# Patient Record
Sex: MALE | Race: WHITE | URBAN - METROPOLITAN AREA
[De-identification: names, ages, dates, MRNs, and addresses within clinical notes are randomized per-mention and may not be internally consistent; named-entity substitution may affect disease eponyms.]

---

## 2023-01-27 ENCOUNTER — TELEPHONE (OUTPATIENT)
Dept: OTHER | Facility: OTHER | Age: 68
End: 2023-01-27

## 2023-01-28 ENCOUNTER — TELEPHONE (OUTPATIENT)
Dept: OTHER | Facility: OTHER | Age: 68
End: 2023-01-28

## 2023-01-28 NOTE — TELEPHONE ENCOUNTER
Jacinto Rosales called, requesting a call back from on call provider, to review new admission order   Provider paged via [facility]

## 2023-01-30 ENCOUNTER — NURSING HOME VISIT (OUTPATIENT)
Dept: GERIATRICS | Facility: OTHER | Age: 68
End: 2023-01-30

## 2023-01-30 DIAGNOSIS — R06.09 DYSPNEA ON EXERTION: Primary | ICD-10-CM

## 2023-01-30 DIAGNOSIS — E66.01 MORBID OBESITY (HCC): ICD-10-CM

## 2023-01-30 DIAGNOSIS — G47.33 OSA (OBSTRUCTIVE SLEEP APNEA): ICD-10-CM

## 2023-01-30 DIAGNOSIS — I50.43 ACUTE ON CHRONIC COMBINED SYSTOLIC AND DIASTOLIC CONGESTIVE HEART FAILURE (HCC): ICD-10-CM

## 2023-01-30 DIAGNOSIS — I10 PRIMARY HYPERTENSION: ICD-10-CM

## 2023-01-30 DIAGNOSIS — R26.2 AMBULATORY DYSFUNCTION: ICD-10-CM

## 2023-01-30 DIAGNOSIS — Z86.711 HISTORY OF PULMONARY EMBOLISM: ICD-10-CM

## 2023-01-30 PROBLEM — I82.409 DVT (DEEP VENOUS THROMBOSIS) (HCC): Status: ACTIVE | Noted: 2023-01-30

## 2023-01-30 PROBLEM — I50.9 CHF (CONGESTIVE HEART FAILURE) (HCC): Status: ACTIVE | Noted: 2023-01-30

## 2023-01-30 NOTE — PROGRESS NOTES
[address]  [address] [facility] 19 [facility], [address] [place] [address] 31  History and Physical    NAME: David Lam  AGE: 69 y o  SEX: male [id number]    DATE OF ENCOUNTER: 2/3/2023    Code status:  CPR    Assessment and Plan     1  Dyspnea on exertion  - non compliant with oxygen and CPAP use  - on chronic 4 lit oxygen via NC  - cont Mucinex 600 mg po bid  - cont duonebs q6     2  Ambulatory dysfunction  - uses walker at baseline  - mostly sedentary at home due to body habitus  - PT/OT ordered  - Fall precautions in place    3  Morbid obesity ([address] )  - due excessive calories and inactivity  - nutrition consult for balanced diet    4  Primary hypertension  - controlled    5  EVERETT (obstructive sleep apnea)  - non compliant with CPAP  - consult resp therapist    6  Acute on chronic combined systolic and diastolic congestive heart failure (HCC)  - improving  - cont fluid restriction 2000 ml  - cont daily weights  - cont Furosemide 40 mg po qd    7  History of pulmonary embolism  - cont coumadin at current dose  - monitor PT/INR      All medications and routine orders were reviewed and updated as needed  Plan discussed with: patient and staff    Chief Complaint     Seen for admission at [address] [place] [address]    History of Present Illness     Consuelo Esposito, a 68 y/o male with PMH of HTN, CHF, M  Obesity, h/o PE/DVT got admitted to the hospital with worsening SOB, due to acute on chronic CHF  Cardiology consulted, treated with IV lasix  Echo was done with no myocardial abnormalities and normal systolic function  Pulmonology consulted, recommended another trial of CPAP  He is on 4 lit of O2 via NC at baseline  He is not tolerant and refused BiPAP and/or CPAP, recommended out patient sleep study follow up  He has sacral and left posterior thigh deep tissue and pressure injury  PT evaluated and recommended physical therapy  He got discharged to [facility] post acute for subacute rehab    He was seen and examined at bedside, stable  He found sitting in bed propped up with pillows  He is c/o about bed, it is small in length  He has chronic back pain  He lives at home with his wife, uses walker, independent of ADLs  Staff have no concerns at this time  HISTORY:  Past Medical History:   Diagnosis Date   • CHF (congestive heart failure) ([facility])    • DVT (deep venous thrombosis) ([facility])    • HTN (hypertension)    • Morbid obesity ([facility])    • EVERETT (obstructive sleep apnea)    • Pulmonary embol[facility] )      History reviewed  No pertinent family history  Social History     Socioeconomic History   • Marital status: /Civil Union     Spouse name: None   • Number of children: None   • Years of education: None   • Highest education level: None   Occupational History   • None   Tobacco Use   • Smoking status: None   • Smokeless tobacco: None   Substance and Sexual Activity   • Alcohol use: None   • Drug use: None   • Sexual activity: None   Other Topics Concern   • None   Social History Narrative   • None     Social Determinants of Health     Financial Resource Strain: Not on file   Food Insecurity: Not on file   Transportation Needs: Not on file   Physical Activity: Not on file   Stress: Not on file   Social Connections: Not on file   Intimate Partner Violence: Not on file   Housing Stability: Not on file       Allergies: Allergies   Allergen Reactions   • Baclofen Other (See Comments)     Nausea and vomiting       Review of Systems     Review of Systems   Constitutional: Positive for activity change and fatigue  HENT: Negative for dental problem, hearing loss and trouble swallowing  Eyes: Negative  Respiratory: Negative  Cardiovascular: Negative  Gastrointestinal: Negative  Genitourinary: Negative  Musculoskeletal: Positive for arthralgias, back pain and gait problem  Neurological: Negative for weakness  Psychiatric/Behavioral: Negative      All other systems reviewed and are negative  As in HPI  Medications and orders     All medications reviewed and updated in Nursing Home EMR  Objective     Vitals: T: 97 3, P: 93, R: 18, BP: 146/64, 96% on 4 lit, Wt: 450 lbs    Physical Exam  Vitals and nursing note reviewed  Constitutional:       General: He is not in acute distress  Appearance: He is well-developed  He is obese  He is not diaphoretic  HENT:      Head: Normocephalic and atraumatic  Nose: Nose normal       Mouth/Throat:      Mouth: Mucous membranes are moist       Pharynx: Oropharynx is clear  No oropharyngeal exudate  Eyes:      General: No scleral icterus  Right eye: No discharge  Left eye: No discharge  Extraocular Movements: Extraocular movements intact  Conjunctiva/sclera: Conjunctivae normal       Comments: Keeps eyes closed mostly  Cardiovascular:      Rate and Rhythm: Normal rate and regular rhythm  Heart sounds: Normal heart sounds  No murmur heard  Pulmonary:      Effort: Pulmonary effort is normal  No respiratory distress  Breath sounds: Normal breath sounds  No wheezing  Chest:      Chest wall: No tenderness  Abdominal:      General: Bowel sounds are normal  There is no distension  Palpations: Abdomen is soft  Tenderness: There is no abdominal tenderness  There is no guarding  Musculoskeletal:         General: No tenderness or deformity  Right lower leg: No edema  Left lower leg: No edema  Comments: Impaired ROM due to body habitus  Skin:     General: Skin is warm and dry  Neurological:      Mental Status: He is alert and oriented to person, place, and time  Cranial Nerves: No cranial nerve deficit  Motor: No abnormal muscle tone  Coordination: Coordination normal    Psychiatric:         Mood and Affect: Mood normal          Behavior: Behavior normal          Pertinent Laboratory/Diagnostic Studies:    The following labs/studies were reviewed please see chart or hospital paperwork for details  Ref Range & Units 1/24/23 0252    Glucose 65 - 99 mg/dL 114 High     BUN 7 - 28 mg/dL 11    Creatinine 0 53 - 1 30 mg/dL 0 63    Sodium 135 - 145 mmol/L 137    Potassium 3 5 - 5 2 mmol/L 4 4    Chloride 100 - 109 mmol/L 89 Low     Carbon Dioxide 23 - 31 mmol/L >40 High     Calcium 8 5 - 10 1 mg/dL 9 1    Anion Gap 3 - 11 Unable to perform calculation      eGFRcr >59 103      Ref Range & Units 1/22/23 0439    Hemoglobin 12 5 - 17 0 g/dL 12 5    Hematocrit 37 0 - 48 0 % 38 9    WBC 4 0 - 10 5 thou/cmm 6 4    RBC 4 00 - 5 40 mill/cmm 3 77 Low     Platelet Count 090 - 350 thou/cmm 174    MPV 7 5 - 11 3 fL 7 6    MCV 80 - 100 fL 103 High     MCH 27 0 - 36 0 pg 33 1    MCHC 32 0 - 37 0 g/dL 32 1    RDW 12 0 - 16 0 % 12 9        - Counseling Documentation: patient was counseled regarding: risk factor reductions

## 2023-02-01 ENCOUNTER — NURSING HOME VISIT (OUTPATIENT)
Dept: GERIATRICS | Facility: OTHER | Age: 68
End: 2023-02-01

## 2023-02-01 VITALS
RESPIRATION RATE: 18 BRPM | WEIGHT: 315 LBS | HEART RATE: 77 BPM | OXYGEN SATURATION: 96 % | SYSTOLIC BLOOD PRESSURE: 144 MMHG | TEMPERATURE: 97.8 F | DIASTOLIC BLOOD PRESSURE: 74 MMHG

## 2023-02-01 DIAGNOSIS — B02.29 NEURALGIA, POST-HERPETIC: ICD-10-CM

## 2023-02-01 DIAGNOSIS — L89.95 PRESSURE INJURY, UNSTAGEABLE, WITH SUSPECTED DEEP TISSUE INJURY (HCC): ICD-10-CM

## 2023-02-01 DIAGNOSIS — R53.81 DEBILITY: ICD-10-CM

## 2023-02-01 DIAGNOSIS — J96.12 CHRONIC RESPIRATORY FAILURE WITH HYPOXIA AND HYPERCAPNIA (HCC): Primary | ICD-10-CM

## 2023-02-01 DIAGNOSIS — I50.812 CHRONIC RIGHT-SIDED CONGESTIVE HEART FAILURE (HCC): ICD-10-CM

## 2023-02-01 DIAGNOSIS — J96.11 CHRONIC RESPIRATORY FAILURE WITH HYPOXIA AND HYPERCAPNIA (HCC): Primary | ICD-10-CM

## 2023-02-01 DIAGNOSIS — F95.2 GILLES DE LA TOURETTE'S SYNDROME: ICD-10-CM

## 2023-02-01 DIAGNOSIS — Z86.711 HISTORY OF PULMONARY EMBOLISM: ICD-10-CM

## 2023-02-01 PROBLEM — J98.4 RESTRICTIVE LUNG DISEASE: Status: ACTIVE | Noted: 2020-04-13

## 2023-02-01 PROBLEM — I50.33 ACUTE ON CHRONIC DIASTOLIC (CONGESTIVE) HEART FAILURE (HCC): Status: ACTIVE | Noted: 2021-12-28

## 2023-02-01 PROBLEM — E66.2 OBESITY HYPOVENTILATION SYNDROME (HCC): Status: ACTIVE | Noted: 2018-02-05

## 2023-02-01 PROBLEM — Z79.899 MEDICAL MARIJUANA USE: Status: ACTIVE | Noted: 2018-09-21

## 2023-02-01 PROBLEM — I27.20 PULMONARY HYPERTENSION (HCC): Status: ACTIVE | Noted: 2018-01-15

## 2023-02-01 PROBLEM — M15.9 DJD (DEGENERATIVE JOINT DISEASE), MULTIPLE SITES: Status: ACTIVE | Noted: 2021-06-30

## 2023-02-01 PROBLEM — Z02.89 PAIN MEDICATION AGREEMENT SIGNED: Status: ACTIVE | Noted: 2017-09-06

## 2023-02-01 RX ORDER — TAMSULOSIN HYDROCHLORIDE 0.4 MG/1
0.4 CAPSULE ORAL
COMMUNITY

## 2023-02-01 RX ORDER — FUROSEMIDE 40 MG/1
40 TABLET ORAL DAILY
COMMUNITY
Start: 2023-01-28 | End: 2024-01-28

## 2023-02-01 RX ORDER — ACETAMINOPHEN 500 MG
1000 TABLET ORAL EVERY 6 HOURS PRN
COMMUNITY

## 2023-02-01 RX ORDER — WARFARIN SODIUM 6 MG/1
9 TABLET ORAL
COMMUNITY

## 2023-02-01 RX ORDER — FEXOFENADINE HCL AND PSEUDOEPHEDRINE HCI 180; 240 MG/1; MG/1
1 TABLET, EXTENDED RELEASE ORAL DAILY PRN
COMMUNITY
Start: 2023-01-27 | End: 2024-01-27

## 2023-02-01 RX ORDER — PREGABALIN 150 MG/1
150 CAPSULE ORAL 4 TIMES DAILY
COMMUNITY
Start: 2022-10-26

## 2023-02-01 RX ORDER — FINASTERIDE 5 MG/1
5 TABLET, FILM COATED ORAL DAILY
COMMUNITY

## 2023-02-01 RX ORDER — IPRATROPIUM BROMIDE AND ALBUTEROL SULFATE 2.5; .5 MG/3ML; MG/3ML
3 SOLUTION RESPIRATORY (INHALATION) 4 TIMES DAILY
COMMUNITY
Start: 2023-01-27 | End: 2024-01-27

## 2023-02-01 RX ORDER — METHOCARBAMOL 500 MG/1
500 TABLET, FILM COATED ORAL 4 TIMES DAILY
COMMUNITY

## 2023-02-01 RX ORDER — CYCLOBENZAPRINE HCL 10 MG
10 TABLET ORAL 3 TIMES DAILY PRN
COMMUNITY
Start: 2023-01-27 | End: 2023-02-01

## 2023-02-01 RX ORDER — RISPERIDONE 1 MG/1
1 TABLET ORAL 3 TIMES DAILY
COMMUNITY
Start: 2022-12-01

## 2023-02-01 NOTE — PROGRESS NOTES
[facility]  [address]  [phone]  FACILITY: [facility]  Code 31 (STR)      NAME: Elaina Stafford  AGE: 69 y o  SEX: male CODE STATUS: CPR    DATE OF ENCOUNTER: 2/1/2023    Assessment and Plan     1  Chronic respiratory failure with hypoxia and hypercapnia (HCC)  Assessment & Plan:  · Secondary to obstructive sleep apnea and Obesity hypoventilation syndrome  · Currently on his baseline home oxygen 4 L nasal cannula  · Patient was unable to tolerate CPAP/BiPAP  · Pulmonary and cardiology were following while inpatient, he was stable from a cardiology standpoint  · Pulmonary recommended CPAP or BiPAP and outpatient sleep study  · Patient is requesting to have a sleep study done while at [facility] will discuss with respiratory therapy   · Continue DuoNebs  · Continue furosemide                  2  Chronic right-sided congestive heart failure ([address] )  Assessment & Plan:  · Continue daily weights  · Continue Lasix 40 mg daily  · Check BMP to monitor renal function on Friday 2/3/23  · Patient was prescribed Flexeril on discharge however there is a high interaction with CHF patient's and possibility of cardiotoxicity  · Will discontinue Flexeril and start Robaxin      3  History of pulmonary embolism  Assessment & Plan:  · On Coumadin 9 mg daily, INR today 2 6, Repeat INR Friday with a CBC  · Patient states at home he takes 9mg Tue-Sun and 10 mg Monday  · Denies any s/s of bleeding       4  Neuralgia, post-herpetic  Assessment & Plan:  · Chronic pain x10 years  · Patient on Lyrica and Tylenol   · Pain medications prescribed by Dr Clif Peña per patient   · Was previously on  Robaxin but switched to [facility] ( significant contraindication with Flexaril and CHF with potential to cause Cardiovascular Toxicity)   · Will switch to Robaxin  · Per [facility] records: Avoid narcotics  He has narcotic seeking tendency              5  Debility  Assessment & Plan:  • Multifactorial in setting of morbid obesity, CHF/EVERETT  • Lives at home with wife who drives, he states he is mostly sedentary, was able to ambulate with RW short distances prior to recent hospital stay  • Continue PT/OT  • Fall Precautions  • Ensure adequate nutrition/hydration   • Monitor CBC/BMP   •  following for d/c planning         6  Fercho de la Tourette's syndrome  Assessment & Plan:  Continue Risperidone 1 mg TID       7  Pressure injury, unstageable, with suspected deep tissue injury [facility])  Assessment & Plan:  · Per review of [facility] records patient presented with a DTI to Sacrum and Left Posterior thigh from home  · Patient states at home he is mostly sedentary, no recent fall/injury noted   Plan:   · Recommend daily skin checks  · Turn and Reposition/Off load  · Keep skin clean and dry  · Elevate heels off bed with Prevelon boots or pillows  · Barrier cream to buttocks and heels          All medications and routine orders were reviewed and updated as needed  Chief Complaint     STR follow up visit    Past Medical and Surgica History      Past Medical History:   Diagnosis Date   • CHF (congestive heart failure) ([facility] )    • DVT (deep venous thrombosis) ([facility])    • HTN (hypertension)    • Morbid obesity (HCC)    • EVERETT (obstructive sleep apnea)    • Pulmonary embolism (HCC)      No past surgical history on file  Allergies   Allergen Reactions   • Baclofen Other (See Comments)     Nausea and vomiting          History of Present Illness     Celso Pily "Varun Jimenez" is a 61-year-[facility] hospitalization for CHF exacerbation  Past medical history of chronic respiratory failure on 4L baseline, diastolic CHF, DVT/PE on coumadin, HTN, morbid obesity, ambulatory dysfunction  Patient is seen today for routine short-term rehab follow-up visit  Patient initially presented to [facility] on 1/20/2023 with complaint of increasing VALERIO x 1 month   He was diagnosed with acute on chronic CHF  He noted a 10 lb weight gain  He was hypoxic 80% on his home 4L and put on 6 L by EMS  CXR revealed cardiomegaly with interstitial pulmonary edema and basilar effusion in the LLL with small effusion on the right  He was treated with 40 mg IV lasix  Cardiology was consulted, recommended obtaining an echocardiogram which revealed LVEF of 60 to 65%, wall motion could not be accurately assessed but appears grossly normal, indeterminate diastolic function due to very technically difficult study  Cardiology felt patient's dyspnea on exertion was not associated with cardiac abnormalities and recommended outpatient cardiology may need a right heart cath at some point, cautioned use of high-dose diuretics which will contribute to his hypokalemia and metabolic alkalosis  Pulmonology was consulted, he was recommended CPAP but was unable to tolerate  He was diagnosed with severe chronic hypercapnic and hypoxic respiratory failure due to EVERETT and OHS intolerant of CPAP as well as BiPAP  Per review of hospital records, May need outpatient PSG for titration but patient not interested because he cannot tolerate BiPAP or CPAP  Patient and wife were concerned about his distended abdomen, ultrasound of the abdomen negative for ascites  Fatty abdomen and obesity causing difficulty with breathing and dyspnea on exertion again noted he was unable to tolerate CPAP/BiPAP  He was noted to have a deep tissue pressure injury to sacrum and left posterior thigh present on admission to hospital   Patient was evaluated by PT/OT multiple times with slow improvement he was able to ambulate about 30 feet and he was recommended discharge to short-term rehab  Patient was seen and examined at bedside in stable condition  Patient states yesterday was a good day however today he is not feeling as well, his main complaint is fatigue  States he has continued shortness of breath    As well as his chronic back pain  Patient is agreeable to labs being drawn on Friday  Patient is requesting to see if he can have a sleep study done while at SNF due to issue with transportation once home  Patient denies any CP/palpitations  Denies any nausea vomiting or diarrhea  Denies any abdominal pain on exam   Denies any bowel or bladder issues  Staff have no concerns at this time  The patient's allergies, past medical, surgical, social and family history were reviewed and unchanged  Review of Systems     Review of Systems   Constitutional: Positive for fatigue  Negative for activity change, appetite change and fever  HENT: Negative for ear pain, rhinorrhea and trouble swallowing  Eyes: Negative for pain and visual disturbance  Respiratory: Positive for shortness of breath  Negative for cough and wheezing  Cardiovascular: Negative for chest pain and palpitations  Gastrointestinal: Negative for abdominal distention, abdominal pain, blood in stool, constipation, diarrhea, nausea and vomiting  Genitourinary: Negative for decreased urine volume, difficulty urinating, dysuria, frequency and hematuria  Musculoskeletal: Positive for gait problem  Negative for arthralgias and back pain  Skin: Positive for wound  Negative for color change and rash  Neurological: Positive for weakness  Negative for dizziness, syncope, light-headedness, numbness and headaches  Psychiatric/Behavioral: Negative for dysphoric mood and sleep disturbance  Objective     Vitals:   Vitals:    02/01/23 0837   BP: 144/74   Pulse: 77   Resp: 18   Temp: 97 8 °F (36 6 °C)   SpO2: 96%         Physical Exam  Vitals and nursing note reviewed  Constitutional:       General: He is not in acute distress  Appearance: Normal appearance  HENT:      Head: Normocephalic and atraumatic  Nose: No congestion or rhinorrhea        Mouth/Throat:      Mouth: Mucous membranes are moist    Eyes:      General: No scleral icterus  Extraocular Movements: Extraocular movements intact  Conjunctiva/sclera: Conjunctivae normal       Pupils: Pupils are equal, round, and reactive to light  Cardiovascular:      Rate and Rhythm: Normal rate and regular rhythm  Pulses: Normal pulses  Heart sounds: Normal heart sounds  No murmur heard  Pulmonary:      Effort: Pulmonary effort is normal       Breath sounds: Normal breath sounds  No wheezing, rhonchi or rales  Abdominal:      General: Bowel sounds are normal  There is no distension  Palpations: Abdomen is soft  Tenderness: There is no abdominal tenderness  Musculoskeletal:         General: No swelling or signs of injury  Lymphadenopathy:      Cervical: No cervical adenopathy  Skin:     General: Skin is warm and dry  Findings: No erythema  Neurological:      Mental Status: He is alert and oriented to person, place, and time  Sensory: No sensory deficit  Motor: No weakness  Gait: Gait normal    Psychiatric:         Mood and Affect: Mood normal          Behavior: Behavior normal          Pertinent Laboratory/Diagnostic Studies:   Reviewed in facility chart-stable      Current Medications   Medications reviewed and updated see facility [facility] for details        Current Outpatient Medications:   •  fexofenadine-pseudoephedrine (ALLEGRA-D 24) 180-240 MG per 24 hr tablet, Take 1 tablet by mouth daily as needed, Disp: , Rfl:   •  furosemide (LASIX) 40 mg tablet, Take 40 mg by mouth daily, Disp: , Rfl:   •  ipratropium-albuterol (DUO-NEB) 0 5-2 5 mg/3 mL nebulizer solution, Inhale 3 mL 4 (four) times a day, Disp: , Rfl:   •  methocarbamol (ROBAXIN) 500 mg tablet, Take 500 mg by mouth 4 (four) times a day, Disp: , Rfl:   •  miconazole (MICOTIN) 2 % powder, Apply 1 application topically 2 (two) times a day, Disp: , Rfl:   •  warfarin (COUMADIN) 6 mg tablet, Take 9 mg by mouth daily, Disp: , Rfl:   •  acetaminophen (TYLENOL) 500 mg tablet, Take 1,000 mg by mouth every 6 (six) hours as needed, Disp: , Rfl:   •  finasteride (PROSCAR) 5 mg tablet, Take 5 mg by mouth daily, Disp: , Rfl:   •  pregabalin (LYRICA) 150 mg capsule, Take 150 mg by mouth 4 (four) times a day, Disp: , Rfl:   •  risperiDONE (RisperDAL) 1 mg tablet, Take 1 mg by mouth 3 (three) times a day, Disp: , Rfl:   •  tamsulosin (FLOMAX) 0 4 mg, Take 0 4 mg by mouth daily at bedtime, Disp: , Rfl:      Plan discussed with Dr Promise Meyers noted agreement with assessment and plan  Please note:  Voice-recognition software may have been used in the preparation of this document  Occasional wrong word or "sound-alike" substitutions may have occurred due to the inherent limitations of voice recognition software  Interpretation should be guided by context           MO Givens  2/1/2023  8:30 AM

## 2023-02-02 NOTE — ASSESSMENT & PLAN NOTE
· Secondary to obstructive sleep apnea and Obesity hypoventilation syndrome  · Currently on his baseline home oxygen 4 L nasal cannula  · Patient was unable to tolerate CPAP/BiPAP  · Pulmonary and cardiology were following while inpatient, he was stable from a cardiology standpoint  · Pulmonary recommended CPAP or BiPAP and outpatient sleep study  · Patient is requesting to have a sleep study done while at SNF will discuss with respiratory therapy   · Continue DuoNebs  · Continue furosemide

## 2023-02-02 NOTE — ASSESSMENT & PLAN NOTE
· Continue daily weights  · Continue Lasix 40 mg daily  · Check BMP to monitor renal function on Friday 2/3/23  · Patient was prescribed Flexeril on discharge however there is a high interaction with CHF patient's and possibility of cardiotoxicity  · Will discontinue Flexeril and start Robaxin

## 2023-02-02 NOTE — ASSESSMENT & PLAN NOTE
· On Coumadin 9 mg daily, INR today 2 6, Repeat INR Friday with a CBC  · Patient states at home he takes 9mg Tue-Sun and 10 mg Monday  · Denies any s/s of bleeding

## 2023-02-02 NOTE — ASSESSMENT & PLAN NOTE
· Per review of [facility] records patient presented with a DTI to Sacrum and Left Posterior thigh from home  · Patient states at home he is mostly sedentary, no recent fall/injury noted   Plan:   · Recommend daily skin checks  · Turn and Reposition/Off load  · Keep skin clean and dry  · Elevate heels off bed with Prevelon boots or pillows  · Barrier cream to buttocks and heels

## 2023-02-02 NOTE — ASSESSMENT & PLAN NOTE
· Chronic pain x10 years  · Patient on Lyrica and Tylenol   · Pain medications prescribed by Dr Larose Bosworth per patient   · Was previously on  Robaxin but switched to [facility] ( significant contraindication with Flexaril and CHF with potential to cause Cardiovascular Toxicity)   · Will switch to Robaxin  · Per [facility] records: Avoid narcotics  He has narcotic seeking tendency

## 2023-02-03 ENCOUNTER — NURSING HOME VISIT (OUTPATIENT)
Dept: GERIATRICS | Facility: OTHER | Age: 68
End: 2023-02-03

## 2023-02-03 VITALS
SYSTOLIC BLOOD PRESSURE: 144 MMHG | WEIGHT: 315 LBS | HEART RATE: 89 BPM | OXYGEN SATURATION: 97 % | DIASTOLIC BLOOD PRESSURE: 83 MMHG | RESPIRATION RATE: 18 BRPM | TEMPERATURE: 97.8 F

## 2023-02-03 DIAGNOSIS — B02.29 NEURALGIA, POST-HERPETIC: ICD-10-CM

## 2023-02-03 DIAGNOSIS — R53.81 DEBILITY: ICD-10-CM

## 2023-02-03 DIAGNOSIS — J96.11 CHRONIC RESPIRATORY FAILURE WITH HYPOXIA AND HYPERCAPNIA (HCC): Primary | ICD-10-CM

## 2023-02-03 DIAGNOSIS — F95.2 GILLES DE LA TOURETTE'S SYNDROME: ICD-10-CM

## 2023-02-03 DIAGNOSIS — L89.95 PRESSURE INJURY, UNSTAGEABLE, WITH SUSPECTED DEEP TISSUE INJURY (HCC): ICD-10-CM

## 2023-02-03 DIAGNOSIS — J96.12 CHRONIC RESPIRATORY FAILURE WITH HYPOXIA AND HYPERCAPNIA (HCC): Primary | ICD-10-CM

## 2023-02-03 DIAGNOSIS — I50.812 CHRONIC RIGHT-SIDED CONGESTIVE HEART FAILURE (HCC): ICD-10-CM

## 2023-02-03 DIAGNOSIS — I27.20 PULMONARY HYPERTENSION (HCC): ICD-10-CM

## 2023-02-03 NOTE — ASSESSMENT & PLAN NOTE
· Continue daily weights  · Continue Lasix 40 mg daily  · BUN 10 creatinine 0 44  (2/3/23)  · Patient was prescribed Flexeril on discharge however there is a high interaction with CHF patient's and possibility of cardiotoxicity  · Proving Robaxin

## 2023-02-03 NOTE — PROGRESS NOTES
[facility]  [address]  [phone]  FACILITY: [facility]  Code 31 (STR)      NAME: Rafita Bazzi  AGE: 69 y o  SEX: male CODE STATUS: CPR    DATE OF ENCOUNTER: 2/3/2023    Assessment and Plan     1  Chronic respiratory failure with hypoxia and hypercapnia (HCC)  Assessment & Plan:  · Secondary to obstructive sleep apnea and Obesity hypoventilation syndrome  · Currently on his baseline home oxygen 4 L nasal cannula  · Patient was unable to tolerate CPAP/BiPAP  · Pulmonary and cardiology were following while inpatient, he was stable from a cardiology standpoint    · On exam today he is falling asleep during exam, he is having some confusion per staff, thinks he is in the hospital, he has muscle twitching  · His CO2 on labs today is >40   · Spoke with RT- Start Bipap 12/6 with 3L NC now and then Q HS and PRN while napping   · Continue DuoNebs  · Continue furosemide  · Continue Mucinex                 2  Chronic right-sided congestive heart failure ([facility] [address] )  Assessment & Plan:  · Continue daily weights  · Continue Lasix 40 mg daily  · BUN 10 creatinine 0 44  (2/3/23)  · Patient was prescribed Flexeril on discharge however there is a high interaction with CHF patient's and possibility of cardiotoxicity  · Proving Robaxin      3  Pulmonary hypertension ([facility] [address] )  Assessment & Plan:  Ordered Bipap 12/6 at lower setting to see if patient can tolerate   Continue lasix         4  Fercho de la Tourette's syndrome  Assessment & Plan:  Continue Risperidone 1 mg TID       5   Pressure injury, unstageable, with suspected deep tissue injury [facility])  Assessment & Plan:  · Per review of [facility] records patient presented with a DTI to Sacrum and Left Posterior thigh from home  · Patient states at home he is mostly sedentary, no recent fall/injury noted     Staff state they checked this today- the area is blanchable which would indicate not actually DTI    Plan:   · Recommend daily skin checks  · Turn and Reposition/Off load  · Keep skin clean and dry  · Elevate heels off bed with Prevelon boots or pillows  · Barrier cream to buttocks and heels       6  Debility  Assessment & Plan:  • Multifactorial in setting of morbid obesity, CHF/EVERETT  • Lives at home with wife who drives, he states he is mostly sedentary, was able to ambulate with RW short distances prior to recent hospital stay  • Continue PT/OT  • Fall Precautions  • Ensure adequate nutrition/hydration   • Monitor CBC/BMP   •  following for d/c planning         7  Neuralgia, post-herpetic  Assessment & Plan:  · Chronic pain x10 years  · Patient on Lyrica and Tylenol   · Pain medications prescribed by Dr Lashae Madrid per patient   · Was previously on  Robaxin but switched to [facility] ( significant contraindication with Flexaril and CHF with potential to cause Cardiovascular Toxicity)   · Will switch to Robaxin  · Per [facility] records: Avoid narcotics  He has narcotic seeking tendency  All medications and routine orders were reviewed and updated as needed  Chief Complaint     STR follow up visit    Past Medical and Surgica History      Past Medical History:   Diagnosis Date   • CHF (congestive heart failure) ([facility] [address] )    • DVT (deep venous thrombosis) (HCC)    • HTN (hypertension)    • Morbid obesity (HCC)    • EVERETT (obstructive sleep apnea)    • Pulmonary embolism (HCC)      No past surgical history on file  Allergies   Allergen Reactions   • Baclofen Other (See Comments)     Nausea and vomiting          History of Present Illness     Kilo Mo" is a 20-year-old male [facility] following hospitalization for CHF exacerbation  Past medical history of chronic respiratory failure on 4L baseline, diastolic CHF, DVT/PE on coumadin, HTN, morbid obesity, ambulatory dysfunction  Patient is seen today for routine short-term rehab follow-up visit      Patient initially presented to [facility] on 1/20/2023 with complaint of increasing VALERIO x 1 month  He was diagnosed with acute on chronic CHF  He noted a 10 lb weight gain  He was hypoxic 80% on his home 4L and put on 6 L by EMS  CXR revealed cardiomegaly with interstitial pulmonary edema and basilar effusion in the LLL with small effusion on the right  He was treated with 40 mg IV lasix  Cardiology was consulted, recommended obtaining an echocardiogram which revealed LVEF of 60 to 65%, wall motion could not be accurately assessed but appears grossly normal, indeterminate diastolic function due to very technically difficult study  Cardiology felt patient's dyspnea on exertion was not associated with cardiac abnormalities and recommended outpatient cardiology may need a right heart cath at some point, cautioned use of high-dose diuretics which will contribute to his hypokalemia and metabolic alkalosis  Pulmonology was consulted, he was recommended CPAP but was unable to tolerate  He was diagnosed with severe chronic hypercapnic and hypoxic respiratory failure due to EVERETT and OHS intolerant of CPAP as well as BiPAP  Per review of hospital records, May need outpatient PSG for titration but patient not interested because he cannot tolerate BiPAP or CPAP  Patient and wife were concerned about his distended abdomen, ultrasound of the abdomen negative for ascites  Fatty abdomen and obesity causing difficulty with breathing and dyspnea on exertion again noted he was unable to tolerate CPAP/BiPAP  He was noted to have a deep tissue pressure injury to sacrum and left posterior thigh present on admission to hospital   Patient was evaluated by PT/OT multiple times with slow improvement he was able to ambulate about 30 feet and he was recommended discharge to short-term rehab  Patient was seen and examined at bedside   He is confused today, thinks he is in the hospital, he is asking for more pain medication, discussed we will schedule Tylenol RTC, he was agreeable to this  He is falling alsleep during my interview  Concerning for CO2 retention, I spoke with patient , agreeable to trial BIpap again  Spoke with Resp  Therapy recommended settings of 12/6  Staff noted some increased confusion  Patient states he was out of bed today for about 1 hours working with PT  The patient's allergies, past medical, surgical, social and family history were reviewed and unchanged  Review of Systems     Review of Systems   Constitutional: Positive for fatigue  Negative for activity change, appetite change and fever  HENT: Negative for ear pain, rhinorrhea and trouble swallowing  Eyes: Negative for pain and visual disturbance  Respiratory: Positive for shortness of breath  Negative for cough and wheezing  Cardiovascular: Negative for chest pain and palpitations  Gastrointestinal: Negative for abdominal distention, abdominal pain, blood in stool, constipation, diarrhea, nausea and vomiting  Genitourinary: Negative for decreased urine volume, difficulty urinating, dysuria, frequency and hematuria  Musculoskeletal: Positive for gait problem  Negative for arthralgias and back pain  Skin: Positive for wound  Negative for color change and rash  Neurological: Positive for weakness  Negative for dizziness, syncope, light-headedness, numbness and headaches  Psychiatric/Behavioral: Negative for dysphoric mood and sleep disturbance  Objective     Vitals:   Vitals:    02/03/23 0951   BP: 144/83   Pulse: 89   Resp: 18   Temp: 97 8 °F (36 6 °C)   SpO2: 97%         Physical Exam  Vitals and nursing note reviewed  Constitutional:       General: He is not in acute distress  Appearance: Normal appearance  HENT:      Head: Normocephalic and atraumatic  Nose: No congestion or rhinorrhea  Mouth/Throat:      Mouth: Mucous membranes are moist    Eyes:      General: No scleral icterus  Extraocular Movements: Extraocular movements intact  Conjunctiva/sclera: Conjunctivae normal       Pupils: Pupils are equal, round, and reactive to light  Cardiovascular:      Rate and Rhythm: Normal rate and regular rhythm  Pulses: Normal pulses  Heart sounds: Normal heart sounds  No murmur heard  Pulmonary:      Effort: Pulmonary effort is normal       Breath sounds: Normal breath sounds  No wheezing, rhonchi or rales  Comments: Diminished, very poor effort   Abdominal:      General: Bowel sounds are normal  There is no distension  Palpations: Abdomen is soft  Tenderness: There is no abdominal tenderness  Musculoskeletal:         General: No swelling or signs of injury  Right lower leg: No edema  Left lower leg: No edema  Lymphadenopathy:      Cervical: No cervical adenopathy  Skin:     General: Skin is warm and dry  Findings: No erythema  Neurological:      Mental Status: He is alert  He is disoriented  Sensory: No sensory deficit  Motor: Weakness present  Gait: Gait abnormal    Psychiatric:         Mood and Affect: Mood normal          Behavior: Behavior normal          Pertinent Laboratory/Diagnostic Studies:   Reviewed in facility chart-stable      Current Medications   Medications reviewed and updated see facility [facility] for details        Current Outpatient Medications:   •  acetaminophen (TYLENOL) 500 mg tablet, Take 1,000 mg by mouth every 6 (six) hours as needed, Disp: , Rfl:   •  fexofenadine-pseudoephedrine (ALLEGRA-D 24) 180-240 MG per 24 hr tablet, Take 1 tablet by mouth daily as needed, Disp: , Rfl:   •  finasteride (PROSCAR) 5 mg tablet, Take 5 mg by mouth daily, Disp: , Rfl:   •  furosemide (LASIX) 40 mg tablet, Take 40 mg by mouth daily, Disp: , Rfl:   •  ipratropium-albuterol (DUO-NEB) 0 5-2 5 mg/3 mL nebulizer solution, Inhale 3 mL 4 (four) times a day, Disp: , Rfl:   •  methocarbamol (ROBAXIN) 500 mg tablet, Take 500 mg by mouth 4 (four) times a day, Disp: , Rfl:   •  miconazole (MICOTIN) 2 % powder, Apply 1 application topically 2 (two) times a day, Disp: , Rfl:   •  pregabalin (LYRICA) 150 mg capsule, Take 150 mg by mouth 4 (four) times a day, Disp: , Rfl:   •  risperiDONE (RisperDAL) 1 mg tablet, Take 1 mg by mouth 3 (three) times a day, Disp: , Rfl:   •  tamsulosin (FLOMAX) 0 4 mg, Take 0 4 mg by mouth daily at bedtime, Disp: , Rfl:   •  warfarin (COUMADIN) 6 mg tablet, Take 9 mg by mouth daily, Disp: , Rfl:      Plan discussed with Dr Alexi Moreno noted agreement with assessment and plan  Please note:  Voice-recognition software may have been used in the preparation of this document  Occasional wrong word or "sound-alike" substitutions may have occurred due to the inherent limitations of voice recognition software  Interpretation should be guided by MO Lamar  2/3/2023  9:52 AM

## 2023-02-03 NOTE — ASSESSMENT & PLAN NOTE
· Secondary to obstructive sleep apnea and Obesity hypoventilation syndrome  · Currently on his baseline home oxygen 4 L nasal cannula  · Patient was unable to tolerate CPAP/BiPAP  · Pulmonary and cardiology were following while inpatient, he was stable from a cardiology standpoint    · On exam today he is falling asleep during exam, he is having some confusion per staff, thinks he is in the hospital, he has muscle twitching  · His CO2 on labs today is >40   · Spoke with RT- Start Bipap 12/6 with 3L NC now and then Q HS and PRN while napping   · Continue DuoNebs  · Continue furosemide  · Continue Mucinex

## 2023-02-03 NOTE — ASSESSMENT & PLAN NOTE
· Chronic pain x10 years  · Patient on Lyrica and Tylenol   · Pain medications prescribed by Dr Isamar Ferrer per patient   · Was previously on  Robaxin but switched to [facility] ( significant contraindication with Flexaril and CHF with potential to cause Cardiovascular Toxicity)   · Will switch to Robaxin  · Per [facility] records: Avoid narcotics  He has narcotic seeking tendency

## 2023-02-03 NOTE — ASSESSMENT & PLAN NOTE
· Per review of [facility] records patient presented with a DTI to Sacrum and Left Posterior thigh from home  · Patient states at home he is mostly sedentary, no recent fall/injury noted     Staff state they checked this today- the area is blanchable which would indicate not actually DTI    Plan:   · Recommend daily skin checks  · Turn and Reposition/Off load  · Keep skin clean and dry  · Elevate heels off bed with Prevelon boots or pillows  · Barrier cream to buttocks and heels

## 2023-02-03 NOTE — ASSESSMENT & PLAN NOTE
• Multifactorial in setting of morbid obesity, CHF/EVERETT  • Lives at home with wife who drives, he states he is mostly sedentary, was able to ambulate with RW short distances prior to recent hospital stay  • Continue PT/OT  • Fall Precautions  • Ensure adequate nutrition/hydration   • Monitor CBC/BMP   •  following for d/c planning

## 2023-02-04 ENCOUNTER — TELEPHONE (OUTPATIENT)
Dept: OTHER | Facility: OTHER | Age: 68
End: 2023-02-04

## 2023-03-06 ENCOUNTER — TELEPHONE (OUTPATIENT)
Dept: OTHER | Facility: OTHER | Age: 68
End: 2023-03-06

## 2023-03-06 DIAGNOSIS — B02.29 NEURALGIA, POST-HERPETIC: Primary | ICD-10-CM

## 2023-03-06 RX ORDER — PREGABALIN 150 MG/1
150 CAPSULE ORAL 4 TIMES DAILY
Qty: 4 CAPSULE | Refills: 0 | Status: SHIPPED | OUTPATIENT
Start: 2023-03-06

## 2023-03-06 NOTE — TELEPHONE ENCOUNTER
Sonia Goodson from [facility] called to review new admission orders with on call  On call notified via TC

## 2023-03-07 ENCOUNTER — NURSING HOME VISIT (OUTPATIENT)
Dept: FAMILY MEDICINE CLINIC | Facility: HOSPITAL | Age: 68
End: 2023-03-07

## 2023-03-07 DIAGNOSIS — J96.12 CHRONIC RESPIRATORY FAILURE WITH HYPOXIA AND HYPERCAPNIA (HCC): Primary | ICD-10-CM

## 2023-03-07 DIAGNOSIS — E66.01 MORBID OBESITY (HCC): ICD-10-CM

## 2023-03-07 DIAGNOSIS — I50.33 ACUTE ON CHRONIC DIASTOLIC (CONGESTIVE) HEART FAILURE (HCC): ICD-10-CM

## 2023-03-07 DIAGNOSIS — Z74.09 IMPAIRED FUNCTIONAL MOBILITY, BALANCE, GAIT, AND ENDURANCE: ICD-10-CM

## 2023-03-07 DIAGNOSIS — G47.33 SEVERE OBSTRUCTIVE SLEEP APNEA: ICD-10-CM

## 2023-03-07 DIAGNOSIS — J96.11 CHRONIC RESPIRATORY FAILURE WITH HYPOXIA AND HYPERCAPNIA (HCC): Primary | ICD-10-CM

## 2023-03-07 DIAGNOSIS — F95.2 GILLES DE LA TOURETTE'S SYNDROME: ICD-10-CM

## 2023-03-07 PROBLEM — N40.0 BPH (BENIGN PROSTATIC HYPERPLASIA): Status: ACTIVE | Noted: 2023-02-08

## 2023-03-07 PROBLEM — Z79.01 CHRONIC ANTICOAGULATION: Status: ACTIVE | Noted: 2023-02-04

## 2023-03-07 PROBLEM — R26.2 AMBULATORY DYSFUNCTION: Status: ACTIVE | Noted: 2021-03-02

## 2023-03-07 PROBLEM — R53.1 GENERALIZED WEAKNESS: Status: ACTIVE | Noted: 2023-03-07

## 2023-03-08 ENCOUNTER — TELEPHONE (OUTPATIENT)
Dept: OTHER | Facility: OTHER | Age: 68
End: 2023-03-08

## 2023-03-08 NOTE — PROGRESS NOTES
[facility]  [address]  [phone]  [facility]  Code 31 (STR)        NAME: Isidro Rice  AGE: 69 y o  SEX: male CODE STATUS: CPR    DATE OF ENCOUNTER: 3/9/2023    Assessment and Plan     1  Acute on chronic right-sided congestive heart failure [facility])  Assessment & Plan:  Wt Readings from Last 3 Encounters:   03/09/23 (!) 203 kg (447 lb 3 2 oz)   02/03/23 (!) 205 kg (452 lb)   02/01/23 (!) 205 kg (451 lb 12 8 oz)   · patient admitted to hospital for SOB  · CXR showed congestion vs pneumonia  · Patient was treated with Lasix  · Continue Lasix 40 mg daily  · Continue daily weights  · Monitor for s/s of fluid overload          2  Ambulatory dysfunction  Assessment & Plan:  Multifactorial in the setting of obesity, multiple chronic conditions  Continue PT/OT  Fall Precautions  Ensure adequate nutrition/hydration   Monitor CBC/BMP    following for d/c planning         3  Deep vein thrombosis (DVT) of proximal lower extremity, unspecified chronicity, unspecified laterality (HCC)  Assessment & Plan:  · DVT history   · INR 3/7/23 2 5  · No s/sof active bleeding reported from staff  · Continue coumadin 4 mg daily      4  Severe obstructive sleep apnea  Assessment & Plan:  · Chronic O2 4L  · Advised to continue CPAP      5  Cerumen debris on tympanic membrane of both ears  Assessment & Plan:  · Patient c/o decreased hearing  · B/l cerumen noted   · Will order debrox 5 gtt b/l ears x 4 days  · Reassess monday         All medications and routine orders were reviewed and updated as needed  Chief Complaint     STR follow up visit    Patient's care was coordinated with nursing facility staff  Recent vitals, labs, and updated medications were review on [facility] system in facility    Past Medical and Surgical History      Past Medical History:   Diagnosis Date   • CHF (congestive heart failure) ([facility] [address] )    • DVT (deep venous thrombosis) (HCC)    • HTN (hypertension)    • Morbid obesity (HCC)    • EVERETT (obstructive sleep apnea)    • Pulmonary embolism ([address] )      History reviewed  No pertinent surgical history  Allergies   Allergen Reactions   • Baclofen Other (See Comments)     Nausea and vomiting          History of Present Illness     HPI Rozella Lundborg is a 69year old male, she/he is a LTC resident/STR patient of [facility] since 3/6/23  Past Medical Hx including but not limited to EVERETT, CHF, HTN, restrictive lung disease, DVT, BPH, PE, Tourette's  He was seen in collaboration with nursing for medical mgmt and STR follow up  Hospital course  Patient was admitted to the hospital 2/4/2023 - 3/6/2023 for shortness of breath  Patient was treated for acute on chronic respiratory failure with hypoxia and hypercapnia  CXR showed congestion vs pneumonia  No fever or leukocytosis suggesting infection  PCO2 111, improved with CPAP  Patient was given PO lasix, and was transitioned to O2 4L NC  Patient was recommended for rehab and discharged to [facility]  Rehab Course  Oziel Mtz was seen and examined at bedside today  Patient is a reliable historian and is AAOx3  On exam patient is resting in bed and does not appear to be in any distress  He offers multiple complaints about the facility  He states his ears feels blocked, on inspection he is noted to have large amount of cerumen in b/l ears, right side impacted  Will order debrox 5 gtts to b/l ears x 4 days and reevaluate at that time  He denies CP/SOB/N/V/D  Denies lightheadedness, dizziness, headaches, vision changes  Patient states they are eating well and staying hydrated  Per review of SNF records, Patient is eating 3 meals per day, consuming  %  Last documented BM 3/9/23  No concerns from nursing at this time  The patient's allergies, past medical, surgical, social and family history were reviewed and unchanged      Review of Systems     Review of Systems   Constitutional: Positive for activity change  Negative for appetite change, chills and fever  HENT: Positive for hearing loss  Eyes: Negative  Respiratory: Positive for shortness of breath  Negative for cough and wheezing  Chronic O2 4 L   Cardiovascular: Positive for leg swelling  Negative for chest pain and palpitations  Gastrointestinal: Negative for abdominal pain, constipation, diarrhea, nausea and vomiting  Patient reports having a formed BM with each meal   Endocrine: Negative  Genitourinary: Negative for difficulty urinating  Musculoskeletal: Positive for gait problem  Skin: Negative  Allergic/Immunologic: Negative  Neurological: Positive for weakness  Negative for dizziness, light-headedness and headaches  Hematological: Negative  Psychiatric/Behavioral: Negative for sleep disturbance  Objective     Vitals:   Vitals:    03/09/23 1330   BP: 137/69   Pulse: 89   Resp: 18   Temp: 97 9 °F (36 6 °C)   SpO2: 96%         Physical Exam  Vitals and nursing note reviewed  Constitutional:       General: He is not in acute distress  Appearance: He is obese  He is not ill-appearing  HENT:      Head: Normocephalic and atraumatic  Right Ear: There is impacted cerumen  Nose: No congestion  Mouth/Throat:      Mouth: Mucous membranes are moist    Eyes:      Conjunctiva/sclera: Conjunctivae normal    Cardiovascular:      Rate and Rhythm: Normal rate and regular rhythm  Heart sounds: Normal heart sounds  Pulmonary:      Effort: Pulmonary effort is normal  No respiratory distress  Breath sounds: No wheezing, rhonchi or rales  Abdominal:      General: Bowel sounds are normal  There is no distension  Palpations: Abdomen is soft  Tenderness: There is no abdominal tenderness  Musculoskeletal:      Right lower leg: Edema present  Left lower leg: Edema present  Skin:     General: Skin is warm     Neurological:      Mental Status: He is alert and oriented to person, place, and time  Motor: Weakness present  Gait: Gait abnormal    Psychiatric:         Mood and Affect: Mood normal          Behavior: Behavior normal          Pertinent Laboratory/Diagnostic Studies:   Reviewed in facility chart-stable      Current Medications   Medications reviewed and updated see facility [facility] for details  Current Outpatient Medications:   •  acetaminophen (TYLENOL) 500 mg tablet, Take 1,000 mg by mouth every 6 (six) hours as needed, Disp: , Rfl:   •  b complex vitamins capsule, Take 1 capsule by mouth daily, Disp: , Rfl:   •  Cholecalciferol 125 MCG (5000 UT) capsule, Take 5,000 Units by mouth daily, Disp: , Rfl:   •  fexofenadine (ALLEGRA) 180 MG tablet, Take 180 mg by mouth daily, Disp: , Rfl:   •  finasteride (PROSCAR) 5 mg tablet, Take 5 mg by mouth daily, Disp: , Rfl:   •  furosemide (LASIX) 40 mg tablet, Take 40 mg by mouth daily, Disp: , Rfl:   •  ipratropium-albuterol (DUO-NEB) 0 5-2 5 mg/3 mL nebulizer solution, Inhale 3 mL 4 (four) times a day, Disp: , Rfl:   •  methocarbamol (ROBAXIN) 500 mg tablet, Take 500 mg by mouth 4 (four) times a day, Disp: , Rfl:   •  pregabalin (LYRICA) 150 mg capsule, Take 1 capsule (150 mg total) by mouth 4 (four) times a day, Disp: 4 capsule, Rfl: 0  •  risperiDONE (RisperDAL) 1 mg tablet, Take 1 mg by mouth 3 (three) times a day, Disp: , Rfl:   •  tamsulosin (FLOMAX) 0 4 mg, Take 0 4 mg by mouth daily at bedtime, Disp: , Rfl:   •  traMADol (ULTRAM) 50 mg tablet, Take 50 mg by mouth every 6 (six) hours as needed, Disp: , Rfl:   •  warfarin (COUMADIN) 6 mg tablet, Take 9 mg by mouth daily, Disp: , Rfl:      Please note:  Voice-recognition software may have been used in the preparation of this document  Occasional wrong word or "sound-alike" substitutions may have occurred due to the inherent limitations of voice recognition software  Interpretation should be guided by context           [facility] MO Huber  3/9/2023 2:03 PM

## 2023-03-08 NOTE — TELEPHONE ENCOUNTER
[phone] or [phone]/LOGAN or Pancho from [employer] [facility]/Confirmation needed to hold med order  Sarah Sevilla paged via TC    Please allow the on-call provider 20-30 mins to respond  If you have not heard from them within that time, please feel free to call back

## 2023-03-08 NOTE — TELEPHONE ENCOUNTER
Pancho from [address] called back since the on call provider has not called  A tiger connect message was sent to the on call provider again regarding medication order

## 2023-03-09 ENCOUNTER — NURSING HOME VISIT (OUTPATIENT)
Dept: GERIATRICS | Facility: OTHER | Age: 68
End: 2023-03-09

## 2023-03-09 VITALS
TEMPERATURE: 97.9 F | RESPIRATION RATE: 18 BRPM | WEIGHT: 315 LBS | HEART RATE: 89 BPM | OXYGEN SATURATION: 96 % | DIASTOLIC BLOOD PRESSURE: 69 MMHG | SYSTOLIC BLOOD PRESSURE: 137 MMHG

## 2023-03-09 DIAGNOSIS — I82.4Y9 DEEP VEIN THROMBOSIS (DVT) OF PROXIMAL LOWER EXTREMITY, UNSPECIFIED CHRONICITY, UNSPECIFIED LATERALITY (HCC): ICD-10-CM

## 2023-03-09 DIAGNOSIS — G47.33 SEVERE OBSTRUCTIVE SLEEP APNEA: ICD-10-CM

## 2023-03-09 DIAGNOSIS — H61.23 CERUMEN DEBRIS ON TYMPANIC MEMBRANE OF BOTH EARS: ICD-10-CM

## 2023-03-09 DIAGNOSIS — I50.813 ACUTE ON CHRONIC RIGHT-SIDED CONGESTIVE HEART FAILURE (HCC): Primary | ICD-10-CM

## 2023-03-09 DIAGNOSIS — R26.2 AMBULATORY DYSFUNCTION: ICD-10-CM

## 2023-03-09 NOTE — ASSESSMENT & PLAN NOTE
· DVT history   · INR 3/7/23 2 5  · No s/sof active bleeding reported from staff  · Continue coumadin 4 mg daily

## 2023-03-09 NOTE — ASSESSMENT & PLAN NOTE
· Patient c/o decreased hearing  · B/l cerumen noted   · Will order debrox 5 gtt b/l ears x 4 days  · Reassess monday

## 2023-03-09 NOTE — ASSESSMENT & PLAN NOTE
Wt Readings from Last 3 Encounters:   03/09/23 (!) 203 kg (447 lb 3 2 oz)   02/03/23 (!) 205 kg (452 lb)   02/01/23 (!) 205 kg (451 lb 12 8 oz)   · patient admitted to hospital for SOB  · CXR showed congestion vs pneumonia  · Patient was treated with Lasix  · Continue Lasix 40 mg daily  · Continue daily weights  · Monitor for s/s of fluid overload

## 2023-03-09 NOTE — ASSESSMENT & PLAN NOTE
Multifactorial in the setting of obesity, multiple chronic conditions  Continue PT/OT  Fall Precautions  Ensure adequate nutrition/hydration   Monitor CBC/BMP    following for d/c planning

## 2023-03-10 NOTE — PROGRESS NOTES
[address]en [address]  [address] Post Acute  History and Physical     NAME: Celso Nascimento  AGE: 69 y o  SEX: male [phone]     DATE OF ENCOUNTER: 3/8/2023     Code status:  CPR     Assessment and Plan      1  Acute on chronic diastolic (congestive) heart failure (HCC)  At this time is acute chronic heart failure has improved  He is relatively euvolemic  He will continue on diuresis  Continue to monitor  His last 2D echo showing 60% ejection fraction with right ventricular dilatation      2  BMI 50 0-59 9, adult ([address] )  Has been stable  Nutrition consult      3  Benign prostatic hyperplasia, unspecified whether lower urinary tract symptoms present  Stable  Continues on Flomax      4  Acute on chronic right-sided congestive heart failure (HCC)        5  Chronic anticoagulation  Patient maintained on Coumadin  Monitor INR's  Maintain INR of 2-3  Not interested in any mobile anticoagulations      INr on hospital   6  Chronic pain disorder  At this point in time his pain is stable  He is on high doses of Lyrica as well as Robaxin  He continues on Tylenol round-the-clock  He does not want to be on any Lidoderm patches  Or any patches for that matter  We will discontinue this  He has been on multiple interventions through pain management which he reports have not been helpful  Continue to monitor his pain      7  Chronic respiratory failure with hypoxia and hypercapnia (HCC)  At this point he is agreeable to BiPAP  He will continue to use this  Respiratory therapy will follow as well      8  Osteoarthritis of multiple joints, unspecified osteoarthritis type  As above continue pain management      9  Dyspnea on exertion  Multifactorial to include congestive heart failure (currently euvolemic), history of restrictive lung disease, morbid obesity, deconditioning      10   Fercho de la Tourette's syndrome  Reports he has been doing well with Risperdal and will continue the same      11  History of pulmonary embolism  Maintained on Coumadin      12  Morbid obesity [facility])  Nutrition consultation continue to monitor      13  Neuralgia, post-herpetic  In addition to chronic pain due to lumbar disc disease and osteoarthritis of other joints he has a history of postherpetic neuralgia  As above he is on Lyrica but reports Lidoderm patches are ineffective      14  Obesity hypoventilation syndrome ([facility] [address] )  He does see sleep medicine as an outpatient  Normally sees them regularly  As above will be on BiPAP      15  Primary hypertension  Stable  Other than diuretics he has not needed any other blood pressure medications  Slightly elevated blood pressure today  Continue to monitor      16  Pulmonary hypertension (HCC)  Stable  Overall he is euvolemic at this point      17  Restrictive lung disease  Follows up with pulmonary medicine as an outpatient  On ipratropium-albuterol 4 times a day      18  Severe obstructive sleep apnea  Currently on BiPAP      19  Generalized weakness        20  Ambulatory dysfunction        21  Impaired functional mobility, balance, gait, and endurance  Patient will undergo a comprehensive rehabilitation program   This will include physical therapy as well as Occupational Therapy  At home he is mostly ambulating with a walker  He is goal is to go home with a walker  At the time of his discharge he was only able to stand for about 2 seconds with 1 assist               All medications and routine orders were reviewed and updated as needed      Plan discussed with: Patient and staff     Chief Complaint      Seen for admission at [address] [place] [address]  History of Present Illness      Patient is a 22-year-old male seen for an admission at [facility]    Patient with known severe obstructive sleep apnea, obesity hypoventilation syndrome, congestive heart failure, lymphedema, benign prostatic hyperplasia, history of pulmonary embolism, lymphedema, chronic hypoxia      Patient was at [facility] care about 4 weeks ago but was sent to the hospital with subsequent admission due to increasing shortness of breath  Patient was at admitted into the hospital at [facility] due to shortness of breath found to be due to congestive heart failure  He has improved from that  He maintains on Lasix 40 mg daily  Severe shortness of breath contributed by severe obstructive sleep apnea  He was nonadherent to the use of CPAP  Consultation with pulmonology/sleep medicine was done in the hospital   They had recommended CPAP or possibly BiPAP if needed  He is currently on BiPAP  Set at 14/6 with 40% oxygen using a full mask      His other chronic medical conditions had remained stable      He was sent back to [facility] postacute care for acute rehabilitation      Today he reports his breathing has improved although still short of breath at times  His main complaint today is having a lot of pressure on his sacral area due to the type of bed that he has  He is requesting a new bed      Patient does have a history of chronic pain  Also with a history of opioid abuse  He is currently on Lyrica, Tylenol, Robaxin  He then uses tramadol up to 6 times a day  He was discharged on the lidocaine Derm patch as well as INC hot patch  He would like to get rid of these as they have been ineffective  He has had visits with pain management for which steroid injections and radial nerve ablations have not been helpful  He has been on other medications  This would include duloxetine  That was ineffective    He does not want to use any other sort of opioids due to the history of opiate abuse            HISTORY:  Medical History        Past Medical History:   Diagnosis Date   • CHF (congestive heart failure) ([facility])     • DVT (deep venous thrombosis) ([facility])     • HTN (hypertension)     • Morbid obesity ([facility] [address] )     • EVERETT (obstructive sleep apnea)     • Pulmonary embolism ([facility] [address] )           Family History   History reviewed  No pertinent family history  Social History   Social History            Socioeconomic History   • Marital status: /Civil Union       Spouse name: None   • Number of children: None   • Years of education: None   • Highest education level: None   Occupational History   • None   Tobacco Use   • Smoking status: Never   • Smokeless tobacco: Never   Substance and Sexual Activity   • Alcohol use: Not Currently   • Drug use: Not Currently       Types: Prescription   • Sexual activity: None   Other Topics Concern   • None   Social History Narrative   • None      Social Determinants of Health      Financial Resource Strain: Not on file   Food Insecurity: Not on file   Transportation Needs: Not on file   Physical Activity: Not on file   Stress: Not on file   Social Connections: Not on file   Intimate Partner Violence: Not on file   Housing Stability: Not on file            Allergies: Allergies   Allergen Reactions   • Baclofen Other (See Comments)       Nausea and vomiting         Review of Systems      Review of Systems   Constitutional: Negative  Negative for activity change, appetite change, chills and diaphoresis  HENT: Negative for congestion and dental problem  Respiratory: Negative  Negative for apnea, chest tightness, shortness of breath and wheezing  Cardiovascular: Negative  Negative for chest pain, palpitations and leg swelling  Gastrointestinal: Negative  Negative for abdominal distention, abdominal pain, constipation, diarrhea and nausea  Genitourinary: Negative  Negative for difficulty urinating, dysuria and frequency          Medications and orders      All medications reviewed and updated in Nursing Home EMR         Objective      Vitals: Temperature is at 97 8 °F, heart rate of 94, respiratory rate of 18, blood pressure 147/77     Physical Exam  Vitals reviewed     Constitutional:       Appearance: Normal appearance  He is obese  He is not ill-appearing or diaphoretic  HENT:      Head: Normocephalic  Nose: Nose normal       Mouth/Throat:      Mouth: Mucous membranes are moist    Eyes:      Extraocular Movements: Extraocular movements intact  Pupils: Pupils are equal, round, and reactive to light  Cardiovascular:      Rate and Rhythm: Normal rate and regular rhythm  Heart sounds: Normal heart sounds  Pulmonary:      Effort: Pulmonary effort is normal    Abdominal:      General: Bowel sounds are normal       Palpations: Abdomen is soft  Musculoskeletal:      Right lower leg: Edema present  Left lower leg: Edema present  Skin:     General: Skin is warm  Capillary Refill: Capillary refill takes less than 2 seconds  Neurological:      Mental Status: He is alert and oriented to person, place, and time  Mental status is at baseline  Psychiatric:         Mood and Affect: Mood normal          Behavior: Behavior normal             Pertinent Laboratory/Diagnostic Studies: The following labs/studies were reviewed please see chart or hospital paperwork for details  Ref Range & Units3/6/23 0305 PT12 0 - 14 6 sec35 9 High  PT INR3 6   Contains abnormal data BASIC METABOLIC PANEL  Order: [id number]    Ref Range & Units 3/5/23 0442   Glucose 65 - 99 mg/dL 102 High     BUN 7 - 28 mg/dL 9    Creatinine 0 53 - 1 30 mg/dL 0 48 Low     Sodium 135 - 145 mmol/L 139    Potassium 3 5 - 5 2 mmol/L 3 8    Chloride 100 - 109 mmol/L 90 Low     Carbon Dioxide 21 - 31 mmol/L >45 High     Calcium 8 5 - 10 1 mg/dL 9 3    Anion Gap 3 - 11 Unable to perform calculation  eGFRcr >59 112    eGFRcr Comment   Interpretive information: calculated GFR       INR: 3/7/2023: 2 5           - As above his main concern today is the quality of his current bed  This was discussed with nursing staff    Attempts to change the bed to include having an air mattress will be done                   Electronically signed by Ariadna Clemons MD at 3/8/2023  3:18 PM

## 2023-03-13 ENCOUNTER — NURSING HOME VISIT (OUTPATIENT)
Dept: GERIATRICS | Facility: OTHER | Age: 68
End: 2023-03-13

## 2023-03-13 VITALS
RESPIRATION RATE: 18 BRPM | DIASTOLIC BLOOD PRESSURE: 69 MMHG | TEMPERATURE: 97.9 F | WEIGHT: 315 LBS | SYSTOLIC BLOOD PRESSURE: 137 MMHG | OXYGEN SATURATION: 96 % | HEART RATE: 89 BPM

## 2023-03-13 DIAGNOSIS — J96.11 CHRONIC RESPIRATORY FAILURE WITH HYPOXIA AND HYPERCAPNIA (HCC): Primary | ICD-10-CM

## 2023-03-13 DIAGNOSIS — J96.12 CHRONIC RESPIRATORY FAILURE WITH HYPOXIA AND HYPERCAPNIA (HCC): Primary | ICD-10-CM

## 2023-03-13 DIAGNOSIS — B02.29 NEURALGIA, POST-HERPETIC: ICD-10-CM

## 2023-03-13 DIAGNOSIS — I50.813 ACUTE ON CHRONIC RIGHT-SIDED CONGESTIVE HEART FAILURE (HCC): ICD-10-CM

## 2023-03-13 DIAGNOSIS — R26.2 AMBULATORY DYSFUNCTION: ICD-10-CM

## 2023-03-13 DIAGNOSIS — F95.2 GILLES DE LA TOURETTE'S SYNDROME: ICD-10-CM

## 2023-03-13 NOTE — PROGRESS NOTES
[facility]  [address]  [phone]  FACILITY: [place] [facility]  Code 31 (STR)      NAME: Cony Vasques  AGE: 69 y o  SEX: male CODE STATUS: CPR    DATE OF ENCOUNTER: 3/13/2023    Assessment and Plan     1  Chronic respiratory failure with hypoxia and hypercapnia (HCC)  Assessment & Plan:  · Secondary to obstructive sleep apnea and Obesity hypoventilation syndrome  · Currently on his baseline home oxygen 4 L nasal cannula  · Patient was unable to tolerate BiPAP  · Continue home O2  · He is tolerating CPAP  · Resp Therapy following at rehab  · Denies daytime sleepiness  · Monitor resp status   · Continue DuoNebs, Continue Lasix                 2  Acute on chronic right-sided congestive heart failure (HCC)  Assessment & Plan:    Chronic b/l lower leg edema  Lungs clear on exam   Continue Lasix   Daily weights  Cardiac diet           3  Fercho de la Tourette's syndrome  Assessment & Plan:  Stable  Continue Risperidone 1 mg TID      4  Neuralgia, post-herpetic  Assessment & Plan:  · Chronic pain x10 years  · Patient on Lyrica and Tylenol, Tramadol, Robaxin   · Pain medications prescribed by Dr Hosea Anguiano per patient as outpatient             5  Ambulatory dysfunction  Assessment & Plan:  • Multifactorial  • Continue PT/OT  • Fall Precautions  • Ensure adequate nutrition/hydration   • Monitor CBC/BMP   •  following for d/c planning            All medications and routine orders were reviewed and updated as needed  Chief Complaint     STR follow up visit    Past Medical and Surgica History      Past Medical History:   Diagnosis Date   • CHF (congestive heart failure) ([facility] [address] )    • DVT (deep venous thrombosis) ([facility])    • HTN (hypertension)    • Morbid obesity (HCC)    • EVERETT (obstructive sleep apnea)    • Pulmonary embolism (HCC)      No past surgical history on file    Allergies   Allergen Reactions   • Baclofen Other (See Comments)     Nausea and vomiting History of Present Illness     Charles Guerrero is a 61-year-old male admitted to [facility] for short-term rehab following hospitalization for acute on chronic respiratory failure with hypoxia and hypercapnia  Patient has a past medical history of morbid obesity, obstructive sleep apnea, CHF, HTN, restrictive lung disease, PE/DVT on warfarin, BPH, Tourette's, chronic pain and ambulatory dysfunction      Patient was seen for routine short-term rehab follow-up visit today      Patient initially presented to the hospital 2/4/2023 with shortness of breath  Patient was diagnosed with acute on chronic hypoxic and hypercapnic respiratory failure  Patient is noted to be unable to tolerate BiPAP  Chest x-ray revealed congestion versus pneumonia  There was no fever or leukocytosis to suggest infectious process  His PCO2 was 111, patient was trialed on CPAP with improvement in his CO2 levels  Patient was also provided p o  Lasix and he later was able to tolerate his home dose of 4 L of oxygen  Patient was discharged to short-term rehab      Patient was seen and examined at bedside in stable condition  Patient is able to provide a reliable history he is alert and oriented x3  Patient is noted does not appear in any distress  Patient states he is hopeful he will get stronger with the goal to go home at the end of therapy  Patient states he is tolerating his CPAP  Patient states he has chronic pain but currently controlled on current regimen  Patient denies any bowel or bladder issues  Patient states he has a good appetite  Patient denies any cardiopulmonary symptoms on exam states his breathing is improved from hospital stay  Patient states he is chronic bilateral lower extremity edema which is unchanged from previous  Patient denies any dizziness or syncope  No concerns from staff at this time          The patient's allergies, past medical, surgical, social and family history were reviewed and unchanged  Review of Systems     Review of Systems   Constitutional: Negative for activity change, appetite change, fatigue and fever  HENT: Negative for ear pain, rhinorrhea and trouble swallowing  Eyes: Negative for pain and visual disturbance  Respiratory: Negative for cough, shortness of breath and wheezing  Cardiovascular: Negative for chest pain and palpitations  Gastrointestinal: Negative for abdominal distention, abdominal pain, blood in stool, constipation, diarrhea, nausea and vomiting  Genitourinary: Negative for decreased urine volume, difficulty urinating, dysuria, frequency and hematuria  Musculoskeletal: Positive for back pain and gait problem  Negative for arthralgias  Skin: Negative for color change and rash  Neurological: Positive for weakness  Negative for dizziness, syncope, light-headedness, numbness and headaches  Psychiatric/Behavioral: Negative for dysphoric mood and sleep disturbance  Objective     Vitals:   Vitals:    03/13/23 1013   BP: 137/69   Pulse: 89   Resp: 18   Temp: 97 9 °F (36 6 °C)   SpO2: 96%         Physical Exam  Vitals and nursing note reviewed  Constitutional:       General: He is not in acute distress  Appearance: Normal appearance  HENT:      Head: Normocephalic and atraumatic  Nose: No congestion or rhinorrhea  Mouth/Throat:      Mouth: Mucous membranes are moist    Eyes:      General: No scleral icterus  Extraocular Movements: Extraocular movements intact  Conjunctiva/sclera: Conjunctivae normal       Pupils: Pupils are equal, round, and reactive to light  Cardiovascular:      Rate and Rhythm: Normal rate and regular rhythm  Pulses: Normal pulses  Heart sounds: Normal heart sounds  No murmur heard  Pulmonary:      Effort: Pulmonary effort is normal       Breath sounds: Normal breath sounds  No wheezing, rhonchi or rales  Abdominal:      General: Bowel sounds are normal  There is no distension  Palpations: Abdomen is soft  Tenderness: There is no abdominal tenderness  Musculoskeletal:         General: No swelling or signs of injury  Lymphadenopathy:      Cervical: No cervical adenopathy  Skin:     General: Skin is warm and dry  Findings: No erythema  Neurological:      Mental Status: He is alert and oriented to person, place, and time  Sensory: No sensory deficit  Motor: Weakness present  Gait: Gait abnormal    Psychiatric:         Mood and Affect: Mood normal          Behavior: Behavior normal          Pertinent Laboratory/Diagnostic Studies:   Reviewed in facility chart-stable      Current Medications   Medications reviewed and updated see facility [facility] for details        Current Outpatient Medications:   •  acetaminophen (TYLENOL) 500 mg tablet, Take 1,000 mg by mouth every 6 (six) hours as needed, Disp: , Rfl:   •  b complex vitamins capsule, Take 1 capsule by mouth daily, Disp: , Rfl:   •  Cholecalciferol 125 MCG (5000 UT) capsule, Take 5,000 Units by mouth daily, Disp: , Rfl:   •  fexofenadine (ALLEGRA) 180 MG tablet, Take 180 mg by mouth daily, Disp: , Rfl:   •  finasteride (PROSCAR) 5 mg tablet, Take 5 mg by mouth daily, Disp: , Rfl:   •  furosemide (LASIX) 40 mg tablet, Take 40 mg by mouth daily, Disp: , Rfl:   •  ipratropium-albuterol (DUO-NEB) 0 5-2 5 mg/3 mL nebulizer solution, Inhale 3 mL 4 (four) times a day, Disp: , Rfl:   •  methocarbamol (ROBAXIN) 500 mg tablet, Take 500 mg by mouth 4 (four) times a day, Disp: , Rfl:   •  pregabalin (LYRICA) 150 mg capsule, Take 1 capsule (150 mg total) by mouth 4 (four) times a day, Disp: 4 capsule, Rfl: 0  •  risperiDONE (RisperDAL) 1 mg tablet, Take 1 mg by mouth 3 (three) times a day, Disp: , Rfl:   •  tamsulosin (FLOMAX) 0 4 mg, Take 0 4 mg by mouth daily at bedtime, Disp: , Rfl:   •  traMADol (ULTRAM) 50 mg tablet, Take 50 mg by mouth every 6 (six) hours as needed, Disp: , Rfl:   •  warfarin (COUMADIN) 6 mg tablet, Take 9 mg by mouth daily, Disp: , Rfl:        Please note:  Voice-recognition software may have been used in the preparation of this document  Occasional wrong word or "sound-alike" substitutions may have occurred due to the inherent limitations of voice recognition software  Interpretation should be guided by context           MO Muller  3/13/2023  10:14 AM

## 2023-03-16 ENCOUNTER — NURSING HOME VISIT (OUTPATIENT)
Dept: GERIATRICS | Facility: OTHER | Age: 68
End: 2023-03-16

## 2023-03-16 VITALS
DIASTOLIC BLOOD PRESSURE: 69 MMHG | SYSTOLIC BLOOD PRESSURE: 137 MMHG | WEIGHT: 315 LBS | HEART RATE: 89 BPM | RESPIRATION RATE: 18 BRPM | TEMPERATURE: 97.9 F | OXYGEN SATURATION: 96 %

## 2023-03-16 DIAGNOSIS — R53.1 GENERALIZED WEAKNESS: ICD-10-CM

## 2023-03-16 DIAGNOSIS — Z86.711 HISTORY OF PULMONARY EMBOLISM: ICD-10-CM

## 2023-03-16 DIAGNOSIS — H61.23 CERUMEN DEBRIS ON TYMPANIC MEMBRANE OF BOTH EARS: ICD-10-CM

## 2023-03-16 DIAGNOSIS — I50.813 ACUTE ON CHRONIC RIGHT-SIDED CONGESTIVE HEART FAILURE (HCC): Primary | ICD-10-CM

## 2023-03-16 DIAGNOSIS — G47.33 SEVERE OBSTRUCTIVE SLEEP APNEA: ICD-10-CM

## 2023-03-16 RX ORDER — WARFARIN SODIUM 3 MG/1
3 TABLET ORAL
COMMUNITY

## 2023-03-16 NOTE — ASSESSMENT & PLAN NOTE
Wt Readings from Last 3 Encounters:   03/16/23 (!) 200 kg (442 lb)   03/13/23 (!) 203 kg (447 lb 3 2 oz)   03/09/23 (!) 203 kg (447 lb 3 2 oz)     Chronic b/l lower leg edema  Lungs clear on exam   Continue Lasix   Daily weights  Cardiac diet

## 2023-03-16 NOTE — ASSESSMENT & PLAN NOTE
Treated with debrox  Able to clean/remove a fair amount of debris from b/l ears today  Still with some cerumen on TM- patient requesting debrox x 4 more days

## 2023-03-16 NOTE — ASSESSMENT & PLAN NOTE
Has been on Coumadin 9mg Tue-Sun and 10 mg on Mondays at home  Last INR was 1 5 mg on 8 mg of Coumadin on 3/13/23  Increase Coumadin to 9 mg tonight  Next INR Monday 3/20/23

## 2023-03-16 NOTE — ASSESSMENT & PLAN NOTE
• Multifactorial  • Continue PT/OT  • Fall Precautions  • Ensure adequate nutrition/hydration   • Monitor CBC/BMP   •  following for d/c planning

## 2023-03-16 NOTE — PROGRESS NOTES
[facility]  [address]  [phone]  FACILITY: [facility]  Code 31 (STR)        NAME: Angel Cortes  AGE: 69 y o  SEX: male CODE STATUS: CPR    DATE OF ENCOUNTER: 3/16/2023    Assessment and Plan     1  Acute on chronic right-sided congestive heart failure [facility])  Assessment & Plan:  Wt Readings from Last 3 Encounters:   03/16/23 (!) 200 kg (442 lb)   03/13/23 (!) 203 kg (447 lb 3 2 oz)   03/09/23 (!) 203 kg (447 lb 3 2 oz)     Chronic b/l lower leg edema  Lungs clear on exam   Continue Lasix   Daily weights  Cardiac diet           2  Severe obstructive sleep apnea  Assessment & Plan: On chronic O2 4L   Could not tolerate BiPap  Has been compliant with CPAP at rehab  No c/o daytime sleepiness- doing well with PT- oob in chair - does need Yuridia lift         3  History of pulmonary embolism  Assessment & Plan:  Has been on Coumadin 9mg Tue-Sun and 10 mg on Mondays at home  Last INR was 1 5 mg on 8 mg of Coumadin on 3/13/23  Increase Coumadin to 9 mg tonight  Next INR Monday 3/20/23      4  Generalized weakness  Assessment & Plan:  • Multifactorial  • Continue PT/OT  • Fall Precautions  • Ensure adequate nutrition/hydration   • Monitor CBC/BMP   •  following for d/c planning         5  Cerumen debris on tympanic membrane of both ears  Assessment & Plan:  Treated with debrox  Able to clean/remove a fair amount of debris from b/l ears today  Still with some cerumen on TM- patient requesting debrox x 4 more days     Orders:  -     Ear cerumen removal       All medications and routine orders were reviewed and updated as needed      Chief Complaint     STR follow up visit    Past Medical and Surgica History      Past Medical History:   Diagnosis Date   • CHF (congestive heart failure) ([facility] [address] )    • DVT (deep venous thrombosis) ([facility])    • HTN (hypertension)    • Morbid obesity (HCC)    • EVERETT (obstructive sleep apnea)    • Pulmonary embolism (HCC)      No past surgical history on file  Allergies   Allergen Reactions   • Baclofen Other (See Comments)     Nausea and vomiting          History of Present Illness     Olegario Bey is a 43-year-old male admitted to [facility] for short-term rehab following hospitalization for acute on chronic respiratory failure with hypoxia and hypercapnia  Patient has a past medical history of morbid obesity, obstructive sleep apnea, CHF, HTN, restrictive lung disease, PE/DVT on warfarin, BPH, Tourette's, chronic pain and ambulatory dysfunction  Patient was seen for routine short-term rehab follow-up visit today  Patient initially presented to the hospital 2/4/2023 with shortness of breath  Patient was diagnosed with acute on chronic hypoxic and hypercapnic respiratory failure  Patient is noted to be unable to tolerate BiPAP  Chest x-ray revealed congestion versus pneumonia  There was no fever or leukocytosis to suggest infectious process  His PCO2 was 111, patient was trialed on CPAP with improvement in his CO2 levels  Patient was also provided p o  Lasix and he later was able to tolerate his home dose of 4 L of oxygen  Patient was discharged to short-term rehab  Patient was seen and examined at bedside in stable condition  Patient is able to provide a reliable history he is alert and oriented x3  Patient is out of bed sitting in chair  He does require Yuridia lift to get out of bed  Patient states he feels he is getting stronger has been able to do exercises with PT and feels his legs are getting stronger  Patient states his goal is to go home  Patient had complained of blocked ears last visit to previous practitioner and is requesting earwax removal on exam today  Patient denies any cardiopulmonary symptoms states his breathing is improved denies any shortness of breath or leg edema  Patient states he is tolerating his diet denies any nausea vomiting or diarrhea  Patient denies any dizziness or syncope  Patient denies any bowel or bladder issues  No concerns from staff at this time  The patient's allergies, past medical, surgical, social and family history were reviewed and unchanged  Review of Systems     Review of Systems   Constitutional: Negative for activity change, appetite change, fatigue and fever  HENT: Negative for ear pain, rhinorrhea and trouble swallowing  Eyes: Negative for pain and visual disturbance  Respiratory: Negative for cough, shortness of breath and wheezing  Cardiovascular: Negative for chest pain and palpitations  Gastrointestinal: Negative for abdominal distention, abdominal pain, blood in stool, constipation, diarrhea, nausea and vomiting  Genitourinary: Negative for decreased urine volume, difficulty urinating, dysuria, frequency and hematuria  Musculoskeletal: Positive for arthralgias, gait problem and myalgias  Negative for back pain  Skin: Negative for color change and rash  Neurological: Positive for weakness  Negative for dizziness, syncope, light-headedness, numbness and headaches  Psychiatric/Behavioral: Negative for dysphoric mood and sleep disturbance  Objective     Vitals:   Vitals:    03/16/23 1331   BP: 137/69   Pulse: 89   Resp: 18   Temp: 97 9 °F (36 6 °C)   SpO2: 96%           Physical Exam  Vitals and nursing note reviewed  Constitutional:       General: He is not in acute distress  Appearance: Normal appearance  He is obese  HENT:      Head: Normocephalic and atraumatic  Right Ear: Ear canal and external ear normal  There is impacted cerumen  Tympanic membrane is not injected, scarred or erythematous  Left Ear: Ear canal and external ear normal  There is impacted cerumen  Tympanic membrane is not injected, scarred or erythematous  Nose: No congestion or rhinorrhea  Mouth/Throat:      Mouth: Mucous membranes are moist    Eyes:      General: No scleral icterus       Extraocular Movements: Extraocular movements intact  Conjunctiva/sclera: Conjunctivae normal       Pupils: Pupils are equal, round, and reactive to light  Cardiovascular:      Rate and Rhythm: Normal rate and regular rhythm  Pulses: Normal pulses  Heart sounds: Normal heart sounds  No murmur heard  Pulmonary:      Effort: Pulmonary effort is normal       Breath sounds: Normal breath sounds  No wheezing, rhonchi or rales  Abdominal:      General: Bowel sounds are normal  There is no distension  Palpations: Abdomen is soft  Tenderness: There is no abdominal tenderness  Musculoskeletal:         General: No swelling or signs of injury  Right lower leg: Edema present  Left lower leg: Edema present  Lymphadenopathy:      Cervical: No cervical adenopathy  Skin:     General: Skin is warm and dry  Findings: No erythema  Neurological:      Mental Status: He is alert and oriented to person, place, and time  Sensory: No sensory deficit  Motor: Weakness present  Gait: Gait normal    Psychiatric:         Mood and Affect: Mood normal          Behavior: Behavior normal        Ear cerumen removal    Date/Time: 3/16/2023 1:00 PM  Performed by: MO Odell  Authorized by: MO Odell   Universal Protocol:  Consent: Verbal consent obtained  Risks and benefits: risks, benefits and alternatives were discussed  Consent given by: patient  Patient understanding: patient states understanding of the procedure being performed  Patient identity confirmed: verbally with patient      Patient location:  Bedside  Procedure details:     Local anesthetic:  None    Location:  L ear and R ear    Procedure type: curette      Approach:  Natural orifice  Post-procedure details:     Complication:  None    Hearing quality:  Improved    Patient tolerance of procedure:   Tolerated well, no immediate complications  Comments:      Could not fully remove all cerumen- will repeat debrox gtts        Pertinent Laboratory/Diagnostic Studies:   Reviewed in facility chart-stable      Current Medications   Medications reviewed and updated see facility [facility] for details  Current Outpatient Medications:   •  warfarin (COUMADIN) 3 mg tablet, Take 3 tablets by mouth daily 9 mg daily, Disp: , Rfl:   •  acetaminophen (TYLENOL) 500 mg tablet, Take 1,000 mg by mouth every 6 (six) hours as needed, Disp: , Rfl:   •  b complex vitamins capsule, Take 1 capsule by mouth daily, Disp: , Rfl:   •  Cholecalciferol 125 MCG (5000 UT) capsule, Take 5,000 Units by mouth daily, Disp: , Rfl:   •  fexofenadine (ALLEGRA) 180 MG tablet, Take 180 mg by mouth daily, Disp: , Rfl:   •  finasteride (PROSCAR) 5 mg tablet, Take 5 mg by mouth daily, Disp: , Rfl:   •  furosemide (LASIX) 40 mg tablet, Take 40 mg by mouth daily, Disp: , Rfl:   •  ipratropium-albuterol (DUO-NEB) 0 5-2 5 mg/3 mL nebulizer solution, Inhale 3 mL 4 (four) times a day, Disp: , Rfl:   •  methocarbamol (ROBAXIN) 500 mg tablet, Take 500 mg by mouth 4 (four) times a day, Disp: , Rfl:   •  pregabalin (LYRICA) 150 mg capsule, Take 1 capsule (150 mg total) by mouth 4 (four) times a day, Disp: 4 capsule, Rfl: 0  •  risperiDONE (RisperDAL) 1 mg tablet, Take 1 mg by mouth 3 (three) times a day, Disp: , Rfl:   •  tamsulosin (FLOMAX) 0 4 mg, Take 0 4 mg by mouth daily at bedtime, Disp: , Rfl:      Plan discussed with Dr Leroy Abts  Dr Leroy Abts noted agreement with assessment and plan  Please note:  Voice-recognition software may have been used in the preparation of this document  Occasional wrong word or "sound-alike" substitutions may have occurred due to the inherent limitations of voice recognition software  Interpretation should be guided by context           MO Perez  3/16/2023  1:32 PM

## 2023-03-16 NOTE — ASSESSMENT & PLAN NOTE
On chronic O2 4L   Could not tolerate BiPap  Has been compliant with CPAP at rehab  No c/o daytime sleepiness- doing well with PT- oob in chair - does need Yuridia lift

## 2023-03-21 NOTE — ASSESSMENT & PLAN NOTE
· Secondary to obstructive sleep apnea and Obesity hypoventilation syndrome  · Currently on his baseline home oxygen 4 L nasal cannula  · Patient was unable to tolerate BiPAP  · Continue home O2  · He is tolerating CPAP  · Resp Therapy following at rehab  · Denies daytime sleepiness  · Monitor resp status   · Continue DuoNebs, Continue Lasix

## 2023-03-21 NOTE — ASSESSMENT & PLAN NOTE
· Chronic pain x10 years  · Patient on Lyrica and Tylenol, Tramadol, Robaxin   · Pain medications prescribed by Dr Queenie Amador per patient as outpatient

## 2023-03-22 ENCOUNTER — NURSING HOME VISIT (OUTPATIENT)
Dept: GERIATRICS | Facility: OTHER | Age: 68
End: 2023-03-22

## 2023-03-22 VITALS
WEIGHT: 315 LBS | TEMPERATURE: 97.9 F | SYSTOLIC BLOOD PRESSURE: 137 MMHG | DIASTOLIC BLOOD PRESSURE: 69 MMHG | OXYGEN SATURATION: 92 % | HEART RATE: 89 BPM | RESPIRATION RATE: 18 BRPM

## 2023-03-22 DIAGNOSIS — R53.1 GENERALIZED WEAKNESS: ICD-10-CM

## 2023-03-22 DIAGNOSIS — F95.2 GILLES DE LA TOURETTE'S SYNDROME: ICD-10-CM

## 2023-03-22 DIAGNOSIS — I50.813 ACUTE ON CHRONIC RIGHT-SIDED CONGESTIVE HEART FAILURE (HCC): ICD-10-CM

## 2023-03-22 DIAGNOSIS — Z86.711 HISTORY OF PULMONARY EMBOLISM: ICD-10-CM

## 2023-03-22 DIAGNOSIS — G89.4 CHRONIC PAIN DISORDER: ICD-10-CM

## 2023-03-22 DIAGNOSIS — J96.11 CHRONIC RESPIRATORY FAILURE WITH HYPOXIA AND HYPERCAPNIA (HCC): Primary | ICD-10-CM

## 2023-03-22 DIAGNOSIS — J96.12 CHRONIC RESPIRATORY FAILURE WITH HYPOXIA AND HYPERCAPNIA (HCC): Primary | ICD-10-CM

## 2023-03-22 NOTE — ASSESSMENT & PLAN NOTE
· Chronic pain x10 years noted with post-herpatic neuralgia- painful to touch to left side/left back   · Patient on Lyrica and Tylenol, Tramadol, Robaxin   · Pain medications prescribed by Dr Nitza Washington per patient as outpatient

## 2023-03-22 NOTE — ASSESSMENT & PLAN NOTE
Wt Readings from Last 3 Encounters:   03/22/23 (!) 198 kg (435 lb 12 8 oz)   03/16/23 (!) 200 kg (442 lb)   03/13/23 (!) 203 kg (447 lb 3 2 oz)     Weights stable  Continue Lasix   Daily weights/cardiac diet

## 2023-03-22 NOTE — ASSESSMENT & PLAN NOTE
· Has been on Coumadin 9mg Tue-Sun and 10 mg on Mondays at home  · Last INR was 1 9 mg on 9 mg of Coumadin   · We started his home dose coumdain of 10 mg Monday and 9 mg all other days on 3/20  · Repeat INR Fri 3/24

## 2023-03-22 NOTE — PROGRESS NOTES
[facility]  [address]  [phone]  FACILITY: [facility]  Code 31 (STR)        NAME: Rasta Barrios  AGE: 69 y o  SEX: male CODE STATUS: CPR    DATE OF ENCOUNTER: 3/22/2023    Assessment and Plan     1  Chronic respiratory failure with hypoxia and hypercapnia (HCC)  Assessment & Plan:  · Secondary to obstructive sleep apnea and Obesity hypoventilation syndrome  · Currently on his baseline home oxygen 4 L nasal cannula  · Patient was unable to tolerate BiPAP  · Continue home O2  · He is tolerating CPAP  · Resp Therapy following at rehab  · Denies daytime sleepiness  · Monitor resp status   · Continue DuoNebs PRN, Continue Lasix                 2  History of pulmonary embolism  Assessment & Plan:  · Has been on Coumadin 9mg Tue-Sun and 10 mg on Mondays at home  · Last INR was 1 9 mg on 9 mg of Coumadin   · We started his home dose coumdain of 10 mg Monday and 9 mg all other days on 3/20  · Repeat INR Fri 3/24        3  Generalized weakness  Assessment & Plan:  • Multifactorial  • Continue PT/OT  • Fall Precautions  • Ensure adequate nutrition/hydration   • Monitor CBC/BMP   •  following for d/c planning         4  Chronic pain disorder  Assessment & Plan:  · Chronic pain x10 years noted with post-herpatic neuralgia- painful to touch to left side/left back   · Patient on Lyrica and Tylenol, Tramadol, Robaxin   · Pain medications prescribed by Dr Kinjal Arias per patient as outpatient             5  Fercho de la Tourette's syndrome  Assessment & Plan:  Stable  Continue Risperidone 1 mg TID      6   Acute on chronic right-sided congestive heart failure [facility])  Assessment & Plan:  Wt Readings from Last 3 Encounters:   03/22/23 (!) 198 kg (435 lb 12 8 oz)   03/16/23 (!) 200 kg (442 lb)   03/13/23 (!) 203 kg (447 lb 3 2 oz)     Weights stable  Continue Lasix   Daily weights/cardiac diet                All medications and routine orders were reviewed and updated as needed  Chief Complaint     STR follow up visit    Past Medical and Surgica History      Past Medical History:   Diagnosis Date   • CHF (congestive heart failure) ([facility] [address] )    • DVT (deep venous thrombosis) ([facility])    • HTN (hypertension)    • Morbid obesity (HCC)    • EVERETT (obstructive sleep apnea)    • [facility]      History reviewed  No pertinent surgical history  Allergies   Allergen Reactions   • Baclofen Other (See Comments)     Nausea and vomiting          History of Present Illness     Fadumo Presley is a 68-year-old male admitted to [facility] for short-term rehab following hospitalization for acute on chronic respiratory failure with hypoxia and hypercapnia  Patient has a past medical history of morbid obesity, obstructive sleep apnea, CHF, HTN, restrictive lung disease, PE/DVT on warfarin, BPH, Tourette's, chronic pain and ambulatory dysfunction  Patient was seen for routine short-term rehab follow-up visit today  Patient initially presented to the hospital 2/4/2023 with shortness of breath  Patient was diagnosed with acute on chronic hypoxic and hypercapnic respiratory failure  Patient is noted to be unable to tolerate BiPAP  Chest x-ray revealed congestion versus pneumonia  There was no fever or leukocytosis to suggest infectious process  His PCO2 was 111, patient was trialed on CPAP with improvement in his CO2 levels  Patient was also provided p o  Lasix and he later was able to tolerate his home dose of 4 L of oxygen  Patient was discharged to short-term rehab  Patient was seen and examined at bedside in stable condition  Patient is able to provide a reliable history, he is alert and oriented x3  Patient is requesting to change DuoNebs to PRN, he states he is feeling better, he has been compliant with cpap  He staets he took 5 steps with PT, feels he is getting stronger  He does still require Yuridia lift to get out of bed  Patient states his goal is to go home  Patient denies any cardiopulmonary symptoms states his breathing is improved denies any shortness of breath or leg edema  Patient states he is tolerating his diet denies any nausea vomiting or diarrhea  Patient denies any dizziness or syncope  Patient denies any bowel or bladder issues  He still c/o his chronic back pain/neuropathy  No concerns from staff at this time  The patient's allergies, past medical, surgical, social and family history were reviewed and unchanged  Review of Systems     Review of Systems   Constitutional: Negative for activity change, appetite change, fatigue and fever  HENT: Negative for ear pain, rhinorrhea and trouble swallowing  Eyes: Negative for pain and visual disturbance  Respiratory: Negative for cough, shortness of breath and wheezing  Cardiovascular: Negative for chest pain and palpitations  Gastrointestinal: Negative for abdominal distention, abdominal pain, blood in stool, constipation, diarrhea, nausea and vomiting  Genitourinary: Negative for decreased urine volume, difficulty urinating, dysuria, frequency and hematuria  Musculoskeletal: Positive for gait problem  Negative for arthralgias, back pain and myalgias  Skin: Negative for color change and rash  Neurological: Positive for weakness and numbness (neuropathy)  Negative for dizziness, syncope, light-headedness and headaches  Psychiatric/Behavioral: Negative for dysphoric mood and sleep disturbance  Objective     Vitals:   Vitals:    03/22/23 1340   BP: 137/69   Pulse: 89   Resp: 18   Temp: 97 9 °F (36 6 °C)   SpO2: 92%           Physical Exam  Vitals and nursing note reviewed  Constitutional:       General: He is not in acute distress  Appearance: Normal appearance  He is obese  HENT:      Head: Normocephalic and atraumatic  Right Ear: Tympanic membrane is not injected, scarred or erythematous        Left Ear: Tympanic membrane is not injected, scarred or erythematous  Nose: No congestion or rhinorrhea  Mouth/Throat:      Mouth: Mucous membranes are moist    Eyes:      General: No scleral icterus  Extraocular Movements: Extraocular movements intact  Conjunctiva/sclera: Conjunctivae normal       Pupils: Pupils are equal, round, and reactive to light  Cardiovascular:      Rate and Rhythm: Normal rate and regular rhythm  Pulses: Normal pulses  Heart sounds: Normal heart sounds  No murmur heard  Pulmonary:      Effort: Pulmonary effort is normal       Breath sounds: Normal breath sounds  No wheezing, rhonchi or rales  Abdominal:      General: Bowel sounds are normal  There is no distension  Palpations: Abdomen is soft  Tenderness: There is no abdominal tenderness  Musculoskeletal:         General: No swelling or signs of injury  Right lower leg: Edema present  Left lower leg: Edema present  Lymphadenopathy:      Cervical: No cervical adenopathy  Skin:     General: Skin is warm and dry  Findings: No erythema  Neurological:      Mental Status: He is alert and oriented to person, place, and time  Sensory: No sensory deficit  Motor: Weakness present  Gait: Gait normal    Psychiatric:         Mood and Affect: Mood normal          Behavior: Behavior normal            Pertinent Laboratory/Diagnostic Studies:   Reviewed in facility chart-stable      Current Medications   Medications reviewed and updated see facility [facility] for details        Current Outpatient Medications:   •  acetaminophen (TYLENOL) 500 mg tablet, Take 1,000 mg by mouth every 6 (six) hours as needed, Disp: , Rfl:   •  b complex vitamins capsule, Take 1 capsule by mouth daily, Disp: , Rfl:   •  Cholecalciferol 125 MCG (5000 UT) capsule, Take 5,000 Units by mouth daily, Disp: , Rfl:   •  fexofenadine (ALLEGRA) 180 MG tablet, Take 180 mg by mouth daily, Disp: , Rfl:   •  finasteride (PROSCAR) 5 mg tablet, Take 5 mg by mouth daily, Disp: , Rfl:   •  furosemide (LASIX) 40 mg tablet, Take 40 mg by mouth daily, Disp: , Rfl:   •  ipratropium-albuterol (DUO-NEB) 0 5-2 5 mg/3 mL nebulizer solution, Inhale 3 mL 4 (four) times a day, Disp: , Rfl:   •  methocarbamol (ROBAXIN) 500 mg tablet, Take 500 mg by mouth 4 (four) times a day, Disp: , Rfl:   •  pregabalin (LYRICA) 150 mg capsule, Take 1 capsule (150 mg total) by mouth 4 (four) times a day, Disp: 4 capsule, Rfl: 0  •  risperiDONE (RisperDAL) 1 mg tablet, Take 1 mg by mouth 3 (three) times a day, Disp: , Rfl:   •  tamsulosin (FLOMAX) 0 4 mg, Take 0 4 mg by mouth daily at bedtime, Disp: , Rfl:   •  warfarin (COUMADIN) 3 mg tablet, Take 3 tablets by mouth daily 9 mg daily, Disp: , Rfl:        Please note:  Voice-recognition software may have been used in the preparation of this document  Occasional wrong word or "sound-alike" substitutions may have occurred due to the inherent limitations of voice recognition software  Interpretation should be guided by context           MO Salmon  3/22/2023  1:41 PM

## 2023-03-22 NOTE — ASSESSMENT & PLAN NOTE
· Secondary to obstructive sleep apnea and Obesity hypoventilation syndrome  · Currently on his baseline home oxygen 4 L nasal cannula  · Patient was unable to tolerate BiPAP  · Continue home O2  · He is tolerating CPAP  · Resp Therapy following at rehab  · Denies daytime sleepiness  · Monitor resp status   · Continue DuoNebs PRN, Continue Lasix

## 2023-03-24 ENCOUNTER — NURSING HOME VISIT (OUTPATIENT)
Dept: GERIATRICS | Facility: OTHER | Age: 68
End: 2023-03-24

## 2023-03-24 VITALS
OXYGEN SATURATION: 94 % | HEART RATE: 89 BPM | TEMPERATURE: 97.9 F | DIASTOLIC BLOOD PRESSURE: 69 MMHG | SYSTOLIC BLOOD PRESSURE: 137 MMHG | RESPIRATION RATE: 18 BRPM | WEIGHT: 315 LBS

## 2023-03-24 DIAGNOSIS — R26.2 AMBULATORY DYSFUNCTION: ICD-10-CM

## 2023-03-24 DIAGNOSIS — Z86.711 HISTORY OF PULMONARY EMBOLISM: ICD-10-CM

## 2023-03-24 DIAGNOSIS — I50.813 ACUTE ON CHRONIC RIGHT-SIDED CONGESTIVE HEART FAILURE (HCC): Primary | ICD-10-CM

## 2023-03-24 DIAGNOSIS — G47.33 SEVERE OBSTRUCTIVE SLEEP APNEA: ICD-10-CM

## 2023-03-24 RX ORDER — TRAMADOL HYDROCHLORIDE 50 MG/1
50 TABLET ORAL EVERY 6 HOURS PRN
Status: ON HOLD | COMMUNITY

## 2023-03-24 NOTE — ASSESSMENT & PLAN NOTE
· History of DVT, PE  · Home dose of coumadin 10 mg Mondays, 9 mg tuesdays-sundays  · INR continues to be subtherapeutic  · 3/24/23 INR 1 8  · Will start coumadin 10 mg sat/sun and 9 mg monday-Friday  · Recheck INR 3/31/23

## 2023-03-24 NOTE — ASSESSMENT & PLAN NOTE
Wt Readings from Last 3 Encounters:   03/24/23 (!) 198 kg (435 lb 12 8 oz)   03/22/23 (!) 198 kg (435 lb 12 8 oz)   03/16/23 (!) 200 kg (442 lb)   · Recent hospitalization for shortness of breath  · CXR showed congestion vs pneumonia  · Patient was treated with Lasix  · Weights have been stable  · Continue Lasix 40 mg daily  · Continue daily weights  · Monitor for s/s of fluid overload

## 2023-03-24 NOTE — ASSESSMENT & PLAN NOTE
Multifactorial in the setting of obesity, multiple chronic conditions  Continue PT/OT  Fall/ safety Precautions  Ensure adequate nutrition/hydration   Monitor CBC/BMP    following for d/c planning

## 2023-03-24 NOTE — PROGRESS NOTES
[facility]  [address]  [phone]  [place] [facility]  Code 31 (STR)        NAME: Jude Marshall  AGE: 69 y o  SEX: male CODE STATUS: CPR    DATE OF ENCOUNTER: 3/24/2023    Assessment and Plan     1  Acute on chronic right-sided congestive heart failure [facility])  Assessment & Plan:  Wt Readings from Last 3 Encounters:   03/24/23 (!) 198 kg (435 lb 12 8 oz)   03/22/23 (!) 198 kg (435 lb 12 8 oz)   03/16/23 (!) 200 kg (442 lb)   · Recent hospitalization for shortness of breath  · CXR showed congestion vs pneumonia  · Patient was treated with Lasix  · Weights have been stable  · Continue Lasix 40 mg daily  · Continue daily weights  · Monitor for s/s of fluid overload          2  Ambulatory dysfunction  Assessment & Plan:  Multifactorial in the setting of obesity, multiple chronic conditions  Continue PT/OT  Fall/ safety Precautions  Ensure adequate nutrition/hydration   Monitor CBC/BMP    following for d/c planning         3  History of pulmonary embolism  Assessment & Plan:  · History of DVT, PE  · Home dose of coumadin 10 mg Mondays, 9 mg tuesdays-sundays  · INR continues to be subtherapeutic  · 3/24/23 INR 1 8  · Will start coumadin 10 mg sat/sun and 9 mg monday-Friday  · Recheck INR 3/31/23      4  Severe obstructive sleep apnea  Assessment & Plan:  · Chronic O2 4L  · Advised to continue CPAP         All medications and routine orders were reviewed and updated as needed  Chief Complaint     STR follow up visit    Patient's care was coordinated with nursing facility staff  Recent vitals, labs, and updated medications were review on [facility] system in facility    Past Medical and Surgical History      Past Medical History:   Diagnosis Date   • CHF (congestive heart failure) ([facility] [address] )    • DVT (deep venous thrombosis) (HCC)    • HTN (hypertension)    • Morbid obesity (HCC)    • EVERETT (obstructive sleep apnea)    • Pulmonary embolism ([facility] [address] )      History reviewed  No pertinent surgical history  Allergies   Allergen Reactions   • Baclofen Other (See Comments)     Nausea and vomiting          History of Present Illness     HPI  Kemar Rodrigues is a 69year old male, she/he is a LTC resident/STR patient of [facility] since 3/6/23  Past Medical Hx including but not limited to EVERETT, CHF, HTN, restrictive lung disease, DVT, BPH, PE, Tourette's  He was seen in collaboration with nursing for medical mgmt and STR follow up  Hospital course  Patient was admitted to the hospital 2/4/2023 - 3/6/2023 for shortness of breath  Patient was treated for acute on chronic respiratory failure with hypoxia and hypercapnia  CXR showed congestion vs pneumonia  No fever or leukocytosis suggesting infection  PCO2 111, improved with CPAP  Patient was given PO lasix, and was transitioned to O2 4L NC  Patient was recommended for rehab and discharged to [facility]  Rehab Course  Zeina Laura was seen and examined at bedside today  Patient is a reliable historian and is AAOx3  On exam patient is sitting in his chair at the side of the bed and does not appear to be in any distress  He continues with 4L 02 NC, LSC, 94%, he has been compliant with his CPAP  Chronic B/L LE edema noted, continue lasix 40 mg daily  Patient states he has taken a few steps with therapy and is hoping to get andrew and go home soon  He denies CP/SOB/N/V/D  Denies lightheadedness, dizziness, headaches, vision changes  Patient states they are eating well and staying hydrated  Per review of SNF records, Patient is eating 3 meals per day, consuming  %  Last documented BM 3/23/23  No concerns from nursing at this time  The patient's allergies, past medical, surgical, social and family history were reviewed and unchanged  Review of Systems     Review of Systems   Constitutional: Positive for activity change  Negative for appetite change, chills and fever  HENT: Negative    Negative for congestion  Eyes: Negative  Respiratory: Negative for cough, shortness of breath and wheezing  Chronic O2 4 L   Cardiovascular: Positive for leg swelling  Negative for chest pain and palpitations  Gastrointestinal: Negative for abdominal pain, constipation, diarrhea, nausea and vomiting  Endocrine: Negative  Genitourinary: Negative for difficulty urinating and dysuria  Musculoskeletal: Positive for gait problem  Skin: Negative  Allergic/Immunologic: Negative  Neurological: Positive for weakness  Negative for dizziness, light-headedness and headaches  Hematological: Negative  Psychiatric/Behavioral: Negative for sleep disturbance  Objective     Vitals:   Vitals:    03/24/23 1345   BP: 137/69   Pulse: 89   Resp: 18   Temp: 97 9 °F (36 6 °C)   SpO2: 94%         Physical Exam  Vitals and nursing note reviewed  Constitutional:       General: He is not in acute distress  Appearance: He is obese  He is not ill-appearing  HENT:      Head: Normocephalic and atraumatic  Nose: No congestion  Mouth/Throat:      Mouth: Mucous membranes are moist    Eyes:      Conjunctiva/sclera: Conjunctivae normal    Cardiovascular:      Rate and Rhythm: Normal rate and regular rhythm  Heart sounds: Normal heart sounds  Pulmonary:      Effort: Pulmonary effort is normal  No respiratory distress  Breath sounds: No wheezing, rhonchi or rales  Abdominal:      General: Bowel sounds are normal  There is no distension  Palpations: Abdomen is soft  Tenderness: There is no abdominal tenderness  Musculoskeletal:      Right lower leg: Edema present  Left lower leg: Edema present  Skin:     General: Skin is warm  Neurological:      Mental Status: He is alert and oriented to person, place, and time  Motor: Weakness present        Gait: Gait abnormal    Psychiatric:         Mood and Affect: Mood normal          Behavior: Behavior normal          Pertinent Laboratory/Diagnostic Studies:   Reviewed in facility chart-stable      Current Medications   Medications reviewed and updated see facility [facility] for details  Current Outpatient Medications:   •  traMADol (ULTRAM) 50 mg tablet, Take 50 mg by mouth every 6 (six) hours as needed, Disp: , Rfl:   •  acetaminophen (TYLENOL) 500 mg tablet, Take 1,000 mg by mouth every 6 (six) hours as needed, Disp: , Rfl:   •  b complex vitamins capsule, Take 1 capsule by mouth daily, Disp: , Rfl:   •  Cholecalciferol 125 MCG (5000 UT) capsule, Take 5,000 Units by mouth daily, Disp: , Rfl:   •  fexofenadine (ALLEGRA) 180 MG tablet, Take 180 mg by mouth daily as needed, Disp: , Rfl:   •  furosemide (LASIX) 40 mg tablet, Take 40 mg by mouth daily, Disp: , Rfl:   •  ipratropium-albuterol (DUO-NEB) 0 5-2 5 mg/3 mL nebulizer solution, Inhale 3 mL 4 (four) times a day, Disp: , Rfl:   •  methocarbamol (ROBAXIN) 500 mg tablet, Take 750 mg by mouth every 6 (six) hours as needed, Disp: , Rfl:   •  pregabalin (LYRICA) 150 mg capsule, Take 1 capsule (150 mg total) by mouth 4 (four) times a day, Disp: 4 capsule, Rfl: 0  •  risperiDONE (RisperDAL) 1 mg tablet, Take 1 mg by mouth 3 (three) times a day, Disp: , Rfl:   •  warfarin (COUMADIN) 3 mg tablet, Take 3 tablets by mouth daily 9 mg m-f 10 mg sat/sun , Disp: , Rfl:      Please note:  Voice-recognition software may have been used in the preparation of this document  Occasional wrong word or "sound-alike" substitutions may have occurred due to the inherent limitations of voice recognition software  Interpretation should be guided by context           MO Franco  3/24/2023  3:33 PM

## 2023-03-25 ENCOUNTER — TELEPHONE (OUTPATIENT)
Dept: OTHER | Facility: OTHER | Age: 68
End: 2023-03-25

## 2023-03-28 ENCOUNTER — NURSING HOME VISIT (OUTPATIENT)
Dept: GERIATRICS | Facility: OTHER | Age: 68
End: 2023-03-28

## 2023-03-28 ENCOUNTER — TELEPHONE (OUTPATIENT)
Dept: OTHER | Facility: OTHER | Age: 68
End: 2023-03-28

## 2023-03-28 ENCOUNTER — HOSPITAL ENCOUNTER (INPATIENT)
Facility: HOSPITAL | Age: 68
LOS: 6 days | Discharge: NURSING, REHAB OR LONG-TERM CARE | End: 2023-04-04
Attending: EMERGENCY MEDICINE | Admitting: INTERNAL MEDICINE

## 2023-03-28 ENCOUNTER — APPOINTMENT (EMERGENCY)
Dept: CT IMAGING | Facility: HOSPITAL | Age: 68
End: 2023-03-28

## 2023-03-28 VITALS
HEART RATE: 89 BPM | DIASTOLIC BLOOD PRESSURE: 69 MMHG | TEMPERATURE: 98.4 F | OXYGEN SATURATION: 93 % | WEIGHT: 315 LBS | SYSTOLIC BLOOD PRESSURE: 137 MMHG | RESPIRATION RATE: 18 BRPM

## 2023-03-28 DIAGNOSIS — R06.89 HYPERCAPNIA: ICD-10-CM

## 2023-03-28 DIAGNOSIS — M15.9 DJD (DEGENERATIVE JOINT DISEASE), MULTIPLE SITES: ICD-10-CM

## 2023-03-28 DIAGNOSIS — U07.1 COVID-19: ICD-10-CM

## 2023-03-28 DIAGNOSIS — I82.4Y9 DEEP VEIN THROMBOSIS (DVT) OF PROXIMAL LOWER EXTREMITY, UNSPECIFIED CHRONICITY, UNSPECIFIED LATERALITY (HCC): ICD-10-CM

## 2023-03-28 DIAGNOSIS — B02.29 NEURALGIA, POST-HERPETIC: ICD-10-CM

## 2023-03-28 DIAGNOSIS — I10 PRIMARY HYPERTENSION: ICD-10-CM

## 2023-03-28 DIAGNOSIS — Z86.711 HISTORY OF PULMONARY EMBOLISM: ICD-10-CM

## 2023-03-28 DIAGNOSIS — I50.813 ACUTE ON CHRONIC RIGHT-SIDED CONGESTIVE HEART FAILURE (HCC): Primary | ICD-10-CM

## 2023-03-28 DIAGNOSIS — G89.4 CHRONIC PAIN DISORDER: ICD-10-CM

## 2023-03-28 DIAGNOSIS — J96.20 ACUTE ON CHRONIC RESPIRATORY FAILURE (HCC): Primary | ICD-10-CM

## 2023-03-28 DIAGNOSIS — G47.33 SEVERE OBSTRUCTIVE SLEEP APNEA: ICD-10-CM

## 2023-03-28 DIAGNOSIS — R26.2 AMBULATORY DYSFUNCTION: ICD-10-CM

## 2023-03-28 DIAGNOSIS — R09.02 HYPOXIA: ICD-10-CM

## 2023-03-28 LAB
ALBUMIN SERPL BCP-MCNC: 3.2 G/DL (ref 3.5–5)
ALP SERPL-CCNC: 81 U/L (ref 34–104)
ALT SERPL W P-5'-P-CCNC: 40 U/L (ref 7–52)
ANION GAP SERPL CALCULATED.3IONS-SCNC: 3 MMOL/L (ref 4–13)
AST SERPL W P-5'-P-CCNC: 37 U/L (ref 13–39)
BASOPHILS # BLD AUTO: 0.01 THOUSANDS/ÂΜL (ref 0–0.1)
BASOPHILS NFR BLD AUTO: 0 % (ref 0–1)
BILIRUB SERPL-MCNC: 0.32 MG/DL (ref 0.2–1)
BILIRUB UR QL STRIP: NEGATIVE
BUN SERPL-MCNC: 9 MG/DL (ref 5–25)
CALCIUM ALBUM COR SERPL-MCNC: 9.7 MG/DL (ref 8.3–10.1)
CALCIUM SERPL-MCNC: 9.1 MG/DL (ref 8.4–10.2)
CHLORIDE SERPL-SCNC: 91 MMOL/L (ref 96–108)
CLARITY UR: CLEAR
CO2 SERPL-SCNC: 45 MMOL/L (ref 21–32)
COLOR UR: YELLOW
CREAT SERPL-MCNC: 0.5 MG/DL (ref 0.6–1.3)
EOSINOPHIL # BLD AUTO: 0.14 THOUSAND/ÂΜL (ref 0–0.61)
EOSINOPHIL NFR BLD AUTO: 3 % (ref 0–6)
ERYTHROCYTE [DISTWIDTH] IN BLOOD BY AUTOMATED COUNT: 12.5 % (ref 11.6–15.1)
GFR SERPL CREATININE-BSD FRML MDRD: 110 ML/MIN/1.73SQ M
GLUCOSE SERPL-MCNC: 113 MG/DL (ref 65–140)
GLUCOSE UR STRIP-MCNC: NEGATIVE MG/DL
HCT VFR BLD AUTO: 41.1 % (ref 36.5–49.3)
HGB BLD-MCNC: 12.4 G/DL (ref 12–17)
HGB UR QL STRIP.AUTO: NEGATIVE
IMM GRANULOCYTES # BLD AUTO: 0.02 THOUSAND/UL (ref 0–0.2)
IMM GRANULOCYTES NFR BLD AUTO: 0 % (ref 0–2)
KETONES UR STRIP-MCNC: NEGATIVE MG/DL
LACTATE SERPL-SCNC: 0.7 MMOL/L (ref 0.5–2)
LEUKOCYTE ESTERASE UR QL STRIP: NEGATIVE
LYMPHOCYTES # BLD AUTO: 0.85 THOUSANDS/ÂΜL (ref 0.6–4.47)
LYMPHOCYTES NFR BLD AUTO: 15 % (ref 14–44)
MAGNESIUM SERPL-MCNC: 2.1 MG/DL (ref 1.9–2.7)
MCH RBC QN AUTO: 32.7 PG (ref 26.8–34.3)
MCHC RBC AUTO-ENTMCNC: 30.2 G/DL (ref 31.4–37.4)
MCV RBC AUTO: 108 FL (ref 82–98)
MONOCYTES # BLD AUTO: 0.6 THOUSAND/ÂΜL (ref 0.17–1.22)
MONOCYTES NFR BLD AUTO: 11 % (ref 4–12)
NEUTROPHILS # BLD AUTO: 3.94 THOUSANDS/ÂΜL (ref 1.85–7.62)
NEUTS SEG NFR BLD AUTO: 71 % (ref 43–75)
NITRITE UR QL STRIP: NEGATIVE
NRBC BLD AUTO-RTO: 0 /100 WBCS
PH UR STRIP.AUTO: 6.5 [PH]
PHOSPHATE SERPL-MCNC: 3.4 MG/DL (ref 2.3–4.1)
PLATELET # BLD AUTO: 182 THOUSANDS/UL (ref 149–390)
PMV BLD AUTO: 9.2 FL (ref 8.9–12.7)
POTASSIUM SERPL-SCNC: 4 MMOL/L (ref 3.5–5.3)
PROT SERPL-MCNC: 6.6 G/DL (ref 6.4–8.4)
PROT UR STRIP-MCNC: NEGATIVE MG/DL
RBC # BLD AUTO: 3.79 MILLION/UL (ref 3.88–5.62)
SODIUM SERPL-SCNC: 139 MMOL/L (ref 135–147)
SP GR UR STRIP.AUTO: 1.01 (ref 1–1.03)
UROBILINOGEN UR STRIP-ACNC: 2 MG/DL
WBC # BLD AUTO: 5.56 THOUSAND/UL (ref 4.31–10.16)

## 2023-03-28 RX ORDER — GUAIFENESIN 600 MG/1
600 TABLET, EXTENDED RELEASE ORAL ONCE
Status: COMPLETED | OUTPATIENT
Start: 2023-03-28 | End: 2023-03-28

## 2023-03-28 RX ORDER — SODIUM CHLORIDE FOR INHALATION 0.9 %
3 VIAL, NEBULIZER (ML) INHALATION ONCE
Status: COMPLETED | OUTPATIENT
Start: 2023-03-28 | End: 2023-03-28

## 2023-03-28 RX ORDER — METHYLPREDNISOLONE SODIUM SUCCINATE 125 MG/2ML
80 INJECTION, POWDER, LYOPHILIZED, FOR SOLUTION INTRAMUSCULAR; INTRAVENOUS ONCE
Status: COMPLETED | OUTPATIENT
Start: 2023-03-28 | End: 2023-03-28

## 2023-03-28 RX ADMIN — ISODIUM CHLORIDE 3 ML: 0.03 SOLUTION RESPIRATORY (INHALATION) at 23:34

## 2023-03-28 RX ADMIN — ALBUTEROL SULFATE 10 MG: 2.5 SOLUTION RESPIRATORY (INHALATION) at 23:33

## 2023-03-28 RX ADMIN — METHYLPREDNISOLONE SODIUM SUCCINATE 80 MG: 125 INJECTION, POWDER, FOR SOLUTION INTRAMUSCULAR; INTRAVENOUS at 23:43

## 2023-03-28 RX ADMIN — IPRATROPIUM BROMIDE 1 MG: 0.5 SOLUTION RESPIRATORY (INHALATION) at 23:34

## 2023-03-28 RX ADMIN — GUAIFENESIN 600 MG: 600 TABLET ORAL at 23:32

## 2023-03-28 NOTE — ASSESSMENT & PLAN NOTE
Multifactoral  Maintain fall precautions  Continue PT/OT  Ensure adequate nutrition and hydration   for dispo planning  Patient lives at home w/ wife

## 2023-03-28 NOTE — ASSESSMENT & PLAN NOTE
BP has been controlled w/o any use of specific BP meds  Continue furosemide 40 mg PO daily  Monitor VS  Avoid hypotension

## 2023-03-28 NOTE — ASSESSMENT & PLAN NOTE
Wt Readings from Last 3 Encounters:   03/28/23 (!) 197 kg (434 lb 6 4 oz)   03/24/23 (!) 198 kg (435 lb 12 8 oz)   03/22/23 (!) 198 kg (435 lb 12 8 oz)   Recent hospital admission for SOB  CXR revealed congestion vs pna  Treated w/ lasix  Continue lasix 40 mg PO daily  Monitor weights and volume status

## 2023-03-28 NOTE — PROGRESS NOTES
[facility]  [address]  [phone]  FACILITY: [facility]  Code 31 (STR)        NAME: Jarett Maher  AGE: 69 y o  SEX: male CODE STATUS: CPR    DATE OF ENCOUNTER: 3/28/2023    Assessment and Plan     1  Acute on chronic right-sided congestive heart failure [facility])  Assessment & Plan:  Wt Readings from Last 3 Encounters:   03/28/23 (!) 197 kg (434 lb 6 4 oz)   03/24/23 (!) 198 kg (435 lb 12 8 oz)   03/22/23 (!) 198 kg (435 lb 12 8 oz)   Recent hospital admission for SOB  CXR revealed congestion vs pna  Treated w/ lasix  Continue lasix 40 mg PO daily  Monitor weights and volume status              2  Severe obstructive sleep apnea  Assessment & Plan: On 4L chronically  Wears CPAP at bedtime  Encourage compliance      3  Deep vein thrombosis (DVT) of proximal lower extremity, unspecified chronicity, unspecified laterality (HCC)  Assessment & Plan:  Stable  Continue coumadin 9 mg PO daily M-F  F/u w/ repeat PT/INR      4  Primary hypertension  Assessment & Plan:  BP has been controlled w/o any use of specific BP meds  Continue furosemide 40 mg PO daily  Monitor VS  Avoid hypotension      5  History of pulmonary embolism  Assessment & Plan:  Stable  Continue coumadin 9 mg PO daily M-F  F/u w/ repeat PT/INR      6  Chronic pain disorder  Assessment & Plan:  Patient w/ 10 year chronic pain hx w/ post herpatic neuralgia  Continue current regimen w/ tylenol 1000 mg PO q  6 PRN, tramadol 50 mg PO q  6 PRN, robaxin 750 mg PO q  6 PRN, voltaren gel q  Shift, lyrica 150 mg PO qid  Has pain agreement w/ Dr Vonita Leventhal as an outpatient      7  Ambulatory dysfunction  Assessment & Plan:  Multifactoral  Maintain fall precautions  Continue PT/OT  Ensure adequate nutrition and hydration   for dispo planning  Patient lives at home w/ wife         All medications and routine orders were reviewed and updated as needed      Chief Complaint     STR follow up visit    Past Medical and Surgica History      Past Medical History:   Diagnosis Date   • CHF (congestive heart failure) ([facility] [address] )    • DVT (deep venous thrombosis) ([facility])    • HTN (hypertension)    • Morbid obesity (HCC)    • EVERETT (obstructive sleep apnea)    • Pulmonary embolism (HCC)      No past surgical history on file  Allergies   Allergen Reactions   • Baclofen Other (See Comments)     Nausea and vomiting          History of Present Illness     Luis F Johns is a 69year old male admitted to [facility] following a hospitalization for shortness of breath  He has a PMH including but not limited to EVERETT, CHF, HTN, DVT, BPH, PE, Tourette's, and ambulatory dysfunction  The patient initially presented to the ED from home for SOB  He was treated for acute on chronic respiratory failure w/ hypoxia and hypercapnia  CXR revealed congestion vs pna  Patient did not show any fever or leukocytosis suggesting infection  PCO2 was 11 but improved w/ CPAP  He was given PO lasix and remained at his baseline O2 requirements of 4L NC  He was evaluated by PT and was recommended for STR  The patient is seen today for a routine STR follow up visit  The patient was seen and examined at bedside in stable condition  He is alert and oriented x 3  He is seen resting in bed not in any acute distress  He has no complaints on today's exam other than some mucus production which has been chronic  He takes mucinex bid  Overall, he denies CP/SOB/N/V/D  Denies lightheadedness, dizziness, headaches, vision changes  Patient states they are eating well and staying hydrated  Denies any bowel or bladder issues  No concerns from nursing staff  He says he is working well w/ therapy and has been compliant w/ his CPAP  He hopes to get stronger so he can return home  The patient's allergies, past medical, surgical, social and family history were reviewed and unchanged      Review of Systems     Review of Systems   Constitutional: Negative  Negative for appetite change, chills, fatigue and fever  HENT: Positive for congestion  Negative for facial swelling, rhinorrhea, sneezing and sore throat  Eyes: Negative  Negative for redness, itching and visual disturbance  Respiratory: Negative  Negative for cough, chest tightness, shortness of breath and wheezing  Cardiovascular: Positive for leg swelling  Negative for chest pain and palpitations  Gastrointestinal: Negative for abdominal distention, abdominal pain, constipation, nausea and vomiting  Genitourinary: Negative  Negative for difficulty urinating, flank pain, frequency and hematuria  Musculoskeletal: Positive for gait problem  Negative for arthralgias, back pain, myalgias and neck stiffness  Skin: Negative for pallor, rash and wound  Neurological: Positive for weakness  Negative for dizziness, light-headedness, numbness and headaches  Psychiatric/Behavioral: Negative for agitation, confusion and sleep disturbance  The patient is not nervous/anxious  Objective     Vitals:   Vitals:    03/28/23 1418   BP: 137/69   Pulse: 89   Resp: 18   Temp: 98 4 °F (36 9 °C)   SpO2: 93%         Physical Exam  Vitals reviewed  Constitutional:       General: He is not in acute distress  Appearance: Normal appearance  He is not ill-appearing, toxic-appearing or diaphoretic  HENT:      Head: Normocephalic and atraumatic  Right Ear: External ear normal       Left Ear: External ear normal       Nose: Congestion present  No rhinorrhea  Mouth/Throat:      Mouth: Mucous membranes are moist       Pharynx: Oropharynx is clear  No oropharyngeal exudate or posterior oropharyngeal erythema  Eyes:      General:         Right eye: No discharge  Left eye: No discharge  Extraocular Movements: Extraocular movements intact  Conjunctiva/sclera: Conjunctivae normal       Pupils: Pupils are equal, round, and reactive to light     Cardiovascular:      Rate and Rhythm: Normal rate and regular rhythm  Pulses: Normal pulses  Heart sounds: Normal heart sounds  No murmur heard  No friction rub  No gallop  Pulmonary:      Effort: Pulmonary effort is normal  No respiratory distress  Breath sounds: Normal breath sounds  No wheezing  Chest:      Chest wall: No tenderness  Abdominal:      General: Abdomen is flat  Bowel sounds are normal  There is no distension  Palpations: Abdomen is soft  There is no mass  Tenderness: There is no abdominal tenderness  There is no guarding  Musculoskeletal:         General: No swelling or tenderness  Normal range of motion  Cervical back: Normal range of motion and neck supple  No rigidity or tenderness  Right lower leg: Edema present  Left lower leg: Edema present  Skin:     General: Skin is warm and dry  Coloration: Skin is not jaundiced  Findings: No bruising, erythema or rash  Neurological:      General: No focal deficit present  Mental Status: He is alert and oriented to person, place, and time  Mental status is at baseline  Sensory: No sensory deficit  Motor: Weakness present  Coordination: Coordination normal       Gait: Gait abnormal    Psychiatric:         Mood and Affect: Mood normal          Behavior: Behavior normal          Thought Content: Thought content normal          Judgment: Judgment normal          Pertinent Laboratory/Diagnostic Studies:   Reviewed in facility chart-stable    HGB 12 0  WBC 7 3  PLTS 238    BUN 10  CREAT 0 38    K 4 0    Current Medications   Medications reviewed and updated see facility [facility] for details        Current Outpatient Medications:   •  acetaminophen (TYLENOL) 500 mg tablet, Take 1,000 mg by mouth every 6 (six) hours as needed, Disp: , Rfl:   •  b complex vitamins capsule, Take 1 capsule by mouth daily, Disp: , Rfl:   •  Cholecalciferol 125 MCG (5000 UT) capsule, Take 5,000 Units by mouth daily, Disp: , Rfl:   • " fexofenadine (ALLEGRA) 180 MG tablet, Take 180 mg by mouth daily as needed, Disp: , Rfl:   •  furosemide (LASIX) 40 mg tablet, Take 40 mg by mouth daily, Disp: , Rfl:   •  ipratropium-albuterol (DUO-NEB) 0 5-2 5 mg/3 mL nebulizer solution, Inhale 3 mL 4 (four) times a day, Disp: , Rfl:   •  methocarbamol (ROBAXIN) 500 mg tablet, Take 750 mg by mouth every 6 (six) hours as needed, Disp: , Rfl:   •  pregabalin (LYRICA) 150 mg capsule, Take 1 capsule (150 mg total) by mouth 4 (four) times a day, Disp: 4 capsule, Rfl: 0  •  risperiDONE (RisperDAL) 1 mg tablet, Take 1 mg by mouth 3 (three) times a day, Disp: , Rfl:   •  traMADol (ULTRAM) 50 mg tablet, Take 50 mg by mouth every 6 (six) hours as needed, Disp: , Rfl:   •  warfarin (COUMADIN) 3 mg tablet, Take 3 tablets by mouth daily 9 mg m-f 10 mg sat/sun , Disp: , Rfl:      Plan discussed with Dr Carmelina Miguel noted agreement with assessment and plan  Please note:  Voice-recognition software may have been used in the preparation of this document  Occasional wrong word or \"sound-alike\" substitutions may have occurred due to the inherent limitations of voice recognition software  Interpretation should be guided by context           MO Martinez  3/28/2023  2:04 PM    "

## 2023-03-28 NOTE — LETTER
Thank you for allowing us to participate in the care of your patient, Sharmin Ware, who was hospitalized from [unfilled] through 4/4/2023 with the admitting diagnosis of ***   ***      If you have any additional questions or would like to discuss further, please feel free to contact me      Kelly Alvarenga MD  [facility] Internal Medicine, Hospitalist  [phone]

## 2023-03-28 NOTE — ASSESSMENT & PLAN NOTE
Patient w/ 10 year chronic pain hx w/ post herpatic neuralgia  Continue current regimen w/ tylenol 1000 mg PO q  6 PRN, tramadol 50 mg PO q  6 PRN, robaxin 750 mg PO q  6 PRN, voltaren gel q   Shift, lyrica 150 mg PO qid  Has pain agreement w/ Dr Lindsey Kidney as an outpatient

## 2023-03-28 NOTE — TELEPHONE ENCOUNTER
[facility] call to report that the patient has a chronic cough and wants new medication         Paged the on call provider [facility]

## 2023-03-29 ENCOUNTER — APPOINTMENT (EMERGENCY)
Dept: CT IMAGING | Facility: HOSPITAL | Age: 68
End: 2023-03-29

## 2023-03-29 PROBLEM — J96.21 ACUTE ON CHRONIC RESPIRATORY FAILURE WITH HYPOXIA AND HYPERCAPNIA (HCC): Status: ACTIVE | Noted: 2020-04-13

## 2023-03-29 PROBLEM — U07.1 COVID-19: Status: ACTIVE | Noted: 2023-03-29

## 2023-03-29 PROBLEM — J96.22 ACUTE ON CHRONIC RESPIRATORY FAILURE WITH HYPOXIA AND HYPERCAPNIA (HCC): Status: ACTIVE | Noted: 2020-04-13

## 2023-03-29 PROBLEM — R65.10 SIRS (SYSTEMIC INFLAMMATORY RESPONSE SYNDROME) (HCC): Status: ACTIVE | Noted: 2023-03-29

## 2023-03-29 LAB
ABO GROUP BLD: NORMAL
ANION GAP SERPL CALCULATED.3IONS-SCNC: 3 MMOL/L (ref 4–13)
APTT PPP: 200 SECONDS (ref 23–37)
APTT PPP: 45 SECONDS (ref 23–37)
BASE EX.OXY STD BLDV CALC-SCNC: 85.3 % (ref 60–80)
BASE EX.OXY STD BLDV CALC-SCNC: 89.5 % (ref 60–80)
BASE EX.OXY STD BLDV CALC-SCNC: 94.8 % (ref 60–80)
BASE EXCESS BLDV CALC-SCNC: 16.1 MMOL/L
BASE EXCESS BLDV CALC-SCNC: 16.6 MMOL/L
BASE EXCESS BLDV CALC-SCNC: 18.6 MMOL/L
BASOPHILS # BLD MANUAL: 0 THOUSAND/UL (ref 0–0.1)
BASOPHILS NFR MAR MANUAL: 0 % (ref 0–1)
BNP SERPL-MCNC: 20 PG/ML (ref 0–100)
BUN SERPL-MCNC: 9 MG/DL (ref 5–25)
CA-I BLD-SCNC: 1.09 MMOL/L (ref 1.12–1.32)
CALCIUM SERPL-MCNC: 8.8 MG/DL (ref 8.4–10.2)
CHLORIDE SERPL-SCNC: 91 MMOL/L (ref 96–108)
CK SERPL-CCNC: 20 U/L (ref 39–308)
CO2 SERPL-SCNC: 44 MMOL/L (ref 21–32)
CREAT SERPL-MCNC: 0.44 MG/DL (ref 0.6–1.3)
CRP SERPL QL: 24.2 MG/L
D DIMER PPP FEU-MCNC: 0.44 UG/ML FEU
EOSINOPHIL # BLD MANUAL: 0 THOUSAND/UL (ref 0–0.4)
EOSINOPHIL NFR BLD MANUAL: 0 % (ref 0–6)
ERYTHROCYTE [DISTWIDTH] IN BLOOD BY AUTOMATED COUNT: 12.6 % (ref 11.6–15.1)
FLUAV RNA RESP QL NAA+PROBE: NEGATIVE
FLUBV RNA RESP QL NAA+PROBE: NEGATIVE
GFR SERPL CREATININE-BSD FRML MDRD: 116 ML/MIN/1.73SQ M
GLUCOSE SERPL-MCNC: 147 MG/DL (ref 65–140)
HBV CORE AB SER QL: NORMAL
HBV CORE IGM SER QL: NORMAL
HBV SURFACE AG SER QL: NORMAL
HCO3 BLDV-SCNC: 46.4 MMOL/L (ref 24–30)
HCO3 BLDV-SCNC: 47.5 MMOL/L (ref 24–30)
HCO3 BLDV-SCNC: 50.3 MMOL/L (ref 24–30)
HCT VFR BLD AUTO: 42 % (ref 36.5–49.3)
HCV AB SER QL: NORMAL
HGB BLD-MCNC: 12.6 G/DL (ref 12–17)
HIV 1+2 AB+HIV1 P24 AG SERPL QL IA: NORMAL
HIV1 P24 AG SER QL: NORMAL
INR PPP: 2.4 (ref 0.84–1.19)
L PNEUMO1 AG UR QL IA.RAPID: NEGATIVE
LYMPHOCYTES # BLD AUTO: 0.13 THOUSAND/UL (ref 0.6–4.47)
LYMPHOCYTES # BLD AUTO: 2 % (ref 14–44)
MAGNESIUM SERPL-MCNC: 2 MG/DL (ref 1.9–2.7)
MCH RBC QN AUTO: 32.4 PG (ref 26.8–34.3)
MCHC RBC AUTO-ENTMCNC: 30 G/DL (ref 31.4–37.4)
MCV RBC AUTO: 108 FL (ref 82–98)
MONOCYTES # BLD AUTO: 0.2 THOUSAND/UL (ref 0–1.22)
MONOCYTES NFR BLD: 3 % (ref 4–12)
NEUTROPHILS # BLD MANUAL: 6.12 THOUSAND/UL (ref 1.85–7.62)
NEUTS BAND NFR BLD MANUAL: 4 % (ref 0–8)
NEUTS SEG NFR BLD AUTO: 90 % (ref 43–75)
O2 CT BLDV-SCNC: 17.1 ML/DL
O2 CT BLDV-SCNC: 17.2 ML/DL
O2 CT BLDV-SCNC: 18.9 ML/DL
PCO2 BLDV: 102.2 MM HG (ref 42–50)
PCO2 BLDV: 86.5 MM HG (ref 42–50)
PCO2 BLDV: 92.4 MM HG (ref 42–50)
PH BLDV: 7.31 [PH] (ref 7.3–7.4)
PH BLDV: 7.33 [PH] (ref 7.3–7.4)
PH BLDV: 7.35 [PH] (ref 7.3–7.4)
PHOSPHATE SERPL-MCNC: 3.2 MG/DL (ref 2.3–4.1)
PLATELET # BLD AUTO: 194 THOUSANDS/UL (ref 149–390)
PLATELET BLD QL SMEAR: ADEQUATE
PMV BLD AUTO: 9.4 FL (ref 8.9–12.7)
PO2 BLDV: 54.3 MM HG (ref 35–45)
PO2 BLDV: 64 MM HG (ref 35–45)
PO2 BLDV: 86.5 MM HG (ref 35–45)
POTASSIUM SERPL-SCNC: 4.2 MMOL/L (ref 3.5–5.3)
PROCALCITONIN SERPL-MCNC: 0.12 NG/ML
PROCALCITONIN SERPL-MCNC: 0.13 NG/ML
PROTHROMBIN TIME: 26.4 SECONDS (ref 11.6–14.5)
RBC # BLD AUTO: 3.89 MILLION/UL (ref 3.88–5.62)
RBC MORPH BLD: NORMAL
RH BLD: POSITIVE
RSV RNA RESP QL NAA+PROBE: NEGATIVE
S PNEUM AG UR QL: NEGATIVE
SARS-COV-2 RNA RESP QL NAA+PROBE: POSITIVE
SODIUM SERPL-SCNC: 138 MMOL/L (ref 135–147)
VARIANT LYMPHS # BLD AUTO: 1 %
WBC # BLD AUTO: 6.51 THOUSAND/UL (ref 4.31–10.16)

## 2023-03-29 PROCEDURE — XW033E5 INTRODUCTION OF REMDESIVIR ANTI-INFECTIVE INTO PERIPHERAL VEIN, PERCUTANEOUS APPROACH, NEW TECHNOLOGY GROUP 5: ICD-10-PCS | Performed by: INTERNAL MEDICINE

## 2023-03-29 PROCEDURE — 5A09557 ASSISTANCE WITH RESPIRATORY VENTILATION, GREATER THAN 96 CONSECUTIVE HOURS, CONTINUOUS POSITIVE AIRWAY PRESSURE: ICD-10-PCS | Performed by: INTERNAL MEDICINE

## 2023-03-29 RX ORDER — FUROSEMIDE 40 MG/1
40 TABLET ORAL DAILY
Status: DISCONTINUED | OUTPATIENT
Start: 2023-03-29 | End: 2023-03-29

## 2023-03-29 RX ORDER — HEPARIN SODIUM 1000 [USP'U]/ML
5000 INJECTION, SOLUTION INTRAVENOUS; SUBCUTANEOUS EVERY 6 HOURS PRN
Status: DISCONTINUED | OUTPATIENT
Start: 2023-03-29 | End: 2023-03-29

## 2023-03-29 RX ORDER — FUROSEMIDE 40 MG/1
40 TABLET ORAL DAILY
Status: DISCONTINUED | OUTPATIENT
Start: 2023-03-30 | End: 2023-04-04 | Stop reason: HOSPADM

## 2023-03-29 RX ORDER — GUAIFENESIN 600 MG/1
600 TABLET, EXTENDED RELEASE ORAL EVERY 12 HOURS SCHEDULED
Status: DISCONTINUED | OUTPATIENT
Start: 2023-03-29 | End: 2023-03-31

## 2023-03-29 RX ORDER — PREGABALIN 75 MG/1
150 CAPSULE ORAL 3 TIMES DAILY
Status: DISCONTINUED | OUTPATIENT
Start: 2023-03-29 | End: 2023-03-29

## 2023-03-29 RX ORDER — HEPARIN SODIUM 1000 [USP'U]/ML
10000 INJECTION, SOLUTION INTRAVENOUS; SUBCUTANEOUS EVERY 6 HOURS PRN
Status: DISCONTINUED | OUTPATIENT
Start: 2023-03-29 | End: 2023-03-29

## 2023-03-29 RX ORDER — HEPARIN SODIUM 1000 [USP'U]/ML
10000 INJECTION, SOLUTION INTRAVENOUS; SUBCUTANEOUS ONCE
Status: COMPLETED | OUTPATIENT
Start: 2023-03-29 | End: 2023-03-29

## 2023-03-29 RX ORDER — HEPARIN SODIUM 10000 [USP'U]/100ML
3-30 INJECTION, SOLUTION INTRAVENOUS
Status: DISCONTINUED | OUTPATIENT
Start: 2023-03-29 | End: 2023-03-29

## 2023-03-29 RX ORDER — CHLORHEXIDINE GLUCONATE 0.12 MG/ML
15 RINSE ORAL EVERY 12 HOURS SCHEDULED
Status: DISCONTINUED | OUTPATIENT
Start: 2023-03-29 | End: 2023-04-04 | Stop reason: HOSPADM

## 2023-03-29 RX ORDER — DOXYCYCLINE HYCLATE 100 MG/1
100 CAPSULE ORAL EVERY 12 HOURS
Status: DISCONTINUED | OUTPATIENT
Start: 2023-03-29 | End: 2023-03-31

## 2023-03-29 RX ORDER — FUROSEMIDE 10 MG/ML
20 INJECTION INTRAMUSCULAR; INTRAVENOUS ONCE
Status: COMPLETED | OUTPATIENT
Start: 2023-03-29 | End: 2023-03-29

## 2023-03-29 RX ORDER — PREGABALIN 75 MG/1
150 CAPSULE ORAL 3 TIMES DAILY
Status: DISCONTINUED | OUTPATIENT
Start: 2023-03-29 | End: 2023-04-04 | Stop reason: HOSPADM

## 2023-03-29 RX ADMIN — REMDESIVIR 200 MG: 100 INJECTION, POWDER, LYOPHILIZED, FOR SOLUTION INTRAVENOUS at 07:03

## 2023-03-29 RX ADMIN — GUAIFENESIN 600 MG: 600 TABLET ORAL at 09:55

## 2023-03-29 RX ADMIN — HEPARIN SODIUM 18 UNITS/KG/HR: 10000 INJECTION, SOLUTION INTRAVENOUS at 05:32

## 2023-03-29 RX ADMIN — DEXAMETHASONE SODIUM PHOSPHATE 19.7 MG: 10 INJECTION INTRAMUSCULAR; INTRAVENOUS at 06:20

## 2023-03-29 RX ADMIN — DEXAMETHASONE SODIUM PHOSPHATE 19.7 MG: 10 INJECTION INTRAMUSCULAR; INTRAVENOUS at 21:53

## 2023-03-29 RX ADMIN — DOXYCYCLINE 100 MG: 100 CAPSULE ORAL at 19:11

## 2023-03-29 RX ADMIN — PREGABALIN 150 MG: 75 CAPSULE ORAL at 09:55

## 2023-03-29 RX ADMIN — DOXYCYCLINE 100 MG: 100 CAPSULE ORAL at 04:47

## 2023-03-29 RX ADMIN — VANCOMYCIN HYDROCHLORIDE 2000 MG: 10 INJECTION, POWDER, LYOPHILIZED, FOR SOLUTION INTRAVENOUS at 02:54

## 2023-03-29 RX ADMIN — FUROSEMIDE 20 MG: 10 INJECTION, SOLUTION INTRAMUSCULAR; INTRAVENOUS at 09:55

## 2023-03-29 RX ADMIN — SODIUM CHLORIDE 250 ML: 0.9 INJECTION, SOLUTION INTRAVENOUS at 00:00

## 2023-03-29 RX ADMIN — CHLORHEXIDINE GLUCONATE 0.12% ORAL RINSE 15 ML: 1.2 LIQUID ORAL at 09:56

## 2023-03-29 RX ADMIN — WARFARIN SODIUM 9 MG: 6 TABLET ORAL at 19:12

## 2023-03-29 RX ADMIN — CEFEPIME 2000 MG: 2 INJECTION, POWDER, FOR SOLUTION INTRAVENOUS at 01:49

## 2023-03-29 RX ADMIN — CEFTRIAXONE SODIUM 1000 MG: 10 INJECTION, POWDER, FOR SOLUTION INTRAVENOUS at 11:58

## 2023-03-29 RX ADMIN — HEPARIN SODIUM 10000 UNITS: 1000 INJECTION INTRAVENOUS; SUBCUTANEOUS at 04:46

## 2023-03-29 RX ADMIN — PREGABALIN 150 MG: 75 CAPSULE ORAL at 19:16

## 2023-03-29 NOTE — ASSESSMENT & PLAN NOTE
Wt Readings from Last 3 Encounters:   03/29/23 (!) 197 kg (434 lb)   03/28/23 (!) 197 kg (434 lb 6 4 oz)   03/24/23 (!) 198 kg (435 lb 12 8 oz)     · Hx diastolic HF  · Most recent echo (2/5/23) - LVEF 60%, indeterminate diastolic function  RV mildly dilated    · Home regimen: Lasix 40mg  · Baseline weight 435 lbs  · Current weight 434 lbs  · Not in acute exacerbation  · Continue home Lasix  · Monitor I&O  · Daily weight

## 2023-03-29 NOTE — ASSESSMENT & PLAN NOTE
Wt Readings from Last 3 Encounters:   03/28/23 (!) 197 kg (434 lb 6 4 oz)   03/24/23 (!) 198 kg (435 lb 12 8 oz)   03/22/23 (!) 198 kg (435 lb 12 8 oz)     • Appears compensated at this time  • Last echocardiogram 2/2023:   o EF 60%, unable to determine diastolic function  o Mildly dilated aortic root  o Trivial pericardial effusion  • Monitor intake and output, daily weights  • Low sodium diet and fluid restriction  • Continue home medications  o Lasix 40 mg PO qd  · S/p fluid bolus in ED  Hold further  Patient is euvolemic

## 2023-03-29 NOTE — CASE MANAGEMENT
Case Management Assessment & Discharge Planning Note    Patient name Jose Elias Trimble  Location S /S -90 MRN [id number]  : 1955 Date 3/29/2023       Current Admission Date: 3/28/2023  Current Admission Diagnosis:Acute on chronic respiratory failure with hypoxia and hypercapnia [facility])   Patient Active Problem List    Diagnosis Date Noted   • COVID-19 2023   • SIRS (systemic inflammatory response syndrome) ([facility] [address] ) 2023   • Cerumen debris on tympanic membrane of both ears 2023   • Generalized weakness 2023   • Impaired functional mobility, balance, gait, and endurance 2023   • BPH (benign prostatic hyperplasia) 2023   • Chronic anticoagulation 2023   • Pressure injury, unstageable, with suspected deep tissue injury ([address] ) 2023   • Dyspnea on exertion 2023   • Morbid obesity ([address] ) 2023   • Primary hypertension 2023   • History of pulmonary embolism 2023   • CHF (congestive heart failure) ([address] ) 2023   • DVT (deep venous thrombosis) ([address] ) 2023   • Acute on chronic diastolic (congestive) heart failure ([address] ) 2021   • DJD (degenerative joint disease), multiple sites 2021   • Ambulatory dysfunction 2021   • Chronic right-sided congestive heart failure ([address] ) 2021   • BMI 50 0-59 9, adult ([address] ) 2020   • Debility 2020   • Acute on chronic respiratory failure with hypoxia and hypercapnia ([address] ) 2020   • Restrictive lung disease 2020   • Medical marijuana use 2018   • Severe obstructive sleep apnea 2018   • Obesity hypoventilation syndrome ([address] ) 2018   • Pulmonary hypertension ([facility] [address] ) 01/15/2018   • Pain medication agreement signed 2017   • Neuralgia, post-herpetic 2017   • Sciatica 2012   • Fercho de la Tourette's syndrome 2012   • Chronic pain disorder 2012      LOS (days): 0  Geometric Mean LOS (GMLOS) (days): 5 20  Days to GMLOS:4 7 OBJECTIVE:    Risk of Unplanned Readmission Score: 14 21     Current admission status: Inpatient    Preferred Pharmacy:   [address], [place] [address]  [address] [place] [address]  Phone: [phone] Fax: [phone]    Primary Care Provider: Janice Earl MD    Primary Insurance: [facility] REP  Secondary Insurance:     ASSESSMENT:  [address] Proxies    There are no active Health Care Proxies on file  Patient Information  Admitted from[de-identified] Facility ([facility])  Mental Status: Other (Comment)  During Assessment patient was accompanied by: Not accompanied during assessment  Assessment information provided by[de-identified] Spouse Son Burch)    DISCHARGE DETAILS:    Discharge planning discussed with[de-identified] pt wifeBoby November of Choice: Yes  Comments - Freedom of Choice: [place] STR    Contacts  Patient Contacts: Shilpa  Relationship to Patient[de-identified] Family  Contact Method: Phone  Reason/Outcome: Continuity of Care, Emergency Contact, Discharge Planning, Referral    Other Referral/Resources/Interventions Provided:  Interventions: Short Term Rehab  Referral Comments: Chart reviewed  Pt admitted from [address] [place] [address]  CM spoke with pt wife, Vida Chandra  Vida Chandra reports that pt has been at rehab at [address] [place] [address] for the past 2-3 weeks  Vida Chandra reports the goal is rehab to home  Vida Chandra reports she would like to consider other facilties for the pt  Preference would be [place] as it is closer to her home  Vida Chandra understands pt weight may cause a barrier to another facility  She is understanding of this  She is ok with [facility] being on the referral along with other facilities  Shilpa aware that [facility] re[facility] liaison may reach out to her given issues within the facility  Vida Chandra ok with this  Vida Chandar reports pt does wear a CPAP and 4L O2 during the day   CM reviewed referrals being sent via [employer] and that CM would f/u with options  CM spoke with Yazan Roach at [address] [facility] to obtain pt level of functioning at [facility]  As per Yazan Roach pt was initially a nanette lift, but now a 2 person assist to edge of bed and to transfer to [facility]  Pt can take about 1-2 steps with the walker and two person assist  Pt is an assist of 1 for bed mobility  CM did speak with [facility] Lia[facility]  Updated her on above information and that pt wife, Blanca Bianchi, would be interested in [address] [place] [address],[address]  They will f/u with admission and update CM      Treatment Team Recommendation: Short Term Rehab  Discharge Destination Plan[de-identified] Short Term Rehab

## 2023-03-29 NOTE — ASSESSMENT & PLAN NOTE
· Hx PH, most recent echo PAP unable to be calculated  · Home regimen: Lasix 40mg daily  · Appears Euvolemic on exam  · At base weight 434 lbs  · Continue Lasix 40mg daily  · Monitor I&O  · Daily weight

## 2023-03-29 NOTE — ED PROVIDER NOTES
History  Chief Complaint   Patient presents with   • Cough     Patient arrived via EMS from [facility] post acute skilled nursing for eval of productive cough  Patient has Hx: respiratory failure  EMS states patient was given several neb treatments with no relief, here for eval  Wears 4L 02 NC at the facility  Alert and oriented  Patient is a 68-year-old male with a history of CHF, DVT, hypertension, and pulmonary embolism on Coumadin milligrams daily, with nonischemic past surgical history that presents emerged department with shortness of breath, hypoxia, and intermittent hacking productive cough with yellow sputum for 1 week  Patient only lives in a residential seniors facility and stated that he had coughing symptoms beginning last week  Patient was recently admitted to the hospital on 2/4/2023 at [facility] by orthopedics  Patient denies palliative factors or provocative factors of exertion  Patient denies noneffective treatment  Patient denies fevers, chills,, vomiting, diarrhea, constipation, or symptoms  Patient has recent fall recent trauma  Patient firms possible sick contacts  Patient denies recent travel  Patient denies recent antibiotic use  Patient denies chest pain and abdominal pain  History provided by:  Patient   used: No    Cough  Associated symptoms: shortness of breath    Associated symptoms: no chest pain, no chills, no fever, no headaches, no rash, no rhinorrhea and no sore throat        Prior to Admission Medications   Prescriptions Last Dose Informant Patient Reported? Taking?    Cholecalciferol 125 MCG (5000 UT) capsule Unknown  Yes No   Sig: Take 5,000 Units by mouth daily   acetaminophen (TYLENOL) 500 mg tablet Unknown  Yes No   Sig: Take 1,000 mg by mouth every 6 (six) hours as needed   b complex vitamins capsule Unknown  Yes No   Sig: Take 1 capsule by mouth daily   fexofenadine (ALLEGRA) 180 MG tablet Unknown  Yes No   Sig: Take 180 mg by mouth daily as needed   furosemide (LASIX) 40 mg tablet Unknown  Yes No   Sig: Take 40 mg by mouth daily   ipratropium-albuterol (DUO-NEB) 0 5-2 5 mg/3 mL nebulizer solution Unknown  Yes No   Sig: Inhale 3 mL 4 (four) times a day   methocarbamol (ROBAXIN) 500 mg tablet Unknown  Yes No   Sig: Take 750 mg by mouth every 6 (six) hours as needed   pregabalin (LYRICA) 150 mg capsule Unknown  No No   Sig: Take 1 capsule (150 mg total) by mouth 4 (four) times a day   risperiDONE (RisperDAL) 1 mg tablet Unknown  Yes No   Sig: Take 1 mg by mouth 3 (three) times a day   traMADol (ULTRAM) 50 mg tablet Unknown  Yes No   Sig: Take 50 mg by mouth every 6 (six) hours as needed   warfarin (COUMADIN) 3 mg tablet Unknown  Yes No   Sig: Take 3 tablets by mouth daily 9 mg m-f 10 mg sat/sun       Facility-Administered Medications: None       Past Medical History:   Diagnosis Date   • CHF (congestive heart failure) ([facility])    • DVT (deep venous thrombosis) ([facility])    • HTN (hypertension)    • Morbid obesity ([facility])    • EVERETT (obstructive sleep apnea)    • Pulmonary embolism ([facility] [address] )        History reviewed  No pertinent surgical history  History reviewed  No pertinent family history  I have reviewed and agree with the history as documented  E-Cigarette/Vaping   • E-Cigarette Use Never User      E-Cigarette/Vaping Substances     Social History     Tobacco Use   • Smoking status: Never   • Smokeless tobacco: Never   Vaping Use   • Vaping Use: Never used   Substance Use Topics   • Alcohol use: Not Currently   • Drug use: Not Currently     Types: Prescription       Review of Systems   Constitutional: Negative for activity change, appetite change, chills and fever  HENT: Negative for congestion, postnasal drip, rhinorrhea, sinus pressure, sinus pain, sore throat and tinnitus  Eyes: Negative for photophobia and visual disturbance  Respiratory: Positive for cough and shortness of breath  Negative for chest tightness      Cardiovascular: Negative for chest pain and palpitations  Gastrointestinal: Negative for abdominal pain, constipation, diarrhea, nausea and vomiting  Genitourinary: Negative for difficulty urinating, dysuria, flank pain, frequency and urgency  Musculoskeletal: Negative for back pain, gait problem, neck pain and neck stiffness  Skin: Negative for pallor and rash  Allergic/Immunologic: Negative for environmental allergies and food allergies  Neurological: Negative for dizziness, weakness, numbness and headaches  Psychiatric/Behavioral: Negative for confusion  All other systems reviewed and are negative  Physical Exam  Physical Exam  Vitals and nursing note reviewed  Constitutional:       General: He is awake  Appearance: Normal appearance  He is well-developed  He is not ill-appearing, toxic-appearing or diaphoretic  Comments: Pulse (!) 114   Temp 98 7 °F (37 1 °C) (Oral)   Resp 20   SpO2 92%      HENT:      Head: Normocephalic and atraumatic  Right Ear: Hearing and external ear normal  No decreased hearing noted  No drainage, swelling or tenderness  No mastoid tenderness  Left Ear: Hearing and external ear normal  No decreased hearing noted  No drainage, swelling or tenderness  No mastoid tenderness  Nose: Nose normal       Mouth/Throat:      Lips: Pink  Mouth: Mucous membranes are moist       Pharynx: Oropharynx is clear  Uvula midline  Eyes:      General: Lids are normal  Vision grossly intact  Right eye: No discharge  Left eye: No discharge  Extraocular Movements: Extraocular movements intact  Conjunctiva/sclera: Conjunctivae normal       Pupils: Pupils are equal, round, and reactive to light  Neck:      Vascular: No JVD  Trachea: Trachea and phonation normal  No tracheal tenderness or tracheal deviation  Cardiovascular:      Rate and Rhythm: Normal rate and regular rhythm  Pulses: Normal pulses             Radial pulses are 2+ on the right side and 2+ on the left side  Posterior tibial pulses are 2+ on the right side and 2+ on the left side  Heart sounds: Normal heart sounds  Pulmonary:      Effort: Pulmonary effort is normal       Breath sounds: No stridor  Examination of the right-lower field reveals rhonchi  Examination of the left-lower field reveals rhonchi  Rhonchi present  No decreased breath sounds, wheezing or rales  Chest:      Chest wall: No tenderness  Abdominal:      General: Abdomen is flat  Bowel sounds are normal  There is no distension  Palpations: Abdomen is soft  Abdomen is not rigid  Tenderness: There is no abdominal tenderness  There is no guarding or rebound  Musculoskeletal:         General: Normal range of motion  Cervical back: Full passive range of motion without pain, normal range of motion and neck supple  No rigidity  No spinous process tenderness or muscular tenderness  Normal range of motion  Lymphadenopathy:      Head:      Right side of head: No submental, submandibular, tonsillar, preauricular, posterior auricular or occipital adenopathy  Left side of head: No submental, submandibular, tonsillar, preauricular, posterior auricular or occipital adenopathy  Cervical: No cervical adenopathy  Right cervical: No superficial, deep or posterior cervical adenopathy  Left cervical: No superficial, deep or posterior cervical adenopathy  Skin:     General: Skin is warm  Capillary Refill: Capillary refill takes less than 2 seconds  Neurological:      General: No focal deficit present  Mental Status: He is alert and oriented to person, place, and time  GCS: GCS eye subscore is 4  GCS verbal subscore is 5  GCS motor subscore is 6  Sensory: No sensory deficit  Deep Tendon Reflexes: Reflexes are normal and symmetric  Reflex Scores:       Patellar reflexes are 2+ on the right side and 2+ on the left side    Psychiatric:         Mood and Affect: Mood normal          Speech: Speech normal          Behavior: Behavior normal  Behavior is cooperative  Thought Content:  Thought content normal          Judgment: Judgment normal          Vital Signs  ED Triage Vitals   Temperature Pulse Respirations Blood Pressure SpO2   03/28/23 2212 03/28/23 2212 03/28/23 2212 03/29/23 0047 03/28/23 2212   98 7 °F (37 1 °C) (!) 114 20 163/72 92 %      Temp Source Heart Rate Source Patient Position - Orthostatic VS BP Location FiO2 (%)   03/28/23 2212 03/28/23 2212 03/28/23 2212 03/28/23 2212 --   Oral Monitor Sitting Right arm       Pain Score       03/28/23 2212       No Pain           Vitals:    03/29/23 0330 03/29/23 0428 03/29/23 0431 03/29/23 0700   BP: 133/63   146/70   Pulse: (!) 107 (!) 106  87   Patient Position - Orthostatic VS: Sitting Lying Lying Lying         Visual Acuity  Visual Acuity    Flowsheet Row Most Recent Value   L Pupil Size (mm) 2   R Pupil Size (mm) 2   L Pupil Shape Round   R Pupil Shape Round          ED Medications  Medications   chlorhexidine (PERIDEX) 0 12 % oral rinse 15 mL (has no administration in time range)   remdesivir (Veklury) 200 mg in sodium chloride 0 9 % 290 mL IVPB (200 mg Intravenous New Bag 3/29/23 0703)     Followed by   remdesivir Yelitza Nipper) 100 mg in sodium chloride 0 9 % 270 mL IVPB (has no administration in time range)   doxycycline hyclate (VIBRAMYCIN) capsule 100 mg (100 mg Oral Given 3/29/23 0447)   dexamethasone (DECADRON) 19 7 mg in sodium chloride 0 9 % 50 mL IVPB (19 7 mg Intravenous New Bag 3/29/23 0620)   heparin (porcine) 25,000 units in 0 45% NaCl 250 mL infusion (premix) (18 Units/kg/hr × 125 kg (Order-Specific) Intravenous New Bag 3/29/23 0532)   heparin (porcine) injection 10,000 Units (has no administration in time range)   heparin (porcine) injection 5,000 Units (has no administration in time range)   furosemide (LASIX) tablet 40 mg (has no administration in time range)   albuterol inhalation solution 10 mg (10 mg Nebulization Given 3/28/23 2333)     And   ipratropium (ATROVENT) 0 02 % inhalation solution 1 mg (1 mg Nebulization Given 3/28/23 2334)     And   sodium chloride 0 9 % inhalation solution 3 mL (3 mL Nebulization Given 3/28/23 2334)   methylPREDNISolone sodium succinate (Solu-MEDROL) injection 80 mg (80 mg Intravenous Given 3/28/23 2343)   guaiFENesin (MUCINEX) 12 hr tablet 600 mg (600 mg Oral Given 3/28/23 2332)   sodium chloride 0 9 % bolus 250 mL (0 mL Intravenous Stopped 3/29/23 0149)   cefepime (MAXIPIME) 2 g/50 mL dextrose IVPB (0 mg Intravenous Stopped 3/29/23 0230)   vancomycin (VANCOCIN) 2,000 mg in sodium chloride 0 9 % 500 mL IVPB (2,000 mg Intravenous New Bag 3/29/23 0254)   heparin (porcine) injection 10,000 Units (10,000 Units Intravenous Given 3/29/23 0446)       Diagnostic Studies  Results Reviewed     Procedure Component Value Units Date/Time    CBC and differential [[id number]]  (Abnormal) Collected: 03/29/23 0451    Lab Status: Final result Specimen: Blood from Arm, Right Updated: 03/29/23 0736     WBC 6 51 Thousand/uL      RBC 3 89 Million/uL      Hemoglobin 12 6 g/dL      Hematocrit 42 0 %       fL      MCH 32 4 pg      MCHC 30 0 g/dL      RDW 12 6 %      MPV 9 4 fL      Platelets 026 Thousands/uL     Narrative: This is an appended report  These results have been appended to a previously verified report  Rapid HIV 1/2 AB-AG combo for 15years old and above [[id number]]  (Normal) Collected: 03/29/23 0451    Lab Status: Final result Specimen: Blood from Arm, Right Updated: 03/29/23 0620     Rapid HIV 1 AND 2 Non-Reactive     HIV-1 P24 Ag Screen Non-Reactive    Narrative:      Negative for HIV-1 p24 Antigen  Negative for HIV-1 and/or HIV-2 Antibody      Procalcitonin [[id number]]  (Normal) Collected: 03/29/23 0451    Lab Status: Final result Specimen: Blood from Arm, Right Updated: 03/29/23 0617     Procalcitonin 0 12 ng/ml     Basic metabolic panel [[id number]] (Abnormal) Collected: 03/29/23 0451    Lab Status: Final result Specimen: Blood from Arm, Right Updated: 03/29/23 0558     Sodium 138 mmol/L      Potassium 4 2 mmol/L      Chloride 91 mmol/L      CO2 44 mmol/L      ANION GAP 3 mmol/L      BUN 9 mg/dL      Creatinine 0 44 mg/dL      Glucose 147 mg/dL      Calcium 8 8 mg/dL      eGFR 116 ml/min/1 73sq m     Narrative:      [place] guidelines for Chronic Kidney Disease (CKD):   •  Stage 1 with normal or high GFR (GFR > 90 mL/min/1 73 square meters)  •  Stage 2 Mild CKD (GFR = 60-89 mL/min/1 73 square meters)  •  Stage 3A Moderate CKD (GFR = 45-59 mL/min/1 73 square meters)  •  Stage 3B Moderate CKD (GFR = 30-44 mL/min/1 73 square meters)  •  Stage 4 Severe CKD (GFR = 15-29 mL/min/1 73 square meters)  •  Stage 5 End Stage CKD (GFR <15 mL/min/1 73 square meters)  Note: GFR calculation is accurate only with a steady state creatinine    Magnesium [[id number]]  (Normal) Collected: 03/29/23 0451    Lab Status: Final result Specimen: Blood from Arm, Right Updated: 03/29/23 0558     Magnesium 2 0 mg/dL     Phosphorus [[id number]]  (Normal) Collected: 03/29/23 0451    Lab Status: Final result Specimen: Blood from Arm, Right Updated: 03/29/23 0558     Phosphorus 3 2 mg/dL     Calcium, ionized [[id number]]  (Abnormal) Collected: 03/29/23 0451    Lab Status: Final result Specimen: Blood from Arm, Right Updated: 03/29/23 0526     Calcium, Ionized 1 09 mmol/L     MRSA culture [[id number]] Collected: 03/29/23 0451    Lab Status: In process Specimen: Nares from Nose Updated: 03/29/23 0518    Chronic Hepatitis Panel [[id number]] Collected: 03/29/23 0451    Lab Status:  In process Specimen: Blood from Arm, Right Updated: 03/29/23 0516    C-reactive protein [[id number]]  (Abnormal) Collected: 03/29/23 0000    Lab Status: Final result Specimen: Blood from Arm, Right Updated: 03/29/23 0419     CRP 24 2 mg/L     CK [[id number]]  (Abnormal) Collected: 03/29/23 0000    Lab Status: Final result Specimen: Blood from Arm, Right Updated: 03/29/23 0419     Total CK 20 U/L     D-dimer, quantitative [[id number]]  (Normal) Collected: 03/28/23 2251    Lab Status: Final result Specimen: Blood from Arm, Right Updated: 03/29/23 0412     D-Dimer, Quant 0 44 ug/ml FEU     Narrative: In the evaluation for possible pulmonary embolism, in the appropriate (Well's Score of 4 or less) patient, the age adjusted d-dimer cutoff for this patient can be calculated as:    Age x 0 01 (in ug/mL) for Age-adjusted D-dimer exclusion threshold for a patient over 50 years  Blood gas, venous [[id number]]  (Abnormal) Collected: 03/29/23 0256    Lab Status: Final result Specimen: Blood from Arm, Right Updated: 03/29/23 0302     pH, Carlos 7 329     pCO2, Carlos 92 4 mm Hg      pO2, Carlos 54 3 mm Hg      HCO3, Carlos 47 5 mmol/L      Base Excess, Carlos 16 6 mmol/L      O2 Content, Carlos 17 1 ml/dL      O2 HGB, VENOUS 85 3 %     FLU/RSV/COVID - if FLU/RSV clinically relevant [[id number]]  (Abnormal) Collected: 03/29/23 0000    Lab Status: Final result Specimen: Nares from Nose Updated: 03/29/23 0110     SARS-CoV-2 Positive     INFLUENZA A PCR Negative     INFLUENZA B PCR Negative     RSV PCR Negative    Narrative:      FOR PEDIATRIC PATIENTS - copy/paste COVID Guidelines URL to browser: [email or web address]    SARS-CoV-2 assay is a Nucleic Acid Amplification assay intended for the  qualitative detection of nucleic acid from SARS-CoV-2 in nasopharyngeal  swabs  Results are for the presumptive identification of SARS-CoV-2 RNA  Positive results are indicative of infection with SARS-CoV-2, the virus  causing COVID-19, but do not rule out bacterial infection or co-infection  with other viruses  Laboratories within the [place] and its  territories are required to report all positive results to the appropriate  public health authorities   Negative results do not preclude SARS-CoV-2  infection and should not be used as the sole basis for treatment or other  patient management decisions  Negative results must be combined with  clinical observations, patient history, and epidemiological information  This test has not been FDA cleared or approved  This test has been authorized by FDA under an Emergency Use Authorization  (EUA)  This test is only authorized for the duration of time the  declaration that circumstances exist justifying the authorization of the  emergency use of an in vitro diagnostic tests for detection of SARS-CoV-2  virus and/or diagnosis of COVID-19 infection under section 564(b)(1) of  the Act, 21 U  S C  450MXW-4(Y)(1), unless the authorization is terminated  or revoked sooner  The test has been validated but independent review by FDA  and CLIA is pending  Test performed using [employer] GeneXpert: This RT-PCR assay targets N2,  a region unique to SARS-CoV-2  A conserved region in the E-gene was chosen  for pan-Sarbecovirus detection which includes SARS-CoV-2  According to CMS-2020-01-R, this platform meets the definition of high-throughput technology  Procalcitonin [[id number]]  (Normal) Collected: 03/29/23 0000    Lab Status: Final result Specimen: Blood from Arm, Right Updated: 03/29/23 0050     Procalcitonin 0 13 ng/ml     B-Type Natriuretic Peptide(BNP) [[id number]]  (Normal) Collected: 03/29/23 0006    Lab Status: Final result Specimen: Blood from Arm, Right Updated: 03/29/23 0046     BNP 20 pg/mL     Blood gas, venous [[id number]]  (Abnormal) Collected: 03/29/23 0000    Lab Status: Final result Specimen: Blood from Arm, Right Updated: 03/29/23 0019     pH, Carlos 7 310     pCO2, Carlos 102 2 mm Hg      pO2, Carlos 64 0 mm Hg      HCO3, Carlos 50 3 mmol/L      Base Excess, Carlos 18 6 mmol/L      O2 Content, Carlos 17 2 ml/dL      O2 HGB, VENOUS 89 5 %     Blood culture #1 [[id number]] Collected: 03/29/23 0000    Lab Status:  In process Specimen: Blood from Arm, Left Updated: 03/29/23 0014    Protime-INR [[id number]]  (Abnormal) Collected: 03/28/23 2251    Lab Status: Final result Specimen: Blood from Arm, Right Updated: 03/29/23 0000     Protime 26 4 seconds      INR 2 40    APTT [[id number]]  (Abnormal) Collected: 03/28/23 2251    Lab Status: Final result Specimen: Blood from Arm, Right Updated: 03/29/23 0000     PTT 45 seconds     Lactic acid [[id number]]  (Normal) Collected: 03/28/23 2251    Lab Status: Final result Specimen: Blood from Arm, Right Updated: 03/28/23 2343     LACTIC ACID 0 7 mmol/L     Narrative:      Result may be elevated if tourniquet was used during collection      Comprehensive metabolic panel [[id number]]  (Abnormal) Collected: 03/28/23 2251    Lab Status: Final result Specimen: Blood from Arm, Right Updated: 03/28/23 2342     Sodium 139 mmol/L      Potassium 4 0 mmol/L      Chloride 91 mmol/L      CO2 45 mmol/L      ANION GAP 3 mmol/L      BUN 9 mg/dL      Creatinine 0 50 mg/dL      Glucose 113 mg/dL      Calcium 9 1 mg/dL      Corrected Calcium 9 7 mg/dL      AST 37 U/L      ALT 40 U/L      Alkaline Phosphatase 81 U/L      Total Protein 6 6 g/dL      Albumin 3 2 g/dL      Total Bilirubin 0 32 mg/dL      eGFR 110 ml/min/1 73sq m     Narrative:      [place] guidelines for Chronic Kidney Disease (CKD):   •  Stage 1 with normal or high GFR (GFR > 90 mL/min/1 73 square meters)  •  Stage 2 Mild CKD (GFR = 60-89 mL/min/1 73 square meters)  •  Stage 3A Moderate CKD (GFR = 45-59 mL/min/1 73 square meters)  •  Stage 3B Moderate CKD (GFR = 30-44 mL/min/1 73 square meters)  •  Stage 4 Severe CKD (GFR = 15-29 mL/min/1 73 square meters)  •  Stage 5 End Stage CKD (GFR <15 mL/min/1 73 square meters)  Note: GFR calculation is accurate only with a steady state creatinine    Phosphorus [[id number]]  (Normal) Collected: 03/28/23 2251    Lab Status: Final result Specimen: Blood from Arm, Right Updated: 03/28/23 2342     Phosphorus 3 4 mg/dL     Magnesium [[id number]]  (Normal) Collected: 03/28/23 2251    Lab Status: Final result Specimen: Blood from Arm, Right Updated: 03/28/23 2342     Magnesium 2 1 mg/dL     UA w Reflex to Microscopic w Reflex to Culture [[id number]]  (Abnormal) Collected: 03/28/23 2258    Lab Status: Final result Specimen: Urine, Clean Catch Updated: 03/28/23 2318     Color, UA Yellow     Clarity, UA Clear     Specific Gravity, UA 1 012     pH, UA 6 5     Leukocytes, UA Negative     Nitrite, UA Negative     Protein, UA Negative mg/dl      Glucose, UA Negative mg/dl      Ketones, UA Negative mg/dl      Urobilinogen, UA 2 0 mg/dl      Bilirubin, UA Negative     Occult Blood, UA Negative    CBC and differential [[id number]]  (Abnormal) Collected: 03/28/23 2251    Lab Status: Final result Specimen: Blood from Arm, Right Updated: 03/28/23 2317     WBC 5 56 Thousand/uL      RBC 3 79 Million/uL      Hemoglobin 12 4 g/dL      Hematocrit 41 1 %       fL      MCH 32 7 pg      MCHC 30 2 g/dL      RDW 12 5 %      MPV 9 2 fL      Platelets 143 Thousands/uL      nRBC 0 /100 WBCs      Neutrophils Relative 71 %      Immat GRANS % 0 %      Lymphocytes Relative 15 %      Monocytes Relative 11 %      Eosinophils Relative 3 %      Basophils Relative 0 %      Neutrophils Absolute 3 94 Thousands/µL      Immature Grans Absolute 0 02 Thousand/uL      Lymphocytes Absolute 0 85 Thousands/µL      Monocytes Absolute 0 60 Thousand/µL      Eosinophils Absolute 0 14 Thousand/µL      Basophils Absolute 0 01 Thousands/µL     Blood culture #2 [[id number]] Collected: 03/28/23 2254    Lab Status: In process Specimen: Blood from Arm, Right Updated: 03/28/23 2307                 CT chest wo contrast   ED Interpretation by Aleyda Valiente PA-C (03/29 0153)   Teodora Felton MD  [phone] 3/29/2023     Narrative & Impression  CT CHEST WITHOUT IV CONTRAST     INDICATION:   hypercapnia; acute on chronic respiratory failure   Patient has confirmed COVID-19      COMPARISON:  CT of abdomen pelvis on March 28, 2023      TECHNIQUE: CT examination of the chest was performed without intravenous contrast  Multiplanar 2D reformatted images were created from the source data      Radiation dose length product (DLP) for this visit:  0764 mGy-cm   This examination, like all CT scans performed in the [facility], was performed utilizing techniques to minimize radiation dose exposure, including the use of iterative   reconstruction and automated exposure control       FINDINGS:     LUNGS:  Mild bilateral dependent atelectasis  Otherwise no focal consolidation  The central airways are patent      PLEURA:  Unremarkable      HEART/GREAT VESSELS: Heart is unremarkable for patient's age  No thoracic aortic aneurysm      MEDIASTINUM AND EMILEE:     Unremarkable      CHEST WALL AND LOWER NECK:  Unremarkable      VISUALIZED STRUCTURES IN THE UPPER ABDOMEN:  Unremarkable      OSSEOUS STRUCTURES:  Spinal degenerative changes are noted  No acute fracture or destructive osseous lesion      IMPRESSION:     Mild bilateral dependent atelectasis      Workstation performed: [id number]           Final Result by Bere Bassett MD (03/29 0143)      Mild bilateral dependent atelectasis  Workstation performed: [id number]         CT abdomen pelvis wo contrast   Final Result by Bere Bassett MD (03/29 0045)      Sludge and/or stones within the gallbladder  No pericholecystic inflammatory change              Workstation performed: [id number]                    Procedures  Procedures         ED Course  ED Course as of 03/29/23 0748   Wed Mar 29, 2023   0030 pCO2, Carlos(!): 102 2                                             Medical Decision Making  Patient is a 49-year-old male with a history of CHF, DVT, hypertension, and pulmonary embolism on Coumadin milligrams daily, with nonischemic past surgical history that presents emerged department with shortness of breath, hypoxia, and intermittent hacking productive cough with yellow sputum for 1 week  Patient hemodynamically stable; initially hypoxic in the 80s with nasal cannulated oxygen 4 L with SPO2 saturation between 90% to 91%  PCO2-102  2-BiPAP with improvement of venous blood gas pCO2 92 4; normal pH with elevated bicarb- compensated; acute on chronic process  COVID-positive  Chest CT without contrast-mild bilateral dependent atelectasis  Cefepime and vancomycin started secondary to patient hospitalization previous; February 2023 in [facility]  Solu-Medrol 80 mg and heart nebulizer delivered in the ED  Discussed patient case with critical care RADHA and both agreed to place patient on inpatient status under the care of Dr Young Neil, critical care attending  Patient with verbal understandable clinical laboratory and imaging findings, admission instructions and verbalized agreement with patient current treatment plan  Amount and/or Complexity of Data Reviewed  Labs: ordered  Decision-making details documented in ED Course  Radiology: ordered  Decision-making details documented in ED Course  ECG/medicine tests:  Decision-making details documented in ED Course  Risk  Prescription drug management  Decision regarding hospitalization            Disposition  Final diagnoses:   Acute on chronic respiratory failure ([address] )   Hypoxia   Hypercapnia     Time reflects when diagnosis was documented in both MDM as applicable and the Disposition within this note     Time User Action Codes Description Comment    3/29/2023 12:11 AM Fannie Chung Add [J96 20] Acute on chronic respiratory failure ([address] )     3/29/2023 12:12 AM Fannie Chung Add [R09 02] Hypoxia     3/29/2023 12:29 AM Fannie Chung Add [R06 89] Hypercapnia       ED Disposition     ED Disposition   Admit    Condition   Stable    Date/Time   Wed Mar 29, 2023  3:18 AM    Comment   Case was discussed with [facility] and the patient's admission status was agreed to be Admission Status: inpatient status to the service of Dr Leisa Morse, Critical Care   Follow-up Information    None         Current Discharge Medication List      CONTINUE these medications which have NOT CHANGED    Details   acetaminophen (TYLENOL) 500 mg tablet Take 1,000 mg by mouth every 6 (six) hours as needed      b complex vitamins capsule Take 1 capsule by mouth daily      Cholecalciferol 125 MCG (5000 UT) capsule Take 5,000 Units by mouth daily      fexofenadine (ALLEGRA) 180 MG tablet Take 180 mg by mouth daily as needed      furosemide (LASIX) 40 mg tablet Take 40 mg by mouth daily      ipratropium-albuterol (DUO-NEB) 0 5-2 5 mg/3 mL nebulizer solution Inhale 3 mL 4 (four) times a day      methocarbamol (ROBAXIN) 500 mg tablet Take 750 mg by mouth every 6 (six) hours as needed      pregabalin (LYRICA) 150 mg capsule Take 1 capsule (150 mg total) by mouth 4 (four) times a day  Qty: 4 capsule, Refills: 0    Associated Diagnoses: Neuralgia, post-herpetic      risperiDONE (RisperDAL) 1 mg tablet Take 1 mg by mouth 3 (three) times a day      traMADol (ULTRAM) 50 mg tablet Take 50 mg by mouth every 6 (six) hours as needed      warfarin (COUMADIN) 3 mg tablet Take 3 tablets by mouth daily 9 mg m-f 10 mg sat/sun              No discharge procedures on file      PDMP Review       Value Time User    PDMP Reviewed  Yes 3/6/2023  7:29 PM Trey Cunningham DO          ED Provider  Electronically Signed by           Pamela Henson PA-C  03/29/23 5746

## 2023-03-29 NOTE — ASSESSMENT & PLAN NOTE
· Presents from [facility] for cough, shortness of breath, reported hypoxia  · COVID positive   · Is at baseline oxygen requirements: 4 liters NC, CPAP HS  · High risk for worsening of symptoms  Start remdesivir x 3 days  · Antibiotics: cefepime, vancomycin in ED  Hold further  Negative procalcitonin x1    Repeat procalcitonin in AM

## 2023-03-29 NOTE — ASSESSMENT & PLAN NOTE
· Hx PE noted on CT PE study 1/12/2018 - Pulmonary embolism is demonstrated as discussed above involving segmental and subsegmental branches of the left and right main pulmonaries    · Home regimen: Warfarin 9mg daily (mon-Fri) and 10mg (Sat-Sun)  · Hold Warfarin for now  · PT/INR - 26 4/2 40  · Start Heparin gtt for DVT/PE  · Monitor respiratory status

## 2023-03-29 NOTE — H&P
[facility]  H&P  Name: Ny Dealcruz  MRN: [id number]  Unit/Bed#: ICU 12 I Date of Admission: 3/28/2023   Date of Service: 3/29/2023 I Hospital Day: 0      Assessment/Plan   * Acute on chronic respiratory failure with hypoxia and hypercapnia (HCC)  Assessment & Plan  · Pt presented to ED with C/o productive cough x 1 week, shortness of breath, no improvement with nebs  He also noted that his SpO2 was low in the 80's at rehab  · Pt wears CPAP qhS doesn't know settings  Per medical record he is non-compliant with CPA as outpatient  · Likely d/t COVID, OHS and CPAP non-compliance  · Initial /102/64/50  · RVP - positive for COVID, see plan above  · Pt placed on BiPAP in ED 15/5, titrate FiO2 to maintain SpO2 > 88%  · Baseline Co2 likely near 90's  · CT chest - Mild bilateral dependent atelectasis  · Strep P and Legionella - pending    Plan  · Continue BiPAP overnight, wean to NC 4L in AM as tolerated  · Plan for CPAP qHS  · Continue COVID pathway, see plan below  · Follow up Strep P and Legionella  · Respiratory protocol  · IS while awake  · Avoid any sedating medication if possible, hold home Tramadol, Robaxin, Seroquel    COVID-19  Assessment & Plan  · Presented with cough and SOB x 1 week, from SNF  · RVP - positive for COVID  · Labs:  · Procalcitonin - 0 13  · CRP - 24 2  · D-dimer - 0 44  · BNP - 20  · Continue COVID pathway:  · Decadron 0 1mg/kg BID, Day 1/10  · Remdesivir Day   · Doxycycline Day #1  · Continue respiratory support with BiPAP  · Check Procal in AM and trend CRP    CHF (congestive heart failure) (HCC)  Assessment & Plan  Wt Readings from Last 3 Encounters:   23 (!) 197 kg (434 lb)   23 (!) 197 kg (434 lb 6 4 oz)   23 (!) 198 kg (435 lb 12 8 oz)     · Hx diastolic HF  · Most recent echo (23) - LVEF 60%, indeterminate diastolic function  RV mildly dilated    · Home regimen: Lasix 40mg  · Baseline weight 435 lbs  · Current weight 434 lbs  · Not in acute exacerbation  · Continue home Lasix  · Monitor I&O  · Daily weight    Morbid obesity (HCC)  Assessment & Plan  · BMI 55 72  · Nutritional consult when appropriate    SIRS (systemic inflammatory response syndrome) ([facility])  Assessment & Plan  · Present on admission: Tachycardia, Tachypnea, + COVID  · No suspected source of infection  · CT chest - Mild bilateral dependent atelectasis  · Received Cefepime and Vancomycin in ED  · WBC - 5  · Procal - 0 13  · Lactic - 0 7  · BCx x 2 - pending  · UA - negative  · Obtain sputum culture if able  · Continue Doxycycline per COVID pathway, hold additional ABX  · Follow up BCx  · Monitor/trend WBC and fevers    Pulmonary hypertension (HCC)  Assessment & Plan  · Hx PH, most recent echo PAP unable to be calculated  · Home regimen: Lasix 40mg daily  · Appears Euvolemic on exam  · At base weight 434 lbs  · Continue Lasix 40mg daily  · Monitor I&O  · Daily weight    Chronic pain disorder  Assessment & Plan  · Chronic pain from post herpetic neuralgia and chronic back pain  · Home regimen: Tramadol, Robaxin, Lyrica  · Hold home tramadol and Robaxin  · Continue home Lyrica  · Tylenol 650mg q6h PRN  · Will add lidocaine patch if pt c/o back pain in attempts to avoid opioids and muscle relaxer's for now    History of pulmonary embolism  Assessment & Plan  · Hx PE noted on CT PE study 1/12/2018 - Pulmonary embolism is demonstrated as discussed above involving segmental and subsegmental branches of the left and right main pulmonaries  · Home regimen: Warfarin 9mg daily (mon-Fri) and 10mg (Sat-Sun)  · Hold Warfarin for now  · PT/INR - 26 4/2 40  · Start Heparin gtt for DVT/PE  · Monitor respiratory status    Severe obstructive sleep apnea  Assessment & Plan  · Suppose to use CPAP at home, medical record notes history of non-compliance, pt currently endorses compliance    · Secondary to super morbid obesity (BMI 55) and OHS  · Currently on BiPAP, see plan above  · When weaned off, plan for CPA qHS    Primary hypertension  Assessment & Plan  · Home regimen: Lasix 40mg daily  · Hold home lasix for now, re-evaluate in AM    History of Present Illness     HPI: Carmina Weaver is a 69 y o  with a PMHx: CHF, DVT and PE on warfarin, HTN, OHS on chronic O2 4L NC, chronic back pain and pulm hypertension  He had a recent admission to [facility] to the ICU on 23 to 3/6/23 for acute/chronic hypoxic/hypercapnic respiratory failure secondary to acute CHF  During this admission he was on BiPAP briefly for hypercarbia and encephalopathy, diuresed well with lasix and improved  At the time of discharge he was too deconditioned and was discharged to a SNF  He presented to the ED with productive cough x 1 week, progressive shortness of breath no improvement with nebs  He was also noted to be hypoxic in the 80's at  On arrival to the ED he was noted to short fo breath and hypoxic in the 80's on baseline 4L NC  He was placed on BiPAP for work of breathing  CT chest - Mild bilateral dependent atelectasis, CT ABD/pelvis - Sludge and/or stones within the gallbladder  No pericholecystic inflammatory changes  RVP - positive for COVID  /102/64/50, other labs Procal 0 13, CRP 24 2, D-dimer 0 44, INR 2 40  In the ED he received albuterol/atrovent and 3% saline, solumedrol, cefepime and vancomycin  On exam pt is sleeping with BiPAP in place, VS stable  Wakes easily to verbal stimuli, oriented to person/place/date/event however during exam he would fall asleep during conversation  He endorses a intermittent productive cough  He denies any pain, shortness of breath, increased WOB, N/V, fever/chills  History obtained from chart review and the patient  Review of Systems   Constitutional: Positive for activity change  Negative for fatigue and fever  HENT: Negative  Eyes: Negative  Respiratory: Positive for cough and shortness of breath  Negative for chest tightness and wheezing      Cardiovascular: Negative  Negative for chest pain and palpitations  Gastrointestinal: Negative  Negative for constipation, diarrhea, nausea and vomiting  Endocrine: Negative  Genitourinary: Negative  Musculoskeletal: Positive for back pain  Skin: Negative  Allergic/Immunologic: Negative  Neurological: Negative  Negative for dizziness, syncope, weakness, light-headedness and headaches  Hematological: Negative  Psychiatric/Behavioral: Negative  Historical Information   Past Medical History:  No date: CHF (congestive heart failure) ([facility])  No date: DVT (deep venous thrombosis) ([facility])  No date: HTN (hypertension)  No date: Morbid obesity ([facility] )  No date: EVERETT (obstructive sleep apnea)  No date: Pulmonary embolism ([facility]) No past surgical history on file  Current Outpatient Medications   Medication Instructions   • acetaminophen (TYLENOL) 1,000 mg, Oral, Every 6 hours PRN   • b complex vitamins capsule 1 capsule, Oral, Daily   • Cholecalciferol 5,000 Units, Oral, Daily   • fexofenadine (ALLEGRA) 180 mg, Oral, Daily PRN   • furosemide (LASIX) 40 mg, Oral, Daily   • ipratropium-albuterol (DUO-NEB) 0 5-2 5 mg/3 mL nebulizer solution 3 mL, Inhalation, 4 times daily   • methocarbamol (ROBAXIN) 750 mg, Oral, Every 6 hours PRN   • pregabalin (LYRICA) 150 mg, Oral, 4 times daily   • risperiDONE (RISPERDAL) 1 mg, Oral, 3 times daily   • traMADol (ULTRAM) 50 mg, Oral, Every 6 hours PRN   • warfarin (COUMADIN) 3 mg tablet 3 tablets, Oral, Daily (warfarin), 9 mg m-f10 mg sat/sun     Allergies   Allergen Reactions   • Baclofen Other (See Comments)     Nausea and vomiting      Social History     Tobacco Use   • Smoking status: Never   • Smokeless tobacco: Never   Vaping Use   • Vaping Use: Never used   Substance Use Topics   • Alcohol use: Not Currently   • Drug use: Not Currently     Types: Prescription    History reviewed  No pertinent family history         Objective                            Vitals I/O      Most Recent Min/Max in 24hrs   Temp 98 6 °F (37 °C) Temp  Min: 98 4 °F (36 9 °C)  Max: 99 °F (37 2 °C)   Pulse 87 Pulse  Min: 87  Max: 114   Resp 19 Resp  Min: 16  Max: 20   /70 BP  Min: 133/63  Max: 163/72   O2 Sat 95 % SpO2  Min: 92 %  Max: 96 %      Intake/Output Summary (Last 24 hours) at 3/29/2023 0748  Last data filed at 3/29/2023 3622  Gross per 24 hour   Intake 300 ml   Output 300 ml   Net 0 ml         Diet NPO     Invasive Monitoring Physical exam   None Physical Exam  Vitals and nursing note reviewed  Constitutional:       General: He is not in acute distress  Appearance: Normal appearance  He is morbidly obese  He is ill-appearing  HENT:      Head: Normocephalic and atraumatic  Mouth/Throat:      Mouth: Mucous membranes are moist       Pharynx: Oropharynx is clear  Eyes:      Pupils: Pupils are equal, round, and reactive to light  Cardiovascular:      Rate and Rhythm: Regular rhythm  Tachycardia present  Pulses: Normal pulses  Radial pulses are 2+ on the right side and 2+ on the left side  Pulmonary:      Effort: Pulmonary effort is normal       Breath sounds: Decreased air movement present  Decreased breath sounds present  Comments: Scattered rhonchi on occasion  Abdominal:      General: There is no distension  Palpations: Abdomen is soft  Tenderness: There is no abdominal tenderness  There is no guarding  Musculoskeletal:      Cervical back: Full passive range of motion without pain  Right lower leg: Edema present  Left lower leg: Edema present  Skin:     General: Skin is warm and dry  Capillary Refill: Capillary refill takes less than 2 seconds  Neurological:      Mental Status: He is lethargic  GCS: GCS eye subscore is 3  GCS verbal subscore is 5  GCS motor subscore is 6  Motor: Motor function is intact  Psychiatric:         Mood and Affect: Mood and affect normal          Behavior: Behavior is cooperative  Thought Content: Thought content normal          Cognition and Memory: Cognition normal           Diagnostic Studies      EKG: ST  Imaging: I have personally reviewed pertinent reports  and I have personally reviewed pertinent films in PACS   CT ABD/pelvis - Sludge and/or stones within the gallbladder  No pericholecystic inflammatory change  CT chest - Mild bilateral dependent atelectasis        Medications:  Scheduled PRN   chlorhexidine, 15 mL, Q12H GRIFFIN  dexamethasone, 0 1 mg/kg, Q12H  doxycycline hyclate, 100 mg, Q12H  furosemide, 40 mg, Daily  [START ON 3/30/2023] remdesivir, 100 mg, Q24H      heparin (porcine), 10,000 Units, Q6H PRN  heparin (porcine), 5,000 Units, Q6H PRN       Continuous    heparin (porcine), 3-30 Units/kg/hr (Order-Specific), Last Rate: 18 Units/kg/hr (03/29/23 0532)         Labs:    CBC    Recent Labs     03/28/23 2251 03/29/23  0451   WBC 5 56 6 51   HGB 12 4 12 6   HCT 41 1 42 0    194   BANDSPCT  --  4     BMP    Recent Labs     03/28/23 2251 03/29/23  0451   SODIUM 139 138   K 4 0 4 2   CL 91* 91*   CO2 45* 44*   AGAP 3* 3*   BUN 9 9   CREATININE 0 50* 0 44*   CALCIUM 9 1 8 8       Coags    Recent Labs     03/28/23 2251   INR 2 40*   PTT 45*        Additional Electrolytes  Recent Labs     03/28/23 2251 03/29/23  0451   MG 2 1 2 0   PHOS 3 4 3 2   CAIONIZED  --  1 09*          Blood Gas    No recent results  Recent Labs     03/29/23  0256   PHVEN 7 329   [id number] 92 4*   PO2VEN 54 3*   [id number] 47 5*   BEVEN 16 6    LFTs  Recent Labs     03/28/23  2251   ALT 40   AST 37   ALKPHOS 81   ALB 3 2*   TBILI 0 32       Infectious  Recent Labs     03/29/23  0000 03/29/23  0451   PROCALCITONI 0 13 0 12     Glucose  Recent Labs     03/28/23 2251 03/29/23  0451   GLUC 113 147*        Critical Care Time Delivered: Upon my evaluation, this patient had a high probability of imminent or life-threatening deterioration due to acute on chronic hypoxic/hypercapnic respiratory failure, which required my direct attention, intervention, and personal management  I have personally provided 60 minutes of critical care time, exclusive of procedures, teaching, family meetings, and any prior time recorded by providers other than myself     Anticipated Length of Stay is > 2 midnights  MO Xavier

## 2023-03-29 NOTE — ASSESSMENT & PLAN NOTE
· Presented with cough and SOB x 1 week, from SNF  · RVP - positive for COVID  · Labs:  · Procalcitonin - 0 13  · CRP - 24 2  · D-dimer - 0 44  · BNP - 20  · Continue COVID pathway:  · Decadron 0 1mg/kg BID, Day 1/10  · Remdesivir Day 1/5  · Doxycycline Day #1  · Continue respiratory support with BiPAP  · Check Procal in AM and trend CRP

## 2023-03-29 NOTE — ASSESSMENT & PLAN NOTE
· Chronic pain from post herpetic neuralgia and chronic back pain  · Home regimen: Tramadol, Robaxin, Lyrica  · Hold home tramadol and Robaxin  · Continue home Lyrica  · Tylenol 650mg q6h PRN  · Will add lidocaine patch if pt c/o back pain in attempts to avoid opioids and muscle relaxer's for now

## 2023-03-29 NOTE — ASSESSMENT & PLAN NOTE
· Present on admission: Tachycardia, Tachypnea, + COVID  · No suspected source of infection  · CT chest - Mild bilateral dependent atelectasis  · Received Cefepime and Vancomycin in ED  · WBC - 5  · Procal - 0 13  · Lactic - 0 7  · BCx x 2 - pending  · UA - negative  · Obtain sputum culture if able  · Continue Doxycycline per COVID pathway, hold additional ABX  · Follow up BCx  · Monitor/trend WBC and fevers

## 2023-03-29 NOTE — ASSESSMENT & PLAN NOTE
· ABG reviewed and stable compared to prior  Patient is compensated with bicarb of 102  Patient was briefly put on bipap, despite compensation     · Continue with home CPAP HS and 4 liters NC during the day  · Monitor mental status  · Hold additional doses of solumedrol

## 2023-03-29 NOTE — PLAN OF CARE
Problem: MOBILITY - ADULT  Goal: Maintain or return to baseline ADL function  Description: INTERVENTIONS:  -  Assess patient's ability to carry out ADLs; assess patient's baseline for ADL function and identify physical deficits which impact ability to perform ADLs (bathing, care of mouth/teeth, toileting, grooming, dressing, etc )  - Assess/evaluate cause of self-care deficits   - Assess range of motion  - Assess patient's mobility; develop plan if impaired  - Assess patient's need for assistive devices and provide as appropriate  - Encourage maximum independence but intervene and supervise when necessary  - Involve family in performance of ADLs  - Assess for home care needs following discharge   - Consider OT consult to assist with ADL evaluation and planning for discharge  - Provide patient education as appropriate  Outcome: Progressing  Goal: Maintains/Returns to pre admission functional level  Description: INTERVENTIONS:  - Perform BMAT or MOVE assessment daily    - Set and communicate daily mobility goal to care team and patient/family/caregiver  - Collaborate with rehabilitation services on mobility goals if consulted  - Out of bed for toileting  - Record patient progress and toleration of activity level   Outcome: Progressing     Problem: Nutrition/Hydration-ADULT  Goal: Nutrient/Hydration intake appropriate for improving, restoring or maintaining nutritional needs  Description: Monitor and assess patient's nutrition/hydration status for malnutrition  Collaborate with interdisciplinary team and initiate plan and interventions as ordered  Monitor patient's weight and dietary intake as ordered or per policy  Utilize nutrition screening tool and intervene as necessary  Determine patient's food preferences and provide high-protein, high-caloric foods as appropriate       INTERVENTIONS:  - Monitor oral intake, urinary output, labs, and treatment plans  - Assess nutrition and hydration status and recommend course of action  - Evaluate amount of meals eaten  - Assist patient with eating if necessary   - Allow adequate time for meals  - Recommend/ encourage appropriate diets, oral nutritional supplements, and vitamin/mineral supplements  - Order, calculate, and assess calorie counts as needed  - Recommend, monitor, and adjust tube feedings and TPN/PPN based on assessed needs  - Assess need for intravenous fluids  - Provide specific nutrition/hydration education as appropriate  - Include patient/family/caregiver in decisions related to nutrition  Outcome: Progressing     Problem: Prexisting or High Potential for Compromised Skin Integrity  Goal: Skin integrity is maintained or improved  Description: INTERVENTIONS:  - Identify patients at risk for skin breakdown  - Assess and monitor skin integrity  - Assess and monitor nutrition and hydration status  - Monitor labs   - Assess for incontinence   - Turn and reposition patient  - Assist with mobility/ambulation  - Relieve pressure over bony prominences  - Avoid friction and shearing  - Provide appropriate hygiene as needed including keeping skin clean and dry  - Evaluate need for skin moisturizer/barrier cream  - Collaborate with interdisciplinary team   - Patient/family teaching  - Consider wound care consult   Outcome: Progressing

## 2023-03-29 NOTE — ASSESSMENT & PLAN NOTE
· Suppose to use CPAP at home, medical record notes history of non-compliance, pt currently endorses compliance    · Secondary to super morbid obesity (BMI 55) and OHS  · Currently on BiPAP, see plan above  · When weaned off, plan for CPA qHS

## 2023-03-29 NOTE — ASSESSMENT & PLAN NOTE
· Pt presented to ED with C/o productive cough x 1 week, shortness of breath, no improvement with nebs  He also noted that his SpO2 was low in the 80's at rehab  · Pt wears CPAP qhS doesn't know settings   Per medical record he is non-compliant with CPA as outpatient  · Likely d/t COVID, OHS and CPAP non-compliance  · Initial /102/64/50  · RVP - positive for COVID, see plan above  · Pt placed on BiPAP in ED 15/5, titrate FiO2 to maintain SpO2 > 88%  · Baseline Co2 likely near 90's  · CT chest - Mild bilateral dependent atelectasis  · Strep P and Legionella - pending    Plan  · Continue BiPAP overnight, wean to NC 4L in AM as tolerated  · Plan for CPAP qHS  · Continue COVID pathway, see plan below  · Follow up Strep P and Legionella  · Respiratory protocol  · IS while awake  · Avoid any sedating medication if possible, hold home Tramadol, Robaxin, Seroquel

## 2023-03-30 ENCOUNTER — APPOINTMENT (INPATIENT)
Dept: NON INVASIVE DIAGNOSTICS | Facility: HOSPITAL | Age: 68
End: 2023-03-30

## 2023-03-30 LAB
ALBUMIN SERPL BCP-MCNC: 3.1 G/DL (ref 3.5–5)
ALP SERPL-CCNC: 75 U/L (ref 34–104)
ALT SERPL W P-5'-P-CCNC: 79 U/L (ref 7–52)
AMMONIA PLAS-SCNC: 32 UMOL/L (ref 18–72)
ANION GAP SERPL CALCULATED.3IONS-SCNC: 3 MMOL/L (ref 4–13)
APTT PPP: 42 SECONDS (ref 23–37)
ARTERIAL PATENCY WRIST A: YES
AST SERPL W P-5'-P-CCNC: 56 U/L (ref 13–39)
BASE EXCESS BLDA CALC-SCNC: 18.3 MMOL/L
BILIRUB DIRECT SERPL-MCNC: 0.09 MG/DL (ref 0–0.2)
BILIRUB SERPL-MCNC: 0.26 MG/DL (ref 0.2–1)
BUN SERPL-MCNC: 19 MG/DL (ref 5–25)
CA-I BLD-SCNC: 1.11 MMOL/L (ref 1.12–1.32)
CALCIUM SERPL-MCNC: 9.2 MG/DL (ref 8.4–10.2)
CHLORIDE SERPL-SCNC: 92 MMOL/L (ref 96–108)
CO2 SERPL-SCNC: 45 MMOL/L (ref 21–32)
CREAT SERPL-MCNC: 0.58 MG/DL (ref 0.6–1.3)
CRP SERPL QL: 13.2 MG/L
ERYTHROCYTE [DISTWIDTH] IN BLOOD BY AUTOMATED COUNT: 12.2 % (ref 11.6–15.1)
GFR SERPL CREATININE-BSD FRML MDRD: 104 ML/MIN/1.73SQ M
GLUCOSE SERPL-MCNC: 138 MG/DL (ref 65–140)
GLUCOSE SERPL-MCNC: 169 MG/DL (ref 65–140)
HCO3 BLDA-SCNC: 47.3 MMOL/L (ref 22–28)
HCT VFR BLD AUTO: 41.2 % (ref 36.5–49.3)
HGB BLD-MCNC: 12.9 G/DL (ref 12–17)
INR PPP: 3.87 (ref 0.84–1.19)
MAGNESIUM SERPL-MCNC: 2 MG/DL (ref 1.9–2.7)
MCH RBC QN AUTO: 33.1 PG (ref 26.8–34.3)
MCHC RBC AUTO-ENTMCNC: 31.3 G/DL (ref 31.4–37.4)
MCV RBC AUTO: 106 FL (ref 82–98)
MRSA NOSE QL CULT: NORMAL
NASAL CANNULA: 8
O2 CT BLDA-SCNC: 17.7 ML/DL (ref 16–23)
OXYHGB MFR BLDA: 92 % (ref 94–97)
PCO2 BLDA: 77 MM HG (ref 36–44)
PH BLDA: 7.41 [PH] (ref 7.35–7.45)
PHOSPHATE SERPL-MCNC: 2.3 MG/DL (ref 2.3–4.1)
PLATELET # BLD AUTO: 229 THOUSANDS/UL (ref 149–390)
PMV BLD AUTO: 9.4 FL (ref 8.9–12.7)
PO2 BLDA: 66.2 MM HG (ref 75–129)
POTASSIUM SERPL-SCNC: 3.7 MMOL/L (ref 3.5–5.3)
PROCALCITONIN SERPL-MCNC: 0.12 NG/ML
PROT SERPL-MCNC: 6.3 G/DL (ref 6.4–8.4)
PROTHROMBIN TIME: 38.3 SECONDS (ref 11.6–14.5)
RBC # BLD AUTO: 3.9 MILLION/UL (ref 3.88–5.62)
SODIUM SERPL-SCNC: 140 MMOL/L (ref 135–147)
SPECIMEN SOURCE: ABNORMAL
WBC # BLD AUTO: 5.28 THOUSAND/UL (ref 4.31–10.16)

## 2023-03-30 RX ORDER — METHOCARBAMOL 500 MG/1
500 TABLET, FILM COATED ORAL EVERY 6 HOURS PRN
Status: DISCONTINUED | OUTPATIENT
Start: 2023-03-30 | End: 2023-03-31

## 2023-03-30 RX ORDER — ACETAMINOPHEN 325 MG/1
975 TABLET ORAL EVERY 8 HOURS
Status: DISCONTINUED | OUTPATIENT
Start: 2023-03-30 | End: 2023-04-04 | Stop reason: HOSPADM

## 2023-03-30 RX ORDER — IPRATROPIUM BROMIDE AND ALBUTEROL SULFATE 2.5; .5 MG/3ML; MG/3ML
3 SOLUTION RESPIRATORY (INHALATION) 4 TIMES DAILY PRN
Status: DISCONTINUED | OUTPATIENT
Start: 2023-03-30 | End: 2023-04-03

## 2023-03-30 RX ORDER — KETOROLAC TROMETHAMINE 30 MG/ML
15 INJECTION, SOLUTION INTRAMUSCULAR; INTRAVENOUS EVERY 6 HOURS PRN
Status: COMPLETED | OUTPATIENT
Start: 2023-03-30 | End: 2023-03-30

## 2023-03-30 RX ORDER — ACETAMINOPHEN 325 MG/1
650 TABLET ORAL EVERY 6 HOURS PRN
Status: DISCONTINUED | OUTPATIENT
Start: 2023-03-30 | End: 2023-03-30

## 2023-03-30 RX ORDER — LABETALOL HYDROCHLORIDE 5 MG/ML
10 INJECTION, SOLUTION INTRAVENOUS EVERY 4 HOURS PRN
Status: DISCONTINUED | OUTPATIENT
Start: 2023-03-30 | End: 2023-04-04 | Stop reason: HOSPADM

## 2023-03-30 RX ADMIN — DEXAMETHASONE SODIUM PHOSPHATE 19.7 MG: 10 INJECTION INTRAMUSCULAR; INTRAVENOUS at 06:32

## 2023-03-30 RX ADMIN — DOXYCYCLINE 100 MG: 100 CAPSULE ORAL at 21:23

## 2023-03-30 RX ADMIN — GUAIFENESIN 600 MG: 600 TABLET ORAL at 10:10

## 2023-03-30 RX ADMIN — DEXAMETHASONE SODIUM PHOSPHATE 19.7 MG: 10 INJECTION INTRAMUSCULAR; INTRAVENOUS at 17:37

## 2023-03-30 RX ADMIN — WARFARIN SODIUM 9 MG: 6 TABLET ORAL at 17:37

## 2023-03-30 RX ADMIN — KETOROLAC TROMETHAMINE 15 MG: 30 INJECTION, SOLUTION INTRAMUSCULAR; INTRAVENOUS at 05:38

## 2023-03-30 RX ADMIN — KETOROLAC TROMETHAMINE 15 MG: 30 INJECTION, SOLUTION INTRAMUSCULAR; INTRAVENOUS at 17:37

## 2023-03-30 RX ADMIN — CEFTRIAXONE SODIUM 1000 MG: 10 INJECTION, POWDER, FOR SOLUTION INTRAVENOUS at 10:10

## 2023-03-30 RX ADMIN — REMDESIVIR 100 MG: 100 INJECTION, POWDER, LYOPHILIZED, FOR SOLUTION INTRAVENOUS at 05:40

## 2023-03-30 RX ADMIN — ACETAMINOPHEN 975 MG: 325 TABLET, FILM COATED ORAL at 21:23

## 2023-03-30 RX ADMIN — METHOCARBAMOL 500 MG: 500 TABLET ORAL at 05:39

## 2023-03-30 RX ADMIN — PREGABALIN 150 MG: 75 CAPSULE ORAL at 14:54

## 2023-03-30 RX ADMIN — DOXYCYCLINE 100 MG: 100 CAPSULE ORAL at 14:57

## 2023-03-30 RX ADMIN — IPRATROPIUM BROMIDE AND ALBUTEROL SULFATE 3 ML: .5; 3 SOLUTION RESPIRATORY (INHALATION) at 14:15

## 2023-03-30 RX ADMIN — GUAIFENESIN 600 MG: 600 TABLET ORAL at 21:23

## 2023-03-30 RX ADMIN — DOXYCYCLINE 100 MG: 100 CAPSULE ORAL at 05:40

## 2023-03-30 RX ADMIN — PREGABALIN 150 MG: 75 CAPSULE ORAL at 05:40

## 2023-03-30 RX ADMIN — PREGABALIN 150 MG: 75 CAPSULE ORAL at 21:24

## 2023-03-30 RX ADMIN — FUROSEMIDE 40 MG: 40 TABLET ORAL at 10:11

## 2023-03-30 RX ADMIN — METHOCARBAMOL 500 MG: 500 TABLET ORAL at 14:57

## 2023-03-30 RX ADMIN — ACETAMINOPHEN 975 MG: 325 TABLET, FILM COATED ORAL at 14:53

## 2023-03-30 RX ADMIN — ACETAMINOPHEN 650 MG: 325 TABLET ORAL at 05:40

## 2023-03-30 NOTE — PLAN OF CARE
Problem: MOBILITY - ADULT  Goal: Maintain or return to baseline ADL function  Description: INTERVENTIONS:  -  Assess patient's ability to carry out ADLs; assess patient's baseline for ADL function and identify physical deficits which impact ability to perform ADLs (bathing, care of mouth/teeth, toileting, grooming, dressing, etc )  - Assess/evaluate cause of self-care deficits   - Assess range of motion  - Assess patient's mobility; develop plan if impaired  - Assess patient's need for assistive devices and provide as appropriate  - Encourage maximum independence but intervene and supervise when necessary  - Involve family in performance of ADLs  - Assess for home care needs following discharge   - Consider OT consult to assist with ADL evaluation and planning for discharge  - Provide patient education as appropriate  Outcome: Progressing  Goal: Maintains/Returns to pre admission functional level  Description: INTERVENTIONS:  - Perform BMAT or MOVE assessment daily    - Set and communicate daily mobility goal to care team and patient/family/caregiver  - Collaborate with rehabilitation services on mobility goals if consulted  - Perform Range of Motion 2 times a day  - Reposition patient every 2 hours  - Dangle patient 2 times a day  - Stand patient 2 times a day  - Ambulate patient 3 times a day  - Out of bed to chair 3 times a day   - Out of bed for meals 3 times a day  - Out of bed for toileting  - Record patient progress and toleration of activity level   Outcome: Progressing     Problem: Nutrition/Hydration-ADULT  Goal: Nutrient/Hydration intake appropriate for improving, restoring or maintaining nutritional needs  Description: Monitor and assess patient's nutrition/hydration status for malnutrition  Collaborate with interdisciplinary team and initiate plan and interventions as ordered  Monitor patient's weight and dietary intake as ordered or per policy   Utilize nutrition screening tool and intervene as necessary  Determine patient's food preferences and provide high-protein, high-caloric foods as appropriate       INTERVENTIONS:  - Monitor oral intake, urinary output, labs, and treatment plans  - Assess nutrition and hydration status and recommend course of action  - Evaluate amount of meals eaten  - Assist patient with eating if necessary   - Allow adequate time for meals  - Recommend/ encourage appropriate diets, oral nutritional supplements, and vitamin/mineral supplements  - Order, calculate, and assess calorie counts as needed  - Recommend, monitor, and adjust tube feedings and TPN/PPN based on assessed needs  - Assess need for intravenous fluids  - Provide specific nutrition/hydration education as appropriate  - Include patient/family/caregiver in decisions related to nutrition  Outcome: Progressing     Problem: Prexisting or High Potential for Compromised Skin Integrity  Goal: Skin integrity is maintained or improved  Description: INTERVENTIONS:  - Identify patients at risk for skin breakdown  - Assess and monitor skin integrity  - Assess and monitor nutrition and hydration status  - Monitor labs   - Assess for incontinence   - Turn and reposition patient  - Assist with mobility/ambulation  - Relieve pressure over bony prominences  - Avoid friction and shearing  - Provide appropriate hygiene as needed including keeping skin clean and dry  - Evaluate need for skin moisturizer/barrier cream  - Collaborate with interdisciplinary team   - Patient/family teaching  - Consider wound care consult   Outcome: Progressing

## 2023-03-30 NOTE — PROGRESS NOTES
[facility]  Progress Note  Name: Kervin Membreno  MRN: [id number]  Unit/Bed#: S -01 I Date of Admission: 3/28/2023   Date of Service: 3/30/2023 I Hospital Day: 1    Assessment/Plan   * Acute on chronic respiratory failure with hypoxia and hypercapnia (HCC)  Assessment & Plan  Pt presented to ED with C/o productive cough x 1 week, shortness of breath, no improvement with nebs  He also noted that his SpO2 was low in the 80's at rehab  Pt wears CPAP qhS doesn't know settings  Per medical record he is non-compliant with CPA as outpatient  Initial /102/64/50  Pt placed on BiPAP in ED 15 inspiratory / 5 expiratory pressure   Respiratory virus panel COVID + 3/29/2023  Legionella and Strep urine Ag negative   CT chest - mild dependent atelectasis     Acute on chronic respiratory failure likely 2/2 COVID with OHS and CPAP noncompliance contributing to chronic respiratory failure with hypoxia and hypercapnia       Patient was initially admitted to critical care, then transferred to [facility] level of care after he was no longer requiring BiPAP continuously       Plan  • Continue BiPAP qHS, titrate FiO2 to maintain SpO2 > 88%  • Continue COVID pathway, see plan below  • Respiratory protocol  • Incentive spirometry   • Avoid any sedating medication if possible, hold home Tramadol, Robaxin, Seroquel    SIRS (systemic inflammatory response syndrome) (HCC)  Assessment & Plan  On admission, patient had tachycardia and tachypnea = SIRS +   CT chest showed mild bilateral dependent atelectasis   S/p Cefepime and Vancomycin in ED   UA negative, Procal negative, Lactic negative    Likely 2/2 COVID with no other known potential source of infection     Plan:   - Blood cultures pending  - Sputum culture pending  - Repeat CBC to follow WBC   - Monitor for other potential sources of infection, especially skin   - Continue COVID treatment with doxycycline, remdesivir, decadron COVID-19  Assessment & Plan  Patient presented from SNF with cough and SOB x 1 week   COVID + on viral panel in ED   Procal negative  BNP negative   CRP and D-Dimer elevated       Plan:   - Continue COVID pathway:   ? Decadron 0 1mg/kg BID, Day 1/10  ? Remdesivir Day 1/5  ? Doxycycline Day #1  - Continue respiratory support with BiPAP  - Recheck Procal and trend CRP  - Consider CT PE scan if concerns for PE     Pulmonary hypertension ([facility] [address] )  Assessment & Plan  Patient with history of pulmonary hypertension  On most recent echo, PAP unable to be calculated   PTA: lasix 40mg daily     Patient appears euvolemic on exam without any peripheral edema  No large pleural effusions seen on imaging  No JVD  Plan:   - Continue PTA lasix 40mg daily  - Daily weights   - Is/Os     Chronic pain disorder  Assessment & Plan  Patient with chronic pain from post herpetic neuralgia and chronic back pain  PTA: Tramadol, Robaxin, Lyrica     Plan:  - Hold home tramadol and robaxin with concerns of AMS/lethargy   - Continue PTA lyrica   - Tylenol 650mg q6h PRN   - Lidocaine patch vs voltaren gel for back pain exacerbations     CHF (congestive heart failure) ([facility])  Assessment & Plan  Wt Readings from Last 3 Encounters:   03/29/23 (!) 197 kg (434 lb)   03/28/23 (!) 197 kg (434 lb 6 4 oz)   03/24/23 (!) 198 kg (435 lb 12 8 oz)     Hx diastolic HF  Most recent echo (2/5/23) - LVEF 60%, indeterminate diastolic function  RV mildly dilated  PTA Lasix 40mg  No evidence of acute exacerbation     Plan:   - Continue home lasix  - Monitor Is/Os   - Daily weights    History of pulmonary embolism  Assessment & Plan  Hx PE noted on CT PE study 1/12/2018 - Pulmonary embolism is demonstrated as discussed above involving segmental and subsegmental branches of the left and right main pulmonaries    PTA: Warfarin 9mg daily (mon-Fri) and 10mg (Sat-Sun)  PT/INR 26 4/2 40    Plan:   - Resume home warfarin   - Monitor respiratory status     Severe obstructive sleep apnea  Assessment & Plan  Patient has history of EVERETT and is prescribed CPAP for home use  Patient endorses compliance but medical record notes hx of non-compliance  EVERETT likely 2/2 BMI 55 and OHS    Plan:   - BiPAP for increased respiratory support until stable   - CPAP qHS once improved     Primary hypertension  Assessment & Plan  Blood Pressure: 165/74    Blood pressures have been elevated since arrival    Patient on lasix 40mg daily PTA  Plan:   - Consider amlodipine 5mg daily for blood pressure control if patient remains hypertensive   - Resume home medication regimen     Morbid obesity ([address] )  Assessment & Plan    Lab Results   Component Value Date    HGBA1C 5 8 (H) 04/02/2021     BMI 55 72    Plan:   - Hgb A1c to assess for DM                VTE Pharmacologic Prophylaxis: VTE Score: 6 High Risk (Score >/= 5) - Pharmacological DVT Prophylaxis Ordered: warfarin (Coumadin)  Sequential Compression Devices Ordered  Patient Centered Rounds: I performed bedside rounds with nursing staff today  Discussions with Specialists or Other Care Team Provider: Attending Physician, Case Management, PT/OT, RT, Critical Care     Education and Discussions with Family / Patient: Will call and update family later today  Current Length of Stay: 1 day(s)  Current Patient Status: Inpatient   Discharge Plan: Anticipate discharge in >72 hrs to rehab facility  Code Status: Level 1 - Full Code    Subjective:   Patient seen and examined at bedside this morning  Patient was very lethargic and difficult to arouse when I saw him  Patient was on BiPAP at time of exam on 50% FiO2  Overnight, night team reported patient had severe back pain overnight  Patient was given a dose of robaxin for pain  Nursing reports patient is very difficult to obtain vascular access for labs  Yesterday, patient was admitted under critical care for increased work of breathing requiring BiPAP   Patient was transferred to med surg after deemed medically stable on id flow nasal cannula  Objective:     Vitals:   Temp (24hrs), Av 4 °F (36 9 °C), Min:98 1 °F (36 7 °C), Max:98 7 °F (37 1 °C)    Temp:  [98 1 °F (36 7 °C)-98 7 °F (37 1 °C)] 98 7 °F (37 1 °C)  HR:  [74-94] 86  Resp:  [20] 20  BP: (135-183)/() 165/74  SpO2:  [93 %-96 %] 94 %  Body mass index is 56 27 kg/m²  Input and Output Summary (last 24 hours): Intake/Output Summary (Last 24 hours) at 3/30/2023 1346  Last data filed at 3/29/2023 1450  Gross per 24 hour   Intake 50 ml   Output 400 ml   Net -350 ml       Physical Exam:   Physical Exam  Vitals and nursing note reviewed  Constitutional:       General: He is sleeping  He is not in acute distress  Appearance: He is obese  He is not diaphoretic  Interventions: Face mask in place  HENT:      Nose: Nose normal  No congestion or rhinorrhea  Mouth/Throat:      Mouth: Mucous membranes are moist    Cardiovascular:      Rate and Rhythm: Normal rate and regular rhythm  Pulmonary:      Breath sounds: Decreased air movement present  Decreased breath sounds present  No wheezing, rhonchi or rales  Abdominal:      General: Abdomen is protuberant  Palpations: Abdomen is soft  Hernia: A hernia is present  Hernia is present in the umbilical area  Musculoskeletal:         General: No swelling, tenderness, deformity or signs of injury  Right lower leg: No edema  Left lower leg: No edema  Skin:     General: Skin is warm and dry  Capillary Refill: Capillary refill takes less than 2 seconds  Coloration: Skin is not jaundiced or pale  Findings: No bruising, erythema, lesion or rash  Neurological:      Mental Status: He is lethargic            Additional Data:     Labs:  Results from last 7 days   Lab Units 23  0451 23  2251   WBC Thousand/uL 6 51 5 56   HEMOGLOBIN g/dL 12 6 12 4   HEMATOCRIT % 42 0 41 1   PLATELETS Thousands/uL 194 182   BANDS PCT % 4 --    NEUTROS PCT %  --  71   LYMPHS PCT %  --  15   LYMPHO PCT % 2*  --    MONOS PCT %  --  11   MONO PCT % 3*  --    EOS PCT % 0 3     Results from last 7 days   Lab Units 03/29/23  0451 03/28/23  2251   SODIUM mmol/L 138 139   POTASSIUM mmol/L 4 2 4 0   CHLORIDE mmol/L 91* 91*   CO2 mmol/L 44* 45*   BUN mg/dL 9 9   CREATININE mg/dL 0 44* 0 50*   ANION GAP mmol/L 3* 3*   CALCIUM mg/dL 8 8 9 1   ALBUMIN g/dL  --  3 2*   TOTAL BILIRUBIN mg/dL  --  0 32   ALK PHOS U/L  --  81   ALT U/L  --  40   AST U/L  --  37   GLUCOSE RANDOM mg/dL 147* 113     Results from last 7 days   Lab Units 03/28/23  2251   INR  2 40*     Results from last 7 days   Lab Units 03/30/23  1008   POC GLUCOSE mg/dl 138         Results from last 7 days   Lab Units 03/29/23  0451 03/29/23  0000 03/28/23  2251   LACTIC ACID mmol/L  --   --  0 7   PROCALCITONIN ng/ml 0 12 0 13  --        Lines/Drains:  Invasive Devices     Peripheral Intravenous Line  Duration           Long-Dwell Peripheral IV (Midline) 49/04/62 Right Basilic 1 day    Peripheral IV 03/28/23 Proximal;Right;Ventral (anterior) Forearm 1 day                      Imaging: Reviewed radiology reports from this admission including: chest CT scan and abdominal/pelvic CT   CT chest wo contrast   ED Interpretation by Chato Vuong PA-C (03/29 0153)   Ray Benito MD  [phone] 3/29/2023     Narrative & Impression  CT CHEST WITHOUT IV CONTRAST     INDICATION:   hypercapnia; acute on chronic respiratory failure  Patient has confirmed COVID-19      COMPARISON:  CT of abdomen pelvis on March 28, 2023      TECHNIQUE: CT examination of the chest was performed without intravenous contrast  Multiplanar 2D reformatted images were created from the source data      Radiation dose length product (DLP) for this visit:  4148 mGy-cm     This examination, like all CT scans performed in the [facility], was performed utilizing techniques to minimize radiation dose exposure, including the use of iterative   reconstruction and automated exposure control       FINDINGS:     LUNGS:  Mild bilateral dependent atelectasis  Otherwise no focal consolidation  The central airways are patent      PLEURA:  Unremarkable      HEART/GREAT VESSELS: Heart is unremarkable for patient's age  No thoracic aortic aneurysm      MEDIASTINUM AND EMILEE:     Unremarkable      CHEST WALL AND LOWER NECK:  Unremarkable      VISUALIZED STRUCTURES IN THE UPPER ABDOMEN:  Unremarkable      OSSEOUS STRUCTURES:  Spinal degenerative changes are noted  No acute fracture or destructive osseous lesion      IMPRESSION:     Mild bilateral dependent atelectasis      Workstation performed: [id number]           Final Result by Teodora Felton MD (03/29 0143)      Mild bilateral dependent atelectasis  Workstation performed: [id number]         CT abdomen pelvis wo contrast   Final Result by Teodora Felton MD (03/29 0045)      Sludge and/or stones within the gallbladder  No pericholecystic inflammatory change  Workstation performed: [id number]             Recent Cultures (last 7 days):   Results from last 7 days   Lab Units 03/29/23  1515 03/29/23  0859 03/29/23  0000 03/28/23  2254   BLOOD CULTURE   --   --  No Growth at 24 hrs  No Growth at 24 hrs     SPUTUM CULTURE  Culture too young- will reincubate  --   --   --    GRAM STAIN RESULT  1+ Epithelial cells per low power field*  1+ Polys*  3+ Gram positive cocci in clusters*  2+ Gram positive rods*  1+ Gram negative rods*  --   --   --    LEGIONELLA URINARY ANTIGEN   --  Negative  --   --        Last 24 Hours Medication List:   Current Facility-Administered Medications   Medication Dose Route Frequency Provider Last Rate   • acetaminophen  650 mg Oral Q6H PRN Roxann Gilmore MD     • cefTRIAXone  1,000 mg Intravenous Q24H Omerdequan Pierre 1,000 mg (03/30/23 1010)   • chlorhexidine  15 mL Mouth/Throat Q12H [address] Omer G Ignacio     • dexamethasone  0 1 mg/kg Intravenous Q12H Omer SOFIE Ignacio 19 7 mg (03/30/23 0947)   • doxycycline hyclate  100 mg Oral Q12H Omer G Ignacio     • furosemide  40 mg Oral Daily [facility]     • guaiFENesin  600 mg Oral Q12H [facility]     • ipratropium-albuterol  3 mL Nebulization 4x Daily PRN Peyton Lewis MD     • ketorolac  15 mg Intravenous Q6H PRN Phillip Peguero MD     • methocarbamol  500 mg Oral Q6H PRN Phillip Peguero MD     • pregabalin  150 mg Oral TID [facility]     • remdesivir  100 mg Intravenous Q24H Richmond Dash     • warfarin  9 mg Oral Daily (warfarin) Peyton Lewis MD          Today, Patient Was Seen By: Peyton Lewis MD    **Please Note: This note may have been constructed using a voice recognition system  **

## 2023-03-30 NOTE — PHYSICAL THERAPY NOTE
Physical Therapy Cancellation Note     03/30/23 2511   Note Type   Note type Cancelled Session   Cancel Reasons Medical status   Additional Comments referral received for PT eval and tx  attempted to see pt for eval  Jovana WILLETTG reports pt is responding poorly to stim and is not appropriate for eval  will follow and initiate PT as appropriate       Joy Galloway, PT

## 2023-03-30 NOTE — OCCUPATIONAL THERAPY NOTE
Occupational Therapy Cancellation     03/30/23 1010   Note Type   Note type Cancelled Session   Cancel Reasons Medical status   Additional Comments OT consult received  Chart reviewed and this writer spoke with PT whom reports RN deferred this AM 2' pt not medically appropriate at this time  Will f/u as schedule permits for OT evaluation       Paulino Sheehan OT

## 2023-03-30 NOTE — ASSESSMENT & PLAN NOTE
Patient presented from SNF with cough and SOB x 1 week   COVID + on viral panel in ED   Procal negative  BNP negative   CRP and D-Dimer elevated       Plan:   - Continue COVID pathway:   ? Decadron 0 1mg/kg BID, Day 1/10  ? Remdesivir Day 1/5  ?  Doxycycline Day #1  - Continue respiratory support with BiPAP  - Recheck Procal and trend CRP  - Consider CT PE scan if concerns for PE

## 2023-03-30 NOTE — ASSESSMENT & PLAN NOTE
Lab Results   Component Value Date    HGBA1C 5 8 (H) 04/02/2021     BMI 55 72    Plan:   - Hgb A1c to assess for DM

## 2023-03-30 NOTE — UTILIZATION REVIEW
Initial Clinical Review    Admission: Date/Time/Statement:   Admission Orders (From admission, onward)     Ordered        03/29/23 0317  INPATIENT ADMISSION  Once                      Orders Placed This Encounter   Procedures   • INPATIENT ADMISSION     Standing Status:   Standing     Number of Occurrences:   1     Order Specific Question:   Level of Care     Answer:   Critical Care [15]     Order Specific Question:   Estimated length of stay     Answer:   More than 2 Midnights     Order Specific Question:   Certification     Answer:   I certify that inpatient services are medically necessary for this patient for a duration of greater than two midnights  See H&P and MD Progress Notes for additional information about the patient's course of treatment  ED Arrival Information     Expected   -    Arrival   3/28/2023 22:04    Acuity   Urgent            Means of arrival   Ambulance    Escorted by   [facility]    Admission type   Emergency            Arrival complaint   -           Chief Complaint   Patient presents with   • Cough     Patient arrived via EMS from [facility] post acute skilled nursing for eval of productive cough  Patient has Hx: respiratory failure  EMS states patient was given several neb treatments with no relief, here for eval  Wears 4L 02 NC at the facility  Alert and oriented  Initial Presentation: 69 y o  male with a PMHx: CHF, DVT and PE on warfarin, HTN, OHS on chronic O2 4L NC, chronic back pain and pulm hypertension  He had a recent admission to [facility] to the ICU on 2/4/23 to 3/6/23 for acute/chronic hypoxic/hypercapnic respiratory failure secondary to acute CHF  During this admission he was on BiPAP briefly for hypercarbia and encephalopathy, diuresed well with lasix and improved  At the time of discharge he was too deconditioned and was discharged to a SNF    He presented to the ED with productive cough x 1 week, progressive shortness of breath no improvement with nebs  He was also noted to be hypoxic in the 80's  On arrival to the ED he was noted to short fo breath and hypoxic in the 80's on baseline 4L NC  He was placed on BiPAP for work of breathing  Positive for Covid  Plan: Inpatient admission for evaluation and treatment of acute on chronic resp failure with hypoxia and hypercapnia, Covid 19, CHF, SIRS, pulmonary HTN, chronic pain disorder, hx of PE, HTN: continue Bipap and wean as tolerated, follow strep pneumoniae and legionella, Covid pathway, IV decadron, IV Remdesivir and doxycycline, trend CRP, procal in am, continue lasix, follow blood cultures, obtain sputum culture, continue home Lyrica, hold warfarin and start heparin drip  Date: 3/30   Day 2:     Internal medicine: Continue BiPAP qHS, titrate FiO2 to maintain SpO2 > 88%  Continue Covid pathway  IV Decadron, IV Remdesivir, doxycycline, blood and sputum cultures pending, follow WBC, continue home lasix, resume warfarin, consider amlodipine if BP control if pt remains hypertensive       ED Triage Vitals   Temperature Pulse Respirations Blood Pressure SpO2   03/28/23 2212 03/28/23 2212 03/28/23 2212 03/29/23 0047 03/28/23 2212   98 7 °F (37 1 °C) (!) 114 20 163/72 92 %      Temp Source Heart Rate Source Patient Position - Orthostatic VS BP Location FiO2 (%)   03/28/23 2212 03/28/23 2212 03/28/23 2212 03/28/23 2212 --   Oral Monitor Sitting Right arm       Pain Score       03/28/23 2212       No Pain          Wt Readings from Last 1 Encounters:   03/30/23 (!) 199 kg (438 lb 4 4 oz)     Additional Vital Signs:     Date/Time Temp Pulse Resp BP MAP (mmHg) SpO2 Calculated FIO2 (%) - Nasal Cannula Nasal Cannula O2 Flow Rate (L/min) O2 Device   03/30/23 1041 -- -- -- -- -- 94 % 52 8 L/min Mid flow nasal cannula   03/30/23 0900 -- 86 -- 165/74 -- -- -- -- --   03/30/23 0700 98 7 °F (37 1 °C) 74 20 183/86 Abnormal  123 -- -- -- --   03/30/23 0331 -- -- -- -- -- 96 % -- -- --   03/30/23 0030 -- 94 20 135/67 94 96 % -- -- --   03/29/23 2328 -- -- -- -- -- 95 % -- -- --   03/29/23 2100 -- -- -- -- -- 94 % 52 8 L/min Mid flow nasal cannula   03/29/23 15:55:17 98 1 °F (36 7 °C) -- -- 154/132 Abnormal  139 -- -- -- --   03/29/23 1545 -- -- -- -- -- 93 % 52 8 L/min Mid flow nasal cannula   03/29/23 1158 -- 93 21 151/79 -- 93 % 52 8 L/min --   03/29/23 0935 -- -- -- -- -- 94 % 52 8 L/min Mid flow nasal cannula   03/29/23 0829 -- -- -- -- -- -- -- -- BiPAP   03/29/23 0700 98 6 °F (37 °C) 87 19 146/70 100 95 % -- -- BiPAP   03/29/23 0431 -- -- -- -- -- 95 % -- -- --   03/29/23 0428 99 °F (37 2 °C) 106 Abnormal  -- -- -- -- -- -- --   03/29/23 0330 -- 107 Abnormal  16 133/63 91 94 % -- -- BiPAP   03/29/23 0130 -- 107 Abnormal  16 144/65 93 94 % -- -- BiPAP   03/29/23 0126 -- -- -- -- -- 96 % -- -- --   03/29/23 0100 -- 109 Abnormal  16 157/72 103 93 % 36 4 L/min Nasal cannula   03/29/23 0047 -- 108 Abnormal  16 163/72 104 93 % -- -- --     Pertinent Labs/Diagnostic Test Results:   CT chest wo contrast   ED Interpretation by Sanchez Salguero PA-C (03/29 0153)   Magaly Escamilla MD  [phone] 3/29/2023     Narrative & Impression  CT CHEST WITHOUT IV CONTRAST     INDICATION:   hypercapnia; acute on chronic respiratory failure  Patient has confirmed COVID-19      COMPARISON:  CT of abdomen pelvis on March 28, 2023      TECHNIQUE: CT examination of the chest was performed without intravenous contrast  Multiplanar 2D reformatted images were created from the source data      Radiation dose length product (DLP) for this visit:  [id number] mGy-cm   This examination, like all CT scans performed in the [facility], was performed utilizing techniques to minimize radiation dose exposure, including the use of iterative   reconstruction and automated exposure control       FINDINGS:     LUNGS:  Mild bilateral dependent atelectasis  Otherwise no focal consolidation    The central airways are patent      PLEURA: Unremarkable      HEART/GREAT VESSELS: Heart is unremarkable for patient's age  No thoracic aortic aneurysm      MEDIASTINUM AND EMILEE:     Unremarkable      CHEST WALL AND LOWER NECK:  Unremarkable      VISUALIZED STRUCTURES IN THE UPPER ABDOMEN:  Unremarkable      OSSEOUS STRUCTURES:  Spinal degenerative changes are noted  No acute fracture or destructive osseous lesion      IMPRESSION:     Mild bilateral dependent atelectasis      Workstation performed: [id number]           Final Result by Mitzi Rice MD (03/29 0143)      Mild bilateral dependent atelectasis  Workstation performed: [id number]         CT abdomen pelvis wo contrast   Final Result by Mitzi Rice MD (03/29 0045)      Sludge and/or stones within the gallbladder  No pericholecystic inflammatory change              Workstation performed: [id number]           Results from last 7 days   Lab Units 03/29/23  0000   SARS-COV-2  Positive*     Results from last 7 days   Lab Units 03/29/23  0451 03/28/23  2251   WBC Thousand/uL 6 51 5 56   HEMOGLOBIN g/dL 12 6 12 4   HEMATOCRIT % 42 0 41 1   PLATELETS Thousands/uL 194 182   NEUTROS ABS Thousands/µL  --  3 94   BANDS PCT % 4  --          Results from last 7 days   Lab Units 03/29/23 0451 03/28/23  2251   SODIUM mmol/L 138 139   POTASSIUM mmol/L 4 2 4 0   CHLORIDE mmol/L 91* 91*   CO2 mmol/L 44* 45*   ANION GAP mmol/L 3* 3*   BUN mg/dL 9 9   CREATININE mg/dL 0 44* 0 50*   EGFR ml/min/1 73sq m 116 110   CALCIUM mg/dL 8 8 9 1   CALCIUM, IONIZED mmol/L 1 09*  --    MAGNESIUM mg/dL 2 0 2 1   PHOSPHORUS mg/dL 3 2 3 4     Results from last 7 days   Lab Units 03/28/23  2251   AST U/L 37   ALT U/L 40   ALK PHOS U/L 81   TOTAL PROTEIN g/dL 6 6   ALBUMIN g/dL 3 2*   TOTAL BILIRUBIN mg/dL 0 32     Results from last 7 days   Lab Units 03/30/23  1008   POC GLUCOSE mg/dl 138     Results from last 7 days   Lab Units 03/29/23 0451 03/28/23  2251   GLUCOSE RANDOM mg/dL 147* 113              Results from last 7 days   Lab Units 03/29/23  0839 03/29/23  0256 03/29/23  0000   PH BRYANT  7 347 7 329 7 310   PCO2 BRYANT mm Hg 86 5* 92 4* 102 2*   PO2 BRYANT mm Hg 86 5* 54 3* 64 0*   HCO3 BRYANT mmol/L 46 4* 47 5* 50 3*   BASE EXC BRYANT mmol/L 16 1 16 6 18 6   O2 CONTENT BRYANT ml/dL 18 9 17 1 17 2   O2 HGB, VENOUS % 94 8* 85 3* 89 5*         Results from last 7 days   Lab Units 03/29/23  0000   CK TOTAL U/L 20*         Results from last 7 days   Lab Units 03/28/23  2251   D-DIMER QUANTITATIVE ug/ml FEU 0 44     Results from last 7 days   Lab Units 03/29/23  1131 03/28/23  2251   PROTIME seconds  --  26 4*   INR   --  2 40*   PTT seconds 200* 45*         Results from last 7 days   Lab Units 03/29/23  0451 03/29/23  0000   PROCALCITONIN ng/ml 0 12 0 13     Results from last 7 days   Lab Units 03/28/23  2251   LACTIC ACID mmol/L 0 7             Results from last 7 days   Lab Units 03/29/23  0006   BNP pg/mL 20         Results from last 7 days   Lab Units 03/29/23  0451   HEP B S AG  Non-reactive   HEP C AB  Non-reactive   HEP B C IGM  Non-reactive   HEP B C TOTAL AB  Non-reactive         Results from last 7 days   Lab Units 03/29/23  0000   CRP mg/L 24 2*             Results from last 7 days   Lab Units 03/28/23  2258   CLARITY UA  Clear   COLOR UA  Yellow   SPEC GRAV UA  1 012   PH UA  6 5   GLUCOSE UA mg/dl Negative   KETONES UA mg/dl Negative   BLOOD UA  Negative   PROTEIN UA mg/dl Negative   NITRITE UA  Negative   BILIRUBIN UA  Negative   UROBILINOGEN UA (BE) mg/dl 2 0*   LEUKOCYTES UA  Negative     Results from last 7 days   Lab Units 03/29/23  0859 03/29/23  0000   STREP PNEUMONIAE ANTIGEN, URINE  Negative  --    LEGIONELLA URINARY ANTIGEN  Negative  --    INFLUENZA A PCR   --  Negative   INFLUENZA B PCR   --  Negative   RSV PCR   --  Negative           Results from last 7 days   Lab Units 03/29/23  1515 03/29/23  0000 03/28/23  2254   BLOOD CULTURE   --  No Growth at 24 hrs  No Growth at 24 hrs     GRAM STAIN RESULT  1+ Epithelial cells per low power field*  1+ Polys*  3+ Gram positive cocci in clusters*  2+ Gram positive rods*  1+ Gram negative rods*  --   --          ED Treatment:   Medication Administration from 03/28/2023 2204 to 03/29/2023 0425       Date/Time Order Dose Route Action     03/28/2023 2333 EDT albuterol inhalation solution 10 mg 10 mg Nebulization Given     03/28/2023 2334 EDT ipratropium (ATROVENT) 0 02 % inhalation solution 1 mg 1 mg Nebulization Given     03/28/2023 2334 EDT sodium chloride 0 9 % inhalation solution 3 mL 3 mL Nebulization Given     03/28/2023 2343 EDT methylPREDNISolone sodium succinate (Solu-MEDROL) injection 80 mg 80 mg Intravenous Given     03/28/2023 2332 EDT guaiFENesin (MUCINEX) 12 hr tablet 600 mg 600 mg Oral Given     03/29/2023 0000 EDT sodium chloride 0 9 % bolus 250 mL 250 mL Intravenous New Bag     03/29/2023 0149 EDT cefepime (MAXIPIME) 2 g/50 mL dextrose IVPB 2,000 mg Intravenous New Bag     03/29/2023 0254 EDT vancomycin (VANCOCIN) 2,000 mg in sodium chloride 0 9 % 500 mL IVPB 2,000 mg Intravenous New Bag        Past Medical History:   Diagnosis Date   • CHF (congestive heart failure) ([facility])    • DVT (deep venous thrombosis) ([facility])    • HTN (hypertension)    • Morbid obesity ([facility])    • EVERETT (obstructive sleep apnea)    • Pulmonary embolism ([facility] )      Present on Admission:  • Acute on chronic respiratory failure with hypoxia and hypercapnia ([facility])  • COVID-19  • CHF (congestive heart failure) ([facility])  • Chronic pain disorder  • Primary hypertension  • Morbid obesity ([place] [address] )  • History of pulmonary embolism  • Severe obstructive sleep apnea  • Pulmonary hypertension ([facility])  • SIRS (systemic inflammatory response syndrome) ([facility])      Admitting Diagnosis: Cough [R05 9]  Hypercapnia [R06 89]  Hypoxia [R09 02]  Acute on chronic respiratory failure ([facility] ) [J96 20]  Age/Sex: 69 y o  male  Admission Orders:  Scheduled Medications:  cefTRIAXone, 1,000 mg, Intravenous, Q24H  chlorhexidine, 15 mL, Mouth/Throat, Q12H [facility]  dexamethasone, 0 1 mg/kg, Intravenous, Q12H  doxycycline hyclate, 100 mg, Oral, Q12H  furosemide, 40 mg, Oral, Daily  guaiFENesin, 600 mg, Oral, Q12H GRIFFIN  pregabalin, 150 mg, Oral, TID  remdesivir, 100 mg, Intravenous, Q24H  warfarin, 9 mg, Oral, Daily (warfarin)      Continuous IV Infusions:    heparin (porcine) 25,000 units in 0 45% NaCl 250 mL infusion (premix)  Rate: 3 8-37 5 mL/hr Dose: 3-30 Units/kg/hr  Weight Dosing Info: 125 kg (Order-Specific)  Freq: Titrated Route: IV  Last Dose: Stopped (03/29/23 1450)  Start: 03/29/23 0415 End: 03/29/23 1404     PRN Meds:  acetaminophen, 650 mg, Oral, Q6H PRN  ketorolac, 15 mg, Intravenous, Q6H PRN  methocarbamol, 500 mg, Oral, Q6H PRN        IP CONSULT TO CASE MANAGEMENT    Network Utilization Review Department  ATTENTION: Please call with any questions or concerns to [phone] and carefully listen to the prompts so that you are directed to the right person  All voicemails are confidential   Steven Andrew all requests for admission clinical reviews, approved or denied determinations and any other requests to dedicated fax number below belonging to the campus where the patient is receiving treatment   List of dedicated fax numbers for the Facilities:  [address] DENIALS (Administrative/Medical Necessity) [phone]   [address] (Maternity/NICU/Pediatrics) [phone]   [address] Aparna Gould [phone]   [facility] [place] 77 [phone]   1307 [facility] 150 Medical [place] [place] [address] [address] [facility] 28 [phone]   [address] [place] 134 1102 [facility] [place] [phone]

## 2023-03-30 NOTE — ASSESSMENT & PLAN NOTE
Wt Readings from Last 3 Encounters:   03/29/23 (!) 197 kg (434 lb)   03/28/23 (!) 197 kg (434 lb 6 4 oz)   03/24/23 (!) 198 kg (435 lb 12 8 oz)     Hx diastolic HF  Most recent echo (2/5/23) - LVEF 60%, indeterminate diastolic function  RV mildly dilated    PTA Lasix 40mg  No evidence of acute exacerbation     Plan:   - Continue home lasix  - Monitor Is/Os   - Daily weights

## 2023-03-30 NOTE — ASSESSMENT & PLAN NOTE
Patient with history of pulmonary hypertension  On most recent echo, PAP unable to be calculated   PTA: lasix 40mg daily     Patient appears euvolemic on exam without any peripheral edema  No large pleural effusions seen on imaging  No JVD       Plan:   - Continue PTA lasix 40mg daily  - Daily weights   - Is/Os

## 2023-03-30 NOTE — ASSESSMENT & PLAN NOTE
Patient has history of EVERETT and is prescribed CPAP for home use  Patient endorses compliance but medical record notes hx of non-compliance     EVERETT likely 2/2 BMI 55 and OHS    Plan:   - BiPAP for increased respiratory support until stable   - CPAP qHS once improved

## 2023-03-30 NOTE — ASSESSMENT & PLAN NOTE
Pt presented to ED with C/o productive cough x 1 week, shortness of breath, no improvement with nebs  He also noted that his SpO2 was low in the 80's at rehab  Pt wears CPAP qhS doesn't know settings  Per medical record he is non-compliant with CPA as outpatient  Initial /102/64/50  Pt placed on BiPAP in ED 15 inspiratory / 5 expiratory pressure   Respiratory virus panel COVID + 3/29/2023  Legionella and Strep urine Ag negative   CT chest - mild dependent atelectasis     Acute on chronic respiratory failure likely 2/2 COVID with OHS and CPAP noncompliance contributing to chronic respiratory failure with hypoxia and hypercapnia       Patient was initially admitted to critical care, then transferred to med surg level of care after he was no longer requiring BiPAP continuously       Plan  • Continue BiPAP qHS, titrate FiO2 to maintain SpO2 > 88%  • Continue COVID pathway, see plan below  • Respiratory protocol  • Incentive spirometry   • Avoid any sedating medication if possible, hold home Tramadol, Robaxin, Seroquel Resident Resident

## 2023-03-30 NOTE — ASSESSMENT & PLAN NOTE
On admission, patient had tachycardia and tachypnea = SIRS +   CT chest showed mild bilateral dependent atelectasis   S/p Cefepime and Vancomycin in ED   UA negative, Procal negative, Lactic negative    Likely 2/2 COVID with no other known potential source of infection     Plan:   - Blood cultures pending  - Sputum culture pending  - Repeat CBC to follow WBC   - Monitor for other potential sources of infection, especially skin   - Continue COVID treatment with doxycycline, remdesivir, decadron

## 2023-03-30 NOTE — ASSESSMENT & PLAN NOTE
Patient with chronic pain from post herpetic neuralgia and chronic back pain  PTA: Tramadol, Robaxin, Lyrica     Plan:  - Hold home tramadol and robaxin with concerns of AMS/lethargy   - Continue PTA lyrica   - Tylenol 650mg q6h PRN   - Lidocaine patch vs voltaren gel for back pain exacerbations

## 2023-03-30 NOTE — QUICK NOTE
Notified by RN that patient's wife was waiting for a phone call  Spoke with patient's wife and updated them on the patient's current condition, care management plan, and goals  All questions were answered to their satisfaction  Patient asleep on 8L [address] [facility] at 94%

## 2023-03-30 NOTE — QUICK NOTE
Called patient's spouse to provide update  All questions answered at this time       Damien Smith MD    PGY-1

## 2023-03-30 NOTE — ASSESSMENT & PLAN NOTE
Hx PE noted on CT PE study 1/12/2018 - Pulmonary embolism is demonstrated as discussed above involving segmental and subsegmental branches of the left and right main pulmonaries    PTA: Warfarin 9mg daily (mon-Fri) and 10mg (Sat-Sun)  PT/INR 26 4/2 40    Plan:   - Resume home warfarin   - Monitor respiratory status

## 2023-03-30 NOTE — PROGRESS NOTES
Patient has a right midline with no blood return, RN, PCA and phlebotomist attempted to retrieve labs with no success  Venous access consulted, [facility] team provider, Pam Gonzalez is aware

## 2023-03-30 NOTE — ASSESSMENT & PLAN NOTE
Blood Pressure: 165/74    Blood pressures have been elevated since arrival    Patient on lasix 40mg daily PTA       Plan:   - Consider amlodipine 5mg daily for blood pressure control if patient remains hypertensive   - Resume home medication regimen

## 2023-03-31 LAB
BACTERIA SPT RESP CULT: ABNORMAL
BASE EX.OXY STD BLDV CALC-SCNC: 94.6 % (ref 60–80)
BASE EXCESS BLDV CALC-SCNC: 13.7 MMOL/L
BASOPHILS # BLD AUTO: 0.01 THOUSANDS/ÂΜL (ref 0–0.1)
BASOPHILS NFR BLD AUTO: 0 % (ref 0–1)
BUN SERPL-MCNC: 18 MG/DL (ref 5–25)
CALCIUM SERPL-MCNC: 8.8 MG/DL (ref 8.4–10.2)
CHLORIDE SERPL-SCNC: 92 MMOL/L (ref 96–108)
CO2 SERPL-SCNC: >45 MMOL/L (ref 21–32)
CREAT SERPL-MCNC: 0.46 MG/DL (ref 0.6–1.3)
CRP SERPL QL: 9.5 MG/L
EOSINOPHIL # BLD AUTO: 0 THOUSAND/ÂΜL (ref 0–0.61)
EOSINOPHIL NFR BLD AUTO: 0 % (ref 0–6)
ERYTHROCYTE [DISTWIDTH] IN BLOOD BY AUTOMATED COUNT: 12.2 % (ref 11.6–15.1)
EST. AVERAGE GLUCOSE BLD GHB EST-MCNC: 103 MG/DL
GFR SERPL CREATININE-BSD FRML MDRD: 114 ML/MIN/1.73SQ M
GLUCOSE SERPL-MCNC: 136 MG/DL (ref 65–140)
GRAM STN SPEC: ABNORMAL
HBA1C MFR BLD: 5.2 %
HCO3 BLDV-SCNC: 42.2 MMOL/L (ref 24–30)
HCT VFR BLD AUTO: 40 % (ref 36.5–49.3)
HGB BLD-MCNC: 12.2 G/DL (ref 12–17)
IMM GRANULOCYTES # BLD AUTO: 0.02 THOUSAND/UL (ref 0–0.2)
IMM GRANULOCYTES NFR BLD AUTO: 0 % (ref 0–2)
LYMPHOCYTES # BLD AUTO: 0.76 THOUSANDS/ÂΜL (ref 0.6–4.47)
LYMPHOCYTES NFR BLD AUTO: 15 % (ref 14–44)
MCH RBC QN AUTO: 32.4 PG (ref 26.8–34.3)
MCHC RBC AUTO-ENTMCNC: 30.5 G/DL (ref 31.4–37.4)
MCV RBC AUTO: 106 FL (ref 82–98)
MONOCYTES # BLD AUTO: 0.43 THOUSAND/ÂΜL (ref 0.17–1.22)
MONOCYTES NFR BLD AUTO: 8 % (ref 4–12)
NEUTROPHILS # BLD AUTO: 3.93 THOUSANDS/ÂΜL (ref 1.85–7.62)
NEUTS SEG NFR BLD AUTO: 77 % (ref 43–75)
NRBC BLD AUTO-RTO: 0 /100 WBCS
O2 CT BLDV-SCNC: 19.2 ML/DL
PCO2 BLDV: 71.5 MM HG (ref 42–50)
PH BLDV: 7.39 [PH] (ref 7.3–7.4)
PLATELET # BLD AUTO: 211 THOUSANDS/UL (ref 149–390)
PMV BLD AUTO: 9.2 FL (ref 8.9–12.7)
PO2 BLDV: 80.6 MM HG (ref 35–45)
POTASSIUM SERPL-SCNC: 3.8 MMOL/L (ref 3.5–5.3)
RBC # BLD AUTO: 3.77 MILLION/UL (ref 3.88–5.62)
SODIUM SERPL-SCNC: 140 MMOL/L (ref 135–147)
WBC # BLD AUTO: 5.15 THOUSAND/UL (ref 4.31–10.16)

## 2023-03-31 RX ORDER — GUAIFENESIN 600 MG/1
1200 TABLET, EXTENDED RELEASE ORAL EVERY 12 HOURS SCHEDULED
Status: DISCONTINUED | OUTPATIENT
Start: 2023-03-31 | End: 2023-04-04 | Stop reason: HOSPADM

## 2023-03-31 RX ORDER — METHOCARBAMOL 750 MG/1
750 TABLET, FILM COATED ORAL EVERY 6 HOURS PRN
Status: DISCONTINUED | OUTPATIENT
Start: 2023-03-31 | End: 2023-04-04 | Stop reason: HOSPADM

## 2023-03-31 RX ORDER — OXYCODONE HYDROCHLORIDE 10 MG/1
10 TABLET ORAL EVERY 4 HOURS PRN
Status: DISCONTINUED | OUTPATIENT
Start: 2023-03-31 | End: 2023-04-04 | Stop reason: HOSPADM

## 2023-03-31 RX ORDER — OXYCODONE HYDROCHLORIDE 5 MG/1
5 TABLET ORAL EVERY 4 HOURS PRN
Status: DISCONTINUED | OUTPATIENT
Start: 2023-03-31 | End: 2023-04-04 | Stop reason: HOSPADM

## 2023-03-31 RX ORDER — GUAIFENESIN 600 MG/1
600 TABLET, EXTENDED RELEASE ORAL ONCE
Status: COMPLETED | OUTPATIENT
Start: 2023-03-31 | End: 2023-03-31

## 2023-03-31 RX ADMIN — METHOCARBAMOL 500 MG: 500 TABLET ORAL at 05:45

## 2023-03-31 RX ADMIN — GUAIFENESIN 600 MG: 600 TABLET ORAL at 08:15

## 2023-03-31 RX ADMIN — OXYCODONE HYDROCHLORIDE 5 MG: 5 TABLET ORAL at 14:51

## 2023-03-31 RX ADMIN — OXYCODONE HYDROCHLORIDE 5 MG: 5 TABLET ORAL at 19:35

## 2023-03-31 RX ADMIN — PREGABALIN 150 MG: 75 CAPSULE ORAL at 04:43

## 2023-03-31 RX ADMIN — REMDESIVIR 100 MG: 100 INJECTION, POWDER, LYOPHILIZED, FOR SOLUTION INTRAVENOUS at 04:38

## 2023-03-31 RX ADMIN — CHLORHEXIDINE GLUCONATE 0.12% ORAL RINSE 15 ML: 1.2 LIQUID ORAL at 08:15

## 2023-03-31 RX ADMIN — GUAIFENESIN 600 MG: 600 TABLET ORAL at 10:38

## 2023-03-31 RX ADMIN — PREGABALIN 150 MG: 75 CAPSULE ORAL at 14:51

## 2023-03-31 RX ADMIN — DEXAMETHASONE SODIUM PHOSPHATE 19.7 MG: 10 INJECTION INTRAMUSCULAR; INTRAVENOUS at 15:47

## 2023-03-31 RX ADMIN — PREGABALIN 150 MG: 75 CAPSULE ORAL at 19:35

## 2023-03-31 RX ADMIN — ACETAMINOPHEN 975 MG: 325 TABLET, FILM COATED ORAL at 14:51

## 2023-03-31 RX ADMIN — METHOCARBAMOL 750 MG: 750 TABLET ORAL at 19:35

## 2023-03-31 RX ADMIN — FUROSEMIDE 40 MG: 40 TABLET ORAL at 08:15

## 2023-03-31 RX ADMIN — ACETAMINOPHEN 975 MG: 325 TABLET, FILM COATED ORAL at 21:39

## 2023-03-31 RX ADMIN — CHLORHEXIDINE GLUCONATE 0.12% ORAL RINSE 15 ML: 1.2 LIQUID ORAL at 19:36

## 2023-03-31 RX ADMIN — DEXAMETHASONE SODIUM PHOSPHATE 19.7 MG: 10 INJECTION INTRAMUSCULAR; INTRAVENOUS at 04:38

## 2023-03-31 RX ADMIN — GUAIFENESIN 1200 MG: 600 TABLET ORAL at 19:36

## 2023-03-31 RX ADMIN — ACETAMINOPHEN 975 MG: 325 TABLET, FILM COATED ORAL at 04:43

## 2023-03-31 NOTE — ASSESSMENT & PLAN NOTE
Lab Results   Component Value Date    HGBA1C 5 2 03/30/2023     BMI 55 72    Plan:   - Hgb A1c to assess for DM

## 2023-03-31 NOTE — OCCUPATIONAL THERAPY NOTE
Occupational Therapy Evaluation     Patient Name: Kevon Oliver  IRSHX'D Date: 3/31/2023  Problem List  Principal Problem:    Acute on chronic respiratory failure with hypoxia and hypercapnia ([facility])  Active Problems: Morbid obesity ([facility] )    Primary hypertension    Severe obstructive sleep apnea    History of pulmonary embolism    CHF (congestive heart failure) (HCC)    Chronic pain disorder    Pulmonary hypertension ([facility] )    COVID-19    SIRS (systemic inflammatory response syndrome) ([facility] )    Past Medical History  Past Medical History:   Diagnosis Date    CHF (congestive heart failure) (HCC)     DVT (deep venous thrombosis) (HCC)     HTN (hypertension)     Morbid obesity (HCC)     EVERETT (obstructive sleep apnea)     Pulmonary embolism (HCC)      Past Surgical History  History reviewed  No pertinent surgical history  03/31/23 0926   OT Last Visit   OT Visit Date 03/31/23  (Friday)   Note Type   Note type Evaluation   Pain Assessment   Pain Assessment Tool 0-10   Pain Score 8   Pain Location/Orientation Location: Back   Pain Radiating Towards   (from neck to buttocks)   Effect of Pain on Daily Activities limits activity tolerance and I w/ ADLs   Patient's Stated Pain Goal No pain   Hospital Pain Intervention(s) Repositioned; Ambulation/increased activity; Emotional support   Restrictions/Precautions   Weight Bearing Precautions Per Order No   Other Precautions Contact/isolation; Airborne/isolation;Droplet precautions; Bed Alarm;Multiple lines;O2;Fall Risk;Pain  (Continuous pulse ox, 8L [address])   Home Living   Type of Home SNF; Other (Comment)  (Rehab at [address] [place] [address])   Home Equipment Walker  (chronic O2 at baseline)   Additional Comments Pt admit from rehab   At baseline lives at home w/ wife in [address] [facility] w/ 1 TRISTIN vs ramped entrance and first floor set- up w/ tub / shower, raised toilet   Prior Function   Level of [place] Needs assistance with IADLS;Needs assistance with ADLs   Lives With Facility "staff;Spouse  (at baseline lives at home w/ wife)   Bhakta Sake Help From Personal care attendant; Family   IADLs Family/Friend/Other provides transportation; Family/Friend/Other provides meals; Family/Friend/Other provides medication management   Falls in the last 6 months 0   Vocational Retired   Comments Pt reports use of RW at baseline on O2  Needs assistance w/ ADL / IADL but I w/ functional mobility using RW   Lifestyle   Autonomy Pt reports supportive wife assist as needed w/ ADL/ IADL at baseline  Admit from rehab and required A X2 to take few steps, transfer to / from commode   Reciprocal Relationships Pt reports living w/ wife at baseline   Service to Others 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 [address] Pt reports enjoying watching tv (nature shows)   General   Family/Caregiver Present No   Subjective   Subjective \"I had a bad night\"   ADL   Where Assessed Edge of bed   Eating Assistance 6  Modified independent   Eating Deficit Setup; Increased time to complete  (able to feed self after set- up (cut food, opened container))   Grooming Assistance 5  Supervision/Setup   Grooming Deficit Setup;Supervision/safety; Increased time to complete  (seated at EOB w/ + time after set- up)   UB Bathing Assistance Unable to assess   LB Bathing Assistance 1  Total Assistance   UB Dressing Assistance 4  Minimal Assistance   UB Dressing Deficit Setup;Verbal cueing;Supervision/safety; Increased time to complete;Pull around back; Fasteners   LB Dressing Assistance 1  Total Assistance   LB Dressing Deficit Don/doff R sock; Don/doff L sock; Setup; Increased time to complete   Toileting Assistance  Unable to assess  (anticipate max A based on fxal obs skills, clinical judgement  Condom catheter  in place)   Additional Comments on 8L [address] [facility]   Bed Mobility   Supine to Sit 2  Maximal assistance  (stand by A of 2nd for line mgmt, safety)   Additional items Assist x 1; Increased time required;Verbal cues;LE management;HOB " elevated; Bedrails  (to pt's R)   Sit to Supine 2  Maximal assistance   Additional items Assist x 1; Increased time required;Verbal cues;LE management; Bedrails   Additional Comments Pt supine HOB elevated post eval w/ needs met, call bell in reach   Transfers   Sit to Stand   (max A x2 to clear buttocks off EOB  Unable to maintain, stand upright)   Additional items Assist x 2; Increased time required;Verbal cues; Bedrails;Armrests   Stand to Sit   (max A x2 to return to EOB)   Additional items Assist x 2; Increased time required;Verbal cues; Bedrails;Armrests   Stand pivot Unable to assess  (recommend Brenda's Egress test)   Additional Comments Pt initiated sit to stand from EOB 2X w/ max A x2  Able to clear buttocks but unable to maintain, stand upright  Functional Mobility   Additional Comments NT  Will continue to assess   Balance   Static Sitting Fair +   Static Standing Zero   Ambulatory   (NT)   Activity Tolerance   Activity Tolerance Patient limited by fatigue;Patient limited by pain   Medical Staff Made Aware care coordination w/ PT, RJ and spoke to Radhika MCWILLIAMS   Nurse Made Aware spoke w/ RN [facility] pre / post eval  Spoke w/ SLIM   RUE Assessment   RUE Assessment WFL   RUE Strength   RUE Overall Strength Within Functional Limits - able to perform ADL tasks with strength   LUE Assessment   LUE Assessment WFL   LUE Strength   LUE Overall Strength Within Functional Limits - able to perform ADL tasks with strength   Hand Function   Gross Motor Coordination Functional   Fine Motor Coordination Functional   Sensation   Light Touch Not tested   Cognition   Overall Cognitive Status Impaired   Arousal/Participation Alert; Cooperative   Attention Attends with cues to redirect   Orientation Level Oriented to person;Oriented to place;Oriented to situation   Memory Decreased recall of recent events  (details, timeline)   Following Commands Follows multistep commands with increased time or repetition   Comments Identified pt by full name and birthdate  Alert and able to communicate wants / needs, follow directions  Limited recall details, timeline events  Recommend ongoing eval of functional cognitive skills to assist in DC planning   Assessment   Limitation Decreased ADL status; Decreased UE strength;Decreased endurance;Decreased self-care trans;Decreased high-level ADLs   Assessment Pt is a 69yo male [facility] on 3/28[facility]23 w/ increased hypoxia and work of breathing from rehab at [address]  Diagnosed w/ acute on chronic respiratory failure w/ hypoxia and hypercapnia and COVID positive  Admitted to ICU and weaned off BiPAP; transferred to med surg on 3/29/23  Pt reports use of RW, chronic O2 at baseline w/ first floor set- up at home w/ wife  Upon eval, pt alert and able to participate in conversation w/ limited recall details, timeline events  Pt required max A to complete bed mobility supine <> sit, max Ax2 sit to partial stand (to clear buttocks off EOB), total A LBD, min A UBD, and S grooming on 8L [address]  Pt presents w/ decreased activity tolerance, decreased endurance, decreased sitting tolerance, generalized weakness /deconditioning, increased pain, decreased standing tolerance impacting his I w/ feeding, oral hygiene, bathing, functional mobility, functional transfers, and clothing mgmt  Pt would benefit from OT while in acute care and recommend post acute rehab when medically stable for DC  Goals   Patient Goals Pt stated that he wants to walk   Plan   Treatment Interventions ADL retraining;Functional transfer training; Endurance training;UE strengthening/ROM; Cognitive reorientation;Patient/family training;Equipment evaluation/education; Compensatory technique education;Continued evaluation; Activityengagement; Energy conservation   Goal Expiration Date 04/12/23   OT Frequency 3-5x/wk   Recommendation   OT Discharge Recommendation Post acute rehabilitation services   AM-PAC Daily Activity Inpatient   Lower Body Dressing 1   Bathing 2   Toileting 1   Upper Body Dressing 2   Grooming 4   Eating 3   Daily Activity Raw Score 13   Daily Activity Standardized Score (Calc for Raw Score >=11) 32 03   AM-PAC Applied Cognition Inpatient   Following a Speech/Presentation 3   Understanding Ordinary Conversation 4   Taking Medications 2   Remembering Where Things Are Placed or Put Away 3   Remembering List of 4-5 Errands 3   Taking Care of Complicated Tasks 2   Applied Cognition Raw Score 17   Applied Cognition Standardized Score 36 52   Barthel Index   Feeding 5   Bathing 0   Grooming Score 0   Dressing Score 5   Bladder Score 0   Bowels Score 5   Toilet Use Score 0   Transfers (Bed/Chair) Score 0   Mobility (Level Surface) Score 0   Stairs Score 0   Barthel Index Score 15   Modified Nobles Scale   Modified Barbara Scale 5   Additional Treatment Session   Start Time 0366   End Time 0926   Treatment Assessment Pt seen for skilled OT tx session day 1 from 1052-9661 following eval focusing on toileting  Pt agreeable and motivated to participate reporting that he needs to move bowels upon return to bed  Pt required min A to roll R<>L and total A for bed pan placement and personal hygiene  Pt reported just gas from moving around  Pt required total A x2 to reposition towards [facility]  Care coordination w/ PT, RJ due to decreased activity tolerance, regression from baseline and physical assistance required  Continue to recommend post acute rehab  Will continue to follow   Additional Treatment Day 1  (Friday)   End of Consult   Patient Position at End of Consult All needs within reach        The patient's raw score on the AM-PAC Daily Activity Inpatient Short Form is 13  A raw score of less than 19 suggests the patient may benefit from discharge to post-acute rehabilitation services  Please refer to the recommendation of the Occupational Therapist for safe discharge planning      Pt goals to be met by 4/12/23 to max I w/ ADLs and improve engagement to return home w/ wife and walk includes:    -Pt will complete bed mobility supine <> sit w/ min A to max I w/ ADLs and improve engagement    -Pt will demonstrate improved activity and sitting tolerance unsupported at EOB w/ at least fair + balance for at least 15 minutes to participate in feeding, grooming w/ mod I    -Pt will complete UBD w/ S after set- up    -Pt will demonstrate good attention and participation in ongoing eval of functional transfers, functional mobility using LRAD, DME as needed to assist in [address] [place] [address]    -Pt will demonstrate good attention and understanding EC tech to max I w/ ADLs and improve engagement    -Pt will demonstrate improved activity tolerance to participate in UB ADLs for at least 15 minutes w/ no more than 1 rest break or sign / symptoms of fatigue in supported sitting to improve engagement    [facility], OTR/L

## 2023-03-31 NOTE — PLAN OF CARE
Problem: MOBILITY - ADULT  Goal: Maintain or return to baseline ADL function  Description: INTERVENTIONS:  -  Assess patient's ability to carry out ADLs; assess patient's baseline for ADL function and identify physical deficits which impact ability to perform ADLs (bathing, care of mouth/teeth, toileting, grooming, dressing, etc )  - Assess/evaluate cause of self-care deficits   - Assess range of motion  - Assess patient's mobility; develop plan if impaired  - Assess patient's need for assistive devices and provide as appropriate  - Encourage maximum independence but intervene and supervise when necessary  - Involve family in performance of ADLs  - Assess for home care needs following discharge   - Consider OT consult to assist with ADL evaluation and planning for discharge  - Provide patient education as appropriate  Outcome: Progressing  Goal: Maintains/Returns to pre admission functional level  Description: INTERVENTIONS:  - Perform BMAT or MOVE assessment daily    - Set and communicate daily mobility goal to care team and patient/family/caregiver  - Collaborate with rehabilitation services on mobility goals if consulted  - Perform Range of Motion 2 times a day  - Reposition patient every 2 hours  - Dangle patient 2 times a day  - Stand patient 2 times a day  - Ambulate patient 2 times a day  - Out of bed to chair 2 times a day   - Out of bed for meals 2 times a day  - Out of bed for toileting  - Record patient progress and toleration of activity level   Outcome: Progressing     Problem: Nutrition/Hydration-ADULT  Goal: Nutrient/Hydration intake appropriate for improving, restoring or maintaining nutritional needs  Description: Monitor and assess patient's nutrition/hydration status for malnutrition  Collaborate with interdisciplinary team and initiate plan and interventions as ordered  Monitor patient's weight and dietary intake as ordered or per policy   Utilize nutrition screening tool and intervene as necessary  Determine patient's food preferences and provide high-protein, high-caloric foods as appropriate       INTERVENTIONS:  - Monitor oral intake, urinary output, labs, and treatment plans  - Assess nutrition and hydration status and recommend course of action  - Evaluate amount of meals eaten  - Assist patient with eating if necessary   - Allow adequate time for meals  - Recommend/ encourage appropriate diets, oral nutritional supplements, and vitamin/mineral supplements  - Order, calculate, and assess calorie counts as needed  - Recommend, monitor, and adjust tube feedings and TPN/PPN based on assessed needs  - Assess need for intravenous fluids  - Provide specific nutrition/hydration education as appropriate  - Include patient/family/caregiver in decisions related to nutrition  Outcome: Progressing     Problem: Prexisting or High Potential for Compromised Skin Integrity  Goal: Skin integrity is maintained or improved  Description: INTERVENTIONS:  - Identify patients at risk for skin breakdown  - Assess and monitor skin integrity  - Assess and monitor nutrition and hydration status  - Monitor labs   - Assess for incontinence   - Turn and reposition patient  - Assist with mobility/ambulation  - Relieve pressure over bony prominences  - Avoid friction and shearing  - Provide appropriate hygiene as needed including keeping skin clean and dry  - Evaluate need for skin moisturizer/barrier cream  - Collaborate with interdisciplinary team   - Patient/family teaching  - Consider wound care consult   Outcome: Progressing

## 2023-03-31 NOTE — ASSESSMENT & PLAN NOTE
Hx PE noted on CT PE study 1/12/2018 - Pulmonary embolism is demonstrated as discussed above involving segmental and subsegmental branches of the left and right main pulmonaries    PTA: Warfarin 9mg daily (mon-Fri) and 10mg (Sat-Sun)    Recent Labs     03/30/23  1803 04/01/23  0434   INR 3 87* 3 91*         Plan:   - Holding home warfarin due to supratherapeutic   - Monitor respiratory status

## 2023-03-31 NOTE — CASE MANAGEMENT
Case Management Discharge Planning Note    Patient name Ning Lucio  Location S MS 80/S -36 MRN [id number]  : 1955 Date 3/31/2023       Current Admission Date: 3/28/2023  Current Admission Diagnosis:Acute on chronic respiratory failure with hypoxia and hypercapnia [facility])   Patient Active Problem List    Diagnosis Date Noted   • COVID-19 2023   • SIRS (systemic inflammatory response syndrome) ([facility] ) 2023   • Cerumen debris on tympanic membrane of both ears 2023   • Generalized weakness 2023   • Impaired functional mobility, balance, gait, and endurance 2023   • BPH (benign prostatic hyperplasia) 2023   • Chronic anticoagulation 2023   • Pressure injury, unstageable, with suspected deep tissue injury ([address] ) 2023   • Dyspnea on exertion 2023   • Morbid obesity ([address] ) 2023   • Primary hypertension 2023   • History of pulmonary embolism 2023   • CHF (congestive heart failure) ([address] ) 2023   • DVT (deep venous thrombosis) ([address] ) 2023   • Acute on chronic diastolic (congestive) heart failure ([address] ) 2021   • DJD (degenerative joint disease), multiple sites 2021   • Ambulatory dysfunction 2021   • Chronic right-sided congestive heart failure ([address] ) 2021   • BMI 50 0-59 9, adult ([address] ) 2020   • Debility 2020   • Acute on chronic respiratory failure with hypoxia and hypercapnia ([facility] ) 2020   • Restrictive lung disease 2020   • Medical marijuana use 2018   • Severe obstructive sleep apnea 2018   • Obesity hypoventilation syndrome ([address] ) 2018   • Pulmonary hypertension ([facility] ) 01/15/2018   • Pain medication agreement signed 2017   • Neuralgia, post-herpetic 2017   • Sciatica 2012   • Fercho de la Tourette's syndrome 2012   • Chronic pain disorder 2012      LOS (days): 2  Geometric Mean LOS (GMLOS) (days): 5 20  Days to GMLOS:2 8 OBJECTIVE:  Risk of Unplanned Readmission Score: 15 12         Current admission status: Inpatient   Preferred Pharmacy:   [address], [place] - [address]  [address] [place] [address]  Phone: [phone] Fax: [phone]    Primary Care Provider: Neftaly Moon MD    Primary Insurance: [facility] REP  Secondary Insurance:     DISCHARGE DETAILS:    Discharge planning discussed with[de-identified] spouse-Shilpa  Freedom of Choice: Yes  Comments - Freedom of Choice: CM informed spouseEdgar that [facility] is only accepting facility within 20 mile radius at this time  [facility] currently refusing to accept bed offer  Cm discussed options to return home w/max support available, of which is limited, or CM can expand to readius to 35 miles  Spouse prefers to expand radius, however confirmed family does nto want to look any further than 35 miles  CM expanded in [facility] contacted family/caregiver?: Yes  Were Treatment Team discharge recommendations reviewed with patient/caregiver?: Yes  Did patient/caregiver verbalize understanding of patient care needs?: N/A- going to facility  Were patient/caregiver advised of the risks associated with not following Treatment Team discharge recommendations?: Yes    Contacts  Patient Contacts: Shilpa  Relationship to Patient[de-identified] Family  Contact Method: Phone  Phone Number: [phone]  Reason/Outcome: Continuity of Care, Referral, Discharge Planning              Other Referral/Resources/Interventions Provided:  Interventions: Short Term Rehab  Referral Comments: CM expanded radius in [address] to 35 miles per request of family  SpouseEdgar expressed several health concerns regarding [facility] and wants someone to address them  CM inquired if pt has reported her concerns to [facility] Board of Health    CM provided phone number per request  CM assured spouse CM would notify CM [facility] as well  Spouse confirmed she just did not want to feel as if she is the only person advocating for her   CM did explain that pt's facilities are going to be limited due to need for Bariatric equipment, Covid+, and nanette lift to transfer    CM notified Marquis nicholson-Fabiana    Treatment Team Recommendation: Short Term Rehab  Discharge Destination Plan[de-identified] Short Term Rehab  Transport at Discharge : BLS Ambulance     IMM Given (Date):: 03/29/23

## 2023-03-31 NOTE — RESPIRATORY THERAPY NOTE
"RT Protocol Note  Constantin Wilson 69 y o  male MRN: [id number]  Unit/Bed#: S -01 Encounter: [id number]    Assessment    Principal Problem:    Acute on chronic respiratory failure with hypoxia and hypercapnia ([facility] )  Active Problems: Morbid obesity ([facility] )    Primary hypertension    Severe obstructive sleep apnea    History of pulmonary embolism    CHF (congestive heart failure) ([facility])    Chronic pain disorder    Pulmonary hypertension ([facility])    COVID-19    SIRS (systemic inflammatory response syndrome) ([facility])      Home Pulmonary Medications:  duoneb  Home Devices/Therapy: (P) BiPAP/CPAP (nebulizer)    Past Medical History:   Diagnosis Date    CHF (congestive heart failure) ([facility])     DVT (deep venous thrombosis) ([facility])     HTN (hypertension)     Morbid obesity ([facility])     EVERETT (obstructive sleep apnea)     Pulmonary embolism ([facility])      Social History     Socioeconomic History    Marital status: /Civil Union     Spouse name: None    Number of children: None    Years of education: None    Highest education level: None   Occupational History    None   Tobacco Use    Smoking status: Never    Smokeless tobacco: Never   Vaping Use    Vaping Use: Never used   Substance and Sexual Activity    Alcohol use: Not Currently    Drug use: Not Currently     Types: Prescription    Sexual activity: None   Other Topics Concern    None   Social History Narrative    None     Social Determinants of Health     Financial Resource Strain: Not on file   Food Insecurity: Not on file   Transportation Needs: Not on file   Physical Activity: Not on file   Stress: Not on file   Social Connections: Not on file   Intimate Partner Violence: Not on file   Housing Stability: Not on file       Subjective         Objective    Physical Exam:        Vitals:  Blood pressure 143/70, pulse 83, temperature 98 6 °F (37 °C), temperature source Oral, resp  rate 20, height 6' 2\" (1 88 m), weight (!) 199 kg (438 lb 4 4 oz), SpO2 95 %      Results from last 7 " days   Lab Units 03/30/23  1041   Nubia 30 ART  7 406   PCO2 ART mm Hg 77 0*   PO2 ART mm Hg 66 2*   HCO3 ART mmol/L 47 3*   BASE EXC ART mmol/L 18 3   O2 CONTENT ART mL/dL 17 7   O2 HGB, ARTERIAL % 92 0*   ABG SOURCE  Radial, Right   FEDE TEST  Yes       Imaging and other studies: I have personally reviewed pertinent reports              Plan    Respiratory Plan: (P) Mild Distress pathway, Home Bronchodilator Patient pathway, Vent/NIV/HFNC, Discontinue Protocol        Resp Comments: MF8

## 2023-03-31 NOTE — PLAN OF CARE
Problem: PHYSICAL THERAPY ADULT  Goal: Performs mobility at highest level of function for planned discharge setting  See evaluation for individualized goals  Description: Treatment/Interventions: Functional transfer training, LE strengthening/ROM, Therapeutic exercise, Endurance training, Patient/family training, Equipment eval/education, Gait training, Bed mobility          See flowsheet documentation for full assessment, interventions and recommendations  Note:    Problem List: Decreased strength, Decreased range of motion, Impaired balance, Decreased endurance, Decreased mobility, Decreased safety awareness, Obesity, Pain, Decreased skin integrity  Assessment: Pt had a recent admission to [facility] to the ICU on 2/4/23 to 3/6/23 for acute/chronic hypoxic/hypercapnic respiratory failure secondary to acute CHF  At the time of discharge he was too deconditioned and was discharged to a SNF  Pt presented to the ED with productive cough x 1 week, progressive shortness of breath no improvement with nebs  Dx: acute on chronic hypoxic and hypercarbic respiratory failure, SIRS, COVID-19, pulmonary HTN, chronic pain disorder, CHF, HTN, and morbid obesity  order placed for PT eval and tx, w/ activity order of up w/ assist and activity as tolerated  pt presents w/ comorbidities of CHF, DVT, PE, HTN, chronic back pain, and pulmonary HTN and personal factors of mobilizing w/ assistive device, inability to perform IADLs and inability to perform ADLs  pt presents w/ pain, weakness, decreased ROM, decreased endurance, impaired balance, decreased safety awareness, fall risk and impaired skin integrity   these impairments are evident in findings from physical examination (weakness, decreased ROM and impaired skin integrity), mobility assessment (need for assist x 1 to 2 w/ bed mobility and transfers, inability of pt to achieve fully standing positioning or mobilize to bedside chair, need for input for mobility and breathing technique), and Barthel Index: 20/100  pt needed input for task focus and mobility technique  pt is at risk for falls due to physical and safety awareness deficits  pt's clinical presentation is unstable/unpredictable (evident in need for assist x 1 to 2w/ all phases of mobility, pain impacting overall mobility status, need for midflow supplemental oxygen in order to maintain oxygen saturation and need for input for mobility technique/safety)  pt needs inpatient PT tx to improve mobility deficits and progress mobility training as appropriate  discharge recommendation is for inpatient rehab to reduce fall risk and maximize level of functional independence  pt would benefit from wound care consult to address skin integrity and wound prevention  PT Discharge Recommendation: Post acute rehabilitation services    See flowsheet documentation for full assessment

## 2023-03-31 NOTE — QUICK NOTE
Reviewed evening labwork       Plan:  -Discontinued abx, normal PCT, Bcx neg x2  -INR supratherapeutic, will communicate with dayteam/RN for recommendations to hold next dose and recheck INR following morning  -Ordered AM bmp, cbc, crp

## 2023-03-31 NOTE — ASSESSMENT & PLAN NOTE
Patient with chronic pain from post herpetic neuralgia and chronic back pain  PTA: Tramadol, Robaxin, Lyrica     Plan:  - Oxy and robaxin ordered for pain with improved level of alertness   - Continue PTA lyrica   - Tylenol 975mg q8h PRN   - Lidocaine patch vs voltaren gel for back pain exacerbations

## 2023-03-31 NOTE — WOUND OSTOMY CARE
Consult Note - Wound   Vivienne Bautista 69 y o  male MRN: [id number]  Unit/Bed#: S -01 Encounter: [id number]      History and Present Illness:  Patient is seen for skin assessment  Patient examined in bed  Patient is a minimal assist to turn in bed  Patient is incontinent of bladder and is continent of bowel  Patient in on a low air loss bariatric mattress  Assessment Findings:   sacro-buttocks intact with blanchable hyperpigmentation        Heels intact            Skin care plans:  1-Hydraguard to bilateral sacrum, buttock and heels BID and PRN  2-Elevate heels to offload pressure  3-Ehob cushion in chair when out of bed  4-Moisturize skin daily with skin nourishing cream   5-Turn/reposition q2h or when medically stable for pressure re-distribution on skin       Wound Care will sign off  Call or Tigertext with any questions

## 2023-03-31 NOTE — ASSESSMENT & PLAN NOTE
Blood Pressure: 147/74    Blood pressures have been elevated since arrival    Patient on lasix 40mg daily PTA       Plan:   - Consider amlodipine 5mg daily for blood pressure control if patient remains hypertensive   - Resume home medication regimen

## 2023-03-31 NOTE — PLAN OF CARE
Problem: OCCUPATIONAL THERAPY ADULT  Goal: Performs self-care activities at highest level of function for planned discharge setting  See evaluation for individualized goals  Description: Treatment Interventions: ADL retraining, Functional transfer training, Endurance training, UE strengthening/ROM, Cognitive reorientation, Patient/family training, Equipment evaluation/education, Compensatory technique education, Continued evaluation, Activityengagement, Energy conservation          See flowsheet documentation for full assessment, interventions and recommendations  Note: Limitation: Decreased ADL status, Decreased UE strength, Decreased endurance, Decreased self-care trans, Decreased high-level ADLs     Assessment: Pt is a 69yo male [facility] on 3/28[facility]23 w/ increased hypoxia and work of breathing from rehab at [facility]  Diagnosed w/ acute on chronic respiratory failure w/ hypoxia and hypercapnia and COVID positive  Admitted to ICU and weaned off BiPAP; transferred to med surg on 3/29/23  Pt reports use of RW, chronic O2 at baseline w/ first floor set- up at home w/ wife  Upon eval, pt alert and able to participate in conversation w/ limited recall details, timeline events  Pt required max A to complete bed mobility supine <> sit, max Ax2 sit to partial stand (to clear buttocks off EOB), total A LBD, min A UBD, and S grooming on 8L [address] [facility]  Pt presents w/ decreased activity tolerance, decreased endurance, decreased sitting tolerance, generalized weakness /deconditioning, increased pain, decreased standing tolerance impacting his I w/ feeding, oral hygiene, bathing, functional mobility, functional transfers, and clothing mgmt  Pt would benefit from OT while in acute care and recommend post acute rehab when medically stable for DC       OT Discharge Recommendation: Post acute rehabilitation services

## 2023-03-31 NOTE — QUICK NOTE
Attempted to call patient's spouse to provide update  Unable to reach       Maylene Holter, MD   PGY-1

## 2023-03-31 NOTE — PROGRESS NOTES
[facility]  Progress Note  Name: Lakia Zazueta  MRN: [id number]  Unit/Bed#: S -01 I Date of Admission: 3/28/2023   Date of Service: 3/31/2023 I Hospital Day: 2    Assessment/Plan   * Acute on chronic respiratory failure with hypoxia and hypercapnia (HCC)  Assessment & Plan  Pt presented to ED with C/o productive cough x 1 week, shortness of breath, no improvement with nebs  He also noted that his SpO2 was low in the 80's at rehab  Pt wears CPAP qhS doesn't know settings  Per medical record he is non-compliant with CPA as outpatient  Initial /102/64/50  Pt placed on BiPAP in ED 15 inspiratory / 5 expiratory pressure   Respiratory virus panel COVID + 3/29/2023  Legionella and Strep urine Ag negative   CT chest - mild dependent atelectasis     Acute on chronic respiratory failure likely 2/2 COVID with OHS and CPAP noncompliance contributing to chronic respiratory failure with hypoxia and hypercapnia  Patient was initially admitted to critical care, then transferred to [facility] level of care after he was no longer requiring BiPAP continuously       Patient intermittently requiring BiPAP qHS and 8L mid-flow NC while awake       Plan  • Continue BiPAP qHS, titrate FiO2 to maintain SpO2 > 88%  • Continue COVID pathway, see plan below  • Respiratory protocol  • Incentive spirometry   • Hold oxy and robaxin for any concerns of AMS    SIRS (systemic inflammatory response syndrome) (HCC)  Assessment & Plan  On admission, patient had tachycardia and tachypnea = SIRS +   CT chest showed mild bilateral dependent atelectasis   S/p Cefepime and Vancomycin in ED   UA negative, Procal negative, Lactic negative    Likely 2/2 COVID with no other known potential source of infection     Blood cultures no growth at 48 hours  Sputum cultures + mixed respiratory tana     Plan:   - Repeat CBC to follow WBC   - Monitor for other potential sources of infection, especially skin   - Continue COVID treatment with remdesivir, decadron     COVID-19  Assessment & Plan  Patient presented from SNF with cough and SOB x 1 week   COVID + on viral panel in ED   Procal negative  BNP negative   CRP and D-Dimer elevated       Plan:   - Continue COVID pathway:   ? Decadron 0 1mg/kg BID, Day 3/10   ? Remdesivir Day 3/5  - Continue respiratory support with BiPAP  - Recheck Procal and trend CRP  - Consider CT PE scan if concerns for PE     Pulmonary hypertension ([facility] [address] )  Assessment & Plan  Patient with history of pulmonary hypertension  On most recent echo, PAP unable to be calculated   PTA: lasix 40mg daily     Patient appears euvolemic on exam without any peripheral edema  No large pleural effusions seen on imaging  No JVD  Plan:   - Continue PTA lasix 40mg daily  - Daily weights   - Is/Os     Chronic pain disorder  Assessment & Plan  Patient with chronic pain from post herpetic neuralgia and chronic back pain  PTA: Tramadol, Robaxin, Lyrica     Plan:  - Oxy and robaxin ordered for pain with improved level of alertness   - Continue PTA lyrica   - Tylenol 975mg q8h PRN   - Lidocaine patch vs voltaren gel for back pain exacerbations     CHF (congestive heart failure) (HCC)  Assessment & Plan  Wt Readings from Last 3 Encounters:   03/31/23 (!) 199 kg (438 lb 4 4 oz)   03/28/23 (!) 197 kg (434 lb 6 4 oz)   03/24/23 (!) 198 kg (435 lb 12 8 oz)     Hx diastolic HF  Most recent echo (2/5/23) - LVEF 60%, indeterminate diastolic function  RV mildly dilated  PTA Lasix 40mg  No evidence of acute exacerbation     Plan:   - Continue home lasix  - Monitor Is/Os   - Daily weights    History of pulmonary embolism  Assessment & Plan  Hx PE noted on CT PE study 1/12/2018 - Pulmonary embolism is demonstrated as discussed above involving segmental and subsegmental branches of the left and right main pulmonaries    PTA: Warfarin 9mg daily (mon-Fri) and 10mg (Sat-Sun)  PT/INR 26 4/2 40    Plan:   - Resume home warfarin   - Monitor respiratory status     Severe obstructive sleep apnea  Assessment & Plan  Patient has history of EVERETT and is prescribed CPAP for home use  Patient endorses compliance but medical record notes hx of non-compliance  EVERETT likely 2/2 BMI 55 and OHS    Plan:   - BiPAP for increased respiratory support until stable   - CPAP qHS once improved     Primary hypertension  Assessment & Plan  Blood Pressure: 143/70    Blood pressures have been elevated since arrival    Patient on lasix 40mg daily PTA  Plan:   - Consider amlodipine 5mg daily for blood pressure control if patient remains hypertensive   - Resume home medication regimen     Morbid obesity ([address] )  Assessment & Plan    Lab Results   Component Value Date    HGBA1C 5 2 03/30/2023     BMI 55 72    Plan:   - Hgb A1c to assess for DM                VTE Pharmacologic Prophylaxis: VTE Score: 6 High Risk (Score >/= 5) - Pharmacological DVT Prophylaxis Ordered: warfarin (Coumadin)  Sequential Compression Devices Ordered  Patient Centered Rounds: I performed bedside rounds with nursing staff today  Discussions with Specialists or Other Care Team Provider: Attending Physician, Case Management, PT/OT, RT, Critical Care     Education and Discussions with Family / Patient: Will call and update family later today  Current Length of Stay: 2 day(s)  Current Patient Status: Inpatient   Discharge Plan: Anticipate discharge in >72 hrs to rehab facility  Code Status: Level 1 - Full Code    Subjective:   Patient seen and examined at bedside this morning  Patient reports feeling chest congestion that he can't break up with coughing  He endorses fatigue and severe pain in his back  Patient takes medication at home for his chronic back pain, and I explained to patient that we had been holding that medication with concern of his AMS  Patient denies any fevers, chills, nausea, vomiting, diarrhea, or constipation       Overnight, no acute events reported by night team  Nursing reports no concerns  Objective:     Vitals:   Temp (24hrs), Av 6 °F (37 °C), Min:98 4 °F (36 9 °C), Max:98 8 °F (37 1 °C)    Temp:  [98 4 °F (36 9 °C)-98 8 °F (37 1 °C)] 98 6 °F (37 °C)  HR:  [] 83  Resp:  [20] 20  BP: (143-175)/() 143/70  SpO2:  [93 %-95 %] 95 %  Body mass index is 56 27 kg/m²  Input and Output Summary (last 24 hours): Intake/Output Summary (Last 24 hours) at 3/31/2023 1456  Last data filed at 3/30/2023 2300  Gross per 24 hour   Intake 480 ml   Output 900 ml   Net -420 ml       Physical Exam:   Physical Exam  Vitals and nursing note reviewed  Constitutional:       General: He is not in acute distress  Appearance: He is obese  He is ill-appearing  He is not toxic-appearing or diaphoretic  Interventions: Nasal cannula in place  HENT:      Head: Normocephalic and atraumatic  Nose: Nose normal  No congestion or rhinorrhea  Mouth/Throat:      Mouth: Mucous membranes are moist    Eyes:      General: No scleral icterus  Right eye: No discharge  Left eye: No discharge  Conjunctiva/sclera: Conjunctivae normal    Cardiovascular:      Rate and Rhythm: Normal rate and regular rhythm  Pulses: Normal pulses  Heart sounds: Normal heart sounds  No murmur heard  No friction rub  No gallop  Pulmonary:      Effort: Pulmonary effort is normal  No respiratory distress  Breath sounds: Decreased air movement present  Decreased breath sounds present  No wheezing, rhonchi or rales  Chest:      Chest wall: No tenderness  Abdominal:      General: Abdomen is protuberant  Bowel sounds are normal  There is no distension  Palpations: Abdomen is soft  Tenderness: There is no abdominal tenderness  There is no guarding  Hernia: A hernia is present  Hernia is present in the umbilical area  Musculoskeletal:         General: No swelling, tenderness, deformity or signs of injury  Right lower leg: No edema  Left lower leg: No edema  Skin:     General: Skin is warm and dry  Capillary Refill: Capillary refill takes less than 2 seconds  Coloration: Skin is not jaundiced or pale  Findings: No bruising, erythema, lesion or rash  Neurological:      Mental Status: He is alert and oriented to person, place, and time  Psychiatric:         Behavior: Behavior normal  Behavior is cooperative  Additional Data:     Labs:  Results from last 7 days   Lab Units 03/31/23 0451 03/30/23 1803 03/29/23 0451   WBC Thousand/uL 5 15   < > 6 51   HEMOGLOBIN g/dL 12 2   < > 12 6   HEMATOCRIT % 40 0   < > 42 0   PLATELETS Thousands/uL 211   < > 194   BANDS PCT %  --   --  4   NEUTROS PCT % 77*  --   --    LYMPHS PCT % 15  --   --    LYMPHO PCT %  --   --  2*   MONOS PCT % 8  --   --    MONO PCT %  --   --  3*   EOS PCT % 0  --  0    < > = values in this interval not displayed       Results from last 7 days   Lab Units 03/31/23 0451 03/30/23 1859 03/30/23 1803   SODIUM mmol/L 140  --  140   POTASSIUM mmol/L 3 8  --  3 7   CHLORIDE mmol/L 92*  --  92*   CO2 mmol/L >45*  --  45*   BUN mg/dL 18  --  19   CREATININE mg/dL 0 46*  --  0 58*   ANION GAP mmol/L  --   --  3*   CALCIUM mg/dL 8 8  --  9 2   ALBUMIN g/dL  --  3 1*  --    TOTAL BILIRUBIN mg/dL  --  0 26  --    ALK PHOS U/L  --  75  --    ALT U/L  --  79*  --    AST U/L  --  56*  --    GLUCOSE RANDOM mg/dL 136  --  169*     Results from last 7 days   Lab Units 03/30/23 1803   INR  3 87*     Results from last 7 days   Lab Units 03/30/23  1008   POC GLUCOSE mg/dl 138     Results from last 7 days   Lab Units 03/30/23  1859   HEMOGLOBIN A1C % 5 2     Results from last 7 days   Lab Units 03/30/23  1803 03/29/23  0451 03/29/23  0000 03/28/23  2251   LACTIC ACID mmol/L  --   --   --  0 7   PROCALCITONIN ng/ml 0 12 0 12 0 13  --        Lines/Drains:  Invasive Devices     Peripheral Intravenous Line  Duration           Long-Dwell Peripheral IV (Midline) 03/29/23 Right Basilic 2 days    Peripheral IV 03/28/23 Proximal;Right;Ventral (anterior) Forearm 2 days                      Imaging: Reviewed radiology reports from this admission including: chest CT scan and abdominal/pelvic CT   CT chest wo contrast   ED Interpretation by Nayan Asher PA-C (03/29 0153)   Gregorio Langford MD  [phone] 3/29/2023     Narrative & Impression  CT CHEST WITHOUT IV CONTRAST     INDICATION:   hypercapnia; acute on chronic respiratory failure  Patient has confirmed COVID-19      COMPARISON:  CT of abdomen pelvis on March 28, 2023      TECHNIQUE: CT examination of the chest was performed without intravenous contrast  Multiplanar 2D reformatted images were created from the source data      Radiation dose length product (DLP) for this visit:  6632 mGy-cm   This examination, like all CT scans performed in the [facility], was performed utilizing techniques to minimize radiation dose exposure, including the use of iterative   reconstruction and automated exposure control       FINDINGS:     LUNGS:  Mild bilateral dependent atelectasis  Otherwise no focal consolidation  The central airways are patent      PLEURA:  Unremarkable      HEART/GREAT VESSELS: Heart is unremarkable for patient's age  No thoracic aortic aneurysm      MEDIASTINUM AND EMILEE:     Unremarkable      CHEST WALL AND LOWER NECK:  Unremarkable      VISUALIZED STRUCTURES IN THE UPPER ABDOMEN:  Unremarkable      OSSEOUS STRUCTURES:  Spinal degenerative changes are noted  No acute fracture or destructive osseous lesion      IMPRESSION:     Mild bilateral dependent atelectasis      Workstation performed: [id number]           Final Result by Gregorio Langford MD (03/29 0143)      Mild bilateral dependent atelectasis  Workstation performed: [id number]         CT abdomen pelvis wo contrast   Final Result by Gregorio Langford MD (03/29 0045)      Sludge and/or stones within the gallbladder    No pericholecystic inflammatory change  Workstation performed: [id number]             Recent Cultures (last 7 days):   Results from last 7 days   Lab Units 03/29/23  1515 03/29/23  0859 03/29/23  0000 03/28/23  2254   BLOOD CULTURE   --   --  No Growth at 48 hrs  No Growth at 48 hrs  SPUTUM CULTURE  3+ Growth of  --   --   --    GRAM STAIN RESULT  1+ Epithelial cells per low power field*  1+ Polys*  3+ Gram positive cocci in clusters*  2+ Gram positive rods*  1+ Gram negative rods*  --   --   --    LEGIONELLA URINARY ANTIGEN   --  Negative  --   --        Last 24 Hours Medication List:   Current Facility-Administered Medications   Medication Dose Route Frequency Provider Last Rate   • acetaminophen  975 mg Oral Q8H Bel Wilson MD     • chlorhexidine  15 mL Mouth/Throat Q12H [address] Anabell Braga     • dexamethasone  0 1 mg/kg Intravenous Q12H Omer G Ignacio 19 7 mg (03/31/23 0438)   • furosemide  40 mg Oral Daily Omer G Ignacio     • guaiFENesin  1,200 mg Oral Q12H [address] Bel Wilson MD     • ipratropium-albuterol  3 mL Nebulization 4x Daily PRN Bel Wilson MD     • labetalol  10 mg Intravenous Q4H PRN Bel Wilson MD     • methocarbamol  750 mg Oral Q6H PRN Bel Wilson MD     • oxyCODONE  10 mg Oral Q4H PRN Bel Wilson MD     • oxyCODONE  5 mg Oral Q4H PRN Bel Wilson MD     • pregabalin  150 mg Oral TID Anabell Braga     • remdesivir  100 mg Intravenous Q24H Omer G Ignacio 100 mg (03/31/23 0438)        Today, Patient Was Seen By: Bel Wilson MD    **Please Note: This note may have been constructed using a voice recognition system  ** Declines

## 2023-03-31 NOTE — ASSESSMENT & PLAN NOTE
Wt Readings from Last 3 Encounters:   04/01/23 (!) 186 kg (410 lb)   03/28/23 (!) 197 kg (434 lb 6 4 oz)   03/24/23 (!) 198 kg (435 lb 12 8 oz)     Hx diastolic HF  Most recent echo (2/5/23) - LVEF 60%, indeterminate diastolic function  RV mildly dilated    PTA Lasix 40mg  No evidence of acute exacerbation     Plan:   - Continue home lasix  - Monitor Is/Os   - Daily weights

## 2023-03-31 NOTE — ASSESSMENT & PLAN NOTE
On admission, patient had tachycardia and tachypnea = SIRS +   CT chest showed mild bilateral dependent atelectasis   S/p Cefepime and Vancomycin in ED   UA negative, Procal negative, Lactic negative    Likely 2/2 COVID with no other known potential source of infection     Blood cultures no growth at 48 hours  Sputum cultures + mixed respiratory tana     Plan:   - Repeat CBC to follow WBC   - Monitor for other potential sources of infection, especially skin   - Continue COVID treatment with remdesivir, decadron

## 2023-03-31 NOTE — PHYSICAL THERAPY NOTE
PHYSICAL THERAPY EVALUATION NOTE      Patient Name: Valentino Adolf RIRBO'K Date: 3/31/2023  AGE:   69 y o   Mrn:   [id number]  ADMIT DX:  Cough [R05 9]  Hypercapnia [R06 89]  Hypoxia [R09 02]  Acute on chronic respiratory failure ([facility] ) [J96 20]    Past Medical History:   Diagnosis Date    CHF (congestive heart failure) ([facility])     DVT (deep venous thrombosis) ([facility])     HTN (hypertension)     Morbid obesity (HCC)     EVERETT (obstructive sleep apnea)     Pulmonary embolism ([facility] )      Length Of Stay: 2  PHYSICAL THERAPY EVALUATION :    03/31/23 0925   PT Last Visit   PT Visit Date 03/31/23   Pain Assessment   Pain Assessment Tool 0-10   Pain Score 8   Pain Location/Orientation Location: Back   Hospital Pain Intervention(s) Repositioned; Ambulation/increased activity; Other (Comment)  (notified [place] [facility])   Restrictions/Precautions   Other Precautions Contact/isolation; Airborne/isolation; Chair Alarm; Bed Alarm;Multiple lines;O2;Fall Risk;Pain   Home Living   Type of Home SNF  (pt was at [address] [place] [address] SNF for inpatient rehab)   Additional Comments pt needed assist x2 for bed mobility to edge of bed and to bedside commode  pt ambulated short distances w/ walker and assist x1  lives w/ spouse in 1 story home w/ ramp to enter and laundry in basement  ambulated w/ walker  no falls in last 6 months  received assistance w/ ADLs  General   Additional Pertinent History 8L oxygen via midflow  resting pulse ox 93% and 74 BPM, active 91% and 85 BPM    Family/Caregiver Present No   Cognition   Arousal/Participation Alert   Orientation Level Oriented to person; Other (Comment)  (pt was identified w/ full name, birth date)   Following Commands Follows one step commands with increased time or repetition   Subjective   Subjective pt seen supine in bed  states having back pain   agreed to PT eval    RLE Assessment   RLE Assessment X  (hip flexion 2+/5 extension 3/5, knee flexion 3-/5 extension 3/5, ankle 3/5)   LLE Assessment   LLE Assessment X  (hip flexion 2+/5 extension 3/5, knee flexion 3-/5 extension 3/5, ankle 3/5)   Light Touch   RLE Light Touch Grossly intact   LLE Light Touch Grossly intact   Bed Mobility   Rolling R 4  Minimal assistance   Additional items Assist x 1;Bedrails; Increased time required;Verbal cues;LE management  (for bedrail use, breathing technique)   Rolling L 4  Minimal assistance   Additional items Assist x 1;Bedrails; Increased time required;LE management;Verbal cues  (for bedrail use, breathing technique)   Supine to Sit 2  Maximal assistance  (w/ 2nd staff for safety/line management)   Additional items Assist x 1;HOB elevated; Bedrails; Increased time required;Verbal cues;LE management  (for trunk/LE positioning, bedrail use)   Sit to Supine 2  Maximal assistance  (w/ 2nd staff for safety/line management)   Additional items Assist x 1;HOB elevated; Bedrails; Increased time required;Verbal cues  (for trunk/LE positioning)   Transfers   Sit to Stand 2  Maximal assistance   Additional items Assist x 2; Increased time required;Verbal cues  (for LE positioning, hand placement)   Stand to Sit 2  Maximal assistance   Additional items Assist x 2; Increased time required;Verbal cues  (for body positioning)   Stand pivot Unable to assess   Additional Comments pt cleared buttocks from bed twice for brief periods w/ hand hold assist x2  seated rest break was required  pt was unable to fully stand upright  transfers were limited by pain and weakness  pt was unable to mobilize to bedside chair and needs dependent means for out of bed mobilization     Ambulation/Elevation   Gait pattern Not appropriate   Balance   Static Sitting Fair +   Static Standing Zero  (assist x2)   Ambulatory Zero   Activity Tolerance   Activity Tolerance Patient limited by fatigue;Patient limited by pain   Nurse Made Aware spoke to Meka Lee OT, Stacia CC, Radhika MCWILLIAMS Assessment   Problem List Decreased strength;Decreased range of motion; Impaired balance;Decreased endurance;Decreased mobility; Decreased safety awareness; Obesity;Pain;Decreased skin integrity   Assessment Pt had a recent admission to [facility] to the ICU on 2/4/23 to 3/6/23 for acute/chronic hypoxic/hypercapnic respiratory failure secondary to acute CHF  At the time of discharge he was too deconditioned and was discharged to a SNF  Pt presented to the ED with productive cough x 1 week, progressive shortness of breath no improvement with nebs  Dx: acute on chronic hypoxic and hypercarbic respiratory failure, SIRS, COVID-19, pulmonary HTN, chronic pain disorder, CHF, HTN, and morbid obesity  order placed for PT eval and tx, w/ activity order of up w/ assist and activity as tolerated  pt presents w/ comorbidities of CHF, DVT, PE, HTN, chronic back pain, and pulmonary HTN and personal factors of mobilizing w/ assistive device, inability to perform IADLs and inability to perform ADLs  pt presents w/ pain, weakness, decreased ROM, decreased endurance, impaired balance, decreased safety awareness, fall risk and impaired skin integrity  these impairments are evident in findings from physical examination (weakness, decreased ROM and impaired skin integrity), mobility assessment (need for assist x 1 to 2 w/ bed mobility and transfers, inability of pt to achieve fully standing positioning or mobilize to bedside chair, need for input for mobility and breathing technique), and Barthel Index: 20/100  pt needed input for task focus and mobility technique  pt is at risk for falls due to physical and safety awareness deficits  pt's clinical presentation is unstable/unpredictable (evident in need for assist x 1 to 2w/ all phases of mobility, pain impacting overall mobility status, need for midflow supplemental oxygen in order to maintain oxygen saturation and need for input for mobility technique/safety)   pt needs inpatient PT tx to improve mobility deficits and progress mobility training as appropriate  discharge recommendation is for inpatient rehab to reduce fall risk and maximize level of functional independence  pt would benefit from wound care consult to address skin integrity and wound prevention  Goals   Patient Goals I want to walk  STG Expiration Date 04/14/23   Short Term Goal #1 pt will: Increase bilateral LE strength 1/2 grade to facilitate independent mobility, Perform rolling and repositioning in bed w/ supervision to decrease caregiver burden, Perform supine <--> sitting edge of bed transition w/ modx1 to improve level of function, Perform sit <---> stand and stand pivot transfers w/ maxx1 to improve independence, Ambulate 10 ft  with least restrictive assistive device w/ maxx1 w/o LOB to improve functional independence, Increase all balance 1 grade to decrease risk for falls, Complete exercise program independently to increase strength and endurance, Tolerate 3 hr OOB to faciliate upright tolerance, Tolerate standing 2 minutes w/ least restrictive assistive device w/ modx1 to facilitate functional task performance and Improve Barthel Index score to 40 or greater to facilitate independence   PT Treatment Day 0   Plan   Treatment/Interventions Functional transfer training;LE strengthening/ROM; Therapeutic exercise; Endurance training;Patient/family training;Equipment eval/education;Gait training;Bed mobility   PT Frequency 3-5x/wk   Recommendation   PT Discharge Recommendation Post acute rehabilitation services   Additional Comments (S)  pt would benefit from wound care consult to address skin integrity and wound prevention  Additional Comments 2 pt needs mechanical lift for out of bed mobilization     AM-PAC Basic Mobility Inpatient   Turning in Flat Bed Without Bedrails 3   Lying on Back to Sitting on Edge of Flat Bed Without Bedrails 2   Moving Bed to Chair 1   Standing Up From Chair Using Arms 1   Walk in Room 1   Climb 3-5 Stairs With Railing 1   Basic Mobility Inpatient Raw Score 9   Turning Head Towards Sound 4   Follow Simple Instructions 4   Low Function Basic Mobility Raw Score 17   Low Function Basic Mobility Standardized Score 27 46   Highest Level Of Mobility   -[facility] Goal 3: Sit at edge of bed   -[facility] Achieved 3: Sit at edge of bed   Barthel Index   Feeding 5   Bathing 0   Grooming Score 0   Dressing Score 0   Bladder Score 0   Bowels Score 10   Toilet Use Score 0   Transfers (Bed/Chair) Score 5   Mobility (Level Surface) Score 0   Stairs Score 0   Barthel Index Score 20   End of Consult   Patient Position at End of Consult Supine; All needs within reach;Bed/Chair alarm activated   End of Consult Comments spoke to Richie Rangel and Kennedy GALEANA regarding need to have pt's bed fixed or obtain new bed to facilitate pt's mobility and prevent loss of skin integrity  The patient's AM-PAC Basic Mobility Inpatient Short Form Raw Score is 9  A Raw score of less than or equal to 16 suggests the patient may benefit from discharge to post-acute rehabilitation services  Please also refer to the recommendation of the Physical Therapist for safe discharge planning  Skilled PT recommended while in hospital and upon DC to progress pt toward treatment goals       Gerry Mcelroy, PT

## 2023-03-31 NOTE — ASSESSMENT & PLAN NOTE
Patient presented from SNF with cough and SOB x 1 week   COVID + on viral panel in ED   Procal negative, S/p 2 days of Rocephin and Doxycycline   BNP negative   CRP and D-Dimer elevated   Recent Labs     03/30/23  1803 04/01/23  0434   PROCALCITONI 0 12 0 09     CRP: 9 5>6    Plan:   - Continue COVID pathway:   ? Decadron 0 1mg/kg BID, Day 4/10   ?  Remdesivir Day 4/5  - Mucinex 1200mg BID  -Tessalon Perles 200mg TID prn  -Continue respiratory support with BiPAP  - Recheck Procal and trend CRP  - Consider CT PE scan if concerns for PE

## 2023-03-31 NOTE — ASSESSMENT & PLAN NOTE
Pt presented to ED with C/o productive cough x 1 week, shortness of breath, no improvement with nebs  He also noted that his SpO2 was low in the 80's at rehab  Pt wears CPAP qhS doesn't know settings  Per medical record he is non-compliant with CPA as outpatient  Initial /102/64/50  Pt placed on BiPAP in ED 15 inspiratory / 5 expiratory pressure   Respiratory virus panel COVID + 3/29/2023  Legionella and Strep urine Ag negative   CT chest - mild dependent atelectasis     Acute on chronic respiratory failure likely 2/2 COVID with OHS and CPAP noncompliance contributing to chronic respiratory failure with hypoxia and hypercapnia  Patient was initially admitted to critical care, then transferred to [facility] level of care after he was no longer requiring BiPAP continuously  Patient intermittently requiring BiPAP qHS and 6L mid-flow NC while awake     Patient uses 4L at baseline, however noted at home 4L wasn't enough     Plan  • Continue BiPAP qHS, titrate FiO2 to maintain SpO2 > 88%  • Continue COVID pathway, see plan below  • Respiratory protocol  • Incentive spirometry   • Hold oxy and robaxin for any concerns of AMS

## 2023-04-01 LAB
ALBUMIN SERPL BCP-MCNC: 2.9 G/DL (ref 3.5–5)
ALP SERPL-CCNC: 66 U/L (ref 34–104)
ALT SERPL W P-5'-P-CCNC: 69 U/L (ref 7–52)
AST SERPL W P-5'-P-CCNC: 30 U/L (ref 13–39)
BASOPHILS # BLD AUTO: 0.02 THOUSANDS/ÂΜL (ref 0–0.1)
BASOPHILS NFR BLD AUTO: 0 % (ref 0–1)
BILIRUB SERPL-MCNC: 0.27 MG/DL (ref 0.2–1)
BUN SERPL-MCNC: 16 MG/DL (ref 5–25)
CALCIUM ALBUM COR SERPL-MCNC: 9.5 MG/DL (ref 8.3–10.1)
CALCIUM SERPL-MCNC: 8.6 MG/DL (ref 8.4–10.2)
CHLORIDE SERPL-SCNC: 92 MMOL/L (ref 96–108)
CO2 SERPL-SCNC: >45 MMOL/L (ref 21–32)
CREAT SERPL-MCNC: 0.49 MG/DL (ref 0.6–1.3)
CRP SERPL QL: 6 MG/L
EOSINOPHIL # BLD AUTO: 0.15 THOUSAND/ÂΜL (ref 0–0.61)
EOSINOPHIL NFR BLD AUTO: 3 % (ref 0–6)
ERYTHROCYTE [DISTWIDTH] IN BLOOD BY AUTOMATED COUNT: 12.2 % (ref 11.6–15.1)
GFR SERPL CREATININE-BSD FRML MDRD: 111 ML/MIN/1.73SQ M
GLUCOSE SERPL-MCNC: 128 MG/DL (ref 65–140)
HCT VFR BLD AUTO: 40.7 % (ref 36.5–49.3)
HGB BLD-MCNC: 12.7 G/DL (ref 12–17)
IMM GRANULOCYTES # BLD AUTO: 0.04 THOUSAND/UL (ref 0–0.2)
IMM GRANULOCYTES NFR BLD AUTO: 1 % (ref 0–2)
INR PPP: 3.91 (ref 0.84–1.19)
LYMPHOCYTES # BLD AUTO: 0.99 THOUSANDS/ÂΜL (ref 0.6–4.47)
LYMPHOCYTES NFR BLD AUTO: 17 % (ref 14–44)
MCH RBC QN AUTO: 32.7 PG (ref 26.8–34.3)
MCHC RBC AUTO-ENTMCNC: 31.2 G/DL (ref 31.4–37.4)
MCV RBC AUTO: 105 FL (ref 82–98)
MONOCYTES # BLD AUTO: 0.48 THOUSAND/ÂΜL (ref 0.17–1.22)
MONOCYTES NFR BLD AUTO: 8 % (ref 4–12)
NEUTROPHILS # BLD AUTO: 4.27 THOUSANDS/ÂΜL (ref 1.85–7.62)
NEUTS SEG NFR BLD AUTO: 71 % (ref 43–75)
NRBC BLD AUTO-RTO: 0 /100 WBCS
PLATELET # BLD AUTO: 233 THOUSANDS/UL (ref 149–390)
PMV BLD AUTO: 9.1 FL (ref 8.9–12.7)
POTASSIUM SERPL-SCNC: 3.8 MMOL/L (ref 3.5–5.3)
PROCALCITONIN SERPL-MCNC: 0.09 NG/ML
PROT SERPL-MCNC: 5.9 G/DL (ref 6.4–8.4)
PROTHROMBIN TIME: 38.5 SECONDS (ref 11.6–14.5)
RBC # BLD AUTO: 3.88 MILLION/UL (ref 3.88–5.62)
SODIUM SERPL-SCNC: 140 MMOL/L (ref 135–147)
WBC # BLD AUTO: 5.95 THOUSAND/UL (ref 4.31–10.16)

## 2023-04-01 RX ORDER — BENZONATATE 100 MG/1
200 CAPSULE ORAL 3 TIMES DAILY PRN
Status: DISCONTINUED | OUTPATIENT
Start: 2023-04-01 | End: 2023-04-04 | Stop reason: HOSPADM

## 2023-04-01 RX ADMIN — BENZONATATE 200 MG: 100 CAPSULE ORAL at 22:00

## 2023-04-01 RX ADMIN — CHLORHEXIDINE GLUCONATE 0.12% ORAL RINSE 15 ML: 1.2 LIQUID ORAL at 08:50

## 2023-04-01 RX ADMIN — PREGABALIN 150 MG: 75 CAPSULE ORAL at 04:51

## 2023-04-01 RX ADMIN — OXYCODONE HYDROCHLORIDE 5 MG: 5 TABLET ORAL at 22:00

## 2023-04-01 RX ADMIN — DEXAMETHASONE SODIUM PHOSPHATE 19.7 MG: 10 INJECTION INTRAMUSCULAR; INTRAVENOUS at 04:43

## 2023-04-01 RX ADMIN — GUAIFENESIN 1200 MG: 600 TABLET ORAL at 08:50

## 2023-04-01 RX ADMIN — ACETAMINOPHEN 975 MG: 325 TABLET, FILM COATED ORAL at 04:52

## 2023-04-01 RX ADMIN — OXYCODONE HYDROCHLORIDE 5 MG: 5 TABLET ORAL at 04:50

## 2023-04-01 RX ADMIN — GUAIFENESIN 1200 MG: 600 TABLET ORAL at 22:00

## 2023-04-01 RX ADMIN — FUROSEMIDE 40 MG: 40 TABLET ORAL at 08:50

## 2023-04-01 RX ADMIN — METHOCARBAMOL 750 MG: 750 TABLET ORAL at 22:00

## 2023-04-01 RX ADMIN — REMDESIVIR 100 MG: 100 INJECTION, POWDER, LYOPHILIZED, FOR SOLUTION INTRAVENOUS at 04:43

## 2023-04-01 RX ADMIN — METHOCARBAMOL 750 MG: 750 TABLET ORAL at 04:50

## 2023-04-01 RX ADMIN — PREGABALIN 150 MG: 75 CAPSULE ORAL at 22:18

## 2023-04-01 RX ADMIN — ACETAMINOPHEN 975 MG: 325 TABLET, FILM COATED ORAL at 22:00

## 2023-04-01 RX ADMIN — DEXAMETHASONE SODIUM PHOSPHATE 19.7 MG: 10 INJECTION INTRAMUSCULAR; INTRAVENOUS at 17:44

## 2023-04-01 RX ADMIN — CHLORHEXIDINE GLUCONATE 0.12% ORAL RINSE 15 ML: 1.2 LIQUID ORAL at 21:59

## 2023-04-01 RX ADMIN — PREGABALIN 150 MG: 75 CAPSULE ORAL at 13:37

## 2023-04-01 RX ADMIN — ACETAMINOPHEN 975 MG: 325 TABLET, FILM COATED ORAL at 13:37

## 2023-04-01 NOTE — PROGRESS NOTES
[facility]  Progress Note  Name: Herbert Espinosa  MRN: [id number]  Unit/Bed#: S -26 I Date of Admission: 3/28/2023   Date of Service: 2023 I Hospital Day: 3    Assessment/Plan   * Acute on chronic respiratory failure with hypoxia and hypercapnia (HCC)  Assessment & Plan  Pt presented to ED with C/o productive cough x 1 week, shortness of breath, no improvement with nebs  He also noted that his SpO2 was low in the 80's at rehab  Pt wears CPAP qhS doesn't know settings  Per medical record he is non-compliant with CPA as outpatient  Initial /102/64/50  Pt placed on BiPAP in ED 15 inspiratory / 5 expiratory pressure   Respiratory virus panel COVID + 3/29/2023  Legionella and Strep urine Ag negative   CT chest - mild dependent atelectasis     Acute on chronic respiratory failure likely 2/2 COVID with OHS and CPAP noncompliance contributing to chronic respiratory failure with hypoxia and hypercapnia  Patient was initially admitted to critical care, then transferred to [facility] level of care after he was no longer requiring BiPAP continuously  Patient intermittently requiring BiPAP qHS and 6L mid-flow NC while awake  Patient uses 4L at baseline, however noted at home 4L wasn't enough     Plan  • Continue BiPAP qHS, titrate FiO2 to maintain SpO2 > 88%  • Continue COVID pathway, see plan below  • Respiratory protocol  • Incentive spirometry   • Hold oxy and robaxin for any concerns of AMS    COVID-19  Assessment & Plan  Patient presented from SNF with cough and SOB x 1 week   COVID + on viral panel in ED   Procal negative, S/p 2 days of Rocephin and Doxycycline   BNP negative   CRP and D-Dimer elevated   Recent Labs     23  1803 23  0434   PROCALCITONI 0 12 0 09     CRP: 9 5>6    Plan:   - Continue COVID pathway:   ? Decadron 0 1mg/kg BID, Day 4/10   ?  Remdesivir Day   - Mucinex 1200mg BID  -Tessalon Perles 200mg TID prn  -Continue respiratory support with BiPAP  - Recheck Procal and trend CRP  - Consider CT PE scan if concerns for PE     Chronic pain disorder  Assessment & Plan  Patient with chronic pain from post herpetic neuralgia and chronic back pain  PTA: Tramadol, Robaxin, Lyrica     Plan:  - Oxy and robaxin ordered for pain with improved level of alertness   - Continue PTA lyrica   - Tylenol 975mg q8h PRN   - Lidocaine patch vs voltaren gel for back pain exacerbations     History of pulmonary embolism  Assessment & Plan  Hx PE noted on CT PE study 1/12/2018 - Pulmonary embolism is demonstrated as discussed above involving segmental and subsegmental branches of the left and right main pulmonaries  PTA: Warfarin 9mg daily (mon-Fri) and 10mg (Sat-Sun)    Recent Labs     03/30/23  1803 04/01/23  0434   INR 3 87* 3 91*         Plan:   - Holding home warfarin due to supratherapeutic   - Monitor respiratory status     SIRS (systemic inflammatory response syndrome) (HCC)  Assessment & Plan  On admission, patient had tachycardia and tachypnea = SIRS +   CT chest showed mild bilateral dependent atelectasis   S/p Cefepime and Vancomycin in ED   UA negative, Procal negative, Lactic negative    Likely 2/2 COVID with no other known potential source of infection     Blood cultures no growth at 48 hours  Sputum cultures + mixed respiratory tana     Plan:   - Repeat CBC to follow WBC   - Monitor for other potential sources of infection, especially skin   - Continue COVID treatment with remdesivir, decadron     Pulmonary hypertension ([facility] [address] )  Assessment & Plan  Patient with history of pulmonary hypertension  On most recent echo, PAP unable to be calculated   PTA: lasix 40mg daily     Patient appears euvolemic on exam without any peripheral edema  No large pleural effusions seen on imaging  No JVD       Plan:   - Continue PTA lasix 40mg daily  - Daily weights   - Is/Os     CHF (congestive heart failure) [facility])  Assessment & Plan  Wt Readings from Last 3 Encounters:   04/01/23 (!) 186 kg (410 lb)   03/28/23 (!) 197 kg (434 lb 6 4 oz)   03/24/23 (!) 198 kg (435 lb 12 8 oz)     Hx diastolic HF  Most recent echo (2/5/23) - LVEF 60%, indeterminate diastolic function  RV mildly dilated  PTA Lasix 40mg  No evidence of acute exacerbation     Plan:   - Continue home lasix  - Monitor Is/Os   - Daily weights    Severe obstructive sleep apnea  Assessment & Plan  Patient has history of EVERETT and is prescribed CPAP for home use  Patient endorses compliance but medical record notes hx of non-compliance  EVERETT likely 2/2 BMI 55 and OHS    Plan:   - BiPAP for increased respiratory support until stable   - CPAP qHS once improved     Primary hypertension  Assessment & Plan  Blood Pressure: 147/74    Blood pressures have been elevated since arrival    Patient on lasix 40mg daily PTA  Plan:   - Consider amlodipine 5mg daily for blood pressure control if patient remains hypertensive   - Resume home medication regimen     Morbid obesity ([address] )  Assessment & Plan    Lab Results   Component Value Date    HGBA1C 5 2 03/30/2023     BMI 55 72    Plan:   - Hgb A1c to assess for DM              VTE Pharmacologic Prophylaxis: VTE Score: 6 High Risk (Score >/= 5) - Pharmacological DVT Prophylaxis Ordered: warfarin (Coumadin)  Sequential Compression Devices Ordered  Patient Centered Rounds: I performed bedside rounds with nursing staff today  Discussions with Specialists or Other Care Team Provider: Attending Physician, RT    Education and Discussions with Family / Patient: Attempted to update  (wife) via phone  Left voicemail  Current Length of Stay: 3 day(s)  Current Patient Status: Inpatient   Discharge Plan: Anticipate discharge in >72 hrs to rehab facility  Code Status: Level 1 - Full Code    Subjective:   Patient seen and examined at bedside this morning  Patient reports congestion and nonproductive cough  Patient was interested in something additional for his cough    Denies any fever or chills  Denies any shortness of breath  Currently on 6 L oxygen satting at 93%  Denies any chest pain, abdominal pain, diarrhea or constipation  Reports poor sleep due to back pain  Overnight, no acute events reported by night team       Nursing reports no concerns  Objective:     Vitals:   Temp (24hrs), Av 4 °F (36 9 °C), Min:98 °F (36 7 °C), Max:98 6 °F (37 °C)    Temp:  [98 °F (36 7 °C)-98 6 °F (37 °C)] 98 6 °F (37 °C)  HR:  [69-85] 69  Resp:  [18-20] 18  BP: (139-147)/(63-74) 147/74  SpO2:  [91 %-96 %] 96 %  Body mass index is 52 64 kg/m²  Input and Output Summary (last 24 hours): Intake/Output Summary (Last 24 hours) at 2023 0835  Last data filed at 3/31/2023 1939  Gross per 24 hour   Intake --   Output 550 ml   Net -550 ml       Physical Exam:   Physical Exam  Vitals and nursing note reviewed  Constitutional:       General: He is not in acute distress  Appearance: He is obese  He is ill-appearing  He is not toxic-appearing or diaphoretic  Interventions: Nasal cannula in place  Comments: Body mass index is 52 64 kg/m²  HENT:      Head: Normocephalic and atraumatic  Nose: Nose normal  No congestion or rhinorrhea  Mouth/Throat:      Mouth: Mucous membranes are moist    Eyes:      General: No scleral icterus  Right eye: No discharge  Left eye: No discharge  Conjunctiva/sclera: Conjunctivae normal    Cardiovascular:      Rate and Rhythm: Normal rate and regular rhythm  Heart sounds: Normal heart sounds  No murmur heard  Pulmonary:      Effort: Pulmonary effort is normal  No respiratory distress  Breath sounds: Decreased air movement present  Decreased breath sounds, wheezing and rhonchi present  No rales  Chest:      Chest wall: No tenderness  Abdominal:      General: Abdomen is protuberant  Bowel sounds are normal  There is no distension  Palpations: Abdomen is soft  Tenderness:  There is no abdominal tenderness  There is no guarding  Hernia: A hernia is present  Hernia is present in the umbilical area  Musculoskeletal:         General: No swelling or tenderness  Right lower leg: No edema  Left lower leg: No edema  Skin:     General: Skin is warm and dry  Neurological:      Mental Status: He is alert and oriented to person, place, and time  Psychiatric:         Behavior: Behavior normal  Behavior is cooperative  Additional Data:     Labs:  Results from last 7 days   Lab Units 04/01/23  0434 03/30/23  1803 03/29/23  0451   WBC Thousand/uL 5 95   < > 6 51   HEMOGLOBIN g/dL 12 7   < > 12 6   HEMATOCRIT % 40 7   < > 42 0   PLATELETS Thousands/uL 233   < > 194   BANDS PCT %  --   --  4   NEUTROS PCT % 71   < >  --    LYMPHS PCT % 17   < >  --    LYMPHO PCT %  --   --  2*   MONOS PCT % 8   < >  --    MONO PCT %  --   --  3*   EOS PCT % 3   < > 0    < > = values in this interval not displayed  Results from last 7 days   Lab Units 04/01/23 0434 03/30/23 1859 03/30/23  1803   SODIUM mmol/L 140   < > 140   POTASSIUM mmol/L 3 8   < > 3 7   CHLORIDE mmol/L 92*   < > 92*   CO2 mmol/L >45*   < > 45*   BUN mg/dL 16   < > 19   CREATININE mg/dL 0 49*   < > 0 58*   ANION GAP mmol/L  --   --  3*   CALCIUM mg/dL 8 6   < > 9 2   ALBUMIN g/dL 2 9*   < >  --    TOTAL BILIRUBIN mg/dL 0 27   < >  --    ALK PHOS U/L 66   < >  --    ALT U/L 69*   < >  --    AST U/L 30   < >  --    GLUCOSE RANDOM mg/dL 128   < > 169*    < > = values in this interval not displayed       Results from last 7 days   Lab Units 04/01/23 0434   INR  3 91*     Results from last 7 days   Lab Units 03/30/23  1008   POC GLUCOSE mg/dl 138     Results from last 7 days   Lab Units 03/30/23  1859   HEMOGLOBIN A1C % 5 2     Results from last 7 days   Lab Units 04/01/23  0434 03/30/23  1803 03/29/23  0451 03/29/23  0000 03/28/23  2251   LACTIC ACID mmol/L  --   --   --   --  0 7   PROCALCITONIN ng/ml 0 09 0 12 0 12 0 13  -- Lines/Drains:  Invasive Devices     Peripheral Intravenous Line  Duration           Peripheral IV 03/28/23 Proximal;Right;Ventral (anterior) Forearm 3 days    Long-Dwell Peripheral IV (Midline) 86/49/09 Right Basilic 2 days                  Imaging: Reviewed radiology reports from this admission including: chest CT scan and abdominal/pelvic CT   CT chest wo contrast   ED Interpretation by Gay Merlos PA-C (03/29 0153)   Robert Lloyd MD  [phone] 3/29/2023     Narrative & Impression  CT CHEST WITHOUT IV CONTRAST     INDICATION:   hypercapnia; acute on chronic respiratory failure  Patient has confirmed COVID-19      COMPARISON:  CT of abdomen pelvis on March 28, 2023      TECHNIQUE: CT examination of the chest was performed without intravenous contrast  Multiplanar 2D reformatted images were created from the source data      Radiation dose length product (DLP) for this visit:  8461 mGy-cm   This examination, like all CT scans performed in the [facility], was performed utilizing techniques to minimize radiation dose exposure, including the use of iterative   reconstruction and automated exposure control       FINDINGS:     LUNGS:  Mild bilateral dependent atelectasis  Otherwise no focal consolidation  The central airways are patent      PLEURA:  Unremarkable      HEART/GREAT VESSELS: Heart is unremarkable for patient's age  No thoracic aortic aneurysm      MEDIASTINUM AND EMILEE:     Unremarkable      CHEST WALL AND LOWER NECK:  Unremarkable      VISUALIZED STRUCTURES IN THE UPPER ABDOMEN:  Unremarkable      OSSEOUS STRUCTURES:  Spinal degenerative changes are noted  No acute fracture or destructive osseous lesion      IMPRESSION:     Mild bilateral dependent atelectasis      Workstation performed: [id number]           Final Result by Robert Lloyd MD (03/29 0143)      Mild bilateral dependent atelectasis        Workstation performed: [id number]         CT abdomen pelvis wo contrast Final Result by Mitzi Rice MD (03/29 0045)      Sludge and/or stones within the gallbladder  No pericholecystic inflammatory change  Workstation performed: [id number]             Recent Cultures (last 7 days):   Results from last 7 days   Lab Units 03/29/23  1515 03/29/23  0859 03/29/23  0000 03/28/23  2254   BLOOD CULTURE   --   --  No Growth at 72 hrs  No Growth at 72 hrs  SPUTUM CULTURE  3+ Growth of  --   --   --    GRAM STAIN RESULT  1+ Epithelial cells per low power field*  1+ Polys*  3+ Gram positive cocci in clusters*  2+ Gram positive rods*  1+ Gram negative rods*  --   --   --    LEGIONELLA URINARY ANTIGEN   --  Negative  --   --        Last 24 Hours Medication List:   Current Facility-Administered Medications   Medication Dose Route Frequency Provider Last Rate   • acetaminophen  975 mg Oral Q8H Lori Martines MD     • benzonatate  200 mg Oral TID PRN Beka High DO     • chlorhexidine  15 mL Mouth/Throat Q12H [address] Omermelanie Mirza Linen     • dexamethasone  0 1 mg/kg Intravenous Q12H Omer G Ignacio 19 7 mg (04/01/23 0443)   • furosemide  40 mg Oral Daily Omer G Ignacio     • guaiFENesin  1,200 mg Oral Q12H [address] Lori Martines MD     • ipratropium-albuterol  3 mL Nebulization 4x Daily PRN Lori Martines MD     • labetalol  10 mg Intravenous Q4H PRN Lori Martines MD     • methocarbamol  750 mg Oral Q6H PRN Lori Martines MD     • oxyCODONE  10 mg Oral Q4H PRN Lori Martines MD     • oxyCODONE  5 mg Oral Q4H PRN Lori Martines MD     • pregabalin  150 mg Oral TID [facility]     • remdesivir  100 mg Intravenous Q24H Omer G Ignacio 100 mg (04/01/23 0443)        Today, Patient Was Seen By: Beka High DO    **Please Note: This note may have been constructed using a voice recognition system  **

## 2023-04-01 NOTE — CASE MANAGEMENT
Case Management Progress Note    Patient name Hermes Goode S /S -41 MRN [id number]  : 1955 Date 2023       LOS (days): 3  Geometric Mean LOS (GMLOS) (days): 5 20  Days to GMLOS:1 7        OBJECTIVE:        Current admission status: Inpatient  Preferred Pharmacy:   [address], [place] [address] [place] [address]  Phone: [phone] Fax: [phone]    Primary Care Provider: Colten Eric MD    Primary Insurance: [facility]  Secondary Insurance:     PROGRESS NOTE:    CM called pt's spouse, LVM w/ number for a return call  At this time there is only [facility] available for the pt  CM messaged [facility] to enquire if they would be able to accept the pt

## 2023-04-02 ENCOUNTER — APPOINTMENT (INPATIENT)
Dept: RADIOLOGY | Facility: HOSPITAL | Age: 68
End: 2023-04-02

## 2023-04-02 LAB
ALBUMIN SERPL BCP-MCNC: 2.9 G/DL (ref 3.5–5)
ALP SERPL-CCNC: 67 U/L (ref 34–104)
ALT SERPL W P-5'-P-CCNC: 59 U/L (ref 7–52)
ANION GAP SERPL CALCULATED.3IONS-SCNC: 3 MMOL/L (ref 4–13)
AST SERPL W P-5'-P-CCNC: 25 U/L (ref 13–39)
BASOPHILS # BLD AUTO: 0.01 THOUSANDS/ÂΜL (ref 0–0.1)
BASOPHILS NFR BLD AUTO: 0 % (ref 0–1)
BILIRUB SERPL-MCNC: 0.34 MG/DL (ref 0.2–1)
BUN SERPL-MCNC: 15 MG/DL (ref 5–25)
CALCIUM ALBUM COR SERPL-MCNC: 9.6 MG/DL (ref 8.3–10.1)
CALCIUM SERPL-MCNC: 8.7 MG/DL (ref 8.4–10.2)
CHLORIDE SERPL-SCNC: 92 MMOL/L (ref 96–108)
CO2 SERPL-SCNC: 45 MMOL/L (ref 21–32)
CREAT SERPL-MCNC: 0.48 MG/DL (ref 0.6–1.3)
CRP SERPL QL: 4.1 MG/L
EOSINOPHIL # BLD AUTO: 0.01 THOUSAND/ÂΜL (ref 0–0.61)
EOSINOPHIL NFR BLD AUTO: 0 % (ref 0–6)
ERYTHROCYTE [DISTWIDTH] IN BLOOD BY AUTOMATED COUNT: 12.4 % (ref 11.6–15.1)
GFR SERPL CREATININE-BSD FRML MDRD: 112 ML/MIN/1.73SQ M
GLUCOSE SERPL-MCNC: 131 MG/DL (ref 65–140)
HCT VFR BLD AUTO: 43.6 % (ref 36.5–49.3)
HGB BLD-MCNC: 13.3 G/DL (ref 12–17)
IMM GRANULOCYTES # BLD AUTO: 0.06 THOUSAND/UL (ref 0–0.2)
IMM GRANULOCYTES NFR BLD AUTO: 1 % (ref 0–2)
INR PPP: 3.3 (ref 0.84–1.19)
LYMPHOCYTES # BLD AUTO: 2.26 THOUSANDS/ÂΜL (ref 0.6–4.47)
LYMPHOCYTES NFR BLD AUTO: 27 % (ref 14–44)
MCH RBC QN AUTO: 32.2 PG (ref 26.8–34.3)
MCHC RBC AUTO-ENTMCNC: 30.5 G/DL (ref 31.4–37.4)
MCV RBC AUTO: 106 FL (ref 82–98)
MONOCYTES # BLD AUTO: 0.69 THOUSAND/ÂΜL (ref 0.17–1.22)
MONOCYTES NFR BLD AUTO: 8 % (ref 4–12)
NEUTROPHILS # BLD AUTO: 5.43 THOUSANDS/ÂΜL (ref 1.85–7.62)
NEUTS SEG NFR BLD AUTO: 64 % (ref 43–75)
NRBC BLD AUTO-RTO: 0 /100 WBCS
PLATELET # BLD AUTO: 240 THOUSANDS/UL (ref 149–390)
PMV BLD AUTO: 9.1 FL (ref 8.9–12.7)
POTASSIUM SERPL-SCNC: 3.6 MMOL/L (ref 3.5–5.3)
PROT SERPL-MCNC: 6 G/DL (ref 6.4–8.4)
PROTHROMBIN TIME: 33.8 SECONDS (ref 11.6–14.5)
RBC # BLD AUTO: 4.13 MILLION/UL (ref 3.88–5.62)
SODIUM SERPL-SCNC: 140 MMOL/L (ref 135–147)
WBC # BLD AUTO: 8.46 THOUSAND/UL (ref 4.31–10.16)

## 2023-04-02 RX ORDER — IPRATROPIUM BROMIDE AND ALBUTEROL SULFATE 2.5; .5 MG/3ML; MG/3ML
3 SOLUTION RESPIRATORY (INHALATION) ONCE
Status: COMPLETED | OUTPATIENT
Start: 2023-04-02 | End: 2023-04-02

## 2023-04-02 RX ADMIN — PREGABALIN 150 MG: 75 CAPSULE ORAL at 14:36

## 2023-04-02 RX ADMIN — DEXAMETHASONE SODIUM PHOSPHATE 19.7 MG: 10 INJECTION INTRAMUSCULAR; INTRAVENOUS at 04:10

## 2023-04-02 RX ADMIN — REMDESIVIR 100 MG: 100 INJECTION, POWDER, LYOPHILIZED, FOR SOLUTION INTRAVENOUS at 04:10

## 2023-04-02 RX ADMIN — PREGABALIN 150 MG: 75 CAPSULE ORAL at 05:21

## 2023-04-02 RX ADMIN — FUROSEMIDE 40 MG: 40 TABLET ORAL at 08:45

## 2023-04-02 RX ADMIN — CHLORHEXIDINE GLUCONATE 0.12% ORAL RINSE 15 ML: 1.2 LIQUID ORAL at 20:47

## 2023-04-02 RX ADMIN — DEXAMETHASONE SODIUM PHOSPHATE 19.7 MG: 10 INJECTION INTRAMUSCULAR; INTRAVENOUS at 17:05

## 2023-04-02 RX ADMIN — METHOCARBAMOL 750 MG: 750 TABLET ORAL at 04:10

## 2023-04-02 RX ADMIN — ACETAMINOPHEN 975 MG: 325 TABLET, FILM COATED ORAL at 14:36

## 2023-04-02 RX ADMIN — OXYCODONE HYDROCHLORIDE 10 MG: 10 TABLET ORAL at 21:06

## 2023-04-02 RX ADMIN — PREGABALIN 150 MG: 75 CAPSULE ORAL at 20:48

## 2023-04-02 RX ADMIN — GUAIFENESIN 1200 MG: 600 TABLET ORAL at 20:48

## 2023-04-02 RX ADMIN — METHOCARBAMOL 750 MG: 750 TABLET ORAL at 10:19

## 2023-04-02 RX ADMIN — METHOCARBAMOL 750 MG: 750 TABLET ORAL at 23:06

## 2023-04-02 RX ADMIN — GUAIFENESIN 1200 MG: 600 TABLET ORAL at 08:45

## 2023-04-02 RX ADMIN — METHOCARBAMOL 750 MG: 750 TABLET ORAL at 17:05

## 2023-04-02 RX ADMIN — OXYCODONE HYDROCHLORIDE 5 MG: 5 TABLET ORAL at 04:09

## 2023-04-02 RX ADMIN — OXYCODONE HYDROCHLORIDE 5 MG: 5 TABLET ORAL at 17:05

## 2023-04-02 RX ADMIN — CHLORHEXIDINE GLUCONATE 0.12% ORAL RINSE 15 ML: 1.2 LIQUID ORAL at 08:45

## 2023-04-02 RX ADMIN — IPRATROPIUM BROMIDE AND ALBUTEROL SULFATE 3 ML: .5; 3 SOLUTION RESPIRATORY (INHALATION) at 09:54

## 2023-04-02 RX ADMIN — ACETAMINOPHEN 975 MG: 325 TABLET, FILM COATED ORAL at 23:06

## 2023-04-02 RX ADMIN — ACETAMINOPHEN 975 MG: 325 TABLET, FILM COATED ORAL at 05:40

## 2023-04-02 RX ADMIN — OXYCODONE HYDROCHLORIDE 5 MG: 5 TABLET ORAL at 10:19

## 2023-04-02 NOTE — ASSESSMENT & PLAN NOTE
Patient with history of pulmonary hypertension  On most recent echo, PAP unable to be calculated   PTA: lasix 40mg daily   Patient appears euvolemic on exam without any peripheral edema  No large pleural effusions seen on imaging  No JVD     Plan: Continue PTA lasix 40mg daily

## 2023-04-02 NOTE — ASSESSMENT & PLAN NOTE
Patient not on any anti-HTN medications on arrival    Intermittently elevated blood pressures during admission  Patient on lasix 40mg daily PTA  Continue lasix 40mg at discharge and follow up with PCP

## 2023-04-02 NOTE — ASSESSMENT & PLAN NOTE
On admission, patient met SIRS criteria with tachycardia and tachypnea  CT chest showed mild bilateral dependent atelectasis  Patient received cefepime and vancomycin ED, but then UA was negative, procal was negative, and lactic was negative  Blood cultures had no growth after 5 days and sputum cultures were positive only for mixed respiratory tana  SIRS was most likely 2/2 COVID with no other source of infection  Patient improved with moderate-severe COVID treatment pathway with 5 doses remdesivir and 10 days of IV dexamethasone 0 1 mg/kg BID  Patient discharging with 3 more days of dexamethasone to complete course

## 2023-04-02 NOTE — ASSESSMENT & PLAN NOTE
BMI 55 72  BiPAP qHS during admission for OHS  Continue on discharge  15 inspiration/5 expiration)   HgbA1c assessed during admission  Patient without diabetes or pre-diabetes  Recommend follow up with PCP and consider bariatrics referral outpatient

## 2023-04-02 NOTE — ASSESSMENT & PLAN NOTE
Pt presented to ED with C/o productive cough x 1 week, shortness of breath, no improvement with nebs  He also noted that his SpO2 was low in the 80's at rehab  Initial VBG in ED demonstrated hypercapnic acidosis and patient was placed on BiPAP in ED  Respiratory virus panel was positive for COVID  Legionella and Strep urine Ag were negative and CT chest showed mild dependent atelectasis  Patient's acute on chronic respiratory failure was likely 2/2 COVID with OHS and CPAP noncompliance contributing to chronic respiratiory failure with hypoxia and hypercapnia  Patient was initially admitted to critical care, then transferred to [facility] level of care after he was no longer requiring BiPAP continuously  Throughout admission patient was titrated back down to home O2 requirement of 4-5L NC with BiPAP qHS  Plan: d/c with BiPAP qHS  Follow up with PCP and pulmonology outpatient

## 2023-04-02 NOTE — ASSESSMENT & PLAN NOTE
Patient with chronic pain from post herpetic neuralgia and chronic back pain  PTA: Tramadol, Robaxin, Lyrica  Patient treated with tylenol, oxycodone, lyrica and robaxin during admission  Plan: resume home pain regimen and follow up outpatient with pain management

## 2023-04-02 NOTE — ASSESSMENT & PLAN NOTE
Patient has history of EVERETT and is prescribed CPAP for home use  Patient endorses compliance but medical record notes hx of non-compliance  EVERETT likely 2/2 BMI 55 and OHS  Patient was maintained on qHS BiPAP during admission  Discharge on same   (15 inspiratory/5 expiratory)

## 2023-04-02 NOTE — ASSESSMENT & PLAN NOTE
Hx diastolic HF with most recent echo (2/5/23) - LVEF 60%, indeterminate diastolic function  RV mildly dilated  PTA Lasix 40mg  No evidence of acute exacerbation during admission  Plan: continue PTA lasix regimen

## 2023-04-02 NOTE — PLAN OF CARE
Problem: MOBILITY - ADULT  Goal: Maintain or return to baseline ADL function  Description: INTERVENTIONS:  -  Assess patient's ability to carry out ADLs; assess patient's baseline for ADL function and identify physical deficits which impact ability to perform ADLs (bathing, care of mouth/teeth, toileting, grooming, dressing, etc )  - Assess/evaluate cause of self-care deficits   - Assess range of motion  - Assess patient's mobility; develop plan if impaired  - Assess patient's need for assistive devices and provide as appropriate  - Encourage maximum independence but intervene and supervise when necessary  - Involve family in performance of ADLs  - Assess for home care needs following discharge   - Consider OT consult to assist with ADL evaluation and planning for discharge  - Provide patient education as appropriate  Outcome: Progressing  Goal: Maintains/Returns to pre admission functional level  Description: INTERVENTIONS:  - Perform BMAT or MOVE assessment daily    - Set and communicate daily mobility goal to care team and patient/family/caregiver  - Collaborate with rehabilitation services on mobility goals if consulted  - Perform Range of Motion 3 times a day  - Reposition patient every 2 hours  - Dangle patient 3 times a day  - Stand patient 3 times a day  - Ambulate patient 3 times a day  - Out of bed to chair 3 times a day   - Out of bed for meals 3 times a day  - Out of bed for toileting  - Record patient progress and toleration of activity level   Outcome: Progressing     Problem: Nutrition/Hydration-ADULT  Goal: Nutrient/Hydration intake appropriate for improving, restoring or maintaining nutritional needs  Description: Monitor and assess patient's nutrition/hydration status for malnutrition  Collaborate with interdisciplinary team and initiate plan and interventions as ordered  Monitor patient's weight and dietary intake as ordered or per policy   Utilize nutrition screening tool and intervene as necessary  Determine patient's food preferences and provide high-protein, high-caloric foods as appropriate       INTERVENTIONS:  - Monitor oral intake, urinary output, labs, and treatment plans  - Assess nutrition and hydration status and recommend course of action  - Evaluate amount of meals eaten  - Assist patient with eating if necessary   - Allow adequate time for meals  - Recommend/ encourage appropriate diets, oral nutritional supplements, and vitamin/mineral supplements  - Order, calculate, and assess calorie counts as needed  - Recommend, monitor, and adjust tube feedings and TPN/PPN based on assessed needs  - Assess need for intravenous fluids  - Provide specific nutrition/hydration education as appropriate  - Include patient/family/caregiver in decisions related to nutrition  Outcome: Progressing     Problem: Prexisting or High Potential for Compromised Skin Integrity  Goal: Skin integrity is maintained or improved  Description: INTERVENTIONS:  - Identify patients at risk for skin breakdown  - Assess and monitor skin integrity  - Assess and monitor nutrition and hydration status  - Monitor labs   - Assess for incontinence   - Turn and reposition patient  - Assist with mobility/ambulation  - Relieve pressure over bony prominences  - Avoid friction and shearing  - Provide appropriate hygiene as needed including keeping skin clean and dry  - Evaluate need for skin moisturizer/barrier cream  - Collaborate with interdisciplinary team   - Patient/family teaching  - Consider wound care consult   Outcome: Progressing - - -

## 2023-04-02 NOTE — ASSESSMENT & PLAN NOTE
Patient presented from SNF with cough and SOB x 1 week  In ED, viral panel positive for COVID  Procal was negative and BNP were negative  CRP and D-Dimer were elevated and trending down  Patient was treated on moderate-severe COVID pathway with 5 daqys of remdesivir and 10 days IV dexamethasone 0 1 mg/kg BID (3 days remaining at time of discharge)  Patient was also treated symptomatically with mucinex 1200 mg BID for mucous and tessalon perles 200mg BID for cough  Patient discharging with refills of same PRN

## 2023-04-03 LAB
ALBUMIN SERPL BCP-MCNC: 2.8 G/DL (ref 3.5–5)
ALP SERPL-CCNC: 66 U/L (ref 34–104)
ALT SERPL W P-5'-P-CCNC: 53 U/L (ref 7–52)
AST SERPL W P-5'-P-CCNC: 19 U/L (ref 13–39)
BACTERIA BLD CULT: NORMAL
BACTERIA BLD CULT: NORMAL
BASOPHILS # BLD AUTO: 0.01 THOUSANDS/ÂΜL (ref 0–0.1)
BASOPHILS NFR BLD AUTO: 0 % (ref 0–1)
BILIRUB SERPL-MCNC: 0.35 MG/DL (ref 0.2–1)
BUN SERPL-MCNC: 15 MG/DL (ref 5–25)
CALCIUM ALBUM COR SERPL-MCNC: 9.6 MG/DL (ref 8.3–10.1)
CALCIUM SERPL-MCNC: 8.6 MG/DL (ref 8.4–10.2)
CHLORIDE SERPL-SCNC: 91 MMOL/L (ref 96–108)
CO2 SERPL-SCNC: >45 MMOL/L (ref 21–32)
CREAT SERPL-MCNC: 0.49 MG/DL (ref 0.6–1.3)
EOSINOPHIL # BLD AUTO: 0 THOUSAND/ÂΜL (ref 0–0.61)
EOSINOPHIL NFR BLD AUTO: 0 % (ref 0–6)
ERYTHROCYTE [DISTWIDTH] IN BLOOD BY AUTOMATED COUNT: 12.3 % (ref 11.6–15.1)
GFR SERPL CREATININE-BSD FRML MDRD: 111 ML/MIN/1.73SQ M
GLUCOSE SERPL-MCNC: 143 MG/DL (ref 65–140)
HCT VFR BLD AUTO: 43 % (ref 36.5–49.3)
HGB BLD-MCNC: 13.4 G/DL (ref 12–17)
IMM GRANULOCYTES # BLD AUTO: 0.04 THOUSAND/UL (ref 0–0.2)
IMM GRANULOCYTES NFR BLD AUTO: 1 % (ref 0–2)
INR PPP: 2.04 (ref 0.84–1.19)
LYMPHOCYTES # BLD AUTO: 0.71 THOUSANDS/ÂΜL (ref 0.6–4.47)
LYMPHOCYTES NFR BLD AUTO: 10 % (ref 14–44)
MCH RBC QN AUTO: 32.8 PG (ref 26.8–34.3)
MCHC RBC AUTO-ENTMCNC: 31.2 G/DL (ref 31.4–37.4)
MCV RBC AUTO: 105 FL (ref 82–98)
MONOCYTES # BLD AUTO: 0.34 THOUSAND/ÂΜL (ref 0.17–1.22)
MONOCYTES NFR BLD AUTO: 5 % (ref 4–12)
NEUTROPHILS # BLD AUTO: 6.15 THOUSANDS/ÂΜL (ref 1.85–7.62)
NEUTS SEG NFR BLD AUTO: 84 % (ref 43–75)
NRBC BLD AUTO-RTO: 0 /100 WBCS
PLATELET # BLD AUTO: 266 THOUSANDS/UL (ref 149–390)
PMV BLD AUTO: 9.5 FL (ref 8.9–12.7)
POTASSIUM SERPL-SCNC: 4 MMOL/L (ref 3.5–5.3)
PROT SERPL-MCNC: 5.8 G/DL (ref 6.4–8.4)
PROTHROMBIN TIME: 23.3 SECONDS (ref 11.6–14.5)
RBC # BLD AUTO: 4.09 MILLION/UL (ref 3.88–5.62)
SODIUM SERPL-SCNC: 138 MMOL/L (ref 135–147)
WBC # BLD AUTO: 7.25 THOUSAND/UL (ref 4.31–10.16)

## 2023-04-03 RX ORDER — LORAZEPAM 2 MG/ML
1 INJECTION INTRAMUSCULAR ONCE
Status: COMPLETED | OUTPATIENT
Start: 2023-04-03 | End: 2023-04-03

## 2023-04-03 RX ORDER — IPRATROPIUM BROMIDE AND ALBUTEROL SULFATE 2.5; .5 MG/3ML; MG/3ML
3 SOLUTION RESPIRATORY (INHALATION) 4 TIMES DAILY
Status: DISCONTINUED | OUTPATIENT
Start: 2023-04-03 | End: 2023-04-03

## 2023-04-03 RX ORDER — ALBUTEROL SULFATE 2.5 MG/3ML
2.5 SOLUTION RESPIRATORY (INHALATION) EVERY 4 HOURS PRN
Status: DISCONTINUED | OUTPATIENT
Start: 2023-04-03 | End: 2023-04-04 | Stop reason: HOSPADM

## 2023-04-03 RX ADMIN — GUAIFENESIN 1200 MG: 600 TABLET ORAL at 20:23

## 2023-04-03 RX ADMIN — DEXAMETHASONE SODIUM PHOSPHATE 19.7 MG: 10 INJECTION INTRAMUSCULAR; INTRAVENOUS at 05:02

## 2023-04-03 RX ADMIN — FUROSEMIDE 40 MG: 40 TABLET ORAL at 11:37

## 2023-04-03 RX ADMIN — OXYCODONE HYDROCHLORIDE 10 MG: 10 TABLET ORAL at 06:44

## 2023-04-03 RX ADMIN — OXYCODONE HYDROCHLORIDE 10 MG: 10 TABLET ORAL at 20:24

## 2023-04-03 RX ADMIN — OXYCODONE HYDROCHLORIDE 5 MG: 5 TABLET ORAL at 11:36

## 2023-04-03 RX ADMIN — LORAZEPAM 1 MG: 2 INJECTION INTRAMUSCULAR; INTRAVENOUS at 15:43

## 2023-04-03 RX ADMIN — METHOCARBAMOL 750 MG: 750 TABLET ORAL at 06:44

## 2023-04-03 RX ADMIN — CHLORHEXIDINE GLUCONATE 0.12% ORAL RINSE 15 ML: 1.2 LIQUID ORAL at 20:25

## 2023-04-03 RX ADMIN — WARFARIN SODIUM 9 MG: 6 TABLET ORAL at 18:04

## 2023-04-03 RX ADMIN — METHOCARBAMOL 750 MG: 750 TABLET ORAL at 20:24

## 2023-04-03 RX ADMIN — PREGABALIN 150 MG: 75 CAPSULE ORAL at 14:49

## 2023-04-03 RX ADMIN — DEXAMETHASONE SODIUM PHOSPHATE 19.7 MG: 10 INJECTION INTRAMUSCULAR; INTRAVENOUS at 18:04

## 2023-04-03 RX ADMIN — ACETAMINOPHEN 975 MG: 325 TABLET, FILM COATED ORAL at 22:04

## 2023-04-03 RX ADMIN — BENZONATATE 200 MG: 100 CAPSULE ORAL at 14:51

## 2023-04-03 RX ADMIN — ACETAMINOPHEN 975 MG: 325 TABLET, FILM COATED ORAL at 05:40

## 2023-04-03 RX ADMIN — CHLORHEXIDINE GLUCONATE 0.12% ORAL RINSE 15 ML: 1.2 LIQUID ORAL at 11:37

## 2023-04-03 RX ADMIN — GUAIFENESIN 1200 MG: 600 TABLET ORAL at 11:37

## 2023-04-03 RX ADMIN — PREGABALIN 150 MG: 75 CAPSULE ORAL at 05:12

## 2023-04-03 RX ADMIN — METHOCARBAMOL 750 MG: 750 TABLET ORAL at 11:39

## 2023-04-03 RX ADMIN — ACETAMINOPHEN 975 MG: 325 TABLET, FILM COATED ORAL at 14:52

## 2023-04-03 RX ADMIN — PREGABALIN 150 MG: 75 CAPSULE ORAL at 20:24

## 2023-04-03 NOTE — UTILIZATION REVIEW
Continued Stay Review    Date: 04/03/23                          Current Patient Class: IP  Current Level of Care: Med/Surg    HPI:67 y o  male initially admitted on 3/29  Assessment/Plan: Reports breathing treatment helpful for his SOB  On exam, ill-appearing, decreased air movement and breath sounds, hernia abdominal, generalized weakness  Plan: continue BiPAP qHS, titrate FiO2 for SpO2 > 88%, incentive spirometry, Supplemental O2, weaned as tolerated during the day; Trend labs, replete electrolytes as needed; continue decadron, mucinex, tessalon perles, continue PTA meds, DW, I&O, supportive care, resume warfarin now that INR in appropriate range      Vital Signs:   Date/Time Temp Pulse Resp BP MAP (mmHg) SpO2 FiO2 (%) Calculated FIO2 (%) - Nasal Cannula Nasal Cannula O2 Flow Rate (L/min) O2 Device O2 Interface Device   04/03/23 1245 -- 94 -- 148/73 -- 96 % -- -- -- Nasal cannula --   04/03/23 1104 -- -- -- -- -- 94 % -- 40 5 L/min Nasal cannula --   04/03/23 0850 98 5 °F (36 9 °C) 90 18 188/80 Abnormal  -- 94 % -- -- -- Nasal cannula --   04/03/23 0408 -- -- -- -- -- 95 % 45 -- -- BiPAP Full face mask   04/02/23 2310 -- -- -- -- -- 94 % 45 -- -- BiPAP Full face mask   04/02/23 2049 -- 85 20 142/74 99 -- -- -- -- -- --   04/02/23 1853 -- -- -- -- -- 93 % -- 40 5 L/min Nasal cannula --       Pertinent Labs/Diagnostic Results:   Results from last 7 days   Lab Units 03/29/23  0000   SARS-COV-2  Positive*     Results from last 7 days   Lab Units 04/03/23  0513 04/02/23  0423 04/01/23  0434 03/31/23  0451 03/30/23  1803 03/29/23  0451   WBC Thousand/uL 7 25 8 46 5 95 5 15 5 28 6 51   HEMOGLOBIN g/dL 13 4 13 3 12 7 12 2 12 9 12 6   HEMATOCRIT % 43 0 43 6 40 7 40 0 41 2 42 0   PLATELETS Thousands/uL 266 240 233 211 229 194   NEUTROS ABS Thousands/µL 6 15 5 43 4 27 3 93  --   --    BANDS PCT %  --   --   --   --   --  4         Results from last 7 days   Lab Units 04/03/23  0513 04/02/23  0423 04/01/23  0434 03/31/23 0451 03/30/23  1803 03/29/23  0451 03/28/23  2251   SODIUM mmol/L 138 140 140 140 140 138 139   POTASSIUM mmol/L 4 0 3 6 3 8 3 8 3 7 4 2 4 0   CHLORIDE mmol/L 91* 92* 92* 92* 92* 91* 91*   CO2 mmol/L >45* 45* >45* >45* 45* 44* 45*   ANION GAP mmol/L  --  3*  --   --  3* 3* 3*   BUN mg/dL 15 15 16 18 19 9 9   CREATININE mg/dL 0 49* 0 48* 0 49* 0 46* 0 58* 0 44* 0 50*   EGFR ml/min/1 73sq m 111 112 111 114 104 116 110   CALCIUM mg/dL 8 6 8 7 8 6 8 8 9 2 8 8 9 1   CALCIUM, IONIZED mmol/L  --   --   --   --  1 11* 1 09*  --    MAGNESIUM mg/dL  --   --   --   --  2 0 2 0 2 1   PHOSPHORUS mg/dL  --   --   --   --  2 3 3 2 3 4     Results from last 7 days   Lab Units 04/03/23  0513 04/02/23 0423 04/01/23 0434 03/30/23  1859 03/28/23  2251   AST U/L 19 25 30 56* 37   ALT U/L 53* 59* 69* 79* 40   ALK PHOS U/L 66 67 66 75 81   TOTAL PROTEIN g/dL 5 8* 6 0* 5 9* 6 3* 6 6   ALBUMIN g/dL 2 8* 2 9* 2 9* 3 1* 3 2*   TOTAL BILIRUBIN mg/dL 0 35 0 34 0 27 0 26 0 32   BILIRUBIN DIRECT mg/dL  --   --   --  0 09  --    AMMONIA umol/L  --   --   --  32  --      Results from last 7 days   Lab Units 03/30/23  1008   POC GLUCOSE mg/dl 138     Results from last 7 days   Lab Units 04/03/23  0513 04/02/23 0423 04/01/23  0434 03/31/23  0451 03/30/23  1803 03/29/23 0451 03/28/23  2251   GLUCOSE RANDOM mg/dL 143* 131 128 136 169* 147* 113         Results from last 7 days   Lab Units 03/30/23  1859   HEMOGLOBIN A1C % 5 2   EAG mg/dl 103     Results from last 7 days   Lab Units 03/30/23  1041   PH ART  7 406   PCO2 ART mm Hg 77 0*   PO2 ART mm Hg 66 2*   HCO3 ART mmol/L 47 3*   BASE EXC ART mmol/L 18 3   O2 CONTENT ART mL/dL 17 7   O2 HGB, ARTERIAL % 92 0*   ABG SOURCE  Radial, Right     Results from last 7 days   Lab Units 03/31/23  1318 03/29/23  0839 03/29/23  0256   PH BRYANT  7 389 7 347 7 329   PCO2 BRYANT mm Hg 71 5* 86 5* 92 4*   PO2 BRYANT mm Hg 80 6* 86 5* 54 3*   HCO3 BYRANT mmol/L 42 2* 46 4* 47 5*   BASE EXC BRYANT mmol/L 13 7 16 1 16 6 O2 CONTENT BRYANT ml/dL 19 2 18 9 17 1   O2 HGB, VENOUS % 94 6* 94 8* 85 3*         Results from last 7 days   Lab Units 03/29/23  0000   CK TOTAL U/L 20*         Results from last 7 days   Lab Units 03/28/23  2251   D-DIMER QUANTITATIVE ug/ml FEU 0 44     Results from last 7 days   Lab Units 04/03/23  0513 04/02/23  0423 04/01/23  0434 03/30/23  1803 03/29/23  1131 03/28/23  2251   PROTIME seconds 23 3* 33 8* 38 5* 38 3*  --  26 4*   INR  2 04* 3 30* 3 91* 3 87*  --  2 40*   PTT seconds  --   --   --  42* 200* 45*         Results from last 7 days   Lab Units 04/01/23  0434 03/30/23  1803 03/29/23  0451 03/29/23  0000   PROCALCITONIN ng/ml 0 09 0 12 0 12 0 13     Results from last 7 days   Lab Units 03/28/23  2251   LACTIC ACID mmol/L 0 7             Results from last 7 days   Lab Units 03/29/23  0006   BNP pg/mL 20         Results from last 7 days   Lab Units 03/29/23  0451   HEP B S AG  Non-reactive   HEP C AB  Non-reactive   HEP B C IGM  Non-reactive   HEP B C TOTAL AB  Non-reactive         Results from last 7 days   Lab Units 04/02/23  0423 04/01/23  0434 03/31/23  0451 03/30/23  1803 03/29/23  0000   CRP mg/L 4 1* 6 0* 9 5* 13 2* 24 2*             Results from last 7 days   Lab Units 03/28/23  2258   CLARITY UA  Clear   COLOR UA  Yellow   SPEC GRAV UA  1 012   PH UA  6 5   GLUCOSE UA mg/dl Negative   KETONES UA mg/dl Negative   BLOOD UA  Negative   PROTEIN UA mg/dl Negative   NITRITE UA  Negative   BILIRUBIN UA  Negative   UROBILINOGEN UA (BE) mg/dl 2 0*   LEUKOCYTES UA  Negative     Results from last 7 days   Lab Units 03/29/23  0859 03/29/23  0000   STREP PNEUMONIAE ANTIGEN, URINE  Negative  --    LEGIONELLA URINARY ANTIGEN  Negative  --    INFLUENZA A PCR   --  Negative   INFLUENZA B PCR   --  Negative   RSV PCR   --  Negative       Results from last 7 days   Lab Units 03/29/23  1515 03/29/23  0000 03/28/23  2254   BLOOD CULTURE   --  No Growth After 5 Days  No Growth After 5 Days     SPUTUM CULTURE  3+ Growth of  --   --    GRAM STAIN RESULT  1+ Epithelial cells per low power field*  1+ Polys*  3+ Gram positive cocci in clusters*  2+ Gram positive rods*  1+ Gram negative rods*  --   --        Medications:   Scheduled Medications:  acetaminophen, 975 mg, Oral, Q8H  chlorhexidine, 15 mL, Mouth/Throat, Q12H GRIFFIN  dexamethasone, 0 1 mg/kg, Intravenous, Q12H  furosemide, 40 mg, Oral, Daily  guaiFENesin, 1,200 mg, Oral, Q12H GRIFFIN  pregabalin, 150 mg, Oral, TID  warfarin, 9 mg, Oral, Daily (warfarin)    Continuous IV Infusions: none    PRN Meds:  albuterol, 2 5 mg, Nebulization, Q4H PRN  benzonatate, 200 mg, Oral, TID PRN; 4/3 x1  labetalol, 10 mg, Intravenous, Q4H PRN  methocarbamol, 750 mg, Oral, Q6H PRN; 4/3 x2  oxyCODONE, 10 mg, Oral, Q4H PRN; 4/3 x1  oxyCODONE, 5 mg, Oral, Q4H PRN; 4/3 x1        Discharge Plan: TBD    Network Utilization Review Department  ATTENTION: Please call with any questions or concerns to [phone] and carefully listen to the prompts so that you are directed to the right person  All voicemails are confidential   Moraima Piper all requests for admission clinical reviews, approved or denied determinations and any other requests to dedicated fax number below belonging to the campus where the patient is receiving treatment   List of dedicated fax numbers for the Facilities:  [address] DENIALS (Administrative/Medical Necessity) [phone]   [address] (Maternity/NICU/Pediatrics) [phone]   [address] [phone]   [facility] [phone]   [address] [place] [address] Adalberto Romero 28 [phone]   [address] [place] [phone] [address] [phone]

## 2023-04-03 NOTE — PLAN OF CARE
Problem: OCCUPATIONAL THERAPY ADULT  Goal: Performs self-care activities at highest level of function for planned discharge setting  See evaluation for individualized goals  Description: Treatment Interventions: ADL retraining, Functional transfer training, Endurance training, UE strengthening/ROM, Cognitive reorientation, Patient/family training, Equipment evaluation/education, Compensatory technique education, Continued evaluation, Activityengagement, Energy conservation          See flowsheet documentation for full assessment, interventions and recommendations  Outcome: Progressing  Note: Limitation: Decreased ADL status, Decreased UE strength, Decreased endurance, Decreased self-care trans, Decreased high-level ADLs     Assessment: Patient participated in Skilled OT session this date with interventions consisting of ADL re training with the use of correct body mechnaics, safety awareness and fall prevention techniques,  therapeutic activities to: increase activity tolerance and increase dynamic sit/ stand balance during functional activity    Patient agreeable to OT treatment session, upon arrival patient was found supine in bed  Treatment session as follows: Pt able to utilize trapeze and Ax1 to sit at EOB  Upon sitting up, pt required to use toilet  Returned to supine (trapeze & Ax1) for bedpan  Total A for bedpan/hygiene  Returned to EOB for grooming (hair, teeth, face)  Back pain increased; returned to supine and made comfortable  Patient continues to be functioning below baseline level, occupational performance remains limited secondary to factors listed above and increased risk for falls and injury  The patient's raw score on the AM-PAC Daily Activity inpatient short form is 13, standardized score is 32 03, less than 39 4  Patients at this level are likely to benefit from DC to post-acute rehabilitation services  From OT standpoint, recommendation at time of d/c would be Short Term Rehab  Patient to benefit from continued Occupational Therapy treatment while in the hospital to address deficits as defined above and maximize level of functional independence with ADLs and functional mobility    Pt left with call bell in reach, tray table in reach, needs met     OT Discharge Recommendation: Post acute rehabilitation services

## 2023-04-03 NOTE — NURSING NOTE
Patient in bed  Patient has no complaints at this time  Vitals WNL  Bed low and locked  Call bell within reach

## 2023-04-03 NOTE — CASE MANAGEMENT
Case Management Progress Note    Patient name Dex Jean Baptiste  Location S /S -23 MRN [id number]  : 1955 Date 4/3/2023       LOS (days): 5  Geometric Mean LOS (GMLOS) (days): 5 20  Days to GMLOS:-0 3        OBJECTIVE:        Current admission status: Inpatient  Preferred Pharmacy:   [address], [place] - [address]  [address]  [address] [address]  Phone: [phone] Fax: [phone]    Primary Care Provider: Neftaly Moon MD    Primary Insurance: [facility]  Secondary Insurance:     PROGRESS NOTE:    CM TC the following facilities inquiring about bed availability:    [facility] ([phone])-XATO VM for Admissions    [place] ([phone])-UGTJ VM for [facility] ([phone])-MPUUR with Admissions[place], no male beds and unable to accommodate over [facility] ([phone])-HJXJA with Javi whom confirmed their facility is unable to accommodate over [address] ([phone])-QUJJ VM for Marlon Parker in [facility] [place] ([phone])-IYGQXU to accept Covid+    [place] at [facility] ([phone])-DPBC VM for Admissions    [facility] ([phone])-SH answer and no VM    [place] at [facility] ([phone])-UJPDXN in Admissions whom confirmed they are able to accommodate over 400lbs and do have a med available, pending clinical review  CM informed pt medically stable for DC today pending auth  CM TC pt's spouse-Shilpa regarding the above  CM informed her that at this time the [employer] [place] at [facility] (about an hour drive from pt's home) and [facility] are the only accepting facilities at this time  Per spouse, she cannot drive an hour away and will speak with patient  CM informed family that they would need to provide CM with a decision today so that we can request for insurance authorization      CM received a call from pt's room  Pt states that he will not go to either rehab and prefers to go home with a nanette lift and [facility]  CM discussed the limitation with [facility] and his current clinical needs  CM informed pt that his spouse has previously declined this as a feasible option and that she would need to be involved in the conversation for this reason  Pt confirmed he would like a conference call with his spouse  CM attempted TC pt's spouse 3x this afternoon and left messages  Pt's spouse left VM for CM stating that she leaves for work at [employer] requested Kishan initiate auth to ensure STR is an option    CM also placed referrals for [facility] in [address]

## 2023-04-03 NOTE — CASE MANAGEMENT
Case Management Progress Note    Patient name Doria Phoenix  Location S /S -85 MRN [id number]  : 1955 Date 4/3/2023       LOS (days): 5  Geometric Mean LOS (GMLOS) (days): 5 20  Days to GMLOS:-0 3        OBJECTIVE:        Current admission status: Inpatient  Preferred Pharmacy:   [address], [place] [address] [address]  Phone: [phone] Fax: [phone]    Primary Care Provider: Sherry Simmons MD    Primary Insurance: [address] [facility]  Secondary Insurance:     PROGRESS NOTE:    CM spoke with both pt and spouse-Shilpa regarding DC planning  Pt is willing to return to [facility], as spouse has confirmed she is unable to care for him at home  CM wrote down a list of concerns the family would like the  to be notified of prior to return tomorrow, 4/4  Family aware Deontelisseth Loredoman has been initiated  CM reached out to liaison-Fabiana regarding the above  CM informed her in detail of pt's complaints  CM informed  Vilma Mayfield will reach out to family directly

## 2023-04-03 NOTE — OCCUPATIONAL THERAPY NOTE
Occupational Therapy Treatment Note    Patient Name: Morey Saint  SCXCU'A Date: 4/3/2023       04/03/23 1045   OT Last Visit   OT Visit Date 04/03/23   Note Type   Note Type Treatment for insurance authorization   Pain Assessment   Pain Assessment Tool 0-10   Pain Score 7   Pain Location/Orientation Other (Comment); Orientation: Left; Location: Shoulder   Restrictions/Precautions   Other Precautions Contact/isolation; Airborne/isolation;Multiple lines;O2;Fall Risk;Pain;Hard of hearing   ADL   Grooming Assistance 5  Supervision/Setup   Grooming Deficit Wash/dry hands; Wash/dry face; Teeth care;Brushing hair   Grooming Comments Seated EOB: Use of shampoo cap to wash hair, brush hair, wash face, brush teeth  Toileting Assistance  1  Total Assistance   Toileting Deficit Use of bedpan/urinal setup   Toileting Comments Bedpan for BM  Total assist for bedpan mgmt & jerry care  Bed Mobility   Rolling R 4  Minimal assistance   Additional items Assist x 1;Bedrails;Trapeze bar; Increased time required;Verbal cues;LE management   Rolling L 4  Minimal assistance   Additional items Bedrails;Trapeze bar; Increased time required;Verbal cues;LE management   Supine to Sit 3  Moderate assistance   Additional items Assist x 1;HOB elevated; Bedrails;Trapeze bar; Increased time required;Verbal cues;LE management   Sit to Supine 2  Maximal assistance   Additional items Assist x 1;HOB elevated; Bedrails;Trapeze bar; Increased time required;Verbal cues;LE management   Transfers   Sit to Stand Unable to assess   Cognition   Overall Cognitive Status Impaired   Arousal/Participation Alert; Cooperative   Attention Attends with cues to redirect   Orientation Level Oriented to person; Other (Comment)  (pt was identified w/ full name, birth date)   Memory Decreased recall of precautions;Decreased short term memory   Following Commands Follows one step commands with increased time or repetition   Activity Tolerance   Activity Tolerance Patient limited by fatigue   Medical Staff Made Aware RN Vincent De La Fuente, PT RJ   Plan   OT Treatment Day 1   Recommendation   OT Discharge Recommendation Post acute rehabilitation services   [facility] Daily Activity Inpatient   Lower Body Dressing 1   Bathing 2   Toileting 1   Upper Body Dressing 2   Grooming 3   Eating 4   Daily Activity Raw Score 13   Daily Activity Standardized Score (Calc for Raw Score >=11) 32 03   [facility] Applied Cognition Inpatient   Following a Speech/Presentation 3   Understanding Ordinary Conversation 4   Taking Medications 2   Remembering Where Things Are Placed or Put Away 3   Remembering List of 4-5 Errands 3   Taking Care of Complicated Tasks 2   Applied Cognition Raw Score 17   Applied Cognition Standardized Score 36 52     Patient participated in Skilled OT session this date with interventions consisting of ADL re training with the use of correct body mechnaics, safety awareness and fall prevention techniques,  therapeutic activities to: increase activity tolerance and increase dynamic sit/ stand balance during functional activity    Patient agreeable to OT treatment session, upon arrival patient was found supine in bed  Treatment session as follows: Pt able to utilize trapeze and Ax1 to sit at EOB  Upon sitting up, pt required to use toilet  Returned to supine (trapeze & Ax1) for bedpan  Total A for bedpan/hygiene  Returned to EOB for grooming (hair, teeth, face)  Back pain increased; returned to supine and made comfortable  Patient continues to be functioning below baseline level, occupational performance remains limited secondary to factors listed above and increased risk for falls and injury  The patient's raw score on the AM-PAC Daily Activity inpatient short form is 13, standardized score is 32 03, less than 39 4  Patients at this level are likely to benefit from DC to post-acute rehabilitation services  From OT standpoint, recommendation at time of d/c would be Short Term Rehab     Patient to benefit from continued Occupational Therapy treatment while in the hospital to address deficits as defined above and maximize level of functional independence with ADLs and functional mobility    Pt left with call bell in reach, tray table in reach, needs met        [employer], MS, OTR/L

## 2023-04-03 NOTE — PHYSICAL THERAPY NOTE
PHYSICAL THERAPY TREATMENT NOTE    Patient Name: Obey LEI Date: 4/3/2023     04/03/23 0957   PT Last Visit   PT Visit Date 04/03/23   Pain Assessment   Pain Assessment Tool 0-10   Pain Score 7   Pain Location/Orientation Other (Comment)  (back, left shoulder)   Hospital Pain Intervention(s) Repositioned; Ambulation/increased activity   Restrictions/Precautions   Other Precautions Contact/isolation; Airborne/isolation;Multiple lines;O2;Fall Risk;Pain;Hard of hearing   General   Chart Reviewed Yes   Family/Caregiver Present No   Cognition   Arousal/Participation Alert; Cooperative   Attention Attends with cues to redirect   Orientation Level Oriented to person; Other (Comment)  (pt was identified w/ full name, birth date)   Following Commands Follows one step commands with increased time or repetition   Subjective   Subjective pt seen supine in bed  agreed to PT session  pt states having back and left shoulder pain  Bed Mobility   Rolling R 4  Minimal assistance   Additional items Assist x 1;Bedrails;Trapeze bar; Increased time required;Verbal cues;LE management  (for bedrail/trapeze use)   Rolling L 4  Minimal assistance   Additional items Bedrails;Trapeze bar; Increased time required;Verbal cues;LE management  (for bedrail/trapeze use)   Supine to Sit 3  Moderate assistance  (w/ 2nd staff member for safety)   Additional items Assist x 1;HOB elevated; Bedrails;Trapeze bar; Increased time required;Verbal cues;LE management  (for trapeze, breathing technique)   Sit to Supine 2  Maximal assistance  (w/ 2nd staff member for safety)   Additional items Assist x 1;HOB elevated; Bedrails;Trapeze bar; Increased time required;Verbal cues;LE management  (for trapeze, breathing technique)   Additional Comments pt sat edge of bed 6 minutes w/ supervision to minx1   pt needed to have a bowel movement and returned to supine position to use bedpan  pt sat edge of bed 10 minutes w/ standby to minx1  additional sitting was not possible due to fatigue and pain  pt was unable to stand during session  Transfers   Sit to Stand Unable to assess   Balance   Static Sitting Fair -  (to poor+)   Static Standing Zero   Activity Tolerance   Activity Tolerance Patient limited by fatigue;Patient limited by pain   Nurse Made Aware spoke to Deonna Odonnell OT, Dr Omar [facility]   Assessment   Problem List Decreased strength;Decreased range of motion; Impaired balance;Decreased endurance;Decreased mobility; Decreased safety awareness; Obesity;Pain;Decreased skin integrity   Assessment pt tolerated additional sitting on edge of bed but was unable to complete transfer training  pain control continues to be issue w/ pt, affecting his mobility  cuing was required for task focus and mobility technique/safety  pt remains a fall risk and continued inpatient PT is needed to reduce fall risk and progress mobility training as appropriate  discharge recommendation is for inpatient rehab to expedite return to independent level of mobility  Goals   Patient Goals go home  STG Expiration Date 04/14/23   Short Term Goal #1 pt will:  Increase bilateral LE strength 1/2 grade to facilitate independent mobility, Perform rolling and repositioning in bed w/ supervision to decrease caregiver burden, Perform supine <--> sitting edge of bed transition w/ modx1 to improve level of function, Perform sit <---> stand and stand pivot transfers w/ maxx1 to improve independence, Ambulate 10 ft  with least restrictive assistive device w/ maxx1 w/o LOB to improve functional independence, Increase all balance 1 grade to decrease risk for falls, Complete exercise program independently to increase strength and endurance, Tolerate 3 hr OOB to faciliate upright tolerance, Tolerate standing 2 minutes w/ least restrictive assistive device w/ modx1 to facilitate functional task performance and Improve Barthel Index score to 40 or greater to facilitate independence   PT Treatment Day 1   Plan   Treatment/Interventions Functional transfer training;LE strengthening/ROM; Therapeutic exercise; Endurance training;Patient/family training;Equipment eval/education;Gait training;Bed mobility   Progress Progressing toward goals   PT Frequency 3-5x/wk   Recommendation   PT Discharge Recommendation Post acute rehabilitation services   Additional Comments spoke to Dr Fabiola Mishra - approved use of trapeze w/ Hill-ROM bed   Additional Comments 2 pt needs mechanical lift for out of bed mobilization  AM-PAC Basic Mobility Inpatient   Turning in Flat Bed Without Bedrails 3   Lying on Back to Sitting on Edge of Flat Bed Without Bedrails 2   Moving Bed to Chair 1   Standing Up From Chair Using Arms 1   Walk in Room 1   Climb 3-5 Stairs With Railing 1   Basic Mobility Inpatient Raw Score 9   Turning Head Towards Sound 4   Follow Simple Instructions 3   Low Function Basic Mobility Raw Score 16   Low Function Basic Mobility Standardized Score 25 72   Highest Level Of Mobility   -HL Goal 3: Sit at edge of bed   JH-HLM Achieved 3: Sit at edge of bed   End of Consult   Patient Position at End of Consult Supine; All needs within reach;Bed/Chair alarm activated     The patient's AM-PAC Basic Mobility Inpatient Short Form Raw Score is 9  A Raw score of less than or equal to 16 suggests the patient may benefit from discharge to post-acute rehabilitation services  Please also refer to the recommendation of the Physical Therapist for safe discharge planning  Skilled inpatient PT recommended while in hospital to progress pt toward treatment goals      Joy Galloway, PT

## 2023-04-03 NOTE — PLAN OF CARE
Problem: PHYSICAL THERAPY ADULT  Goal: Performs mobility at highest level of function for planned discharge setting  See evaluation for individualized goals  Description: Treatment/Interventions: Functional transfer training, LE strengthening/ROM, Therapeutic exercise, Endurance training, Patient/family training, Equipment eval/education, Gait training, Bed mobility          See flowsheet documentation for full assessment, interventions and recommendations  Outcome: Progressing  Note:    Problem List: Decreased strength, Decreased range of motion, Impaired balance, Decreased endurance, Decreased mobility, Decreased safety awareness, Obesity, Pain, Decreased skin integrity  Assessment: pt tolerated additional sitting on edge of bed but was unable to complete transfer training  pain control continues to be issue w/ pt, affecting his mobility  cuing was required for task focus and mobility technique/safety  pt remains a fall risk and continued inpatient PT is needed to reduce fall risk and progress mobility training as appropriate  discharge recommendation is for inpatient rehab to expedite return to independent level of mobility  PT Discharge Recommendation: Post acute rehabilitation services    See flowsheet documentation for full assessment

## 2023-04-03 NOTE — PLAN OF CARE
Problem: MOBILITY - ADULT  Goal: Maintain or return to baseline ADL function  Description: INTERVENTIONS:  -  Assess patient's ability to carry out ADLs; assess patient's baseline for ADL function and identify physical deficits which impact ability to perform ADLs (bathing, care of mouth/teeth, toileting, grooming, dressing, etc )  - Assess/evaluate cause of self-care deficits   - Assess range of motion  - Assess patient's mobility; develop plan if impaired  - Assess patient's need for assistive devices and provide as appropriate  - Encourage maximum independence but intervene and supervise when necessary  - Involve family in performance of ADLs  - Assess for home care needs following discharge   - Consider OT consult to assist with ADL evaluation and planning for discharge  - Provide patient education as appropriate  Outcome: Progressing  Goal: Maintains/Returns to pre admission functional level  Description: INTERVENTIONS:  - Perform BMAT or MOVE assessment daily    - Set and communicate daily mobility goal to care team and patient/family/caregiver  - Collaborate with rehabilitation services on mobility goals if consulted  - Reposition patient every 2 hours  -- Record patient progress and toleration of activity level   Outcome: Progressing     Problem: Nutrition/Hydration-ADULT  Goal: Nutrient/Hydration intake appropriate for improving, restoring or maintaining nutritional needs  Description: Monitor and assess patient's nutrition/hydration status for malnutrition  Collaborate with interdisciplinary team and initiate plan and interventions as ordered  Monitor patient's weight and dietary intake as ordered or per policy  Utilize nutrition screening tool and intervene as necessary  Determine patient's food preferences and provide high-protein, high-caloric foods as appropriate       INTERVENTIONS:  - Monitor oral intake, urinary output, labs, and treatment plans  - Assess nutrition and hydration status and recommend course of action  - Evaluate amount of meals eaten  - Assist patient with eating if necessary   - Allow adequate time for meals  - Recommend/ encourage appropriate diets, oral nutritional supplements, and vitamin/mineral supplements  - Order, calculate, and assess calorie counts as needed  - Recommend, monitor, and adjust tube feedings and TPN/PPN based on assessed needs  - Assess need for intravenous fluids  - Provide specific nutrition/hydration education as appropriate  - Include patient/family/caregiver in decisions related to nutrition  Outcome: Progressing     Problem: Prexisting or High Potential for Compromised Skin Integrity  Goal: Skin integrity is maintained or improved  Description: INTERVENTIONS:  - Identify patients at risk for skin breakdown  - Assess and monitor skin integrity  - Assess and monitor nutrition and hydration status  - Monitor labs   - Assess for incontinence   - Turn and reposition patient  - Assist with mobility/ambulation  - Relieve pressure over bony prominences  - Avoid friction and shearing  - Provide appropriate hygiene as needed including keeping skin clean and dry  - Evaluate need for skin moisturizer/barrier cream  - Collaborate with interdisciplinary team   - Patient/family teaching  - Consider wound care consult   Outcome: Progressing

## 2023-04-03 NOTE — PROGRESS NOTES
[facility]  Progress Note  Name: Irma Bliss  MRN: [id number]  Unit/Bed#: S -01 I Date of Admission: 3/28/2023   Date of Service: 4/3/2023 I Hospital Day: 5    Assessment/Plan   * Acute on chronic respiratory failure with hypoxia and hypercapnia (HCC)  Assessment & Plan  Pt presented to ED with C/o productive cough x 1 week, shortness of breath, no improvement with nebs  He also noted that his SpO2 was low in the 80's at rehab  Pt wears CPAP qhS doesn't know settings  Per medical record he is non-compliant with CPA as outpatient  Initial /102/64/50  Pt placed on BiPAP in ED 15 inspiratory / 5 expiratory pressure   Respiratory virus panel COVID + 3/29/2023  Legionella and Strep urine Ag negative   CT chest - mild dependent atelectasis     Acute on chronic respiratory failure likely 2/2 COVID with OHS and CPAP noncompliance contributing to chronic respiratory failure with hypoxia and hypercapnia  Patient was initially admitted to critical care, then transferred to [facility] level of care after he was no longer requiring BiPAP continuously  Patient intermittently requiring BiPAP qHS and 6L mid-flow NC while awake     Patient uses 4L at baseline, however noted at home 4L wasn't enough     Plan  • Continue BiPAP qHS, titrate FiO2 to maintain SpO2 > 88%  • Continue COVID pathway, see plan below  • Respiratory protocol  • Incentive spirometry   • Hold oxy and robaxin for any concerns of AMS    SIRS (systemic inflammatory response syndrome) ([facility])  Assessment & Plan  On admission, patient had tachycardia and tachypnea = SIRS +   CT chest showed mild bilateral dependent atelectasis   S/p Cefepime and Vancomycin in ED   UA negative, Procal negative, Lactic negative    Likely 2/2 COVID with no other known potential source of infection     Blood cultures no growth at 48 hours  Sputum cultures + mixed respiratory taan     Plan:   - Repeat CBC to follow WBC   - Monitor for other potential sources of infection, especially skin   - Continue COVID treatment with decadron (5 days of remdesivir complete)     COVID-19  Assessment & Plan  Patient presented from SNF with cough and SOB x 1 week   COVID + on viral panel in ED   Procal negative, S/p 2 days of Rocephin and Doxycycline   BNP negative   CRP and D-Dimer elevated   Recent Labs     04/01/23  0434   PROCALCITONI 0 09     CRP: 9 5>6    Plan:   - Continue COVID pathway:   ? Decadron 0 1mg/kg BID, Day 6/10   ? Remdesivir Day 5/5 complete 4/2/23   - Mucinex 1200mg BID  -Tessalon Perles 200mg TID prn  -Continue respiratory support with BiPAP  - Recheck Procal and trend CRP  - Consider CT PE scan if concerns for PE     Pulmonary hypertension ([facility] [address] )  Assessment & Plan  Patient with history of pulmonary hypertension  On most recent echo, PAP unable to be calculated   PTA: lasix 40mg daily     Patient appears euvolemic on exam without any peripheral edema  No large pleural effusions seen on imaging  No JVD  Plan:   - Continue PTA lasix 40mg daily  - Daily weights   - Is/Os     Chronic pain disorder  Assessment & Plan  Patient with chronic pain from post herpetic neuralgia and chronic back pain  PTA: Tramadol, Robaxin, Lyrica     Plan:  - Oxy and robaxin ordered for pain with improved level of alertness   - Continue PTA lyrica   - Tylenol 975mg q8h PRN   - Lidocaine patch vs voltaren gel for back pain exacerbations     CHF (congestive heart failure) ([facility])  Assessment & Plan  Wt Readings from Last 3 Encounters:   04/03/23 (!) 186 kg (411 lb)   03/28/23 (!) 197 kg (434 lb 6 4 oz)   03/24/23 (!) 198 kg (435 lb 12 8 oz)     Hx diastolic HF  Most recent echo (2/5/23) - LVEF 60%, indeterminate diastolic function  RV mildly dilated    PTA Lasix 40mg  No evidence of acute exacerbation     Plan:   - Continue home lasix  - Monitor Is/Os   - Daily weights    History of pulmonary embolism  Assessment & Plan  Hx PE noted on CT PE study 1/12/2018 - Pulmonary embolism is demonstrated as discussed above involving segmental and subsegmental branches of the left and right main pulmonaries  PTA: Warfarin 9mg daily (mon-Fri) and 10mg (Sat-Sun)    Recent Labs     04/01/23  0434 04/02/23  0423 04/03/23  0513   INR 3 91* 3 30* 2 04*         Plan:   - Had been holding home warfarin 2/2 supratherapeutic INR   - Resume PTA warfarin regimen   - Monitor respiratory status     Severe obstructive sleep apnea  Assessment & Plan  Patient has history of EVERETT and is prescribed CPAP for home use  Patient endorses compliance but medical record notes hx of non-compliance  EVERETT likely 2/2 BMI 55 and OHS    Plan:   - BiPAP for increased respiratory support until stable   - CPAP qHS once improved     Primary hypertension  Assessment & Plan  Blood Pressure: 148/73    Blood pressures have been elevated since arrival    Patient on lasix 40mg daily PTA  Plan:   - Consider amlodipine 5mg daily for blood pressure control if patient remains hypertensive   - Resume home medication regimen     Morbid obesity ([address] )  Assessment & Plan    Lab Results   Component Value Date    HGBA1C 5 2 03/30/2023     BMI 55 72    Plan:   - Hgb A1c to assess for DM                VTE Pharmacologic Prophylaxis: VTE Score: 6 High Risk (Score >/= 5) - Pharmacological DVT Prophylaxis Ordered: warfarin (Coumadin)  Sequential Compression Devices Ordered  Patient Centered Rounds: I performed bedside rounds with nursing staff today  Discussions with Specialists or Other Care Team Provider: Attending Physician, Case Management, PT/OT, RT, Critical Care     Education and Discussions with Family / Patient: Will call and update family later today  Current Length of Stay: 5 day(s)  Current Patient Status: Inpatient   Discharge Plan: Anticipate discharge in 24-48 hrs to rehab facility  Code Status: Level 1 - Full Code    Subjective:   Patient seen and examined at bedside this morning   Patient reports breathing treatment yesterday was helpful for his SOB  He denies any chest pain, abdominal pain, nausea, vomiting, diarrhea, or constipation  Overnight, no acute events reported by night team       Nursing reports no concerns  Objective:     Vitals:   Temp (24hrs), Av 1 °F (36 7 °C), Min:97 6 °F (36 4 °C), Max:98 5 °F (36 9 °C)    Temp:  [97 6 °F (36 4 °C)-98 5 °F (36 9 °C)] 98 5 °F (36 9 °C)  HR:  [] 94  Resp:  [18-20] 18  BP: (137-188)/(63-80) 148/73  SpO2:  [93 %-96 %] 96 %  Body mass index is 52 77 kg/m²  Input and Output Summary (last 24 hours): Intake/Output Summary (Last 24 hours) at 4/3/2023 1252  Last data filed at 4/3/2023 8570  Gross per 24 hour   Intake --   Output 2225 ml   Net -2225 ml       Physical Exam:   Physical Exam  Vitals and nursing note reviewed  Constitutional:       General: He is not in acute distress  Appearance: He is obese  He is ill-appearing  He is not toxic-appearing or diaphoretic  Interventions: Nasal cannula in place  HENT:      Head: Normocephalic and atraumatic  Nose: Nose normal  No congestion or rhinorrhea  Mouth/Throat:      Mouth: Mucous membranes are moist    Eyes:      General: No scleral icterus  Right eye: No discharge  Left eye: No discharge  Conjunctiva/sclera: Conjunctivae normal    Cardiovascular:      Rate and Rhythm: Normal rate and regular rhythm  Pulses: Normal pulses  Heart sounds: Normal heart sounds  No murmur heard  No friction rub  No gallop  Pulmonary:      Effort: Pulmonary effort is normal  No respiratory distress  Breath sounds: Decreased air movement present  No stridor  Decreased breath sounds present  No wheezing, rhonchi or rales  Chest:      Chest wall: No tenderness  Abdominal:      General: Abdomen is protuberant  Bowel sounds are normal  There is no distension  Palpations: Abdomen is soft  Tenderness: There is no abdominal tenderness   There is no guarding  Hernia: A hernia is present  Musculoskeletal:         General: No swelling, tenderness, deformity or signs of injury  Right lower leg: No edema  Left lower leg: No edema  Skin:     General: Skin is warm and dry  Capillary Refill: Capillary refill takes less than 2 seconds  Coloration: Skin is not jaundiced or pale  Findings: No bruising, erythema, lesion or rash  Neurological:      Mental Status: He is alert and oriented to person, place, and time  Motor: Weakness present  Psychiatric:         Behavior: Behavior normal  Behavior is cooperative  Additional Data:     Labs:  Results from last 7 days   Lab Units 04/03/23  0513 03/30/23  1803 03/29/23  0451   WBC Thousand/uL 7 25   < > 6 51   HEMOGLOBIN g/dL 13 4   < > 12 6   HEMATOCRIT % 43 0   < > 42 0   PLATELETS Thousands/uL 266   < > 194   BANDS PCT %  --   --  4   NEUTROS PCT % 84*   < >  --    LYMPHS PCT % 10*   < >  --    LYMPHO PCT %  --   --  2*   MONOS PCT % 5   < >  --    MONO PCT %  --   --  3*   EOS PCT % 0   < > 0    < > = values in this interval not displayed       Results from last 7 days   Lab Units 04/03/23  0513 04/02/23  0423   SODIUM mmol/L 138 140   POTASSIUM mmol/L 4 0 3 6   CHLORIDE mmol/L 91* 92*   CO2 mmol/L >45* 45*   BUN mg/dL 15 15   CREATININE mg/dL 0 49* 0 48*   ANION GAP mmol/L  --  3*   CALCIUM mg/dL 8 6 8 7   ALBUMIN g/dL 2 8* 2 9*   TOTAL BILIRUBIN mg/dL 0 35 0 34   ALK PHOS U/L 66 67   ALT U/L 53* 59*   AST U/L 19 25   GLUCOSE RANDOM mg/dL 143* 131     Results from last 7 days   Lab Units 04/03/23  0513   INR  2 04*     Results from last 7 days   Lab Units 03/30/23  1008   POC GLUCOSE mg/dl 138     Results from last 7 days   Lab Units 03/30/23  1859   HEMOGLOBIN A1C % 5 2     Results from last 7 days   Lab Units 04/01/23  0434 03/30/23  1803 03/29/23  0451 03/29/23  0000 03/28/23  2251   LACTIC ACID mmol/L  --   --   --   --  0 7   PROCALCITONIN ng/ml 0 09 0 12 0 12 0 13  -- Lines/Drains:  Invasive Devices     Peripheral Intravenous Line  Duration           Long-Dwell Peripheral IV (Midline) 26/47/50 Right Basilic 4 days                      Imaging: Reviewed radiology reports from this admission including: chest CT scan and abdominal/pelvic CT   CT chest wo contrast   ED Interpretation by Dania Marino PA-C (03/29 0153)   Mayela Cartagena MD  [phone] 3/29/2023     Narrative & Impression  CT CHEST WITHOUT IV CONTRAST     INDICATION:   hypercapnia; acute on chronic respiratory failure  Patient has confirmed COVID-19      COMPARISON:  CT of abdomen pelvis on March 28, 2023      TECHNIQUE: CT examination of the chest was performed without intravenous contrast  Multiplanar 2D reformatted images were created from the source data      Radiation dose length product (DLP) for this visit:  7035 mGy-cm   This examination, like all CT scans performed in the [facility], was performed utilizing techniques to minimize radiation dose exposure, including the use of iterative   reconstruction and automated exposure control       FINDINGS:     LUNGS:  Mild bilateral dependent atelectasis  Otherwise no focal consolidation  The central airways are patent      PLEURA:  Unremarkable      HEART/GREAT VESSELS: Heart is unremarkable for patient's age  No thoracic aortic aneurysm      MEDIASTINUM AND EMILEE:     Unremarkable      CHEST WALL AND LOWER NECK:  Unremarkable      VISUALIZED STRUCTURES IN THE UPPER ABDOMEN:  Unremarkable      OSSEOUS STRUCTURES:  Spinal degenerative changes are noted  No acute fracture or destructive osseous lesion      IMPRESSION:     Mild bilateral dependent atelectasis      Workstation performed: [id number]           Final Result by Mayela Cartagena MD (03/29 0143)      Mild bilateral dependent atelectasis        Workstation performed: [id number]         CT abdomen pelvis wo contrast   Final Result by Mayela Cartagena MD (03/29 0045)      Sludge and/or stones within the gallbladder  No pericholecystic inflammatory change  Workstation performed: [id number]             Recent Cultures (last 7 days):   Results from last 7 days   Lab Units 03/29/23  1515 03/29/23  0859 03/29/23  0000 03/28/23  2254   BLOOD CULTURE   --   --  No Growth After 5 Days  No Growth After 5 Days  SPUTUM CULTURE  3+ Growth of  --   --   --    GRAM STAIN RESULT  1+ Epithelial cells per low power field*  1+ Polys*  3+ Gram positive cocci in clusters*  2+ Gram positive rods*  1+ Gram negative rods*  --   --   --    LEGIONELLA URINARY ANTIGEN   --  Negative  --   --        Last 24 Hours Medication List:   Current Facility-Administered Medications   Medication Dose Route Frequency Provider Last Rate   • acetaminophen  975 mg Oral Q8H Ada Craig MD     • albuterol  2 5 mg Nebulization Q4H PRN Orlin Jean MD     • benzonatate  200 mg Oral TID PRN Yaquelin Penny DO     • chlorhexidine  15 mL Mouth/Throat Q12H [facility] Deep Ortega     • dexamethasone  0 1 mg/kg Intravenous Q12H [facility] G Ignacio 19 7 mg (04/03/23 0502)   • furosemide  40 mg Oral Daily Jose C Beverly     • guaiFENesin  1,200 mg Oral Q12H [facility] Ada Craig MD     • labetalol  10 mg Intravenous Q4H PRN Ada Craig MD     • methocarbamol  750 mg Oral Q6H PRN Ada Craig MD     • oxyCODONE  10 mg Oral Q4H PRN Ada Craig MD     • oxyCODONE  5 mg Oral Q4H PRN Ada Craig MD     • pregabalin  150 mg Oral TID Jose C Beverly          Today, Patient Was Seen By: Ada Craig MD    **Please Note: This note may have been constructed using a voice recognition system  **

## 2023-04-04 ENCOUNTER — TELEPHONE (OUTPATIENT)
Dept: OTHER | Facility: OTHER | Age: 68
End: 2023-04-04

## 2023-04-04 VITALS
OXYGEN SATURATION: 92 % | WEIGHT: 315 LBS | HEIGHT: 74 IN | DIASTOLIC BLOOD PRESSURE: 81 MMHG | SYSTOLIC BLOOD PRESSURE: 170 MMHG | BODY MASS INDEX: 40.43 KG/M2 | RESPIRATION RATE: 20 BRPM | HEART RATE: 88 BPM | TEMPERATURE: 98.1 F

## 2023-04-04 PROBLEM — R65.10 SIRS (SYSTEMIC INFLAMMATORY RESPONSE SYNDROME) (HCC): Status: RESOLVED | Noted: 2023-03-29 | Resolved: 2023-04-04

## 2023-04-04 LAB
ALBUMIN SERPL BCP-MCNC: 2.7 G/DL (ref 3.5–5)
ALP SERPL-CCNC: 58 U/L (ref 34–104)
ALT SERPL W P-5'-P-CCNC: 53 U/L (ref 7–52)
ANION GAP SERPL CALCULATED.3IONS-SCNC: -1 MMOL/L (ref 4–13)
AST SERPL W P-5'-P-CCNC: 20 U/L (ref 13–39)
BASOPHILS # BLD AUTO: 0 THOUSANDS/ÂΜL (ref 0–0.1)
BASOPHILS NFR BLD AUTO: 0 % (ref 0–1)
BILIRUB SERPL-MCNC: 0.27 MG/DL (ref 0.2–1)
BUN SERPL-MCNC: 15 MG/DL (ref 5–25)
CALCIUM ALBUM COR SERPL-MCNC: 8.9 MG/DL (ref 8.3–10.1)
CALCIUM SERPL-MCNC: 7.9 MG/DL (ref 8.4–10.2)
CHLORIDE SERPL-SCNC: 95 MMOL/L (ref 96–108)
CO2 SERPL-SCNC: 44 MMOL/L (ref 21–32)
CREAT SERPL-MCNC: 0.45 MG/DL (ref 0.6–1.3)
EOSINOPHIL # BLD AUTO: 0 THOUSAND/ÂΜL (ref 0–0.61)
EOSINOPHIL NFR BLD AUTO: 0 % (ref 0–6)
ERYTHROCYTE [DISTWIDTH] IN BLOOD BY AUTOMATED COUNT: 12.2 % (ref 11.6–15.1)
GFR SERPL CREATININE-BSD FRML MDRD: 115 ML/MIN/1.73SQ M
GLUCOSE SERPL-MCNC: 177 MG/DL (ref 65–140)
HCT VFR BLD AUTO: 41.6 % (ref 36.5–49.3)
HGB BLD-MCNC: 12.7 G/DL (ref 12–17)
IMM GRANULOCYTES # BLD AUTO: 0.09 THOUSAND/UL (ref 0–0.2)
IMM GRANULOCYTES NFR BLD AUTO: 1 % (ref 0–2)
INR PPP: 1.82 (ref 0.84–1.19)
LYMPHOCYTES # BLD AUTO: 0.41 THOUSANDS/ÂΜL (ref 0.6–4.47)
LYMPHOCYTES NFR BLD AUTO: 5 % (ref 14–44)
MCH RBC QN AUTO: 32.3 PG (ref 26.8–34.3)
MCHC RBC AUTO-ENTMCNC: 30.5 G/DL (ref 31.4–37.4)
MCV RBC AUTO: 106 FL (ref 82–98)
MONOCYTES # BLD AUTO: 0.48 THOUSAND/ÂΜL (ref 0.17–1.22)
MONOCYTES NFR BLD AUTO: 6 % (ref 4–12)
NEUTROPHILS # BLD AUTO: 7.11 THOUSANDS/ÂΜL (ref 1.85–7.62)
NEUTS SEG NFR BLD AUTO: 88 % (ref 43–75)
NRBC BLD AUTO-RTO: 0 /100 WBCS
PLATELET # BLD AUTO: 274 THOUSANDS/UL (ref 149–390)
PMV BLD AUTO: 9.3 FL (ref 8.9–12.7)
POTASSIUM SERPL-SCNC: 3.9 MMOL/L (ref 3.5–5.3)
PROT SERPL-MCNC: 5.3 G/DL (ref 6.4–8.4)
PROTHROMBIN TIME: 21.3 SECONDS (ref 11.6–14.5)
RBC # BLD AUTO: 3.93 MILLION/UL (ref 3.88–5.62)
SODIUM SERPL-SCNC: 138 MMOL/L (ref 135–147)
WBC # BLD AUTO: 8.09 THOUSAND/UL (ref 4.31–10.16)

## 2023-04-04 RX ORDER — PREGABALIN 150 MG/1
150 CAPSULE ORAL 3 TIMES DAILY
Qty: 60 CAPSULE | Refills: 0 | Status: ON HOLD | OUTPATIENT
Start: 2023-04-04

## 2023-04-04 RX ORDER — TRAMADOL HYDROCHLORIDE 50 MG/1
50 TABLET ORAL EVERY 6 HOURS PRN
Qty: 30 TABLET | Refills: 0 | Status: ON HOLD | OUTPATIENT
Start: 2023-04-04

## 2023-04-04 RX ORDER — BENZONATATE 200 MG/1
200 CAPSULE ORAL 3 TIMES DAILY PRN
Qty: 20 CAPSULE | Refills: 0 | Status: ON HOLD
Start: 2023-04-04

## 2023-04-04 RX ORDER — OXYCODONE HYDROCHLORIDE 5 MG/1
5 TABLET ORAL EVERY 6 HOURS PRN
Qty: 20 TABLET | Refills: 0 | Status: ON HOLD | OUTPATIENT
Start: 2023-04-04 | End: 2023-04-09

## 2023-04-04 RX ORDER — HYDROXYZINE HYDROCHLORIDE 25 MG/1
50 TABLET, FILM COATED ORAL EVERY 6 HOURS PRN
Status: DISCONTINUED | OUTPATIENT
Start: 2023-04-04 | End: 2023-04-04 | Stop reason: HOSPADM

## 2023-04-04 RX ORDER — GUAIFENESIN 1200 MG/1
1200 TABLET, EXTENDED RELEASE ORAL EVERY 12 HOURS SCHEDULED
Qty: 28 TABLET | Refills: 0 | Status: ON HOLD
Start: 2023-04-04 | End: 2023-04-18

## 2023-04-04 RX ADMIN — BENZONATATE 200 MG: 100 CAPSULE ORAL at 11:41

## 2023-04-04 RX ADMIN — DEXAMETHASONE SODIUM PHOSPHATE 19.7 MG: 10 INJECTION INTRAMUSCULAR; INTRAVENOUS at 04:37

## 2023-04-04 RX ADMIN — CHLORHEXIDINE GLUCONATE 0.12% ORAL RINSE 15 ML: 1.2 LIQUID ORAL at 11:40

## 2023-04-04 RX ADMIN — METHOCARBAMOL 750 MG: 750 TABLET ORAL at 11:42

## 2023-04-04 RX ADMIN — HYDROXYZINE HYDROCHLORIDE 50 MG: 25 TABLET ORAL at 15:33

## 2023-04-04 RX ADMIN — OXYCODONE HYDROCHLORIDE 5 MG: 5 TABLET ORAL at 03:08

## 2023-04-04 RX ADMIN — PREGABALIN 150 MG: 75 CAPSULE ORAL at 05:57

## 2023-04-04 RX ADMIN — ALBUTEROL SULFATE 2.5 MG: 2.5 SOLUTION RESPIRATORY (INHALATION) at 06:14

## 2023-04-04 RX ADMIN — FUROSEMIDE 40 MG: 40 TABLET ORAL at 11:41

## 2023-04-04 RX ADMIN — GUAIFENESIN 1200 MG: 600 TABLET ORAL at 11:40

## 2023-04-04 RX ADMIN — OXYCODONE HYDROCHLORIDE 5 MG: 5 TABLET ORAL at 11:44

## 2023-04-04 RX ADMIN — METHOCARBAMOL 750 MG: 750 TABLET ORAL at 03:09

## 2023-04-04 RX ADMIN — HYDROXYZINE HYDROCHLORIDE 50 MG: 25 TABLET ORAL at 11:40

## 2023-04-04 RX ADMIN — ACETAMINOPHEN 975 MG: 325 TABLET, FILM COATED ORAL at 05:57

## 2023-04-04 RX ADMIN — PREGABALIN 150 MG: 75 CAPSULE ORAL at 15:31

## 2023-04-04 RX ADMIN — ACETAMINOPHEN 975 MG: 325 TABLET, FILM COATED ORAL at 15:31

## 2023-04-04 NOTE — CASE MANAGEMENT
Case Management Progress Note    Patient name Marylin Goode S /S -09 MRN [id number]  : 1955 Date 2023       LOS (days): 6  Geometric Mean LOS (GMLOS) (days): 5 20  Days to GMLOS:-1 1        OBJECTIVE:        Current admission status: Inpatient  Preferred Pharmacy:   [address], [place] [address] [facility] [place]  [address] [address]  Phone: [phone] Fax: [phone]    Primary Care Provider: Viridiana Cabello MD    Primary Insurance: [address] REP  Secondary Insurance:     PROGRESS NOTE:    CM informed by CM DC Support, they received a vm about Darene Locket from veronica at [facility]  received an [facility] request from [address] [place], [place] [address]ino [place] post acute, physician is requesting a p2p P# [phone] opt 5, have 3 pt ids, deadline is 1:30 pm today   Cliff P# [phone] ext [phone]  CM provided the above information to Dr Timothy Khan and [facility] Resident Dr Jose Magallon  CM also inquired with the medical team if pt is on both BiPap and Cpap, as only noted a Bipap ordered, so that facility can have the correct equipment, awaiting response

## 2023-04-04 NOTE — PLAN OF CARE
Problem: PHYSICAL THERAPY ADULT  Goal: Performs mobility at highest level of function for planned discharge setting  See evaluation for individualized goals  Description: Treatment/Interventions: Functional transfer training, LE strengthening/ROM, Therapeutic exercise, Endurance training, Patient/family training, Equipment eval/education, Gait training, Bed mobility          See flowsheet documentation for full assessment, interventions and recommendations  Outcome: Not Progressing  Note:    Problem List: Decreased strength, Decreased range of motion, Decreased endurance, Impaired balance, Decreased mobility, Decreased safety awareness, Obesity, Pain, Decreased skin integrity  Assessment: pt begen tx session lying supine in the bed and was agreeable to participate in PT intervention  pt was educated on all bed mobility strategies such as using trapeze and bed rails for rolling and repositioning  pt was able to pull up on trapeze 5x in order to strengthen UE's  pt required increased assistance for rolling and repositioning in the bed as pt required max Ax1 for rolling to L and to R with LE and trunk management  pt was also max Ax1 for completing a supine< sit EOB transgfer as pt required LE and trunk maangement  pt limited with sitting tolerance due to SOB and fatigue  pt returned to supine position with max Ax2 and required max Ax4 to reposition towards HOB  Continue to recommedn post acute rehab services at the time of D/C in order to maximize pt functional independence and safety will all OOB mobility  Post tx pt in bed with call bell and all pt needs met        PT Discharge Recommendation: Post acute rehabilitation services    See flowsheet documentation for full assessment

## 2023-04-04 NOTE — CASE MANAGEMENT
Case Management Progress Note    Patient name Hina Conley  Location S /S -32 MRN [id number]  : 1955 Date 2023       LOS (days): 6  Geometric Mean LOS (GMLOS) (days): 5 20  Days to GMLOS:-1 2        OBJECTIVE:        Current admission status: Inpatient  Preferred Pharmacy:   [address] [place] [address] [facility]  [facility]  [address] [address]  Phone: [phone] Fax: [phone]    Primary Care Provider: Jessica Mims MD    Primary Insurance: [address] [place] [address]  Secondary Insurance:     PROGRESS NOTE:    CM received a message from spouse requesting a return call ASAP as she forgot to mention something to CM on the prior call  CM attempted to return call, however went straight to   CM left detailed message regarding the above

## 2023-04-04 NOTE — QUICK NOTE
Fred patient's spouse, Peggy Au, to provide update  All questions answered         Cary Vega MD    PGY-1

## 2023-04-04 NOTE — PLAN OF CARE
Problem: MOBILITY - ADULT  Goal: Maintain or return to baseline ADL function  Description: INTERVENTIONS:  -  Assess patient's ability to carry out ADLs; assess patient's baseline for ADL function and identify physical deficits which impact ability to perform ADLs (bathing, care of mouth/teeth, toileting, grooming, dressing, etc )  - Assess/evaluate cause of self-care deficits   - Assess range of motion  - Assess patient's mobility; develop plan if impaired  - Assess patient's need for assistive devices and provide as appropriate  - Encourage maximum independence but intervene and supervise when necessary  - Involve family in performance of ADLs  - Assess for home care needs following discharge   - Consider OT consult to assist with ADL evaluation and planning for discharge  - Provide patient education as appropriate  Outcome: Adequate for Discharge  Goal: Maintains/Returns to pre admission functional level  Description: INTERVENTIONS:  - Perform BMAT or MOVE assessment daily    - Set and communicate daily mobility goal to care team and patient/family/caregiver  - Collaborate with rehabilitation services on mobility goals if consulted  - Perform Range of Motion 2 times a day  - Reposition patient every 2 hours  - Dangle patient 2 times a day  - Stand patient 2 times a day  - Ambulate patient 3 times a day  - Out of bed to chair 3 times a day   - Out of bed for meals 3 times a day  - Out of bed for toileting  - Record patient progress and toleration of activity level   Outcome: Adequate for Discharge     Problem: Nutrition/Hydration-ADULT  Goal: Nutrient/Hydration intake appropriate for improving, restoring or maintaining nutritional needs  Description: Monitor and assess patient's nutrition/hydration status for malnutrition  Collaborate with interdisciplinary team and initiate plan and interventions as ordered  Monitor patient's weight and dietary intake as ordered or per policy   Utilize nutrition screening tool and intervene as necessary  Determine patient's food preferences and provide high-protein, high-caloric foods as appropriate       INTERVENTIONS:  - Monitor oral intake, urinary output, labs, and treatment plans  - Assess nutrition and hydration status and recommend course of action  - Evaluate amount of meals eaten  - Assist patient with eating if necessary   - Allow adequate time for meals  - Recommend/ encourage appropriate diets, oral nutritional supplements, and vitamin/mineral supplements  - Order, calculate, and assess calorie counts as needed  - Recommend, monitor, and adjust tube feedings and TPN/PPN based on assessed needs  - Assess need for intravenous fluids  - Provide specific nutrition/hydration education as appropriate  - Include patient/family/caregiver in decisions related to nutrition  Outcome: Adequate for Discharge     Problem: Prexisting or High Potential for Compromised Skin Integrity  Goal: Skin integrity is maintained or improved  Description: INTERVENTIONS:  - Identify patients at risk for skin breakdown  - Assess and monitor skin integrity  - Assess and monitor nutrition and hydration status  - Monitor labs   - Assess for incontinence   - Turn and reposition patient  - Assist with mobility/ambulation  - Relieve pressure over bony prominences  - Avoid friction and shearing  - Provide appropriate hygiene as needed including keeping skin clean and dry  - Evaluate need for skin moisturizer/barrier cream  - Collaborate with interdisciplinary team   - Patient/family teaching  - Consider wound care consult   Outcome: Adequate for Discharge

## 2023-04-04 NOTE — PLAN OF CARE
Problem: MOBILITY - ADULT  Goal: Maintain or return to baseline ADL function  Description: INTERVENTIONS:  -  Assess patient's ability to carry out ADLs; assess patient's baseline for ADL function and identify physical deficits which impact ability to perform ADLs (bathing, care of mouth/teeth, toileting, grooming, dressing, etc )  - Assess/evaluate cause of self-care deficits   - Assess range of motion  - Assess patient's mobility; develop plan if impaired  - Assess patient's need for assistive devices and provide as appropriate  - Encourage maximum independence but intervene and supervise when necessary  - Involve family in performance of ADLs  - Assess for home care needs following discharge   - Consider OT consult to assist with ADL evaluation and planning for discharge  - Provide patient education as appropriate  Outcome: Progressing  Goal: Maintains/Returns to pre admission functional level  Description: INTERVENTIONS:  - Perform BMAT or MOVE assessment daily    - Set and communicate daily mobility goal to care team and patient/family/caregiver  - Collaborate with rehabilitation services on mobility goals if consulted    - Reposition patient every 2 hours  - Record patient progress and toleration of activity level   Outcome: Progressing     Problem: Nutrition/Hydration-ADULT  Goal: Nutrient/Hydration intake appropriate for improving, restoring or maintaining nutritional needs  Description: Monitor and assess patient's nutrition/hydration status for malnutrition  Collaborate with interdisciplinary team and initiate plan and interventions as ordered  Monitor patient's weight and dietary intake as ordered or per policy  Utilize nutrition screening tool and intervene as necessary  Determine patient's food preferences and provide high-protein, high-caloric foods as appropriate       INTERVENTIONS:  - Monitor oral intake, urinary output, labs, and treatment plans  - Assess nutrition and hydration status and recommend course of action  - Evaluate amount of meals eaten  - Assist patient with eating if necessary   - Allow adequate time for meals  - Recommend/ encourage appropriate diets, oral nutritional supplements, and vitamin/mineral supplements  - Order, calculate, and assess calorie counts as needed  - Recommend, monitor, and adjust tube feedings and TPN/PPN based on assessed needs  - Assess need for intravenous fluids  - Provide specific nutrition/hydration education as appropriate  - Include patient/family/caregiver in decisions related to nutrition  Outcome: Progressing     Problem: Prexisting or High Potential for Compromised Skin Integrity  Goal: Skin integrity is maintained or improved  Description: INTERVENTIONS:  - Identify patients at risk for skin breakdown  - Assess and monitor skin integrity  - Assess and monitor nutrition and hydration status  - Monitor labs   - Assess for incontinence   - Turn and reposition patient  - Assist with mobility/ambulation  - Relieve pressure over bony prominences  - Avoid friction and shearing  - Provide appropriate hygiene as needed including keeping skin clean and dry  - Evaluate need for skin moisturizer/barrier cream  - Collaborate with interdisciplinary team   - Patient/family teaching  - Consider wound care consult   Outcome: Progressing

## 2023-04-04 NOTE — DISCHARGE SUMMARY
[facility] 1955, 69 y o  male MRN: [id number]  Unit/Bed#: S -01 Encounter: [id number]  Primary Care Provider: Silvia Sanchez MD   Date and time admitted to hospital: 3/28/2023 10:04 PM    * Acute on chronic respiratory failure with hypoxia and hypercapnia ([address] )  Assessment & Plan  Pt presented to ED with C/o productive cough x 1 week, shortness of breath, no improvement with nebs  He also noted that his SpO2 was low in the 80's at rehab  Initial VBG in ED demonstrated hypercapnic acidosis and patient was placed on BiPAP in ED  Respiratory virus panel was positive for COVID  Legionella and Strep urine Ag were negative and CT chest showed mild dependent atelectasis  Patient's acute on chronic respiratory failure was likely 2/2 COVID with OHS and CPAP noncompliance contributing to chronic respiratiory failure with hypoxia and hypercapnia  Patient was initially admitted to critical care, then transferred to [facility] level of care after he was no longer requiring BiPAP continuously  Throughout admission patient was titrated back down to home O2 requirement of 4-5L NC with BiPAP qHS  Plan: d/c with BiPAP qHS  Follow up with PCP and pulmonology outpatient  SIRS (systemic inflammatory response syndrome) (HCC)-resolved as of 4/4/2023  Assessment & Plan  On admission, patient met SIRS criteria with tachycardia and tachypnea  CT chest showed mild bilateral dependent atelectasis  Patient received cefepime and vancomycin ED, but then UA was negative, procal was negative, and lactic was negative  Blood cultures had no growth after 5 days and sputum cultures were positive only for mixed respiratory tana  SIRS was most likely 2/2 COVID with no other source of infection  Patient improved with moderate-severe COVID treatment pathway with 5 doses remdesivir and 10 days of IV dexamethasone 0 1 mg/kg BID   Patient discharging with 3 more days of dexamethasone to complete course  COVID-19  Assessment & Plan  Patient presented from SNF with cough and SOB x 1 week  In ED, viral panel positive for COVID  Procal was negative and BNP were negative  CRP and D-Dimer were elevated and trending down  Patient was treated on moderate-severe COVID pathway with 5 daqys of remdesivir and 10 days IV dexamethasone 0 1 mg/kg BID (3 days remaining at time of discharge)  Patient was also treated symptomatically with mucinex 1200 mg BID for mucous and tessalon perles 200mg BID for cough  Patient discharging with refills of same PRN  Pulmonary hypertension ([facility] [address] )  Assessment & Plan  Patient with history of pulmonary hypertension  On most recent echo, PAP unable to be calculated   PTA: lasix 40mg daily   Patient appears euvolemic on exam without any peripheral edema  No large pleural effusions seen on imaging  No JVD  Plan: Continue PTA lasix 40mg daily    Chronic pain disorder  Assessment & Plan  Patient with chronic pain from post herpetic neuralgia and chronic back pain  PTA: Tramadol, Robaxin, Lyrica  Patient treated with tylenol, oxycodone, lyrica and robaxin during admission  Plan: resume home pain regimen and follow up outpatient with pain management  CHF (congestive heart failure) (HCC)  Assessment & Plan  Hx diastolic HF with most recent echo (2/5/23) - LVEF 60%, indeterminate diastolic function  RV mildly dilated  PTA Lasix 40mg  No evidence of acute exacerbation during admission  Plan: continue PTA lasix regimen  History of pulmonary embolism  Assessment & Plan  Hx PE noted on CT PE study 1/12/2018 - Pulmonary embolism is demonstrated as discussed above involving segmental and subsegmental branches of the left and right main pulmonaries  PTA: Warfarin 9mg daily (mon-Fri) and 10mg (Sat-Sun)  Warfarin held for few doses during admission 2/2 supratherapeutic INR on arrival  Resumed 2 days prior to discharge  Plan: resume PTA warfarin schedule   Goal "INR 2-3  Follow up with PCP  Severe obstructive sleep apnea  Assessment & Plan  Patient has history of EVERETT and is prescribed CPAP for home use  Patient endorses compliance but medical record notes hx of non-compliance  EVERETT likely 2/2 BMI 55 and OHS  Patient was maintained on qHS BiPAP during admission  Discharge on same  (15 inspiratory/5 expiratory)    Primary hypertension  Assessment & Plan  Patient not on any anti-HTN medications on arrival    Intermittently elevated blood pressures during admission  Patient on lasix 40mg daily PTA  Continue lasix 40mg at discharge and follow up with PCP  Morbid obesity ([facility] )  Assessment & Plan  BMI 55 72  BiPAP qHS during admission for OHS  Continue on discharge  15 inspiration/5 expiration)   HgbA1c assessed during admission  Patient without diabetes or pre-diabetes  Recommend follow up with PCP and consider bariatrics referral outpatient  Medical Problems     Resolved Problems  Date Reviewed: 4/4/2023          Resolved    SIRS (systemic inflammatory response syndrome) ([facility] ) 4/4/2023     Resolved by  Carlie Read MD        Discharging Physician / Practitioner: Carlie Read MD  PCP: Jodie Hernandez MD  Admission Date:   Admission Orders (From admission, onward)     Ordered        03/29/23 0317  INPATIENT ADMISSION  Once                      Discharge Date: 04/04/23    Consultations During Hospital Stay:  · Critical Care   · PT/OT  · Respiratory Therapy   · Wound Care    Procedures Performed:   · None    Significant Findings / Test Results:   · SARS-CoV-2 positive     Incidental Findings:   · None    Test Results Pending at Discharge (will require follow up): · None     Outpatient Tests Requested:  · None    Complications:  None    Reason for Admission: Acute Hypoxic Respiratory Failure     HPI: Gil Koch is a 69 y o  with a PMHx: CHF, DVT and PE on warfarin, HTN, OHS on chronic O2 4L NC, chronic back pain and pulm hypertension   He had a " "recent admission to [facility] to the ICU on 23 to 3/6/23 for acute/chronic hypoxic/hypercapnic respiratory failure secondary to acute CHF  During this admission he was on BiPAP briefly for hypercarbia and encephalopathy, diuresed well with lasix and improved  At the time of discharge he was too deconditioned and was discharged to a SNF  He presented to the ED with productive cough x 1 week, progressive shortness of breath no improvement with nebs  He was also noted to be hypoxic in the 80's at  On arrival to the ED he was noted to short fo breath and hypoxic in the 80's on baseline 4L NC  He was placed on BiPAP for work of breathing  CT chest - Mild bilateral dependent atelectasis, CT ABD/pelvis - Sludge and/or stones within the gallbladder  No pericholecystic inflammatory changes  RVP - positive for COVID  /102/64/50, other labs Procal 0 13, CRP 24 2, D-dimer 0 44, INR 2 40  In the ED he received albuterol/atrovent and 3% saline, solumedrol, cefepime and vancomycin  On exam pt is sleeping with BiPAP in place, VS stable  Wakes easily to verbal stimuli, oriented to person/place/date/event however during exam he would fall asleep during conversation  He endorses a intermittent productive cough  He denies any pain, shortness of breath, increased WOB, N/V, fever/chills  \"    Hospital Course:   Marie Talbot is a 69 y o  male patient who originally presented to the hospital on 3/28/2023 due to 1 week of productive cough and worsening SOB  Patient was found to be hypoxic and COVID+  He was placed in BiPAP for respiratory support and admitted to critical care for concern of hypercapnia and lethargy  Patient was started on moderate-severe COVID treatment pathway and started course of Remdesivir and Dexamethasone  Within 24 hours of critical care admission patient was weaned off of BiPAP and transferred to med-surg level of service   Patient did very well with COVID treatment and returned to baseline O2 " "requirements within a few days  He finished his 5 day course of remdesivir and has 3 days remaining of his 10 day course of dexamethasone at time of discharge  He was maintained on qHS BiPAP throughout admission for OHS and is being discharged on same  Patient was seen and evaluated by PT/OT during admission, who recommended discharge to post-acute rehabilitation services  Patient agreeable to plan and is being discharged to rehab facility in stable condition  Please see above list of diagnoses and related plan for additional information  Condition at Discharge: stable    Discharge Day Visit / Exam:   Subjective:  Patient seen and examined at bedside this morning  Patient reports some anxiety about going back to rehab  He denies any fevers, chills, abdominal pain, nausea, vomiting, diarrhea, or constipation  He does endorse back pain, which is chronic  Overnight, no acute events reported by night team       Nursing reports no concerns  Yesterday, patient continued working with physical therapy and occupational therapy  Vitals: Blood Pressure: 170/81 (04/04/23 0841)  Pulse: 88 (04/04/23 1442)  Temperature: 98 1 °F (36 7 °C) (04/04/23 0841)  Temp Source: Oral (04/03/23 1652)  Respirations: 20 (04/04/23 1442)  Height: 6' 2\" (188 cm) (03/29/23 0300)  Weight - Scale: (!) 186 kg (410 lb 15 oz) (04/04/23 0600)  SpO2: 92 % (04/04/23 1442)     Exam:   Physical Exam  Vitals and nursing note reviewed  Constitutional:       General: He is not in acute distress  Appearance: He is obese  He is not ill-appearing, toxic-appearing or diaphoretic  Interventions: Nasal cannula in place  HENT:      Head: Normocephalic and atraumatic  Nose: Nose normal  No congestion or rhinorrhea  Mouth/Throat:      Mouth: Mucous membranes are moist    Eyes:      General: No scleral icterus  Right eye: No discharge  Left eye: No discharge        Conjunctiva/sclera: Conjunctivae normal  " Cardiovascular:      Rate and Rhythm: Normal rate and regular rhythm  Heart sounds: Normal heart sounds  No murmur heard  No friction rub  No gallop  Pulmonary:      Effort: Pulmonary effort is normal  No respiratory distress  Breath sounds: No stridor  No wheezing, rhonchi or rales  Chest:      Chest wall: No tenderness  Abdominal:      General: Bowel sounds are normal  There is no distension  Palpations: Abdomen is soft  Tenderness: There is no abdominal tenderness  There is no guarding  Musculoskeletal:         General: No swelling, tenderness, deformity or signs of injury  Right lower leg: No edema  Left lower leg: No edema  Skin:     General: Skin is warm and dry  Capillary Refill: Capillary refill takes less than 2 seconds  Coloration: Skin is not jaundiced or pale  Findings: No bruising, erythema, lesion or rash  Neurological:      Mental Status: He is alert  Motor: Weakness present  Psychiatric:         Attention and Perception: Attention normal          Mood and Affect: Affect normal  Mood is anxious  Speech: Speech normal          Behavior: Behavior normal  Behavior is cooperative  Judgment: Judgment normal         Discussion with Family: Updated  (wife) via phone  Discharge instructions/Information to patient and family:   See after visit summary for information provided to patient and family  Provisions for Follow-Up Care:  See after visit summary for information related to follow-up care and any pertinent home health orders  Disposition:   Acute Rehab at [facility] - EXTENDED CARE Post Acute    Planned Readmission: No     Discharge Statement:  I spent 60 minutes discharging the patient  This time was spent on the day of discharge  I had direct contact with the patient on the day of discharge   Greater than 50% of the total time was spent examining patient, answering all patient questions, arranging and discussing plan of care with patient as well as directly providing post-discharge instructions  Additional time then spent on discharge activities  Discharge Medications:  See after visit summary for reconciled discharge medications provided to patient and/or family        **Please Note: This note may have been constructed using a voice recognition system**

## 2023-04-04 NOTE — DISCHARGE INSTR - AVS FIRST PAGE
Dear Kamran Bragg,     It was our pleasure to care for you here at [facility]  It is our hope that we were always able to exceed the expected standards for your care during your stay  You were hospitalized due to COVID  You were cared for on the third floor by Cary Vega MD under the service of Cyndee Peck MD with the [facility] who covers for your primary care physician (PCP), Bart Felix MD, while you were hospitalized  If you have any questions or concerns related to this hospitalization, you may contact us at [phone]  For follow up as well as any medication refills, we recommend that you follow up with your primary care physician  A registered nurse will reach out to you by phone within a few days after your discharge to answer any additional questions that you may have after going home  However, at this time we provide for you here, the most important instructions / recommendations at discharge:       Notable Medication Adjustments -   Please continue decadron taper as instructed on the bottle  Testing Required after Discharge -   None    Important follow up information -   Please follow up with your PCP  Please follow up with Pulmonology  Please follow up with Cardiology  Other Instructions -   Best of luck at rehab! Please review this entire after visit summary as additional general instructions including medication list, appointments, activity, diet, any pertinent wound care, and other additional recommendations from your care team that may be provided for you        Wishing you all the best,     Cary Vega MD

## 2023-04-04 NOTE — CASE MANAGEMENT
Case Management Discharge Planning Note    Patient name Kellie Segura  Location S /S -30 MRN [id number]  : 1955 Date 2023       Current Admission Date: 3/28/2023  Current Admission Diagnosis:Acute on chronic respiratory failure with hypoxia and hypercapnia [facility])   Patient Active Problem List    Diagnosis Date Noted   • COVID-19 2023   • Cerumen debris on tympanic membrane of both ears 2023   • Generalized weakness 2023   • Impaired functional mobility, balance, gait, and endurance 2023   • BPH (benign prostatic hyperplasia) 2023   • Chronic anticoagulation 2023   • Pressure injury, unstageable, with suspected deep tissue injury ([address] ) 2023   • Dyspnea on exertion 2023   • Morbid obesity ([address] ) 2023   • Primary hypertension 2023   • History of pulmonary embolism 2023   • CHF (congestive heart failure) ([address] ) 2023   • DVT (deep venous thrombosis) ([address] ) 2023   • Acute on chronic diastolic (congestive) heart failure ([address] ) 2021   • DJD (degenerative joint disease), multiple sites 2021   • Ambulatory dysfunction 2021   • Chronic right-sided congestive heart failure ([address] ) 2021   • BMI 50 0-59 9, adult ([address] ) 2020   • Debility 2020   • Acute on chronic respiratory failure with hypoxia and hypercapnia ([address] ) 2020   • Restrictive lung disease 2020   • Medical marijuana use 2018   • Severe obstructive sleep apnea 2018   • Obesity hypoventilation syndrome ([address] ) 2018   • Pulmonary hypertension ([address] ) 01/15/2018   • Pain medication agreement signed 2017   • Neuralgia, post-herpetic 2017   • Sciatica 2012   • Fercho de la Tourette's syndrome 2012   • Chronic pain disorder 2012      LOS (days): 6  Geometric Mean LOS (GMLOS) (days): 5 20  Days to GMLOS:-1 2     OBJECTIVE:  Risk of Unplanned Readmission Score: 17 01         Current admission status: Inpatient   Preferred Pharmacy:   [address]  [address] [address]  Phone: [phone] Fax: [phone]    Primary Care Provider: Jatin Barber MD    Primary Insurance: [facility] [employer] RE[employer]  Secondary Insurance:     DISCHARGE DETAILS:    Contacts  Patient Contacts: Shilpa  Relationship to Patient[de-identified] Family  Contact Method: Phone  Phone Number: [phone]  Reason/Outcome: Continuity of Care, Discharge Planning    Other Referral/Resources/Interventions Provided:  Interventions: Short Term Rehab, Transportation  Referral Comments: CM informed P2P approved  CM requested 4pm transport pending spouse conversation with  Bobbi Birch  CM informed spouse-Shilpa as well as [facility], Charles, Dr Carrol Barfield, and [facility] Resident Dr Ivet Crawford    Dispatcher Contacted: Yes  Number/Name of Dispatcher: Roundtrip     ETA of Transport (Date): 04/04/23  ETA of Transport (Time): 1600 (requested, pending confirmation of time)    Accepting Facility Name, [facility] : [address] [facility]  Receiving Facility/Agency Phone Number: [phone]  Facility/Agency Fax Number: [phone]       CM received message from [facility] confirming facility did speak with pt's spouse

## 2023-04-04 NOTE — PHYSICAL THERAPY NOTE
PHYSICAL THERAPY NOTE          Patient Name: Constantin Wilson  EJIIO'A Date: 23 1458   PT Last Visit   PT Visit Date 23   Note Type   Note Type Treatment   Pain Assessment   Pain Assessment Tool 0-10   Pain Score 5   Pain Location/Orientation Location: Back   Effect of Pain on Daily Activities limited activity tolerance and bed mobility   Patient's Stated Pain Goal No pain   Hospital Pain Intervention(s) Repositioned; Ambulation/increased activity; Rest;Emotional support   Restrictions/Precautions   Weight Bearing Precautions Per Order No   Other Precautions Contact/isolation; Airborne/isolation;Multiple lines;O2;Fall Risk;Pain   General   Chart Reviewed Yes   Additional Pertinent History Sp02 90> throughout tx session   Family/Caregiver Present No   Cognition   Overall Cognitive Status Impaired   Arousal/Participation Alert; Responsive; Cooperative   Attention Attends with cues to redirect   Orientation Level Oriented X4   Memory Decreased recall of precautions;Decreased short term memory   Following Commands Follows one step commands with increased time or repetition   Comments pt identified by name and    Subjective   Subjective pt was agreeable to participate in PT intervention   Bed Mobility   Rolling R 2  Maximal assistance   Additional items Assist x 1;HOB elevated; Bedrails; Increased time required;Verbal cues;LE management; Other  (trunk management)   Rolling L 2  Maximal assistance   Additional items Assist x 1;HOB elevated; Bedrails; Increased time required;Verbal cues;LE management; Other  (trunk managmeent)   Supine to Sit 2  Maximal assistance   Additional items Assist x 1;HOB elevated; Bedrails; Increased time required;LE management; Other  (HHA)   Sit to Supine 2  Maximal assistance   Additional items Assist x 1;HOB elevated; Bedrails; Increased time required;Verbal cues;LE management; Other  (trunk maangement)   Additional Comments VC's for breathing strategies, use of trapeze and assist of PCA for safety   Transfers   Sit to Stand Unable to assess   Ambulation/Elevation   Gait pattern Not appropriate   Balance   Static Sitting Fair -   Static Standing Zero   Ambulatory Zero   Endurance Deficit   Endurance Deficit Yes   Endurance Deficit Description limited bed mobility, activity tolerance   Activity Tolerance   Activity Tolerance Patient limited by fatigue; Other (Comment)  (SOB, breathing techniques required)   Exercises   Heelslides Supine;10 reps;AROM; Bilateral   Hip Abduction Supine;15 reps;AAROM; Bilateral   Hip Adduction Supine;15 reps;AAROM; Bilateral   Ankle Pumps Supine;10 reps;AROM; Bilateral   UE Exercise Supine;5 reps;AROM; Bilateral  (pull ups using trapeze)   Assessment   Problem List Decreased strength;Decreased range of motion;Decreased endurance; Impaired balance;Decreased mobility; Decreased safety awareness; Obesity;Pain;Decreased skin integrity   Assessment pt begen tx session lying supine in the bed and was agreeable to participate in PT intervention  pt was educated on all bed mobility strategies such as using trapeze and bed rails for rolling and repositioning  pt was able to pull up on trapeze 5x in order to strengthen UE's  pt required increased assistance for rolling and repositioning in the bed as pt required max Ax1 for rolling to L and to R with LE and trunk management  pt was also max Ax1 for completing a supine< sit EOB transgfer as pt required LE and trunk maangement  pt limited with sitting tolerance due to SOB and fatigue  pt returned to supine position with max Ax2 and required max Ax4 to reposition towards HOB  Continue to recommedn post acute rehab services at the time of D/C in order to maximize pt functional independence and safety will all OOB mobility   Post tx pt in bed with call bell and all pt needs met   Goals   Patient Goals none stated   STG Expiration Date 04/14/23   PT Treatment Day 2   Plan   Treatment/Interventions Functional transfer training;LE strengthening/ROM; Therapeutic exercise; Endurance training;Patient/family training;Equipment eval/education; Bed mobility;Gait training;Spoke to nursing   Progress Progressing toward goals   PT Frequency 3-5x/wk   Recommendation   PT Discharge Recommendation Post acute rehabilitation services   AM-PAC Basic Mobility Inpatient   Turning in Flat Bed Without Bedrails 2   Lying on Back to Sitting on Edge of Flat Bed Without Bedrails 2   Moving Bed to Chair 1   Standing Up From Chair Using Arms 1   Walk in Room 1   Climb 3-5 Stairs With Railing 1   Basic Mobility Inpatient Raw Score 8   Highest Level Of Mobility   -[facility] Goal 3: Sit at edge of bed   JH-HLM Achieved 3: Sit at edge of bed   Education   Education Provided Other  (bed mobility)   End of Consult   Patient Position at End of Consult Supine;Bed/Chair alarm activated; All needs within reach   The patient's AM-PAC Basic Mobility Inpatient Short Form Raw Score is 8  A Raw score of less than or equal to 16 suggests the patient may benefit from discharge to post-acute rehabilitation services  Please also refer to the recommendation of the Physical Therapist for safe discharge planning       Asim Tsang

## 2023-04-05 ENCOUNTER — TELEPHONE (OUTPATIENT)
Dept: OTHER | Facility: OTHER | Age: 68
End: 2023-04-05

## 2023-04-05 ENCOUNTER — NURSING HOME VISIT (OUTPATIENT)
Dept: GERIATRICS | Facility: OTHER | Age: 68
End: 2023-04-05

## 2023-04-05 DIAGNOSIS — U07.1 COVID-19: ICD-10-CM

## 2023-04-05 DIAGNOSIS — I27.20 PULMONARY HYPERTENSION (HCC): ICD-10-CM

## 2023-04-05 DIAGNOSIS — I50.812 CHRONIC RIGHT-SIDED CONGESTIVE HEART FAILURE (HCC): ICD-10-CM

## 2023-04-05 DIAGNOSIS — G47.33 OSA (OBSTRUCTIVE SLEEP APNEA): ICD-10-CM

## 2023-04-05 DIAGNOSIS — J96.22 ACUTE ON CHRONIC RESPIRATORY FAILURE WITH HYPOXIA AND HYPERCAPNIA (HCC): Primary | ICD-10-CM

## 2023-04-05 DIAGNOSIS — R65.10 SIRS (SYSTEMIC INFLAMMATORY RESPONSE SYNDROME) (HCC): ICD-10-CM

## 2023-04-05 DIAGNOSIS — G89.4 CHRONIC PAIN DISORDER: ICD-10-CM

## 2023-04-05 DIAGNOSIS — J96.21 ACUTE ON CHRONIC RESPIRATORY FAILURE WITH HYPOXIA AND HYPERCAPNIA (HCC): Primary | ICD-10-CM

## 2023-04-05 DIAGNOSIS — I27.82 CHRONIC PULMONARY EMBOLISM WITHOUT ACUTE COR PULMONALE, UNSPECIFIED PULMONARY EMBOLISM TYPE (HCC): ICD-10-CM

## 2023-04-05 DIAGNOSIS — E66.01 MORBID OBESITY (HCC): ICD-10-CM

## 2023-04-05 DIAGNOSIS — I10 PRIMARY HYPERTENSION: ICD-10-CM

## 2023-04-05 NOTE — UTILIZATION REVIEW
NOTIFICATION OF ADMISSION DISCHARGE   This is a Notification of Discharge from [address]  Please be advised that this patient has been discharge from our facility  Below you will find the admission and discharge date and time including the patient’s disposition  UTILIZATION REVIEW CONTACT:  Neftaly Torres MA  Utilization   Network Utilization Review Department  Phone: [phone] x carefully listen to the prompts  All voicemails are confidential   Email: [email or web address]     ADMISSION INFORMATION  PRESENTATION DATE: 3/28/2023 10:04 PM  OBERVATION ADMISSION DATE:   INPATIENT ADMISSION DATE: 3/29/23  3:17 AM   DISCHARGE DATE: 4/4/2023  5:09 PM   DISPOSITION:Non [facility] SNF/TCU/SNU    IMPORTANT INFORMATION:  Send all requests for admission clinical reviews, approved or denied determinations and any other requests to dedicated fax number below belonging to the campus where the patient is receiving treatment   List of dedicated fax numbers:  [address] DENIALS (Administrative/Medical Necessity) [phone]   [address] (Maternity/NICU/Pediatrics) [phone]   [facility] [phone]   [place] [address] [phone]   [address] [phone]   220 [facility] [phone]   90 [facility] [phone]   [address] [place] [address] 119 [phone]   [facility]  [phone] 4058 [facility] [phone] 412 [facility] [address] [facility] [phone]

## 2023-04-05 NOTE — PROGRESS NOTES
[facility]  [address] [facility] [address], [address]    Nursing Home Admission    NAME: Sean Owens  AGE: 69 y o  SEX: male [id number]    DATE OF ENCOUNTER: 4/5/2023    Assessment/Plan:   Patient’s care was coordinated with nursing facility staff  Recent vitals, labs and updated medications were reviewed on [facility]  Past Medical, surgical, social, medication and allergy history and patient’s previous records reviewed  1  Acute on chronic respiratory failure with hypoxia and hypercapnia (HCC)        2  SIRS (systemic inflammatory response syndrome) (HCC)        3  COVID-19        4  Chronic pulmonary embolism without acute cor pulmonale, unspecified pulmonary embolism type ([facility] [address] )        5  Chronic right-sided congestive heart failure ([facility] )        6  Chronic pain disorder        7  Pulmonary hypertension ([facility] )        8  EVERETT (obstructive sleep apnea)        9  Primary hypertension        10   Morbid obesity ([facility] )             Acute on chronic respiratory failure with hypoxia and hypercapnia (HCC)  Due to covid  Pt had required bipap as inpt & was in icu  Pt on bipap now QHS  Cont 4-5 liters nasal cannula humidified  F/u outpatient with pulm    SIRS (systemic inflammatory response syndrome) (HCC)  Likely due to covid  Neg blood cultures  Pt s/p remdesivir for 5 days  S/p 5 days of IV dexamethasone  Finish dexamethasone course    COVID-19  Pt covid positive on 3/29/2023  S/p 5 days of remdesivir  S/p 10 days of IV dexamethasone  Finish po dexamethasone course  Cont prn mucinex    Pulmonary embolism (HCC)  Pt with hx pulmonary embolism 1/12/29  INR 1 8 today  Pt normally on warfarn 9 mg T-Sun & 10 mg on the Mon now  Next INR check 4/7/2023    Chronic right-sided congestive heart failure (HCC)  Pt volume overloaded  439 6 Lbs   Anasarca  Clear lungs  2/5/2023 echo EF 30% & diastolic indeterminate function  Cont lasix 40 mg 1 tab po daily  Check regular weights    Chronic pain disorder  Pt with chronic back pain  Hx of post herpetic neuralgia  Cont tramadol, robaxin & lyrica  F/u outpatient pain management    Pulmonary hypertension (HCC)  Pt with hx of pulm htn  Unable to calculate on recent echo  Cont lasix 40 mg 1 tab po daily  F/u outpatient with pulmonary    EVERETT (obstructive sleep apnea)  Pt with BMI 55 & obesity hypoventilation syndrome  Cont CPAP QHS    Primary hypertension  4/4/2023 18:25 124 / 72 mmHg   BP controlled  Cont lasix 40 mg 1 tab po daily    Morbid obesity ([address] )  bmi 55  Consider outpatient bariatric referral if pt qualifies  Cont bipap QHS          Chief Complaint      Here STR    HPI   Patient is a 69 y o  male with PMH chronic respiratory failure, CHF, DVT, HTN, EVERETT and pulmonary embolism  Pt admitted to [facility] form 3/29/2023-4/4/2023 for acute respiratory failure due to COVID19 infection  He already has chronic respiratory failure  He received IV steriods and was continued on warfarin for his hx of pulmonary embolism  He was noted to have hypercapneic acidosis and placed on Bipap  Pt then transferred out of ICU once he no longer required bipap  He is on his home 4-5 liters with bipap at bedtime  Pt then transferred to [facility] SNF on 4/4/2023  Pt seen and examined  Pt would like larger bed  Pt on 5 liter nasal cannula  Pt very worried about being at rehab  Pt wants to be lifted up in the bed  Pt waiting for his lunch  Lunch came & I handed it to him  Pt still on 5 liters nasal cannula         Past Medical History:   Diagnosis Date   • CHF (congestive heart failure) (HCC)    • DVT (deep venous thrombosis) (HCC)    • HTN (hypertension)    • Morbid obesity (HCC)    • EVERETT (obstructive sleep apnea)    • Pulmonary embolism (HCC)      From Care Everywhere:  Past Surgical History:   Procedure Laterality Date   • COLONOSCOPY W/ BIOPSIES AND POLYPECTOMY N/A 7/19/2018   Performed by Randa Mohs, MD at [facility]   • JOINT REPLACEMENT Left 2002   Knee Replacement   • JOINT REPLACEMENT Right 2009   Knee Replacement   • KNEE ARTHROSCOPY Bilateral   2 each knee   • KNEE SURGERY Bilateral 2009 & 2002   • OTHER SURGICAL HISTORY Left 1990?    Bone Spurs   • OTHER SURGICAL HISTORY Left 1977   Open   • OTHER SURGICAL HISTORY Bilateral   Arthroscopies (5)   • SHOULDER ARTHROSCOPY Left   • TONSILLECTOMY       Past Surgical History:   Procedure Laterality Date   • KNEE SURGERY         Social History     Tobacco Use   Smoking Status Never   Smokeless Tobacco Never          Family History   Problem Relation Age of Onset   • Cancer Mother    • Cancer Father         Allergies   Allergen Reactions   • Baclofen Other (See Comments)     Nausea and vomiting   • Morphine Other (See Comments)          Current Outpatient Medications:   •  acetaminophen (TYLENOL) 500 mg tablet, Take 1,000 mg by mouth every 6 (six) hours as needed, Disp: , Rfl:   •  b complex vitamins capsule, Take 1 capsule by mouth daily, Disp: , Rfl:   •  benzonatate (TESSALON) 200 MG capsule, Take 1 capsule (200 mg total) by mouth 3 (three) times a day as needed for cough, Disp: 20 capsule, Rfl: 0  •  Cholecalciferol 125 MCG (5000 UT) capsule, Take 5,000 Units by mouth daily, Disp: , Rfl:   •  dexamethasone 20 MG TABS, Take 20 mg by mouth daily with breakfast for 3 days Do not start before April 5, 2023 , Disp: , Rfl: 0  •  fexofenadine (ALLEGRA) 180 MG tablet, Take 180 mg by mouth daily as needed, Disp: , Rfl:   •  furosemide (LASIX) 40 mg tablet, Take 40 mg by mouth daily, Disp: , Rfl:   •  guaiFENesin 1200 MG TB12, Take 1 tablet (1,200 mg total) by mouth every 12 (twelve) hours for 14 days, Disp: 28 tablet, Rfl: 0  •  ipratropium-albuterol (DUO-NEB) 0 5-2 5 mg/3 mL nebulizer solution, Inhale 3 mL 4 (four) times a day, Disp: , Rfl:   •  methocarbamol (ROBAXIN) 500 mg tablet, Take 750 mg by mouth every 6 (six) hours as needed, Disp: , Rfl:   •  oxyCODONE (ROXICODONE) 5 immediate release tablet, Take 1 tablet (5 mg total) by mouth every 6 (six) hours as needed for severe pain (HOLD FOR AMS) for up to 5 days Max Daily Amount: 20 mg, Disp: 20 tablet, Rfl: 0  •  pregabalin (LYRICA) 150 mg capsule, Take 1 capsule (150 mg total) by mouth 3 (three) times a day, Disp: 60 capsule, Rfl: 0  •  risperiDONE (RisperDAL) 1 mg tablet, Take 1 mg by mouth 3 (three) times a day, Disp: , Rfl:   •  traMADol (ULTRAM) 50 mg tablet, Take 1 tablet (50 mg total) by mouth every 6 (six) hours as needed for severe pain, Disp: 30 tablet, Rfl: 0  •  warfarin (COUMADIN) 3 mg tablet, Take 3 tablets by mouth daily 9 mg m-f 10 mg sat/sun , Disp: , Rfl:     [facility]  Vital signs were reviewed in point [facility]  Review of Systems   All other systems reviewed and are negative  Physical Exam  Constitutional:       General: He is not in acute distress  Appearance: He is not ill-appearing  HENT:      Head: Normocephalic and atraumatic  Cardiovascular:      Rate and Rhythm: Normal rate and regular rhythm  Heart sounds: Normal heart sounds  No murmur heard  No friction rub  Pulmonary:      Effort: No respiratory distress  Breath sounds: Normal breath sounds  No wheezing  Abdominal:      General: Bowel sounds are normal       Palpations: Abdomen is soft  Tenderness: There is no abdominal tenderness  Musculoskeletal:      Right lower leg: Edema present  Left lower leg: Edema present  Neurological:      General: No focal deficit present  Mental Status: He is alert and oriented to person, place, and time  Comments: Pt with generalized edema in arms/legs       Diagnostic Data   Recent labs and imaging studies were reviewed  Code Status:    Full code  Additional notes:     Total time spent on H&P 49 minutes in reviewing discharge paperwork from the hospital, interpreting test results from hospital medical records, and documenting information in the medical record  In addition, I provided counseling to the patient and encouraged them to work with physical therapy      This note was electronically signed by Dr Abelardo Tolliver

## 2023-04-06 NOTE — ASSESSMENT & PLAN NOTE
Due to covid  Pt had required bipap as inpt & was in icu  Pt on bipap now QHS  Cont 4-5 liters nasal cannula humidified  F/u outpatient with pulm

## 2023-04-06 NOTE — ASSESSMENT & PLAN NOTE
Pt with chronic back pain  Hx of post herpetic neuralgia  Cont tramadol, robaxin & lyrica  F/u outpatient pain management

## 2023-04-06 NOTE — ASSESSMENT & PLAN NOTE
Likely due to covid  Neg blood cultures  Pt s/p remdesivir for 5 days  S/p 5 days of IV dexamethasone  Finish dexamethasone course

## 2023-04-06 NOTE — ASSESSMENT & PLAN NOTE
Pt covid positive on 3/29/2023  S/p 5 days of remdesivir  S/p 10 days of IV dexamethasone  Finish po dexamethasone course  Cont prn mucinex

## 2023-04-06 NOTE — ASSESSMENT & PLAN NOTE
Pt with hx pulmonary embolism 1/12/29  INR 1 8 today  Pt normally on warfarn 9 mg T-Sun & 10 mg on the Mon now  Next INR check 4/7/2023

## 2023-04-06 NOTE — ASSESSMENT & PLAN NOTE
Pt volume overloaded  439 6 Lbs   Anasarca  Clear lungs  2/5/2023 echo EF 36% & diastolic indeterminate function  Cont lasix 40 mg 1 tab po daily  Check regular weights

## 2023-04-06 NOTE — ASSESSMENT & PLAN NOTE
Pt with hx of pulm htn  Unable to calculate on recent echo  Cont lasix 40 mg 1 tab po daily  F/u outpatient with pulmonary

## 2023-04-07 ENCOUNTER — APPOINTMENT (EMERGENCY)
Dept: RADIOLOGY | Facility: HOSPITAL | Age: 68
End: 2023-04-07

## 2023-04-07 ENCOUNTER — HOSPITAL ENCOUNTER (INPATIENT)
Facility: HOSPITAL | Age: 68
LOS: 29 days | Discharge: NURSING, REHAB OR LONG-TERM CARE | End: 2023-05-06
Attending: EMERGENCY MEDICINE | Admitting: INTERNAL MEDICINE

## 2023-04-07 DIAGNOSIS — F43.20 ADJUSTMENT DISORDER: ICD-10-CM

## 2023-04-07 DIAGNOSIS — E66.01 MORBID OBESITY (HCC): ICD-10-CM

## 2023-04-07 DIAGNOSIS — J18.9 PNEUMONIA: ICD-10-CM

## 2023-04-07 DIAGNOSIS — J44.1 COPD EXACERBATION (HCC): Primary | ICD-10-CM

## 2023-04-07 DIAGNOSIS — I50.813 ACUTE ON CHRONIC RIGHT-SIDED CONGESTIVE HEART FAILURE (HCC): ICD-10-CM

## 2023-04-07 DIAGNOSIS — U07.1 COVID-19: ICD-10-CM

## 2023-04-07 DIAGNOSIS — K21.9 GERD (GASTROESOPHAGEAL REFLUX DISEASE): ICD-10-CM

## 2023-04-07 DIAGNOSIS — E66.2 OBESITY HYPOVENTILATION SYNDROME (HCC): ICD-10-CM

## 2023-04-07 DIAGNOSIS — G89.4 CHRONIC PAIN DISORDER: ICD-10-CM

## 2023-04-07 DIAGNOSIS — R09.81 CHRONIC NASAL CONGESTION: ICD-10-CM

## 2023-04-07 PROBLEM — I50.32 CHRONIC DIASTOLIC CONGESTIVE HEART FAILURE (HCC): Status: ACTIVE | Noted: 2021-12-28

## 2023-04-07 PROBLEM — J96.20 ACUTE ON CHRONIC RESPIRATORY FAILURE (HCC): Status: ACTIVE | Noted: 2020-04-13

## 2023-04-07 LAB
ALBUMIN SERPL BCP-MCNC: 3.1 G/DL (ref 3.5–5)
ALP SERPL-CCNC: 65 U/L (ref 34–104)
ALT SERPL W P-5'-P-CCNC: 32 U/L (ref 7–52)
ANION GAP SERPL CALCULATED.3IONS-SCNC: 3 MMOL/L (ref 4–13)
APTT PPP: 39 SECONDS (ref 23–37)
AST SERPL W P-5'-P-CCNC: 14 U/L (ref 13–39)
BASE EX.OXY STD BLDV CALC-SCNC: 77.9 % (ref 60–80)
BASE EXCESS BLDV CALC-SCNC: 16.8 MMOL/L
BASOPHILS # BLD AUTO: 0.01 THOUSANDS/ÂΜL (ref 0–0.1)
BASOPHILS NFR BLD AUTO: 0 % (ref 0–1)
BILIRUB SERPL-MCNC: 0.53 MG/DL (ref 0.2–1)
BNP SERPL-MCNC: 35 PG/ML (ref 0–100)
BUN SERPL-MCNC: 13 MG/DL (ref 5–25)
CALCIUM ALBUM COR SERPL-MCNC: 9.3 MG/DL (ref 8.3–10.1)
CALCIUM SERPL-MCNC: 8.6 MG/DL (ref 8.4–10.2)
CHLORIDE SERPL-SCNC: 91 MMOL/L (ref 96–108)
CO2 SERPL-SCNC: 44 MMOL/L (ref 21–32)
CREAT SERPL-MCNC: 0.48 MG/DL (ref 0.6–1.3)
EOSINOPHIL # BLD AUTO: 0.35 THOUSAND/ÂΜL (ref 0–0.61)
EOSINOPHIL NFR BLD AUTO: 3 % (ref 0–6)
ERYTHROCYTE [DISTWIDTH] IN BLOOD BY AUTOMATED COUNT: 12.8 % (ref 11.6–15.1)
GFR SERPL CREATININE-BSD FRML MDRD: 112 ML/MIN/1.73SQ M
GLUCOSE SERPL-MCNC: 126 MG/DL (ref 65–140)
HCO3 BLDV-SCNC: 43.7 MMOL/L (ref 24–30)
HCT VFR BLD AUTO: 42.6 % (ref 36.5–49.3)
HGB BLD-MCNC: 13.2 G/DL (ref 12–17)
IMM GRANULOCYTES # BLD AUTO: 0.05 THOUSAND/UL (ref 0–0.2)
IMM GRANULOCYTES NFR BLD AUTO: 0 % (ref 0–2)
INR PPP: 2.91 (ref 0.84–1.19)
LACTATE SERPL-SCNC: 1.3 MMOL/L (ref 0.5–2)
LYMPHOCYTES # BLD AUTO: 1.17 THOUSANDS/ÂΜL (ref 0.6–4.47)
LYMPHOCYTES NFR BLD AUTO: 10 % (ref 14–44)
MCH RBC QN AUTO: 33.2 PG (ref 26.8–34.3)
MCHC RBC AUTO-ENTMCNC: 31 G/DL (ref 31.4–37.4)
MCV RBC AUTO: 107 FL (ref 82–98)
MONOCYTES # BLD AUTO: 0.65 THOUSAND/ÂΜL (ref 0.17–1.22)
MONOCYTES NFR BLD AUTO: 5 % (ref 4–12)
NEUTROPHILS # BLD AUTO: 10.08 THOUSANDS/ÂΜL (ref 1.85–7.62)
NEUTS SEG NFR BLD AUTO: 82 % (ref 43–75)
NRBC BLD AUTO-RTO: 0 /100 WBCS
O2 CT BLDV-SCNC: 15.3 ML/DL
PCO2 BLDV: 60.8 MM HG (ref 42–50)
PH BLDV: 7.47 [PH] (ref 7.3–7.4)
PLATELET # BLD AUTO: 249 THOUSANDS/UL (ref 149–390)
PMV BLD AUTO: 8.9 FL (ref 8.9–12.7)
PO2 BLDV: 40.1 MM HG (ref 35–45)
POTASSIUM SERPL-SCNC: 3.7 MMOL/L (ref 3.5–5.3)
PROCALCITONIN SERPL-MCNC: 0.1 NG/ML
PROT SERPL-MCNC: 5.8 G/DL (ref 6.4–8.4)
PROTHROMBIN TIME: 30.6 SECONDS (ref 11.6–14.5)
RBC # BLD AUTO: 3.98 MILLION/UL (ref 3.88–5.62)
SODIUM SERPL-SCNC: 138 MMOL/L (ref 135–147)
WBC # BLD AUTO: 12.31 THOUSAND/UL (ref 4.31–10.16)

## 2023-04-07 RX ORDER — PREGABALIN 75 MG/1
150 CAPSULE ORAL ONCE
Status: COMPLETED | OUTPATIENT
Start: 2023-04-07 | End: 2023-04-07

## 2023-04-07 RX ORDER — ACETAMINOPHEN 325 MG/1
650 TABLET ORAL EVERY 6 HOURS PRN
Status: DISCONTINUED | OUTPATIENT
Start: 2023-04-07 | End: 2023-04-13

## 2023-04-07 RX ORDER — RISPERIDONE 1 MG/1
1 TABLET ORAL 3 TIMES DAILY
Status: DISCONTINUED | OUTPATIENT
Start: 2023-04-08 | End: 2023-05-06 | Stop reason: HOSPADM

## 2023-04-07 RX ORDER — OXYCODONE HYDROCHLORIDE 5 MG/1
5 TABLET ORAL EVERY 6 HOURS PRN
Status: DISPENSED | OUTPATIENT
Start: 2023-04-07 | End: 2023-04-09

## 2023-04-07 RX ORDER — GUAIFENESIN 600 MG/1
600 TABLET, EXTENDED RELEASE ORAL EVERY 12 HOURS SCHEDULED
Status: DISCONTINUED | OUTPATIENT
Start: 2023-04-08 | End: 2023-04-12

## 2023-04-07 RX ORDER — ALBUTEROL SULFATE 2.5 MG/3ML
2.5 SOLUTION RESPIRATORY (INHALATION) ONCE
Status: COMPLETED | OUTPATIENT
Start: 2023-04-07 | End: 2023-04-07

## 2023-04-07 RX ORDER — WARFARIN SODIUM 2.5 MG/1
10 TABLET ORAL
Status: DISCONTINUED | OUTPATIENT
Start: 2023-04-08 | End: 2023-04-10

## 2023-04-07 RX ORDER — ALBUTEROL SULFATE 2.5 MG/3ML
2.5 SOLUTION RESPIRATORY (INHALATION) EVERY 4 HOURS PRN
Status: DISCONTINUED | OUTPATIENT
Start: 2023-04-07 | End: 2023-05-06 | Stop reason: HOSPADM

## 2023-04-07 RX ORDER — TRAMADOL HYDROCHLORIDE 50 MG/1
50 TABLET ORAL EVERY 6 HOURS PRN
Status: DISCONTINUED | OUTPATIENT
Start: 2023-04-07 | End: 2023-04-13

## 2023-04-07 RX ORDER — METHYLPREDNISOLONE SODIUM SUCCINATE 40 MG/ML
40 INJECTION, POWDER, LYOPHILIZED, FOR SOLUTION INTRAMUSCULAR; INTRAVENOUS EVERY 8 HOURS SCHEDULED
Status: DISCONTINUED | OUTPATIENT
Start: 2023-04-07 | End: 2023-04-08

## 2023-04-07 RX ORDER — METHYLPREDNISOLONE SODIUM SUCCINATE 40 MG/ML
40 INJECTION, POWDER, LYOPHILIZED, FOR SOLUTION INTRAMUSCULAR; INTRAVENOUS ONCE
Status: COMPLETED | OUTPATIENT
Start: 2023-04-07 | End: 2023-04-07

## 2023-04-07 RX ORDER — GUAIFENESIN 600 MG/1
1200 TABLET, EXTENDED RELEASE ORAL ONCE
Status: COMPLETED | OUTPATIENT
Start: 2023-04-07 | End: 2023-04-07

## 2023-04-07 RX ORDER — LEVALBUTEROL INHALATION SOLUTION 1.25 MG/3ML
1.25 SOLUTION RESPIRATORY (INHALATION)
Status: DISCONTINUED | OUTPATIENT
Start: 2023-04-07 | End: 2023-05-06 | Stop reason: HOSPADM

## 2023-04-07 RX ORDER — BENZONATATE 100 MG/1
200 CAPSULE ORAL 3 TIMES DAILY PRN
Status: DISCONTINUED | OUTPATIENT
Start: 2023-04-07 | End: 2023-04-13

## 2023-04-07 RX ORDER — SODIUM CHLORIDE FOR INHALATION 0.9 %
3 VIAL, NEBULIZER (ML) INHALATION ONCE
Status: COMPLETED | OUTPATIENT
Start: 2023-04-07 | End: 2023-04-07

## 2023-04-07 RX ORDER — PREGABALIN 75 MG/1
150 CAPSULE ORAL 3 TIMES DAILY
Status: DISCONTINUED | OUTPATIENT
Start: 2023-04-07 | End: 2023-05-06 | Stop reason: HOSPADM

## 2023-04-07 RX ORDER — METHOCARBAMOL 750 MG/1
750 TABLET, FILM COATED ORAL EVERY 6 HOURS PRN
Status: DISCONTINUED | OUTPATIENT
Start: 2023-04-07 | End: 2023-05-06 | Stop reason: HOSPADM

## 2023-04-07 RX ORDER — SODIUM CHLORIDE FOR INHALATION 0.9 %
3 VIAL, NEBULIZER (ML) INHALATION
Status: DISCONTINUED | OUTPATIENT
Start: 2023-04-07 | End: 2023-04-09

## 2023-04-07 RX ORDER — LEVALBUTEROL INHALATION SOLUTION 1.25 MG/3ML
1.25 SOLUTION RESPIRATORY (INHALATION)
Status: DISCONTINUED | OUTPATIENT
Start: 2023-04-07 | End: 2023-04-07

## 2023-04-07 RX ORDER — SODIUM CHLORIDE FOR INHALATION 0.9 %
3 VIAL, NEBULIZER (ML) INHALATION
Status: DISCONTINUED | OUTPATIENT
Start: 2023-04-07 | End: 2023-04-07

## 2023-04-07 RX ORDER — FUROSEMIDE 40 MG/1
40 TABLET ORAL DAILY
Status: DISCONTINUED | OUTPATIENT
Start: 2023-04-08 | End: 2023-04-08

## 2023-04-07 RX ADMIN — IPRATROPIUM BROMIDE 1 MG: 0.5 SOLUTION RESPIRATORY (INHALATION) at 09:25

## 2023-04-07 RX ADMIN — METHOCARBAMOL TABLETS 750 MG: 750 TABLET, COATED ORAL at 20:41

## 2023-04-07 RX ADMIN — ALBUTEROL SULFATE 2.5 MG: 2.5 SOLUTION RESPIRATORY (INHALATION) at 12:08

## 2023-04-07 RX ADMIN — ISODIUM CHLORIDE 3 ML: 0.03 SOLUTION RESPIRATORY (INHALATION) at 09:25

## 2023-04-07 RX ADMIN — METHYLPREDNISOLONE SODIUM SUCCINATE 40 MG: 40 INJECTION, POWDER, FOR SOLUTION INTRAMUSCULAR; INTRAVENOUS at 20:35

## 2023-04-07 RX ADMIN — CEFEPIME 2000 MG: 2 INJECTION, POWDER, FOR SOLUTION INTRAVENOUS at 10:18

## 2023-04-07 RX ADMIN — LEVALBUTEROL HYDROCHLORIDE 1.25 MG: 1.25 SOLUTION RESPIRATORY (INHALATION) at 20:06

## 2023-04-07 RX ADMIN — ACETAMINOPHEN 650 MG: 325 TABLET ORAL at 14:13

## 2023-04-07 RX ADMIN — METHYLPREDNISOLONE SODIUM SUCCINATE 40 MG: 40 INJECTION, POWDER, FOR SOLUTION INTRAMUSCULAR; INTRAVENOUS at 09:50

## 2023-04-07 RX ADMIN — ISODIUM CHLORIDE 3 ML: 0.03 SOLUTION RESPIRATORY (INHALATION) at 20:06

## 2023-04-07 RX ADMIN — ALBUTEROL SULFATE 10 MG: 2.5 SOLUTION RESPIRATORY (INHALATION) at 09:24

## 2023-04-07 RX ADMIN — OXYCODONE HYDROCHLORIDE 5 MG: 5 TABLET ORAL at 20:35

## 2023-04-07 RX ADMIN — IPRATROPIUM BROMIDE 0.5 MG: 0.5 SOLUTION RESPIRATORY (INHALATION) at 20:06

## 2023-04-07 RX ADMIN — OXYCODONE HYDROCHLORIDE 5 MG: 5 TABLET ORAL at 14:13

## 2023-04-07 RX ADMIN — GUAIFENESIN 1200 MG: 600 TABLET ORAL at 09:50

## 2023-04-07 RX ADMIN — PREGABALIN 150 MG: 75 CAPSULE ORAL at 10:18

## 2023-04-07 RX ADMIN — METHOCARBAMOL TABLETS 750 MG: 750 TABLET, COATED ORAL at 14:13

## 2023-04-07 RX ADMIN — METHYLPREDNISOLONE SODIUM SUCCINATE 40 MG: 40 INJECTION, POWDER, FOR SOLUTION INTRAMUSCULAR; INTRAVENOUS at 12:07

## 2023-04-07 RX ADMIN — ACETAMINOPHEN 650 MG: 325 TABLET ORAL at 20:41

## 2023-04-07 RX ADMIN — PREGABALIN 150 MG: 75 CAPSULE ORAL at 20:35

## 2023-04-07 RX ADMIN — PREGABALIN 150 MG: 75 CAPSULE ORAL at 16:12

## 2023-04-07 NOTE — ASSESSMENT & PLAN NOTE
Hx of EVERETT, morbid obesity, obesity hypoventilation syndrome   Recently hospitalization, used BiPAP HS     Plan-   Continue Bipap inpatient

## 2023-04-07 NOTE — ED ATTENDING ATTESTATION
4/7/2023  IDonis MD, saw and evaluated the patient  I have discussed the patient with the resident/non-physician practitioner and agree with the resident's/non-physician practitioner's findings, Plan of Care, and MDM as documented in the resident's/non-physician practitioner's note, except where noted  All available labs and Radiology studies were reviewed  I was present for key portions of any procedure(s) performed by the resident/non-physician practitioner and I was immediately available to provide assistance  At this point I agree with the current assessment done in the Emergency Department  I have conducted an independent evaluation of this patient a history and physical is as follows:    I personally reviewed the patient's medical record  He was recently admitted to the hospital and discharged 3 days ago for acute on chronic respiratory failure secondary to COVID-19  He required BiPAP while inpatient and was discharged on 4 L oxygen by nasal cannula  He has a history of chronic pulmonary embolism for which he takes warfarin, pulmonary hypertension, and CHF with LVEF of 60% and indeterminate diastolic dysfunction  The patient presents with worsening shortness of breath and chest tightness  Also reports yellow sputum that occasionally has flecks of black in it  Denies fevers or chills  Physical Exam  Vitals and nursing note reviewed  Constitutional:       General: He is in acute distress (mild tachypnea with increased WOB)  Appearance: He is well-developed  He is ill-appearing (chronic-appearing)  He is not diaphoretic  HENT:      Head: Normocephalic and atraumatic  Right Ear: External ear normal       Left Ear: External ear normal       Nose: Nose normal    Eyes:      Conjunctiva/sclera: Conjunctivae normal    Cardiovascular:      Rate and Rhythm: Regular rhythm  Tachycardia present  Pulses: Normal pulses  Heart sounds: Normal heart sounds   No murmur heard     No friction rub  No gallop  Pulmonary:      Effort: Respiratory distress (mild) present  Breath sounds: Wheezing (at the apices) and rales (scattered) present  Abdominal:      General: Bowel sounds are normal  There is no distension  Palpations: Abdomen is soft  Tenderness: There is no abdominal tenderness  There is no guarding  Musculoskeletal:         General: No deformity  Normal range of motion  Cervical back: Normal range of motion and neck supple  Comments: No calf swelling or tenderness   Skin:     General: Skin is warm and dry  Neurological:      Mental Status: He is alert and oriented to person, place, and time  Motor: No abnormal muscle tone  Psychiatric:         Mood and Affect: Mood normal          ED Course  ED Course as of 04/07/23 1131   Fri Apr 07, 2023   0950 Heart neb as well as Solu-Medrol ordered  Patient meets SIRS criteria, has purulent sputum, will empirically start cefepime and order sepsis work-up  Patient denies chest pain, suspicion for progression of PE is low at this time but will obtain EKG for restratification  1015 I personally interpreted the patient's EKG which reveals sinus tachycardia with heart rate of 104 bpm, PACs, left axis deviation similar to prior, prominent S wave with left anterior fascicular block unchanged from prior, incomplete right bundle branch block new from December 1996, nonspecific T wave changes in the anterior leads without ST segment deviation  [place] with leukocytosis to 12 3, no lactic acidosis  It is unclear at this time whether the patient's positive SIRS criteria is reflective of sepsis or his respiratory distress with elevated white blood cell count due to chronic steroid use  Given presence of purulent sputum, however, will cover empirically with cefepime should an infectious source for his symptoms be found     1038 INR is therapeutic- doubt progression of PE     1039 CXR cannot exclude pneumonia   1039 BNP not elevated, no LE edema, no JVD, no pulmonary edema on CXR- doubt decompensated CHF  Patient's work of breathing appears improved following YBARRA neb though the patient continues to report shortness of breath  Will give additional albuterol and admit to internal medicine for further management        Critical Care Time  CriticalCare Time    Date/Time: 4/7/2023 11:30 AM  Performed by: Octavio Falcon MD  Authorized by: Octavio Falcon MD     Critical care provider statement:     Critical care time (minutes):  35    Critical care was necessary to treat or prevent imminent or life-threatening deterioration of the following conditions:  Respiratory failure    Critical care was time spent personally by me on the following activities:  Obtaining history from patient or surrogate, development of treatment plan with patient or surrogate, evaluation of patient's response to treatment, examination of patient, review of old charts, re-evaluation of patient's condition, ordering and review of radiographic studies, ordering and review of laboratory studies and ordering and performing treatments and interventions    I assumed direction of critical care for this patient from another provider in my specialty: no

## 2023-04-07 NOTE — RESPIRATORY THERAPY NOTE
04/07/23 1411   Respiratory Protocol   Protocol Initiated? Yes   Protocol Selection Respiratory   Language Barrier? No   Medical & Social History Reviewed? Yes   Diagnostic Studies Reviewed? Yes   Physical Assessment Performed? Yes   Home Devices/Therapy BiPAP/CPAP   Respiratory Plan Moderate/Severe Distress pathway;Vent/NIV/HFNC   Respiratory Assessment   Assessment Type Assess only   General Appearance Alert; Awake   Respiratory Pattern Tachypneic   Chest Assessment Chest expansion symmetrical   Bilateral Breath Sounds Diminished   Resp Comments Patient uses a bipap at home  Denies use of nebs/inhalers at home  He is ordered on nebs here says he feel like they dont help him but willing to keep trying them

## 2023-04-07 NOTE — PHYSICAL THERAPY NOTE
PHYSICAL THERAPY EVALUATION NOTE    Patient Name: Anup Garvey  LVDRP'T Date: 4/7/2023  AGE:   69 y o   Mrn:   [id number]  ADMIT DX:  Pneumonia [J18 9]  SOB (shortness of breath) [R06 02]  COPD exacerbation ([place] [address] ) [J44 1]    Past Medical History:   Diagnosis Date    CHF (congestive heart failure) ([place] [address] )     DVT (deep venous thrombosis) ([facility])     HTN (hypertension)     Morbid obesity ([facility])     EVERETT (obstructive sleep apnea)     Pulmonary embolism ([place] [address] )      Length Of Stay: 0  PHYSICAL THERAPY EVALUATION :    04/07/23 1618   PT Last Visit   PT Visit Date 04/07/23   Pain Assessment   Pain Assessment Tool 0-10   Pain Score 8   Pain Location/Orientation Other (Comment)  (back, left shoulder)   Hospital Pain Intervention(s) Repositioned; Ambulation/increased activity; Other (Comment)  (Toma Umanzor was notified )   Restrictions/Precautions   Other Precautions Chair Alarm; Bed Alarm;Multiple lines;O2;Fall Risk;Pain  (Masimo)   Home Living   Type of Home SNF  ([facility])   Additional Comments pt was recently discontinued from the hospital and admitted to SNF for inpatient rehab  pt was unable to mobilize out of bed  pt usually lives w/ spouse in 1 story home w/ ramp to enter and laundry in basement  ambulated w/ walker  no falls in last 6 months  received assistance w/ ADLs  General   Additional Pertinent History 5L oxygen via nasal cannula  resting pulse ox 95% and 73 BPM, active 93% and 89 BPM    Family/Caregiver Present Yes   Cognition   Arousal/Participation Cooperative   Orientation Level Oriented to person   Following Commands Follows one step commands with increased time or repetition   Comments moderate edema LEs   Subjective   Subjective pt seen sitting in chair w/ spouse and David Bita NSG present  pt stated feeling anxious and having back and left shoulder pain   pt agreed to participate in eval  pt needed frequent redirection for task focus  mental imagery and breathing technique were used for attempts to calm pt  RUE Assessment   RUE Assessment WFL   LUE Assessment   LUE Assessment WFL   RLE Assessment   RLE Assessment X  (hip flexion 3-/5 extension 3/5, knee flexion 3+/5 extension 3/5, ankle plantarflexion 3-/5 dorsiflexion 2+/5)   LLE Assessment   LLE Assessment X  (hip flexion 3-/5 extension 3/5, knee flexion 3+/5 extension 3/5, ankle plantarflexion 3-/5 dorsiflexion 2+/5)   Vision-Basic Assessment   Current Vision Wears glasses all the time   Light Touch   RLE Light Touch Grossly intact   LLE Light Touch Grossly intact   Transfers   Sit to Stand 2  Maximal assistance   Additional items Assist x 2; Increased time required;Verbal cues  (for hand placement, LE positioning)   Stand to Sit 3  Moderate assistance   Additional items Assist x 2; Increased time required;Verbal cues  (for body positioning, hand placement)   Stand pivot Unable to assess   Additional Comments pt stood 10 seconds x2 and 15 seconds w/ hand hold assist and maxx2  seated rest breaks were needed due to fatigue and pain  additional standing was not possible due to fatigue and pain  pt was unable to weight shift or advance/retreat  Ambulation/Elevation   Gait pattern Not appropriate   Assistive Device Other (Comment)  (bilateral hand hold assist  pt was unable to utilize bariatric roller walker appropriately )   Ambulation/Elevation Additional Comments pt was Brenda's Egress test FAIL and needs dependent means for out of bed mobilization, per hospital policy  pt is positioned on mechanical lift sling in chair  Balance   Static Sitting Fair   Static Standing Zero  (assist x2)   Ambulatory Zero   Activity Tolerance   Activity Tolerance Patient limited by fatigue;Patient limited by pain   Nurse Made Aware spoke to Nitin Webb CM   Assessment   Problem List Decreased strength;Decreased range of motion;Decreased endurance; Impaired balance;Decreased mobility; Decreased safety awareness; Impaired hearing;Obesity;Pain  (LE edema)   Assessment Pt was recently discharged from the hospital on 4/4/23 due to respiratory failure secondary to COVID infection, was discharged on 5 L NC which is around patient's baseline, and was also given dexamethasone PO  Reports that he just cannot catch his breath  Dx: acute on chronic respiratory failure, SIRS, chronic diastolic CHF, HTN, and morbid obesity  order placed for PT eval and tx, w/ activity order of up and out of bed as tolerated  pt presents w/ comorbidities of CHF, DVT, PE, HTN, chronic back pain, morbid obesity, and pulmonary HTN and personal factors of mobilizing w/ assistive device, inability to perform ADLs and  IADLs and readmission to the hospital  pt presents w/ pain, weakness, decreased ROM, decreased endurance, impaired balance, decreased safety awareness, fall risk and LE edema  these impairments are evident in findings from physical examination (weakness and decreased ROM), mobility assessment (need for assist x2 w/ transfers, inability to complete pregait activities or mobilize to bedside chair, need for cuing for mobility and breathing technique), and Barthel Index: 35/100  pt needed input for task focus and mobility technique  pt is at risk for falls due to physical and safety awareness deficits  pt's clinical presentation is unstable/unpredictable (evident in need for assist x2 w/ all phases of mobility, pain impacting overall mobility status, need for supplemental oxygen in order to maintain oxygen saturation and need for input for task focus and mobility technique)  pt needs inpatient PT tx to improve mobility deficits and progress mobility training as appropriate  discharge recommendation is for inpatient rehab to reduce fall risk and maximize level of functional independence  Goals   Patient Goals i want to get up and use the walker and walk  STG Expiration Date 04/21/23   Short Term Goal #1 pt will:  Increase bilateral LE strength 1/2 grade to facilitate independent mobility, Perform rolling and repositioning in bed w/ minx1 to decrease caregiver burden, Perform supine <--> sitting edge of bed transition w/ modx1 to improve level of function, Perform sit <---> stand transfers w/ maxx1 to improve independence, Complete stand pivot transfers w/ least restrictive assistive device w/ maxx1 w/o LOB to improve functional independence, Increase static standing balance 1 grade to decrease risk for falls, Tolerate 3 hr OOB to faciliate upright tolerance, Tolerate standing 2 minutes w/ maxx1 w/ least restrictive assistive device to facilitate functional task performance, and Improve Barthel Index score to 55 or greater to facilitate independence  PT to see when ambulation training is appropriate  PT Treatment Day 0   Plan   Treatment/Interventions Functional transfer training;LE strengthening/ROM; Therapeutic exercise; Endurance training;Cognitive reorientation;Patient/family training;Equipment eval/education;Gait training;Bed mobility   PT Frequency 3-5x/wk   Recommendation   PT Discharge Recommendation Post acute rehabilitation services   Additional Comments pt needs mechanical lift for out of bed mobilization     AM-PAC Basic Mobility Inpatient   Turning in Flat Bed Without Bedrails 2   Lying on Back to Sitting on Edge of Flat Bed Without Bedrails 1   Moving Bed to Chair 1   Standing Up From Chair Using Arms 1   Walk in Room 1   Climb 3-5 Stairs With Railing 1   Basic Mobility Inpatient Raw Score 7   Turning Head Towards Sound 4   Follow Simple Instructions 3   Low Function Basic Mobility Raw Score  14   Low Function Basic Mobility Standardized Score  22 01   Highest Level Of Mobility   JH-HLM Goal 2: Bed activities/Dependent transfer   JH-HLM Achieved 4: Move to chair/commode   Barthel Index   Feeding 10   Bathing 0   Grooming Score 5   Dressing Score 5   Bladder Score 0   Bowels Score 10   Toilet Use Score 0   Transfers (Bed/Chair) Score 5   Mobility (Level Surface) Score 0   Stairs Score 0   Barthel Index Score 35   End of Consult   Patient Position at End of Consult Bedside chair;Bed/Chair alarm activated; All needs within reach     The patient's AM-PAC Basic Mobility Inpatient Short Form Raw Score is 7  A Raw score of less than or equal to 16 suggests the patient may benefit from discharge to post-acute rehabilitation services  Please also refer to the recommendation of the Physical Therapist for safe discharge planning  Skilled PT recommended while in hospital and upon DC to progress pt toward treatment goals       Milka Wynn, PT

## 2023-04-07 NOTE — ASSESSMENT & PLAN NOTE
Wt Readings from Last 3 Encounters:   04/07/23 (!) 199 kg (439 lb 9 6 oz)   04/04/23 (!) 186 kg (410 lb 15 oz)   03/28/23 (!) 197 kg (434 lb 6 4 oz)   Denies recent weight gain, abdominal bloating, lower extremity swelling, compliance with medications   ECHO 2/23- EF 60%, indeterminate diastolic dysfunction, pulmonary HTN   Home medications include Lasix     Plan-   Daily Weights   Monitor I/Os  Monitor BMP  Continue home regime

## 2023-04-07 NOTE — ASSESSMENT & PLAN NOTE
History of DVT, PE  Home dose of coumadin 10 mg Mondays, 9 mg tuesdays-sundays  INR 2 91, therapeutic   Coumadin 10 mg sat/sun and 9 mg Monday-Friday    Plan-   Continue current regime of warfarin   Monitor INR

## 2023-04-07 NOTE — ASSESSMENT & PLAN NOTE
POA  as evidenced by tachycardia 112, leukocytosis 12 3, tachypnea 24  Blood Pressure: 121/55  Pulse: (!) 112  Respirations: (!) 24    Sepsis Unlikely as no source present  Previous COVID infxn, likely continued inflammatory state         Plan-  Blood Cx-pending  Continue to monitor off antibiotics  Encourage p o  intake

## 2023-04-07 NOTE — ED PROVIDER NOTES
History  Chief Complaint   Patient presents with   • Shortness of Breath     Here recently for COVID and CHF, c/o SOB, 5L NS at home     Mr Gregory Hankins is a 69 y o  male with PMH of EVERETT on CPAP, pulmonary hypertension and recent covid pneumonia presents with increased chest tightness, shortness of breath since yesterday, has been bringing clear/yellow along with intermittent dark colored sputum  He was recently admitted for COVID pneumonia and was treated on moderate-severe treatment pathway  He meets SIRS criteria on admission with tachycardia and tachypnea  He denies any fevers, chills, nausea or vomiting  Prior to Admission Medications   Prescriptions Last Dose Informant Patient Reported? Taking? Cholecalciferol 125 MCG (5000 UT) capsule   Yes No   Sig: Take 5,000 Units by mouth daily   acetaminophen (TYLENOL) 500 mg tablet   Yes No   Sig: Take 1,000 mg by mouth every 6 (six) hours as needed   b complex vitamins capsule   Yes No   Sig: Take 1 capsule by mouth daily   benzonatate (TESSALON) 200 MG capsule   No No   Sig: Take 1 capsule (200 mg total) by mouth 3 (three) times a day as needed for cough   dexamethasone 20 MG TABS   No No   Sig: Take 20 mg by mouth daily with breakfast for 3 days Do not start before April 5, 2023     fexofenadine (ALLEGRA) 180 MG tablet   Yes No   Sig: Take 180 mg by mouth daily as needed   furosemide (LASIX) 40 mg tablet   Yes No   Sig: Take 40 mg by mouth daily   guaiFENesin 1200 MG TB12   No No   Sig: Take 1 tablet (1,200 mg total) by mouth every 12 (twelve) hours for 14 days   ipratropium-albuterol (DUO-NEB) 0 5-2 5 mg/3 mL nebulizer solution   Yes No   Sig: Inhale 3 mL 4 (four) times a day   methocarbamol (ROBAXIN) 500 mg tablet   Yes No   Sig: Take 750 mg by mouth every 6 (six) hours as needed   oxyCODONE (ROXICODONE) 5 immediate release tablet   No No   Sig: Take 1 tablet (5 mg total) by mouth every 6 (six) hours as needed for severe pain (HOLD FOR AMS) for up to 5 days Max Daily Amount: 20 mg   pregabalin (LYRICA) 150 mg capsule   No No   Sig: Take 1 capsule (150 mg total) by mouth 3 (three) times a day   risperiDONE (RisperDAL) 1 mg tablet   Yes No   Sig: Take 1 mg by mouth 3 (three) times a day   traMADol (ULTRAM) 50 mg tablet   No No   Sig: Take 1 tablet (50 mg total) by mouth every 6 (six) hours as needed for severe pain   warfarin (COUMADIN) 3 mg tablet   Yes No   Sig: Take 3 tablets by mouth daily 9 mg m-f 10 mg sat/sun       Facility-Administered Medications: None       Past Medical History:   Diagnosis Date   • CHF (congestive heart failure) ([facility])    • DVT (deep venous thrombosis) ([facility])    • HTN (hypertension)    • Morbid obesity ([facility])    • EVERETT (obstructive sleep apnea)    • Pulmonary embolism ([facility])        Past Surgical History:   Procedure Laterality Date   • KNEE SURGERY         Family History   Problem Relation Age of Onset   • Cancer Mother    • Cancer Father      I have reviewed and agree with the history as documented  E-Cigarette/Vaping   • E-Cigarette Use Never User      E-Cigarette/Vaping Substances     Social History     Tobacco Use   • Smoking status: Never   • Smokeless tobacco: Never   Vaping Use   • Vaping Use: Never used   Substance Use Topics   • Alcohol use: Not Currently   • Drug use: Not Currently     Types: Prescription        Review of Systems   Constitutional: Negative for chills and fever  HENT: Negative for rhinorrhea  Respiratory: Positive for cough (productive of clear sputum at times tinged with dark color), chest tightness and shortness of breath  Cardiovascular: Negative for chest pain and leg swelling  Gastrointestinal: Negative for abdominal pain, constipation, diarrhea, nausea and vomiting  Genitourinary: Negative for difficulty urinating, dysuria and hematuria  Neurological: Negative for headaches         Physical Exam  ED Triage Vitals   Temperature Pulse Respirations Blood Pressure SpO2   04/07/23 0907 04/07/23 0905 04/07/23 0905 04/07/23 0905 04/07/23 0905   98 4 °F (36 9 °C) (!) 112 (!) 24 121/55 97 %      Temp src Heart Rate Source Patient Position - Orthostatic VS BP Location FiO2 (%)   -- -- -- 04/07/23 0905 --      Right arm       Pain Score       --                    Orthostatic Vital Signs  Vitals:    04/07/23 0905   BP: 121/55   Pulse: (!) 112       Physical Exam  Vitals reviewed  Constitutional:       General: He is in acute distress  Appearance: He is obese  He is not ill-appearing  HENT:      Head: Normocephalic and atraumatic  Eyes:      Extraocular Movements: Extraocular movements intact  Cardiovascular:      Rate and Rhythm: Normal rate and regular rhythm  Pulmonary:      Effort: Tachypnea present  Breath sounds: Examination of the right-upper field reveals wheezing  Examination of the left-upper field reveals wheezing  Decreased breath sounds, wheezing and rales present  Chest:      Chest wall: No tenderness or edema  Abdominal:      Palpations: Abdomen is soft  Tenderness: There is no guarding  Neurological:      Mental Status: He is alert           ED Medications  Medications   methylPREDNISolone sodium succinate (Solu-MEDROL) injection 40 mg (has no administration in time range)   pregabalin (LYRICA) capsule 150 mg (has no administration in time range)   guaiFENesin (MUCINEX) 12 hr tablet 1,200 mg (has no administration in time range)   albuterol inhalation solution 10 mg (10 mg Nebulization Given 4/7/23 0924)     And   ipratropium (ATROVENT) 0 02 % inhalation solution 1 mg (1 mg Nebulization Given 4/7/23 0925)     And   sodium chloride 0 9 % inhalation solution 3 mL (3 mL Nebulization Given 4/7/23 0925)       Diagnostic Studies  Results Reviewed     Procedure Component Value Units Date/Time    B-Type Natriuretic Peptide(BNP) [[id number]]     Lab Status: No result Specimen: Blood     Lactic acid [[id number]]     Lab Status: No result Specimen: Blood     Procalcitonin [[id number]] Lab Status: No result Specimen: Blood     Protime-INR [[id number]]     Lab Status: No result Specimen: Blood     APTT [[id number]]     Lab Status: No result Specimen: Blood     Blood culture #2 [[id number]]     Lab Status: No result Specimen: Blood     Blood culture #1 [[id number]]     Lab Status: No result Specimen: Blood     CBC and differential [[id number]]     Lab Status: No result Specimen: Blood     Comprehensive metabolic panel [[id number]]     Lab Status: No result Specimen: Blood     Blood gas, venous [[id number]]     Lab Status: No result Specimen: Blood                  XR chest portable    (Results Pending)         Procedures  ECG 12 Lead Documentation Only    Date/Time: 4/7/2023 11:34 AM  Performed by: Elan Neri MD  Authorized by: Elan Neri MD     ECG reviewed by me, the ED Provider: yes    Patient location:  ED  Rate:     ECG rate assessment: tachycardic    Rhythm:     Rhythm: sinus rhythm    Ectopy:     Ectopy: [facility]            ED Course                                       Medical Decision Making  Mr Gregory Hankins is a 69 Y  O  male who was recently admitted for covid pneumonia, he presents with increased shortness of breath and chest tightness since yesterday  He has been having white/yellow and intermittent dark sputum  He denies any fevers, chills, nausea or vomiting  He was tacypenic and tachycardic on admission  Differential include: COPD exacerbation/pneumonia/Sepsis  YBARRA neb and solumedrol given with some symptomatic improvement  Another round of albuterol nebs given while awaiting admission  Sepis workup ordered: slight leukocytosis likely due to patient being on steroids lactic acid is within range, blood cultures are in process  Chest X-ray shows bibasilar atelectasis and possible pneumonia  Sputum culture ordered one dose of cefepime given  Patient will be admitted for further management of COPD exacerbation/pneumonia      COPD exacerbation ([facility] [address] ): acute illness or injury  Amount and/or Complexity of Data Reviewed  Labs: ordered  Radiology: ordered  Risk  Prescription drug management  Decision regarding hospitalization  Disposition  Final diagnoses:   None     ED Disposition     None      Follow-up Information    None         Patient's Medications   Discharge Prescriptions    No medications on file     No discharge procedures on file  PDMP Review       Value Time User    PDMP Reviewed  Yes 3/6/2023  7:29 PM Reyna Gill DO           ED Provider  Attending physically available and evaluated Chantelle Rachel HERRERA managed the patient along with the ED Attending      Electronically Signed by         Tricia Howard MD  04/07/23 1151

## 2023-04-07 NOTE — H&P
[facility]  H&P  Name: Yoshi Vidales  MRN: [id number]  Unit/Bed#: S -01 I Date of Admission: 4/7/2023   Date of Service: 4/7/2023 I Hospital Day: 0      Assessment/Plan   * Acute on chronic respiratory failure [facility])  Assessment & Plan  Complaining of worsening SOB prior to arrival   Recently discharged 4/4 due to Acute on Chronic Respiratory Failkure secondary to Covid infection ( Covid + 3/29)  Discharged on 5L NC (pt's previous baseline), dexamethasone  Denies fever, chills, cough, abdominal pain, nvd  Afebrile, mild leukocytosis 12 3 likely in the setting of dexamethasone  Procal- Negative    XR chest portable- Impression Mild bibasilar atelectasis   Pneumonia not excluded in the appropriate clinical setting  ED gave Solumedrol 80mg IV and Cefepime        Plan-  Procal tomorrow  IV Solu-Medrol 40mg IV for 2 days   4-5L wean as appropriate, Goal O2 sat 88-92%  Monitor respiratory status  Respiratory protocol, Xopenex/Atrovent, Airway clearance protocol, IS  Mucinex, Tessalon Perles          SIRS (systemic inflammatory response syndrome) (HCC)  Assessment & Plan  POA  as evidenced by tachycardia 112, leukocytosis 12 3, tachypnea 24  Blood Pressure: 121/55  Pulse: (!) 112  Respirations: (!) 24    Sepsis Unlikely as no source present  Previous COVID infxn, likely continued inflammatory state         Plan-  Blood Cx-pending  Continue to monitor off antibiotics  Encourage p o  intake     Chronic diastolic congestive heart failure (HCC)  Assessment & Plan  Wt Readings from Last 3 Encounters:   04/07/23 (!) 199 kg (439 lb 9 6 oz)   04/04/23 (!) 186 kg (410 lb 15 oz)   03/28/23 (!) 197 kg (434 lb 6 4 oz)   Denies recent weight gain, abdominal bloating, lower extremity swelling, compliance with medications   ECHO 2/23- EF 60%, indeterminate diastolic dysfunction, pulmonary HTN   Home medications include Lasix     Plan-   Daily Weights   Monitor I/Os  Monitor BMP  Continue home regime          History of pulmonary embolism  Assessment & Plan  History of DVT, PE  Home dose of coumadin 10 mg Mondays, 9 mg tuesdays-sundays  INR 2 91, therapeutic   Coumadin 10 mg sat/sun and 9 mg Monday-Friday    Plan-   Continue current regime of warfarin   Monitor INR    EVERETT (obstructive sleep apnea)  Assessment & Plan  Hx of EVERETT, morbid obesity, obesity hypoventilation syndrome   Recently hospitalization, used BiPAP HS     Plan-   Continue Bipap inpatient    Primary hypertension  Assessment & Plan  BP on admission 121/55  Home medication Lasix 40mg QD     Plan-  Continue home regime       Morbid obesity ([address] )  Assessment & Plan  Recently DC to [facility]   Pt reports no PT/OT completed    Plan-   PT/OT Eval        VTE Pharmacologic Prophylaxis: VTE Score: 10 High Risk (Score >/= 5) - Pharmacological DVT Prophylaxis Ordered: warfarin (Coumadin)  Sequential Compression Devices Ordered  Code Status: Level 1 - Full Code   Discussion with family: Updated  (wife) via phone  Anticipated Length of Stay: Patient will be admitted on an inpatient basis with an anticipated length of stay of greater than 2 midnights secondary to Acute on chronic respiratory failure  Chief Complaint: Shortness of breath    History of Present Illness:  Robert Mcadams is a 69 y o  male with a PMH of chronic respiratory failure, morbid obesity, diastolic CHF, DVT/PE, obstructive sleep apnea,pulmonary hypertension, hypertension, who presents with worsening shortness of breath prior to arrival   Patient was recently discharge from the hospital on 4/4 due to respiratory failure secondary to COVID infection, was discharged on 5 L NC which is around patient's baseline, and was also given dexamethasone PO  Patient reports that he just cannot catch his breath  Patient denies fever, chills, chest pain, palpitations, abdominal pain, NVD, urinary symptoms, leg pain, or any other symptoms at this time      ED-  Vitals 97% on 5L NC, afebrile, 121/55, tachycardic 112, RR 24  CBC remarkable for mild leukocytosis 12 3 likely secondary to recent steroid use  CMP unremarkable  INR 2 9  Pro-Vinicio-negative  VBG- appears compensated, 7 47, 60 8, 43 7  COVID-positive from 3/29  Chest x-ray-mild bibasilar atelectasis, possibly concerning for PNA  ED gave DuoNeb, Solu-Medrol 40mg IV, cefepime 1 dose  Admission for acute on chronic respiratory failure        Review of Systems:  Review of Systems   Constitutional: Negative for chills, fever and unexpected weight change  HENT: Negative for congestion, ear pain and sore throat  Eyes: Negative for pain and visual disturbance  Respiratory: Positive for shortness of breath  Negative for cough  Cardiovascular: Negative for chest pain and palpitations  Gastrointestinal: Negative for abdominal distention, abdominal pain, diarrhea, nausea and vomiting  Genitourinary: Negative for difficulty urinating, dysuria, frequency, hematuria and urgency  Musculoskeletal: Negative for arthralgias and back pain  Skin: Negative for color change and rash  Neurological: Negative for dizziness, seizures, syncope, facial asymmetry, weakness and headaches  Psychiatric/Behavioral: Negative for confusion  All other systems reviewed and are negative  Past Medical and Surgical History:   Past Medical History:   Diagnosis Date   • CHF (congestive heart failure) ([facility] [address] )    • DVT (deep venous thrombosis) ([facility])    • HTN (hypertension)    • Morbid obesity ([facility])    • EVERETT (obstructive sleep apnea)    • Pulmonary embolism ([facility])        Past Surgical History:   Procedure Laterality Date   • KNEE SURGERY         Meds/Allergies:  Prior to Admission medications    Medication Sig Start Date End Date Taking?  Authorizing Provider   acetaminophen (TYLENOL) 500 mg tablet Take 1,000 mg by mouth every 6 (six) hours as needed   Yes Historical Provider, MD   b complex vitamins capsule Take 1 capsule by mouth daily   Yes Historical Provider, MD   benzonatate (TESSALON) 200 MG capsule Take 1 capsule (200 mg total) by mouth 3 (three) times a day as needed for cough 4/4/23  Yes Annika Olivares MD   Cholecalciferol 125 MCG (5000 UT) capsule Take 5,000 Units by mouth daily   Yes Historical Provider, MD   dexamethasone 20 MG TABS Take 20 mg by mouth daily with breakfast for 3 days Do not start before April 5, 2023 4/5/23 4/8/23 Yes Annika Olivares MD   furosemide (LASIX) 40 mg tablet Take 40 mg by mouth daily 1/28/23 1/28/24 Yes Historical Provider, MD   guaiFENesin 1200 MG TB12 Take 1 tablet (1,200 mg total) by mouth every 12 (twelve) hours for 14 days 4/4/23 4/18/23 Yes Annika Olivares MD   ipratropium-albuterol (DUO-NEB) 0 5-2 5 mg/3 mL nebulizer solution Inhale 3 mL 4 (four) times a day 1/27/23 1/27/24 Yes Historical Provider, MD   risperiDONE (RisperDAL) 1 mg tablet Take 1 mg by mouth 3 (three) times a day 12/1/22  Yes Historical Provider, MD   traMADol (ULTRAM) 50 mg tablet Take 1 tablet (50 mg total) by mouth every 6 (six) hours as needed for severe pain 4/4/23  Yes Jimmy Nyhan, MD   warfarin (COUMADIN) 3 mg tablet Take 3 tablets by mouth daily 9 mg m-f 10 mg sat/sun    Yes Historical Provider, MD   methocarbamol (ROBAXIN) 500 mg tablet Take 750 mg by mouth every 6 (six) hours as needed    Historical Provider, MD   oxyCODONE (ROXICODONE) 5 immediate release tablet Take 1 tablet (5 mg total) by mouth every 6 (six) hours as needed for severe pain (HOLD FOR AMS) for up to 5 days Max Daily Amount: 20 mg 4/4/23 4/9/23  Kiana Alfaro Daily, MD   pregabalin (LYRICA) 150 mg capsule Take 1 capsule (150 mg total) by mouth 3 (three) times a day 4/4/23   Jimmy Nyhan, MD   fexofenadine (ALLEGRA) 180 MG tablet Take 180 mg by mouth daily as needed  4/7/23  Historical Provider, MD     I have reviewed home medications with patient personally  Allergies:    Allergies   Allergen Reactions   • Baclofen Other (See Comments) Nausea and vomiting   • Morphine Other (See Comments)       Social History:  Marital Status: /Civil Union   Occupation: Disabled  Patient Pre-hospital Living Situation: Home  Patient Pre-hospital Level of Mobility: non-ambulatory/bed bound  Patient Pre-hospital Diet Restrictions: None  Substance Use History:   Social History     Substance and Sexual Activity   Alcohol Use Not Currently     Social History     Tobacco Use   Smoking Status Never   Smokeless Tobacco Never     Social History     Substance and Sexual Activity   Drug Use Not Currently   • Types: Prescription       Family History:  Family History   Problem Relation Age of Onset   • Cancer Mother    • Cancer Father        Physical Exam:     Vitals:   Blood Pressure: 121/55 (04/07/23 0905)  Pulse: (!) 112 (04/07/23 0905)  Temperature: 98 4 °F (36 9 °C) (04/07/23 0907)  Respirations: (!) 24 (04/07/23 0905)  SpO2: 97 % (04/07/23 0905)    Physical Exam  Vitals and nursing note reviewed  Constitutional:       General: He is not in acute distress  Appearance: He is well-developed  He is obese  He is not ill-appearing, toxic-appearing or diaphoretic  HENT:      Head: Normocephalic and atraumatic  Mouth/Throat:      Mouth: Mucous membranes are dry  Eyes:      Extraocular Movements: Extraocular movements intact  Conjunctiva/sclera: Conjunctivae normal       Pupils: Pupils are equal, round, and reactive to light  Cardiovascular:      Rate and Rhythm: Regular rhythm  Tachycardia present  Pulses: Normal pulses  Heart sounds: Normal heart sounds  No murmur heard  No friction rub  No gallop  Pulmonary:      Effort: Pulmonary effort is normal  No respiratory distress  Breath sounds: Wheezing present  No rhonchi  Comments: Decreased breath sounds throughout secondary to body habitus  Chest:      Chest wall: No tenderness  Abdominal:      General: Bowel sounds are normal  There is no distension        Palpations: Abdomen is soft  Tenderness: There is no abdominal tenderness  There is no guarding or rebound  Musculoskeletal:         General: No tenderness  Normal range of motion  Cervical back: Normal range of motion and neck supple  Right lower leg: No edema  Left lower leg: No edema  Skin:     General: Skin is warm and dry  Capillary Refill: Capillary refill takes less than 2 seconds  Neurological:      General: No focal deficit present  Mental Status: He is alert and oriented to person, place, and time  Cranial Nerves: No cranial nerve deficit  Sensory: No sensory deficit  Motor: No weakness  Coordination: Coordination normal    Psychiatric:         Mood and Affect: Mood normal          Behavior: Behavior normal          Thought Content:  Thought content normal           Additional Data:     Lab Results:  Results from last 7 days   Lab Units 04/07/23  0941   WBC Thousand/uL 12 31*   HEMOGLOBIN g/dL 13 2   HEMATOCRIT % 42 6   PLATELETS Thousands/uL 249   NEUTROS PCT % 82*   LYMPHS PCT % 10*   MONOS PCT % 5   EOS PCT % 3     Results from last 7 days   Lab Units 04/07/23  0941   SODIUM mmol/L 138   POTASSIUM mmol/L 3 7   CHLORIDE mmol/L 91*   CO2 mmol/L 44*   BUN mg/dL 13   CREATININE mg/dL 0 48*   ANION GAP mmol/L 3*   CALCIUM mg/dL 8 6   ALBUMIN g/dL 3 1*   TOTAL BILIRUBIN mg/dL 0 53   ALK PHOS U/L 65   ALT U/L 32   AST U/L 14   GLUCOSE RANDOM mg/dL 126     Results from last 7 days   Lab Units 04/07/23  0941   INR  2 91*             Results from last 7 days   Lab Units 04/07/23  0941 04/01/23  0434   LACTIC ACID mmol/L 1 3  --    PROCALCITONIN ng/ml 0 10 0 09       Lines/Drains:  Invasive Devices     Peripheral Intravenous Line  Duration           Peripheral IV 04/07/23 Right Antecubital <1 day                    Imaging: Reviewed radiology reports from this admission including: chest xray  XR chest portable   Final Result by Liset Neil MD (04/07 1033) Mild bibasilar atelectasis  Pneumonia not excluded in the appropriate clinical setting  Workstation performed: [id number]             EKG and Other Studies Reviewed on Admission:   · EKG: Sinus Tachycardia    ** Please Note: This note has been constructed using a voice recognition system   **

## 2023-04-07 NOTE — ASSESSMENT & PLAN NOTE
Complaining of worsening SOB prior to arrival   Recently discharged 4/4 due to Acute on Chronic Respiratory Failkure secondary to Covid infection ( Covid + 3/29)  Discharged on 5L NC (pt's previous baseline), dexamethasone  Denies fever, chills, cough, abdominal pain, nvd  Afebrile, mild leukocytosis 12 3 likely in the setting of dexamethasone  Procal- Negative    XR chest portable- Impression Mild bibasilar atelectasis   Pneumonia not excluded in the appropriate clinical setting  ED gave Solumedrol 80mg IV and Cefepime        Plan-  Procal tomorrow  IV Solu-Medrol 40mg IV for 2 days   4-5L wean as appropriate, Goal O2 sat 88-92%  Monitor respiratory status  Respiratory protocol, Xopenex/Atrovent, Airway clearance protocol, IS  Mucinex, Tessalon Perles

## 2023-04-07 NOTE — PLAN OF CARE
Problem: PHYSICAL THERAPY ADULT  Goal: Performs mobility at highest level of function for planned discharge setting  See evaluation for individualized goals  Description: Treatment/Interventions: Functional transfer training, LE strengthening/ROM, Therapeutic exercise, Endurance training, Cognitive reorientation, Patient/family training, Equipment eval/education, Gait training, Bed mobility          See flowsheet documentation for full assessment, interventions and recommendations  Note:    Problem List: Decreased strength, Decreased range of motion, Decreased endurance, Impaired balance, Decreased mobility, Decreased safety awareness, Impaired hearing, Obesity, Pain (LE edema)  Assessment: Pt was recently discharged from the hospital on 4/4/23 due to respiratory failure secondary to COVID infection, was discharged on 5 L NC which is around patient's baseline, and was also given dexamethasone PO  Reports that he just cannot catch his breath  Dx: acute on chronic respiratory failure, SIRS, chronic diastolic CHF, HTN, and morbid obesity  order placed for PT eval and tx, w/ activity order of up and out of bed as tolerated  pt presents w/ comorbidities of CHF, DVT, PE, HTN, chronic back pain, morbid obesity, and pulmonary HTN and personal factors of mobilizing w/ assistive device, inability to perform ADLs and  IADLs and readmission to the hospital  pt presents w/ pain, weakness, decreased ROM, decreased endurance, impaired balance, decreased safety awareness, fall risk and LE edema  these impairments are evident in findings from physical examination (weakness and decreased ROM), mobility assessment (need for assist x2 w/ transfers, inability to complete pregait activities or mobilize to bedside chair, need for cuing for mobility and breathing technique), and Barthel Index: 35/100  pt needed input for task focus and mobility technique  pt is at risk for falls due to physical and safety awareness deficits   pt's clinical presentation is unstable/unpredictable (evident in need for assist x2 w/ all phases of mobility, pain impacting overall mobility status, need for supplemental oxygen in order to maintain oxygen saturation and need for input for task focus and mobility technique)  pt needs inpatient PT tx to improve mobility deficits and progress mobility training as appropriate  discharge recommendation is for inpatient rehab to reduce fall risk and maximize level of functional independence  PT Discharge Recommendation: Post acute rehabilitation services    See flowsheet documentation for full assessment

## 2023-04-08 LAB
ANION GAP SERPL CALCULATED.3IONS-SCNC: 12 MMOL/L (ref 4–13)
BASOPHILS # BLD MANUAL: 0 THOUSAND/UL (ref 0–0.1)
BASOPHILS NFR MAR MANUAL: 0 % (ref 0–1)
BUN SERPL-MCNC: 10 MG/DL (ref 5–25)
CALCIUM SERPL-MCNC: 8.6 MG/DL (ref 8.4–10.2)
CHLORIDE SERPL-SCNC: 91 MMOL/L (ref 96–108)
CO2 SERPL-SCNC: 38 MMOL/L (ref 21–32)
CREAT SERPL-MCNC: 0.37 MG/DL (ref 0.6–1.3)
EOSINOPHIL # BLD MANUAL: 0 THOUSAND/UL (ref 0–0.4)
EOSINOPHIL NFR BLD MANUAL: 0 % (ref 0–6)
ERYTHROCYTE [DISTWIDTH] IN BLOOD BY AUTOMATED COUNT: 12.5 % (ref 11.6–15.1)
GFR SERPL CREATININE-BSD FRML MDRD: 125 ML/MIN/1.73SQ M
GLUCOSE SERPL-MCNC: 194 MG/DL (ref 65–140)
HCT VFR BLD AUTO: 39.5 % (ref 36.5–49.3)
HGB BLD-MCNC: 12.5 G/DL (ref 12–17)
INR PPP: 3.29 (ref 0.84–1.19)
LYMPHOCYTES # BLD AUTO: 0.58 THOUSAND/UL (ref 0.6–4.47)
LYMPHOCYTES # BLD AUTO: 7 % (ref 14–44)
MCH RBC QN AUTO: 32.8 PG (ref 26.8–34.3)
MCHC RBC AUTO-ENTMCNC: 31.6 G/DL (ref 31.4–37.4)
MCV RBC AUTO: 104 FL (ref 82–98)
MONOCYTES # BLD AUTO: 0.25 THOUSAND/UL (ref 0–1.22)
MONOCYTES NFR BLD: 3 % (ref 4–12)
NEUTROPHILS # BLD MANUAL: 7.42 THOUSAND/UL (ref 1.85–7.62)
NEUTS SEG NFR BLD AUTO: 90 % (ref 43–75)
PLATELET # BLD AUTO: 258 THOUSANDS/UL (ref 149–390)
PLATELET BLD QL SMEAR: ADEQUATE
PMV BLD AUTO: 9.5 FL (ref 8.9–12.7)
POTASSIUM SERPL-SCNC: 4.2 MMOL/L (ref 3.5–5.3)
PROCALCITONIN SERPL-MCNC: 0.07 NG/ML
PROTHROMBIN TIME: 33.7 SECONDS (ref 11.6–14.5)
RBC # BLD AUTO: 3.81 MILLION/UL (ref 3.88–5.62)
RBC MORPH BLD: NORMAL
SODIUM SERPL-SCNC: 141 MMOL/L (ref 135–147)
WBC # BLD AUTO: 8.24 THOUSAND/UL (ref 4.31–10.16)

## 2023-04-08 RX ORDER — FUROSEMIDE 10 MG/ML
40 INJECTION INTRAMUSCULAR; INTRAVENOUS
Status: COMPLETED | OUTPATIENT
Start: 2023-04-08 | End: 2023-04-08

## 2023-04-08 RX ORDER — METHYLPREDNISOLONE SODIUM SUCCINATE 40 MG/ML
40 INJECTION, POWDER, LYOPHILIZED, FOR SOLUTION INTRAMUSCULAR; INTRAVENOUS DAILY
Status: DISCONTINUED | OUTPATIENT
Start: 2023-04-09 | End: 2023-04-10

## 2023-04-08 RX ORDER — FUROSEMIDE 10 MG/ML
40 INJECTION INTRAMUSCULAR; INTRAVENOUS
Status: DISCONTINUED | OUTPATIENT
Start: 2023-04-08 | End: 2023-04-08

## 2023-04-08 RX ORDER — GUAIFENESIN/DEXTROMETHORPHAN 100-10MG/5
10 SYRUP ORAL EVERY 4 HOURS PRN
Status: DISCONTINUED | OUTPATIENT
Start: 2023-04-08 | End: 2023-05-06 | Stop reason: HOSPADM

## 2023-04-08 RX ADMIN — IPRATROPIUM BROMIDE 0.5 MG: 0.5 SOLUTION RESPIRATORY (INHALATION) at 19:41

## 2023-04-08 RX ADMIN — IPRATROPIUM BROMIDE 0.5 MG: 0.5 SOLUTION RESPIRATORY (INHALATION) at 07:56

## 2023-04-08 RX ADMIN — RISPERIDONE 1 MG: 1 TABLET ORAL at 08:44

## 2023-04-08 RX ADMIN — ISODIUM CHLORIDE 3 ML: 0.03 SOLUTION RESPIRATORY (INHALATION) at 14:01

## 2023-04-08 RX ADMIN — METHOCARBAMOL TABLETS 750 MG: 750 TABLET, COATED ORAL at 13:08

## 2023-04-08 RX ADMIN — METHYLPREDNISOLONE SODIUM SUCCINATE 40 MG: 40 INJECTION, POWDER, FOR SOLUTION INTRAMUSCULAR; INTRAVENOUS at 05:35

## 2023-04-08 RX ADMIN — FUROSEMIDE 40 MG: 10 INJECTION, SOLUTION INTRAMUSCULAR; INTRAVENOUS at 17:43

## 2023-04-08 RX ADMIN — GUAIFENESIN AND DEXTROMETHORPHAN 10 ML: 100; 10 SYRUP ORAL at 10:22

## 2023-04-08 RX ADMIN — BENZONATATE 200 MG: 100 CAPSULE ORAL at 10:22

## 2023-04-08 RX ADMIN — IPRATROPIUM BROMIDE 0.5 MG: 0.5 SOLUTION RESPIRATORY (INHALATION) at 14:01

## 2023-04-08 RX ADMIN — GUAIFENESIN AND DEXTROMETHORPHAN 10 ML: 100; 10 SYRUP ORAL at 22:29

## 2023-04-08 RX ADMIN — PREGABALIN 150 MG: 75 CAPSULE ORAL at 08:44

## 2023-04-08 RX ADMIN — FUROSEMIDE 40 MG: 10 INJECTION, SOLUTION INTRAMUSCULAR; INTRAVENOUS at 12:16

## 2023-04-08 RX ADMIN — ACETAMINOPHEN 650 MG: 325 TABLET ORAL at 13:08

## 2023-04-08 RX ADMIN — RISPERIDONE 1 MG: 1 TABLET ORAL at 17:43

## 2023-04-08 RX ADMIN — GUAIFENESIN 600 MG: 600 TABLET ORAL at 20:53

## 2023-04-08 RX ADMIN — OXYCODONE HYDROCHLORIDE 5 MG: 5 TABLET ORAL at 13:08

## 2023-04-08 RX ADMIN — OXYCODONE HYDROCHLORIDE 5 MG: 5 TABLET ORAL at 20:53

## 2023-04-08 RX ADMIN — ISODIUM CHLORIDE 3 ML: 0.03 SOLUTION RESPIRATORY (INHALATION) at 19:41

## 2023-04-08 RX ADMIN — LEVALBUTEROL HYDROCHLORIDE 1.25 MG: 1.25 SOLUTION RESPIRATORY (INHALATION) at 14:01

## 2023-04-08 RX ADMIN — FUROSEMIDE 40 MG: 40 TABLET ORAL at 08:44

## 2023-04-08 RX ADMIN — METHOCARBAMOL TABLETS 750 MG: 750 TABLET, COATED ORAL at 03:33

## 2023-04-08 RX ADMIN — PREGABALIN 150 MG: 75 CAPSULE ORAL at 17:43

## 2023-04-08 RX ADMIN — WARFARIN SODIUM 10 MG: 2.5 TABLET ORAL at 08:43

## 2023-04-08 RX ADMIN — RISPERIDONE 1 MG: 1 TABLET ORAL at 20:53

## 2023-04-08 RX ADMIN — PREGABALIN 150 MG: 75 CAPSULE ORAL at 20:53

## 2023-04-08 RX ADMIN — LEVALBUTEROL HYDROCHLORIDE 1.25 MG: 1.25 SOLUTION RESPIRATORY (INHALATION) at 07:56

## 2023-04-08 RX ADMIN — ISODIUM CHLORIDE 3 ML: 0.03 SOLUTION RESPIRATORY (INHALATION) at 07:56

## 2023-04-08 RX ADMIN — BENZONATATE 200 MG: 100 CAPSULE ORAL at 22:28

## 2023-04-08 RX ADMIN — ACETAMINOPHEN 650 MG: 325 TABLET ORAL at 03:33

## 2023-04-08 RX ADMIN — OXYCODONE HYDROCHLORIDE 5 MG: 5 TABLET ORAL at 03:33

## 2023-04-08 RX ADMIN — METHOCARBAMOL TABLETS 750 MG: 750 TABLET, COATED ORAL at 20:53

## 2023-04-08 RX ADMIN — LEVALBUTEROL HYDROCHLORIDE 1.25 MG: 1.25 SOLUTION RESPIRATORY (INHALATION) at 19:41

## 2023-04-08 NOTE — ASSESSMENT & PLAN NOTE
POA  as evidenced by tachycardia 112, leukocytosis 12 3, tachypnea 24  Blood Pressure: 139/82  Pulse: 95  Respirations: 19    Sepsis Unlikely as no source present  Previous COVID infxn, likely continued inflammatory state         Plan-  Blood Cx-pending  Continue to monitor off antibiotics  Encourage p o  intake

## 2023-04-08 NOTE — UTILIZATION REVIEW
Initial Clinical Review    Admission: Date/Time/Statement:   Admission Orders (From admission, onward)     Ordered        04/07/23 1130  INPATIENT ADMISSION  Once                      Orders Placed This Encounter   Procedures   • INPATIENT ADMISSION     Standing Status:   Standing     Number of Occurrences:   1     Order Specific Question:   Level of Care     Answer:   Med Surg [16]     Order Specific Question:   Estimated length of stay     Answer:   More than 2 Midnights     Order Specific Question:   Certification     Answer:   I certify that inpatient services are medically necessary for this patient for a duration of greater than two midnights  See H&P and MD Progress Notes for additional information about the patient's course of treatment  ED Arrival Information     Expected   -    Arrival   4/7/2023 09:01    Acuity   Emergent            Means of arrival   Ambulance    Escorted by   [facility]    Admission type   Emergency            Arrival complaint   sob            Chief Complaint   Patient presents with   • Shortness of Breath     Here recently for COVID and CHF, c/o SOB, 5L NS at home       Initial Presentation: 69 y o  male from short term rehab  to ED via ems admitted inpatient due to acute on chronic respiratory failure/SIRS with possible etiology ofInflammation related to recent COVID/COVID fibrosis   PMH of EVERETT on CPAP, pulmonary hypertension and recent covid pneumonia, uses home oxygen 4-5 liters  Presented due to increased chest tightness, shortness of breath starting day prior to arrival    On exam: tachycardia, tachypnea  Obese  Lungs wheezing, rales and decreased breath sounds  CxR sowed bibasilar atelectasis and possible pneumonia  In the ED given Marilyn Garcia neb, IV solumedrol, another round of albuterol nebs, started on cefepime  Oxygen to 6 liters  Plan is wean oxygen as able    Continue IV Solu medrol, scheduled nebulizer, incentive spirometry, Mucinex and tessalon perles  Monitor off antibiotics  Continue home lasix, if no improvement tomorrow consider diuresis  Date: 4/8/23    Day 2: Dyspnea on exertion  Lungs diminished breath sounds  INR 3 39 on Coumadin  Plan is home lasix changed to IV and increased to twice daily  Solu medrol decreased starting tomorrow   On oxygen 5 liters  Continue [facility]  ED Triage Vitals   Temperature Pulse Respirations Blood Pressure SpO2   04/07/23 0907 04/07/23 0905 04/07/23 0905 04/07/23 0905 04/07/23 0905   98 4 °F (36 9 °C) (!) 112 (!) 24 121/55 97 %      Temp src Heart Rate Source Patient Position - Orthostatic VS BP Location FiO2 (%)   -- -- -- 04/07/23 0905 --      Right arm       Pain Score       04/07/23 1413       9          Wt Readings from Last 1 Encounters:   04/08/23 (!) 199 kg (439 lb 9 6 oz)     Additional Vital Signs:   04/08/23 12:18:05 -- 95 -- 139/82 101 94 % -- -- -- --   04/08/23 08:43:45 -- 107 Abnormal  -- 168/75 106 95 % -- -- -- --   04/08/23 07:10:55 98 °F (36 7 °C) 103 19 155/85 108 94 % -- -- -- --   04/08/23 0419 -- -- -- -- -- 95 % -- -- -- Full face mask   04/08/23 0018 -- -- -- -- -- 98 % -- -- -- Full face mask   04/07/23 22:59:13 98 4 °F (36 9 °C) 105 18 130/72 91 94 % -- -- -- --   04/07/23 2008 -- -- -- -- -- 94 % 40 5 L/min Nasal cannula --   04/07/23 15:23:30 98 1 °F (36 7 °C) 111 Abnormal  18 127/76 93 94 % -- -- -- --   04/07/23 1500 -- -- -- -- -- 92 % 44 6 L/min Nasal cannula      Pertinent Labs/Diagnostic Test Results:   XR chest portable   Final Result by Twyla Montgomery MD (04/07 1033)      Mild bibasilar atelectasis  Pneumonia not excluded in the appropriate clinical setting                 Workstation performed: [id number]           4/7/23 ecg Rate:     ECG rate assessment: tachycardic     Rhythm:     Rhythm: sinus rhythm     Ectopy:     Ectopy: PAC    Results from last 7 days   Lab Units 04/08/23  0524 04/07/23  0941 04/04/23  0606 04/03/23  8236 04/02/23  0423   WBC Thousand/uL 8 24 12 31* 8 09 7 25 8 46   HEMOGLOBIN g/dL 12 5 13 2 12 7 13 4 13 3   HEMATOCRIT % 39 5 42 6 41 6 43 0 43 6   PLATELETS Thousands/uL 258 249 274 266 240   NEUTROS ABS Thousands/µL  --  10 08* 7 11 6 15 5 43     Results from last 7 days   Lab Units 04/08/23  0524 04/07/23  0941 04/04/23  0606 04/03/23  0513 04/02/23  0423   SODIUM mmol/L 141 138 138 138 140   POTASSIUM mmol/L 4 2 3 7 3 9 4 0 3 6   CHLORIDE mmol/L 91* 91* 95* 91* 92*   CO2 mmol/L 38* 44* 44* >45* 45*   ANION GAP mmol/L 12 3* -1*  --  3*   BUN mg/dL 10 13 15 15 15   CREATININE mg/dL 0 37* 0 48* 0 45* 0 49* 0 48*   EGFR ml/min/1 73sq m 125 112 115 111 112   CALCIUM mg/dL 8 6 8 6 7 9* 8 6 8 7     Results from last 7 days   Lab Units 04/07/23  0941 04/04/23  0606 04/03/23  0513 04/02/23  0423   AST U/L 14 20 19 25   ALT U/L 32 53* 53* 59*   ALK PHOS U/L 65 58 66 67   TOTAL PROTEIN g/dL 5 8* 5 3* 5 8* 6 0*   ALBUMIN g/dL 3 1* 2 7* 2 8* 2 9*   TOTAL BILIRUBIN mg/dL 0 53 0 27 0 35 0 34     Results from last 7 days   Lab Units 04/08/23  0524 04/07/23  0941 04/04/23  0606 04/03/23  0513 04/02/23  0423   GLUCOSE RANDOM mg/dL 194* 126 177* 143* 131     Results from last 7 days   Lab Units 04/07/23  0941   PH BRYANT  7 474*   PCO2 BRYANT mm Hg 60 8*   PO2 BRYANT mm Hg 40 1   HCO3 BRYANT mmol/L 43 7*   BASE EXC BRYANT mmol/L 16 8   O2 CONTENT BRYANT ml/dL 15 3   O2 HGB, VENOUS % 77 9     Results from last 7 days   Lab Units 04/08/23  0524 04/07/23  0941 04/04/23  0606   PROTIME seconds 33 7* 30 6* 21 3*   INR  3 29* 2 91* 1 82*   PTT seconds  --  39*  --      Results from last 7 days   Lab Units 04/08/23  0524 04/07/23  0941   PROCALCITONIN ng/ml 0 07 0 10     Results from last 7 days   Lab Units 04/07/23  0941   LACTIC ACID mmol/L 1 3       Results from last 7 days   Lab Units 04/07/23  0941   BNP pg/mL 35     Results from last 7 days   Lab Units 04/02/23  0423   CRP mg/L 4 1*     Results from last 7 days   Lab Units 04/07/23  1018 04/07/23  0941 BLOOD CULTURE  No Growth at 24 hrs  No Growth at 24 hrs         ED Treatment:   Medication Administration from 04/07/2023 0901 to 04/07/2023 1353       Date/Time Order Dose Route Action Comments     04/07/2023 0924 EDT albuterol inhalation solution 10 mg 10 mg Nebulization Given --     04/07/2023 9009 EDT ipratropium (ATROVENT) 0 02 % inhalation solution 1 mg 1 mg Nebulization Given --     04/07/2023 9928 EDT sodium chloride 0 9 % inhalation solution 3 mL 3 mL Nebulization Given --     04/07/2023 0950 EDT methylPREDNISolone sodium succinate (Solu-MEDROL) injection 40 mg 40 mg Intravenous Given --     04/07/2023 1018 EDT pregabalin (LYRICA) capsule 150 mg 150 mg Oral Given --     04/07/2023 0950 EDT guaiFENesin (MUCINEX) 12 hr tablet 1,200 mg 1,200 mg Oral Given --     04/07/2023 1018 EDT cefepime (MAXIPIME) 2 g/50 mL dextrose IVPB 2,000 mg Intravenous New Bag --     04/07/2023 1207 EDT methylPREDNISolone sodium succinate (Solu-MEDROL) injection 40 mg 40 mg Intravenous Given --     04/07/2023 1208 EDT albuterol inhalation solution 2 5 mg 2 5 mg Nebulization Given --        Past Medical History:   Diagnosis Date   • CHF (congestive heart failure) ([facility])    • DVT (deep venous thrombosis) ([facility])    • HTN (hypertension)    • Morbid obesity ([facility])    • EVERETT (obstructive sleep apnea)    • Pulmonary embolism ([facility])      Present on Admission:  • EVERETT (obstructive sleep apnea)  • Primary hypertension  • History of pulmonary embolism  • Morbid obesity ([place] [address] )      Admitting Diagnosis: Pneumonia [J18 9]  SOB (shortness of breath) [R06 02]  COPD exacerbation ([place] [address] ) [J44 1]  Age/Sex: 69 y o  male  Admission Orders:  Scheduled Medications:  furosemide, 40 mg, Intravenous, BID (diuretic)  guaiFENesin, 600 mg, Oral, Q12H [facility]  ipratropium, 0 5 mg, Nebulization, TID  levalbuterol, 1 25 mg, Nebulization, TID   And  sodium chloride, 3 mL, Nebulization, TID  [START ON 4/9/2023] methylPREDNISolone sodium succinate, 40 mg, Intravenous, Daily  pregabalin, 150 mg, Oral, TID  risperiDONE, 1 mg, Oral, TID  warfarin, 10 mg, Oral, Once per day on Sun Sat  [START ON 4/10/2023] warfarin, 9 mg, Oral, Once per day on Mon Tue Wed Thu Fri    furosemide (LASIX) tablet 40 mg  Dose: 40 mg  Freq: Daily Route: PO  Start: 04/08/23 0900 End: 04/08/23 1045    methylPREDNISolone sodium succinate (Solu-MEDROL) injection 40 mg  Dose: 40 mg  Freq: Every 8 hours scheduled Route: IV  Start: 04/07/23 1800 End: 04/08/23 1046    Continuous IV Infusions: none      PRN Meds:  acetaminophen, 650 mg, Oral, Q6H PRN x 2 4/7, x 2 4/8  albuterol, 2 5 mg, Nebulization, Q4H PRN  benzonatate, 200 mg, Oral, TID PRN x 1 4/8  dextromethorphan-guaiFENesin, 10 mL, Oral, Q4H PRN x 1 4/8  methocarbamol, 750 mg, Oral, Q6H PRN x 2 4/7, x 2 4/8   oxyCODONE, 5 mg, Oral, Q6H PRN x 2 4/7, x 2 4/8  traMADol, 50 mg, Oral, Q6H PRN    Bipap at bedtime       Network Utilization Review Department  ATTENTION: Please call with any questions or concerns to [phone] and carefully listen to the prompts so that you are directed to the right person  All voicemails are confidential   Xin Ryan all requests for admission clinical reviews, approved or denied determinations and any other requests to dedicated fax number below belonging to the campus where the patient is receiving treatment   List of dedicated fax numbers for the Facilities:  [address] [place] [address] DENIALS (Administrative/Medical Necessity) [phone]   [address] [place] [address] (Maternity/NICU/Pediatrics) [phone]   [address] [facility] [address] [place] [address] [place] [address] [address] [place] [address] [address] [facility] [address] [facility] [phone]

## 2023-04-08 NOTE — PROGRESS NOTES
[facility]  Progress Note  Name: Feliciano Hodgkins  MRN: [id number]  Unit/Bed#: S -01 I Date of Admission: 4/7/2023   Date of Service: 4/8/2023 I Hospital Day: 1    Assessment/Plan   * Acute on chronic respiratory failure [facility])  Assessment & Plan  Complaining of worsening SOB prior to arrival   Recently discharged 4/4 due to Acute on Chronic Respiratory Failkure secondary to Covid infection ( Covid + 3/29)  Discharged on 5L NC (pt's previous baseline), dexamethasone  Denies fever, chills, cough, abdominal pain, nvd  Afebrile, mild leukocytosis 12 3 likely in the setting of dexamethasone  Procal- Negative    XR chest portable- Impression Mild bibasilar atelectasis   Pneumonia not excluded in the appropriate clinical setting  ED gave Solumedrol 80mg IV and Cefepime        Trial IV Lasix 40 mg twice daily for today  We will taper down IV Solu-Medrol to 40 mg daily  4-5L wean as appropriate, Goal O2 sat 88-92%  Monitor respiratory status  Respiratory protocol, Xopenex/Atrovent, Airway clearance protocol, IS  Mucinex, Tessalon Perles          Chronic diastolic congestive heart failure (HCC)  Assessment & Plan  Wt Readings from Last 3 Encounters:   04/08/23 (!) 199 kg (439 lb 9 6 oz)   04/07/23 (!) 199 kg (439 lb 9 6 oz)   04/04/23 (!) 186 kg (410 lb 15 oz)   Denies recent weight gain, abdominal bloating, lower extremity swelling, compliance with medications   ECHO 2/23- EF 60%, indeterminate diastolic dysfunction, pulmonary HTN   Home medications include Lasix     Plan-   Lasix IV 40 mg twice daily  Daily Weights   Monitor I/Os  Monitor BMP            SIRS (systemic inflammatory response syndrome) (HCC)  Assessment & Plan  POA  as evidenced by tachycardia 112, leukocytosis 12 3, tachypnea 24  Blood Pressure: 139/82  Pulse: 95  Respirations: 19    Sepsis Unlikely as no source present  Previous COVID infxn, likely continued inflammatory state         Plan-  Blood Cx-pending  Continue to monitor off antibiotics  Encourage p o  intake     History of pulmonary embolism  Assessment & Plan  History of DVT, PE  Home dose of coumadin 10 mg , 9 mg -sundays  INR 2 91, therapeutic   Coumadin 10 mg sat/sun and 9 mg Monday-Friday    Plan-   Continue current regime of warfarin   Monitor INR    EVERETT (obstructive sleep apnea)  Assessment & Plan  Hx of EVERETT, morbid obesity, obesity hypoventilation syndrome   Recently hospitalization, used BiPAP HS     Plan-   Continue Bipap inpatient    Primary hypertension  Assessment & Plan  BP on admission 121/55  Home medication Lasix 40mg QD       Morbid obesity ([address] )  Assessment & Plan  Recently DC to [facility]   Pt reports no PT/OT completed    Plan-   PT/OT Eval               VTE Pharmacologic Prophylaxis:   VTE Score: 10 Patient is on warfarin    Mechanical VTE Prophylaxis in Place: Yes    Patient Centered Rounds: I have performed bedside rounds with nursing staff today  Discussions with Specialists or Other Care Team Provider:     Education and Discussions with Family / Patient: Updated  (wife) via phone  Current Length of Stay: 1 day(s)    Current Patient Status: Inpatient     Discharge Plan / Estimated Discharge Date: Anticipate discharge in 48-72 hrs to home  Code Status: Level 1 - Full Code      Subjective:   Patient still having some difficulty breathing this morning  No fevers or chills overnight  No complaint of orthopnea or PND  Was able to tolerate his BiPAP well overnight  Objective:     Vitals:   Temp (24hrs), Av 2 °F (36 8 °C), Min:98 °F (36 7 °C), Max:98 4 °F (36 9 °C)    Temp:  [98 °F (36 7 °C)-98 4 °F (36 9 °C)] 98 °F (36 7 °C)  HR:  [] 95  Resp:  [18-19] 19  BP: (130-168)/(72-85) 139/82  SpO2:  [94 %-98 %] 94 %  Body mass index is 56 44 kg/m²  Input and Output Summary (last 24 hours):        Intake/Output Summary (Last 24 hours) at 2023 1529  Last data filed at 2023 1038  Gross per 24 hour Intake 540 ml   Output 3150 ml   Net -2610 ml       Physical Exam:     Physical Exam  Constitutional:       General: He is not in acute distress  Appearance: He is obese  He is ill-appearing  HENT:      Head: Normocephalic and atraumatic  Cardiovascular:      Rate and Rhythm: Normal rate and regular rhythm  Pulmonary:      Effort: No respiratory distress  Breath sounds: No wheezing or rales  Comments: Diminished breath sounds  Currently on 5 L nasal cannula  Abdominal:      General: Abdomen is flat  There is distension  Musculoskeletal:      Right lower leg: No edema  Left lower leg: No edema  Skin:     General: Skin is warm  Neurological:      General: No focal deficit present  Mental Status: He is alert and oriented to person, place, and time            Additional Data:     Labs:  Results from last 7 days   Lab Units 04/08/23  0524 04/07/23  0941   WBC Thousand/uL 8 24 12 31*   HEMOGLOBIN g/dL 12 5 13 2   HEMATOCRIT % 39 5 42 6   PLATELETS Thousands/uL 258 249   NEUTROS PCT %  --  82*   LYMPHS PCT %  --  10*   LYMPHO PCT % 7*  --    MONOS PCT %  --  5   MONO PCT % 3*  --    EOS PCT % 0 3     Results from last 7 days   Lab Units 04/08/23  0524 04/07/23  0941   SODIUM mmol/L 141 138   POTASSIUM mmol/L 4 2 3 7   CHLORIDE mmol/L 91* 91*   CO2 mmol/L 38* 44*   BUN mg/dL 10 13   CREATININE mg/dL 0 37* 0 48*   ANION GAP mmol/L 12 3*   CALCIUM mg/dL 8 6 8 6   ALBUMIN g/dL  --  3 1*   TOTAL BILIRUBIN mg/dL  --  0 53   ALK PHOS U/L  --  65   ALT U/L  --  32   AST U/L  --  14   GLUCOSE RANDOM mg/dL 194* 126     Results from last 7 days   Lab Units 04/08/23  0524   INR  3 29*             Results from last 7 days   Lab Units 04/08/23  0524 04/07/23  0941   LACTIC ACID mmol/L  --  1 3   PROCALCITONIN ng/ml 0 07 0 10       Imaging: Personally reviewed the following imaging: chest xray    Recent Cultures (last 7 days):     Results from last 7 days   Lab Units 04/07/23  1018 04/07/23  0941 BLOOD CULTURE  No Growth at 24 hrs  No Growth at 24 hrs  Lines/Drains:  Invasive Devices     Peripheral Intravenous Line  Duration           Peripheral IV 04/07/23 Right Antecubital 1 day                Telemetry:        Last 24 Hours Medication List:   Current Facility-Administered Medications   Medication Dose Route Frequency Provider Last Rate   • acetaminophen  650 mg Oral Q6H PRN Inocencia Snyder MD     • albuterol  2 5 mg Nebulization Q4H PRN Inocencia Snyder MD     • benzonatate  200 mg Oral TID PRN Inocencia Snyder MD     • dextromethorphan-guaiFENesin  10 mL Oral Q4H PRN Colleen Mcqueen MD     • furosemide  40 mg Intravenous BID (diuretic) Colleen Mcqueen MD     • guaiFENesin  600 mg Oral Q12H Eve Cadena MD     • ipratropium  0 5 mg Nebulization TID Kal Morocho MD     • levalbuterol  1 25 mg Nebulization TID Kal Morocho MD      And   • sodium chloride  3 mL Nebulization TID Kal Morocho MD     • methocarbamol  750 mg Oral Q6H PRN Ioncencia Snyder MD     • [START ON 4/9/2023] methylPREDNISolone sodium succinate  40 mg Intravenous Daily Colleen Mcqueen MD     • oxyCODONE  5 mg Oral Q6H PRN Inocencia Snyder MD     • pregabalin  150 mg Oral TID Inocencia Snyder MD     • risperiDONE  1 mg Oral TID Inocencia Snyder MD     • traMADol  50 mg Oral Q6H PRN Inocencia Snyder MD     • warfarin  10 mg Oral Once per day on Sun Sat Inocencia Snyder MD     • [START ON 4/10/2023] warfarin  9 mg Oral Once per day on Mon Tue Wed Thu Fri Inocencia Snyder MD          Today, Patient Was Seen By: Colleen Mcqueen MD    ** Please Note: This note has been constructed using a voice recognition system   **

## 2023-04-08 NOTE — ASSESSMENT & PLAN NOTE
Complaining of worsening SOB prior to arrival   Recently discharged 4/4 due to Acute on Chronic Respiratory Failkure secondary to Covid infection ( Covid + 3/29)  Discharged on 5L NC (pt's previous baseline), dexamethasone  Denies fever, chills, cough, abdominal pain, nvd  Afebrile, mild leukocytosis 12 3 likely in the setting of dexamethasone  Procal- Negative    XR chest portable- Impression Mild bibasilar atelectasis   Pneumonia not excluded in the appropriate clinical setting  ED gave Solumedrol 80mg IV and Cefepime        Trial IV Lasix 40 mg twice daily for today  We will taper down IV Solu-Medrol to 40 mg daily  4-5L wean as appropriate, Goal O2 sat 88-92%  Monitor respiratory status  Respiratory protocol, Xopenex/Atrovent, Airway clearance protocol, IS  Mucinex, Tessalon Perles

## 2023-04-08 NOTE — ASSESSMENT & PLAN NOTE
Wt Readings from Last 3 Encounters:   04/08/23 (!) 199 kg (439 lb 9 6 oz)   04/07/23 (!) 199 kg (439 lb 9 6 oz)   04/04/23 (!) 186 kg (410 lb 15 oz)   Denies recent weight gain, abdominal bloating, lower extremity swelling, compliance with medications   ECHO 2/23- EF 60%, indeterminate diastolic dysfunction, pulmonary HTN   Home medications include Lasix     Plan-   Lasix IV 40 mg twice daily  Daily Weights   Monitor I/Os  Monitor BMP

## 2023-04-09 LAB
ANION GAP SERPL CALCULATED.3IONS-SCNC: 13 MMOL/L (ref 4–13)
ATRIAL RATE: 104 BPM
BUN SERPL-MCNC: 14 MG/DL (ref 5–25)
C DIFF TOX GENS STL QL NAA+PROBE: NEGATIVE
CALCIUM SERPL-MCNC: 9 MG/DL (ref 8.4–10.2)
CHLORIDE SERPL-SCNC: 86 MMOL/L (ref 96–108)
CO2 SERPL-SCNC: 43 MMOL/L (ref 21–32)
CREAT SERPL-MCNC: 0.46 MG/DL (ref 0.6–1.3)
ERYTHROCYTE [DISTWIDTH] IN BLOOD BY AUTOMATED COUNT: 12.6 % (ref 11.6–15.1)
GFR SERPL CREATININE-BSD FRML MDRD: 114 ML/MIN/1.73SQ M
GLUCOSE SERPL-MCNC: 116 MG/DL (ref 65–140)
HCT VFR BLD AUTO: 42.1 % (ref 36.5–49.3)
HGB BLD-MCNC: 12.9 G/DL (ref 12–17)
MCH RBC QN AUTO: 32.4 PG (ref 26.8–34.3)
MCHC RBC AUTO-ENTMCNC: 30.6 G/DL (ref 31.4–37.4)
MCV RBC AUTO: 106 FL (ref 82–98)
P AXIS: 99 DEGREES
PLATELET # BLD AUTO: 271 THOUSANDS/UL (ref 149–390)
PMV BLD AUTO: 9.4 FL (ref 8.9–12.7)
POTASSIUM SERPL-SCNC: 3.8 MMOL/L (ref 3.5–5.3)
PR INTERVAL: 180 MS
QRS AXIS: -47 DEGREES
QRSD INTERVAL: 94 MS
QT INTERVAL: 348 MS
QTC INTERVAL: 457 MS
RBC # BLD AUTO: 3.98 MILLION/UL (ref 3.88–5.62)
SODIUM SERPL-SCNC: 142 MMOL/L (ref 135–147)
T WAVE AXIS: 31 DEGREES
VENTRICULAR RATE: 104 BPM
WBC # BLD AUTO: 11.57 THOUSAND/UL (ref 4.31–10.16)

## 2023-04-09 RX ORDER — POTASSIUM CHLORIDE 20 MEQ/1
20 TABLET, EXTENDED RELEASE ORAL DAILY
Status: DISCONTINUED | OUTPATIENT
Start: 2023-04-09 | End: 2023-05-06 | Stop reason: HOSPADM

## 2023-04-09 RX ORDER — METOPROLOL SUCCINATE 25 MG/1
25 TABLET, EXTENDED RELEASE ORAL DAILY
Status: DISCONTINUED | OUTPATIENT
Start: 2023-04-09 | End: 2023-05-06 | Stop reason: HOSPADM

## 2023-04-09 RX ORDER — FUROSEMIDE 10 MG/ML
40 INJECTION INTRAMUSCULAR; INTRAVENOUS
Status: DISCONTINUED | OUTPATIENT
Start: 2023-04-09 | End: 2023-04-10

## 2023-04-09 RX ADMIN — GUAIFENESIN AND DEXTROMETHORPHAN 10 ML: 100; 10 SYRUP ORAL at 05:04

## 2023-04-09 RX ADMIN — RISPERIDONE 1 MG: 1 TABLET ORAL at 21:18

## 2023-04-09 RX ADMIN — GUAIFENESIN AND DEXTROMETHORPHAN 10 ML: 100; 10 SYRUP ORAL at 13:39

## 2023-04-09 RX ADMIN — GUAIFENESIN 600 MG: 600 TABLET ORAL at 21:18

## 2023-04-09 RX ADMIN — ISODIUM CHLORIDE 3 ML: 0.03 SOLUTION RESPIRATORY (INHALATION) at 13:27

## 2023-04-09 RX ADMIN — FUROSEMIDE 40 MG: 10 INJECTION, SOLUTION INTRAMUSCULAR; INTRAVENOUS at 16:42

## 2023-04-09 RX ADMIN — METHOCARBAMOL TABLETS 750 MG: 750 TABLET, COATED ORAL at 21:18

## 2023-04-09 RX ADMIN — RISPERIDONE 1 MG: 1 TABLET ORAL at 10:17

## 2023-04-09 RX ADMIN — GUAIFENESIN AND DEXTROMETHORPHAN 10 ML: 100; 10 SYRUP ORAL at 21:18

## 2023-04-09 RX ADMIN — METHOCARBAMOL TABLETS 750 MG: 750 TABLET, COATED ORAL at 13:39

## 2023-04-09 RX ADMIN — PREGABALIN 150 MG: 75 CAPSULE ORAL at 10:17

## 2023-04-09 RX ADMIN — PREGABALIN 150 MG: 75 CAPSULE ORAL at 16:42

## 2023-04-09 RX ADMIN — LEVALBUTEROL HYDROCHLORIDE 1.25 MG: 1.25 SOLUTION RESPIRATORY (INHALATION) at 13:27

## 2023-04-09 RX ADMIN — ISODIUM CHLORIDE 3 ML: 0.03 SOLUTION RESPIRATORY (INHALATION) at 07:55

## 2023-04-09 RX ADMIN — LEVALBUTEROL HYDROCHLORIDE 1.25 MG: 1.25 SOLUTION RESPIRATORY (INHALATION) at 07:55

## 2023-04-09 RX ADMIN — RISPERIDONE 1 MG: 1 TABLET ORAL at 16:42

## 2023-04-09 RX ADMIN — GUAIFENESIN 600 MG: 600 TABLET ORAL at 10:17

## 2023-04-09 RX ADMIN — PREGABALIN 150 MG: 75 CAPSULE ORAL at 21:17

## 2023-04-09 RX ADMIN — OXYCODONE HYDROCHLORIDE 5 MG: 5 TABLET ORAL at 13:39

## 2023-04-09 RX ADMIN — IPRATROPIUM BROMIDE 0.5 MG: 0.5 SOLUTION RESPIRATORY (INHALATION) at 19:31

## 2023-04-09 RX ADMIN — BENZONATATE 200 MG: 100 CAPSULE ORAL at 13:39

## 2023-04-09 RX ADMIN — METOPROLOL SUCCINATE 25 MG: 25 TABLET, EXTENDED RELEASE ORAL at 10:17

## 2023-04-09 RX ADMIN — BENZONATATE 200 MG: 100 CAPSULE ORAL at 21:18

## 2023-04-09 RX ADMIN — OXYCODONE HYDROCHLORIDE 5 MG: 5 TABLET ORAL at 05:04

## 2023-04-09 RX ADMIN — IPRATROPIUM BROMIDE 0.5 MG: 0.5 SOLUTION RESPIRATORY (INHALATION) at 13:27

## 2023-04-09 RX ADMIN — METHYLPREDNISOLONE SODIUM SUCCINATE 40 MG: 40 INJECTION, POWDER, FOR SOLUTION INTRAMUSCULAR; INTRAVENOUS at 10:17

## 2023-04-09 RX ADMIN — METHOCARBAMOL TABLETS 750 MG: 750 TABLET, COATED ORAL at 05:07

## 2023-04-09 RX ADMIN — WARFARIN SODIUM 10 MG: 2.5 TABLET ORAL at 10:17

## 2023-04-09 RX ADMIN — IPRATROPIUM BROMIDE 0.5 MG: 0.5 SOLUTION RESPIRATORY (INHALATION) at 07:54

## 2023-04-09 RX ADMIN — BENZONATATE 200 MG: 100 CAPSULE ORAL at 05:04

## 2023-04-09 RX ADMIN — FUROSEMIDE 40 MG: 10 INJECTION, SOLUTION INTRAMUSCULAR; INTRAVENOUS at 10:17

## 2023-04-09 RX ADMIN — POTASSIUM CHLORIDE 20 MEQ: 1500 TABLET, EXTENDED RELEASE ORAL at 10:17

## 2023-04-09 RX ADMIN — LEVALBUTEROL HYDROCHLORIDE 1.25 MG: 1.25 SOLUTION RESPIRATORY (INHALATION) at 19:31

## 2023-04-09 RX ADMIN — TRAMADOL HYDROCHLORIDE 50 MG: 50 TABLET, COATED ORAL at 21:18

## 2023-04-09 NOTE — ASSESSMENT & PLAN NOTE
Acute on chronic diastolic heart failure, continue IV Lasix, improving, patient reports improvement in shortness of breath as well    Daily weights monitor input and output

## 2023-04-09 NOTE — NURSING NOTE
Patient's family had concern about placement and not getting sufficient care in facility  SLIM made aware

## 2023-04-09 NOTE — PLAN OF CARE
Problem: MOBILITY - ADULT  Goal: Maintain or return to baseline ADL function  Description: INTERVENTIONS:  -  Assess patient's ability to carry out ADLs; assess patient's baseline for ADL function and identify physical deficits which impact ability to perform ADLs (bathing, care of mouth/teeth, toileting, grooming, dressing, etc )  - Assess/evaluate cause of self-care deficits   - Assess range of motion  - Assess patient's mobility; develop plan if impaired  - Assess patient's need for assistive devices and provide as appropriate  - Encourage maximum independence but intervene and supervise when necessary  - Involve family in performance of ADLs  - Assess for home care needs following discharge   - Consider OT consult to assist with ADL evaluation and planning for discharge  - Provide patient education as appropriate  4/8/2023 2321 by Sandip Patricia RN  Outcome: Progressing  4/8/2023 1621 by Sandip Patricia RN  Outcome: Progressing  Goal: Maintains/Returns to pre admission functional level  Description: INTERVENTIONS:  - Perform BMAT or MOVE assessment daily    - Set and communicate daily mobility goal to care team and patient/family/caregiver  - Collaborate with rehabilitation services on mobility goals if consulted  - Perform Range of Motion  times a day  - Reposition patient every  hours    - Dangle patient  times a day  - Stand patient times a day  - Ambulate patient  times a day  - Out of bed to chair  times a day   - Out of bed for meals times a day  - Out of bed for toileting  - Record patient progress and toleration of activity level   4/8/2023 2321 by Sandip Patricia RN  Outcome: Progressing  4/8/2023 1621 by Sandip Patricia RN  Outcome: Progressing     Problem: Prexisting or High Potential for Compromised Skin Integrity  Goal: Skin integrity is maintained or improved  Description: INTERVENTIONS:  - Identify patients at risk for skin breakdown  - Assess and monitor skin integrity  - Assess and monitor nutrition and hydration status  - Monitor labs   - Assess for incontinence   - Turn and reposition patient  - Assist with mobility/ambulation  - Relieve pressure over bony prominences  - Avoid friction and shearing  - Provide appropriate hygiene as needed including keeping skin clean and dry  - Evaluate need for skin moisturizer/barrier cream  - Collaborate with interdisciplinary team   - Patient/family teaching  - Consider wound care consult   4/8/2023 2321 by Alexsander Lundberg RN  Outcome: Progressing  4/8/2023 1621 by Alexsander Lundberg RN  Outcome: Progressing

## 2023-04-09 NOTE — ASSESSMENT & PLAN NOTE
Give IV Lasix 40 mg twice daily, patient is having good diuresis more than 7 L since admission  Continue Solu-Medrol to 40 mg daily   wean O2 as appropriate, Goal O2 sat 88-92%  Monitor respiratory status  Respiratory protocol, Xopenex/Atrovent, Airway clearance protocol, IS  Mucinex, Tessalon Perles   Out of bed to chair, PT and OT    Discussed with nursing staff

## 2023-04-09 NOTE — ASSESSMENT & PLAN NOTE
POA  as evidenced by tachycardia 112, leukocytosis 12 3, tachypnea 24  Blood Pressure: 114/65  Pulse: (!) 112  Respirations: 18    Sepsis Unlikely as no source present  Previous COVID infxn, likely continued inflammatory state         Plan-  Blood Cx-pending  Continue to monitor off antibiotics  Encourage p o  intake

## 2023-04-09 NOTE — PROGRESS NOTES
[facility]  Progress Note  Name: Yamilka Wing  MRN: [id number]  Unit/Bed#: S -95 I Date of Admission: 4/7/2023   Date of Service: 4/9/2023 I Hospital Day: 2    Assessment/Plan   * Acute on chronic respiratory failure [facility])  Assessment & Plan  Give IV Lasix 40 mg twice daily, patient is having good diuresis more than 7 L since admission  Continue Solu-Medrol to 40 mg daily   wean O2 as appropriate, Goal O2 sat 88-92%  Monitor respiratory status  Respiratory protocol, Xopenex/Atrovent, Airway clearance protocol, IS  Mucinex, Tessalon Perles   Out of bed to chair, PT and OT  Discussed with nursing staff       Chronic diastolic congestive heart failure ([facility] )  Assessment & Plan  Acute on chronic diastolic heart failure, continue IV Lasix, improving, patient reports improvement in shortness of breath as well    Daily weights monitor input and output            SIRS (systemic inflammatory response syndrome) (HCC)  Assessment & Plan  POA  as evidenced by tachycardia 112, leukocytosis 12 3, tachypnea 24  Blood Pressure: 114/65  Pulse: (!) 112  Respirations: 18    Sepsis Unlikely as no source present  Previous COVID infxn, likely continued inflammatory state         Plan-  Blood Cx-pending  Continue to monitor off antibiotics  Encourage p o  intake     History of pulmonary embolism  Assessment & Plan  History of DVT, PE  Home dose of coumadin 10 mg Mondays, 9 mg tuesdays-sundays  INR 2 91, therapeutic   Coumadin 10 mg sat/sun and 9 mg Monday-Friday    Plan-   Continue current regime of warfarin   Monitor INR    EVERETT (obstructive sleep apnea)  Assessment & Plan  Hx of EVERETT, morbid obesity, obesity hypoventilation syndrome   Recently hospitalization, used BiPAP HS     Plan-   Continue Bipap inpatient    Morbid obesity ([facility] )  Assessment & Plan  Recently DC to [facility]   Pt reports no PT/OT completed    Plan-   PT/OT Eval                 Subjective/Objective     Subjective:   Patient feels better this morning, shortness of breath is improving, chest tightness improving  Discussed with the nursing staff, patient had good urine output  Patient would like to get out of the bed and sit in chair  Objective:  Vitals: Blood pressure 114/65, pulse (!) 112, temperature 98 1 °F (36 7 °C), resp  rate 18, weight (!) 199 kg (439 lb 9 6 oz), SpO2 92 %  ,Body mass index is 56 44 kg/m²  Intake/Output Summary (Last 24 hours) at 4/9/2023 1258  Last data filed at 4/9/2023 1233  Gross per 24 hour   Intake --   Output 4825 ml   Net -4825 ml       Invasive Devices     Peripheral Intravenous Line  Duration           Peripheral IV 04/07/23 Right Antecubital 2 days                Physical Exam:  General appearance: Patient not in acute distress  Eyes: Pupils equal and reactive to right  CVS: S1-S2 heard, regular rate and rhythm, 2+ pitting edema bilateral lower extremities  Chest: Decreased air entry in all lung fields  Abdomen: Obese, soft, nontender  CNS: No focal logical deficits  Skin: No acute rash  Lab, Imaging and other studies: I have personally reviewed pertinent reports  VTE Pharmacologic Prophylaxis: Warfarin (Coumadin)  VTE Mechanical Prophylaxis: sequential compression device     Discussed with the patient regarding further management and plan    Called patient wife updated over the phone  Discussed with the nurse

## 2023-04-09 NOTE — CASE MANAGEMENT
Case Management Assessment & Discharge Planning Note    Patient name Mimi Knutson  Location S /S -30 MRN [id number]  : 1955 Date 2023       Current Admission Date: 2023  Current Admission Diagnosis:Acute on chronic respiratory failure [facility])   Patient Active Problem List    Diagnosis Date Noted   • Pulmonary embolism ([facility] )    • COVID-19 2023   • SIRS (systemic inflammatory response syndrome) ([facility] ) 2023   • Cerumen debris on tympanic membrane of both ears 2023   • Generalized weakness 2023   • Impaired functional mobility, balance, gait, and endurance 2023   • BPH (benign prostatic hyperplasia) 2023   • Chronic anticoagulation 2023   • Pressure injury, unstageable, with suspected deep tissue injury ([facility] ) 2023   • Dyspnea on exertion 2023   • Morbid obesity ([facility] ) 2023   • Primary hypertension 2023   • History of pulmonary embolism 2023   • CHF (congestive heart failure) ([facility] ) 2023   • DVT (deep venous thrombosis) ([facility] ) 2023   • Chronic diastolic congestive heart failure ([facility] ) 2021   • DJD (degenerative joint disease), multiple sites 2021   • Ambulatory dysfunction 2021   • Chronic right-sided congestive heart failure ([facility] ) 2021   • BMI 50 0-59 9, adult ([facility] ) 2020   • Debility 2020   • Acute on chronic respiratory failure ([facility] ) 2020   • Restrictive lung disease 2020   • Medical marijuana use 2018   • EVERETT (obstructive sleep apnea) 2018   • Obesity hypoventilation syndrome ([facility] ) 2018   • Pulmonary hypertension ([facility] ) 01/15/2018   • Pain medication agreement signed 2017   • Neuralgia, post-herpetic 2017   • Sciatica 2012   • Fercho de la Tourette's syndrome 2012   • Chronic pain disorder 2012      LOS (days): 2  Geometric Mean LOS (GMLOS) (days):   Days to GMLOS:     OBJECTIVE:  PATIENT READMITTED TO HOSPITAL  Risk of Unplanned Readmission Score: 17 5         Current admission status: Inpatient       Preferred Pharmacy:   [address], [place] - [address]  [address]  [address] [facility] [address]  Phone: [phone] Fax: [phone]    Primary Care Provider: David Rico MD    Primary Insurance: [address] [facility]  Secondary Insurance:     ASSESSMENT:  [address], [place] [address] - Spouse   Primary Phone: [phone] (Mobile)  Home Phone: [phone]               Advance Directives  Does patient have a [address]?: Yes         Readmission Root Cause  30 Day Readmission: No    Patient Information  Admitted from[de-identified] Home  Mental Status: Alert  During Assessment patient was accompanied by: Not accompanied during assessment  Assessment information provided by[de-identified] Patient  Primary Caregiver: Spouse  Caregiver's Name[de-identified] Indra Jacob Relationship to Patient[de-identified] Family Member  Support Systems: Spouse/significant other  [place] of Residence: [address] [place] [address] do you live in?: ISVS entry access options  Select all that apply : Stairs  Number of steps to enter home  : 1  Do the steps have railings?: No  Type of Current Residence: [place]  In the last 12 months, was there a time when you were not able to pay the mortgage or rent on time?: No  In the last 12 months, how many places have you lived?: 2  In the last 12 months, was there a time when you did not have a steady place to sleep or slept in a shelter (including now)?: No  Homeless/housing insecurity resource given?: N/A  Living Arrangements: Lives w/ Spouse/significant other  Is patient a ?: No    Activities of Daily Living Prior to Admission  Functional Status: Assistance  Completes ADLs independently?: No  Level of ADL dependence: Assistance  Ambulates independently?: No  Level of ambulatory dependence: Assistance  Does patient use assisted devices?: Yes  Assisted Devices (DME) used: BiPAP, Shower Chair, Walker, Portable Oxygen concentrator, Portable Oxygen tanks  DME Company Name (respiratory supplies): [employer]  Does patient currently own DME?: Yes  What DME does the patient currently own?: Valente Croey, BiPAP, Portable Oxygen tanks, Portable Oxygen concentrator  Does patient have a history of Outpatient Therapy (PT/OT)?: No  Does the patient have a history of Short-Term Rehab?: Yes ([facility])  Does patient have a history of HHC?: Yes  Does patient currently have [facility]?: No         Patient Information Continued  Income Source: SSI/SSD  Does patient have prescription coverage?: Yes  Within the past 12 months, you worried that your food would run out before you got the money to buy more : Never true  Within the past 12 months, the food you bought just didn't last and you didn't have money to get more : Never true  Food insecurity resource given?: N/A  Does patient receive dialysis treatments?: No  Does patient have a history of substance abuse?: No  Does patient have a history of Mental Health Diagnosis?: No    PHQ 2/9 Screening   Reviewed PHQ 2/9 Depression Screening Score?: No    Means of Transportation  In the past 12 months, has lack of transportation kept you from medical appointments or from getting medications?: No  In the past 12 months, has lack of transportation kept you from meetings, work, or from getting things needed for daily living?: No  Was application for public transport provided?: N/A        DISCHARGE DETAILS:    Discharge planning discussed with[de-identified] Patient  Freedom of Choice: Yes  Comments - Freedom of Choice: Pt reported not being interested in SNF if recommended based on the bad experience he had at the last SNF  Pt reported he would prefer going home with [facility] services and a chair lift  Pt also reported he will require assistance with transportation upon d/c  Requested [address] [employer]         Is the patient interested in [facility] at discharge?: Yes  [address] requested[de-identified] Occupational Therapy, Physical [address] Name[de-identified] Other  [address] Provider[de-identified] PCP  Home Health Services Needed[de-identified] Gait/ADL Training, Strengthening/Theraputic Exercises to Improve Function  Homebound Criteria Met[de-identified] Uses an Assist Device (i e  cane, walker, etc), Requires the Assistance of Another Person for Safe Ambulation or to Leave the Home  Supporting Clincal Findings[de-identified] Limited Endurance, Fatigues Easliy in [employer]         Other Referral/Resources/Interventions Provided:  Interventions: [facility]  Referral Comments: [employer] referral made for an accepting agency           Treatment Team Recommendation: Home with [address] [employer]  Discharge Destination Plan[de-identified] Home with [address] [employer]

## 2023-04-10 LAB
BASE EX.OXY STD BLDV CALC-SCNC: 97.3 % (ref 60–80)
BASE EXCESS BLDV CALC-SCNC: 14 MMOL/L
BASOPHILS # BLD AUTO: 0.01 THOUSANDS/ÂΜL (ref 0–0.1)
BASOPHILS NFR BLD AUTO: 0 % (ref 0–1)
BUN SERPL-MCNC: 15 MG/DL (ref 5–25)
CALCIUM SERPL-MCNC: 8.7 MG/DL (ref 8.4–10.2)
CHLORIDE SERPL-SCNC: 90 MMOL/L (ref 96–108)
CO2 SERPL-SCNC: >45 MMOL/L (ref 21–32)
CREAT SERPL-MCNC: 0.55 MG/DL (ref 0.6–1.3)
EOSINOPHIL # BLD AUTO: 0.02 THOUSAND/ÂΜL (ref 0–0.61)
EOSINOPHIL NFR BLD AUTO: 0 % (ref 0–6)
ERYTHROCYTE [DISTWIDTH] IN BLOOD BY AUTOMATED COUNT: 12.5 % (ref 11.6–15.1)
GFR SERPL CREATININE-BSD FRML MDRD: 106 ML/MIN/1.73SQ M
GLUCOSE SERPL-MCNC: 137 MG/DL (ref 65–140)
HCO3 BLDV-SCNC: 40.2 MMOL/L (ref 24–30)
HCT VFR BLD AUTO: 41.1 % (ref 36.5–49.3)
HGB BLD-MCNC: 12.8 G/DL (ref 12–17)
IMM GRANULOCYTES # BLD AUTO: 0.09 THOUSAND/UL (ref 0–0.2)
IMM GRANULOCYTES NFR BLD AUTO: 1 % (ref 0–2)
INR PPP: 4.93 (ref 0.84–1.19)
LYMPHOCYTES # BLD AUTO: 1.41 THOUSANDS/ÂΜL (ref 0.6–4.47)
LYMPHOCYTES NFR BLD AUTO: 11 % (ref 14–44)
MAGNESIUM SERPL-MCNC: 2.1 MG/DL (ref 1.9–2.7)
MCH RBC QN AUTO: 32.8 PG (ref 26.8–34.3)
MCHC RBC AUTO-ENTMCNC: 31.1 G/DL (ref 31.4–37.4)
MCV RBC AUTO: 105 FL (ref 82–98)
MONOCYTES # BLD AUTO: 1.27 THOUSAND/ÂΜL (ref 0.17–1.22)
MONOCYTES NFR BLD AUTO: 10 % (ref 4–12)
NEUTROPHILS # BLD AUTO: 9.6 THOUSANDS/ÂΜL (ref 1.85–7.62)
NEUTS SEG NFR BLD AUTO: 78 % (ref 43–75)
NRBC BLD AUTO-RTO: 0 /100 WBCS
O2 CT BLDV-SCNC: 21.2 ML/DL
PCO2 BLDV: 55.1 MM HG (ref 42–50)
PH BLDV: 7.48 [PH] (ref 7.3–7.4)
PLATELET # BLD AUTO: 272 THOUSANDS/UL (ref 149–390)
PMV BLD AUTO: 9.1 FL (ref 8.9–12.7)
PO2 BLDV: 191.6 MM HG (ref 35–45)
POTASSIUM SERPL-SCNC: 4.1 MMOL/L (ref 3.5–5.3)
PROTHROMBIN TIME: 46.1 SECONDS (ref 11.6–14.5)
RBC # BLD AUTO: 3.9 MILLION/UL (ref 3.88–5.62)
SODIUM SERPL-SCNC: 138 MMOL/L (ref 135–147)
WBC # BLD AUTO: 12.4 THOUSAND/UL (ref 4.31–10.16)

## 2023-04-10 RX ORDER — PREDNISONE 20 MG/1
20 TABLET ORAL DAILY
Status: DISCONTINUED | OUTPATIENT
Start: 2023-04-10 | End: 2023-04-10

## 2023-04-10 RX ORDER — PREDNISONE 20 MG/1
20 TABLET ORAL DAILY
Status: COMPLETED | OUTPATIENT
Start: 2023-04-11 | End: 2023-04-13

## 2023-04-10 RX ADMIN — TRAMADOL HYDROCHLORIDE 50 MG: 50 TABLET, COATED ORAL at 11:07

## 2023-04-10 RX ADMIN — IPRATROPIUM BROMIDE 0.5 MG: 0.5 SOLUTION RESPIRATORY (INHALATION) at 13:17

## 2023-04-10 RX ADMIN — IPRATROPIUM BROMIDE 0.5 MG: 0.5 SOLUTION RESPIRATORY (INHALATION) at 07:32

## 2023-04-10 RX ADMIN — PREGABALIN 150 MG: 75 CAPSULE ORAL at 17:52

## 2023-04-10 RX ADMIN — RISPERIDONE 1 MG: 1 TABLET ORAL at 17:52

## 2023-04-10 RX ADMIN — GUAIFENESIN AND DEXTROMETHORPHAN 10 ML: 100; 10 SYRUP ORAL at 20:32

## 2023-04-10 RX ADMIN — METOPROLOL SUCCINATE 25 MG: 25 TABLET, EXTENDED RELEASE ORAL at 09:51

## 2023-04-10 RX ADMIN — GUAIFENESIN 600 MG: 600 TABLET ORAL at 09:51

## 2023-04-10 RX ADMIN — GUAIFENESIN 600 MG: 600 TABLET ORAL at 20:32

## 2023-04-10 RX ADMIN — POTASSIUM CHLORIDE 20 MEQ: 1500 TABLET, EXTENDED RELEASE ORAL at 09:51

## 2023-04-10 RX ADMIN — ACETAMINOPHEN 650 MG: 325 TABLET ORAL at 17:52

## 2023-04-10 RX ADMIN — METHOCARBAMOL TABLETS 750 MG: 750 TABLET, COATED ORAL at 17:52

## 2023-04-10 RX ADMIN — METHOCARBAMOL TABLETS 750 MG: 750 TABLET, COATED ORAL at 04:09

## 2023-04-10 RX ADMIN — ACETAMINOPHEN 650 MG: 325 TABLET ORAL at 11:08

## 2023-04-10 RX ADMIN — METHYLPREDNISOLONE SODIUM SUCCINATE 40 MG: 40 INJECTION, POWDER, FOR SOLUTION INTRAMUSCULAR; INTRAVENOUS at 10:06

## 2023-04-10 RX ADMIN — METHOCARBAMOL TABLETS 750 MG: 750 TABLET, COATED ORAL at 11:07

## 2023-04-10 RX ADMIN — LEVALBUTEROL HYDROCHLORIDE 1.25 MG: 1.25 SOLUTION RESPIRATORY (INHALATION) at 13:17

## 2023-04-10 RX ADMIN — BENZONATATE 200 MG: 100 CAPSULE ORAL at 04:10

## 2023-04-10 RX ADMIN — TRAMADOL HYDROCHLORIDE 50 MG: 50 TABLET, COATED ORAL at 04:09

## 2023-04-10 RX ADMIN — GUAIFENESIN AND DEXTROMETHORPHAN 10 ML: 100; 10 SYRUP ORAL at 04:10

## 2023-04-10 RX ADMIN — RISPERIDONE 1 MG: 1 TABLET ORAL at 09:50

## 2023-04-10 RX ADMIN — TRAMADOL HYDROCHLORIDE 50 MG: 50 TABLET, COATED ORAL at 17:52

## 2023-04-10 RX ADMIN — PREGABALIN 150 MG: 75 CAPSULE ORAL at 20:32

## 2023-04-10 RX ADMIN — PREGABALIN 150 MG: 75 CAPSULE ORAL at 09:51

## 2023-04-10 RX ADMIN — RISPERIDONE 1 MG: 1 TABLET ORAL at 20:32

## 2023-04-10 RX ADMIN — LEVALBUTEROL HYDROCHLORIDE 1.25 MG: 1.25 SOLUTION RESPIRATORY (INHALATION) at 07:32

## 2023-04-10 RX ADMIN — FUROSEMIDE 40 MG: 10 INJECTION, SOLUTION INTRAMUSCULAR; INTRAVENOUS at 10:06

## 2023-04-10 RX ADMIN — BENZONATATE 200 MG: 100 CAPSULE ORAL at 20:32

## 2023-04-10 NOTE — ASSESSMENT & PLAN NOTE
History of DVT, PE  Home dose of coumadin 10 mg Mondays, 9 mg tuesdays-sundays  INR 2 91, therapeutic   Coumadin 10 mg sat/sun and 9 mg Monday-Friday    Plan-   Continue current regime of warfarin (hold due to high INR)  Monitor INR

## 2023-04-10 NOTE — OCCUPATIONAL THERAPY NOTE
Occupational Therapy Evaluation     Patient Name: Guillermina Salas  KGWIQ'F Date: 4/10/2023  Problem List  Principal Problem:    Acute on chronic respiratory failure [facility])  Active Problems: Morbid obesity ([facility] )    Primary hypertension    EVERETT (obstructive sleep apnea)    Chronic diastolic congestive heart failure (HCC)    History of pulmonary embolism    SIRS (systemic inflammatory response syndrome) ([facility] )    Past Medical History  Past Medical History:   Diagnosis Date    CHF (congestive heart failure) ([facility])     DVT (deep venous thrombosis) ([facility])     HTN (hypertension)     Morbid obesity (HCC)     EVERETT (obstructive sleep apnea)     Pulmonary embolism [facility])      Past Surgical History  Past Surgical History:   Procedure Laterality Date    KNEE SURGERY        04/10/23 1341   OT Last Visit   OT Visit Date 04/10/23   Note Type   Note type Evaluation   Pain Assessment   Pain Assessment Tool 0-10   Pain Score 9   Pain Location/Orientation Orientation: Upper; Location: Back   Pain Radiating Towards radiating to L shoulder   Pain Onset/Description Onset: Ongoing;Frequency: Constant/Continuous  (chronic in nature)   Effect of Pain on Daily Activities limited activity tolerance and comfort   Hospital Pain Intervention(s) Repositioned; Ambulation/increased activity; Rest;Emotional support   Multiple Pain Sites No   Restrictions/Precautions   Weight Bearing Precautions Per Order No   Other Precautions Chair Alarm; Bed Alarm; Fall Risk;Pain  (ricky, condom cath, bariatric; 5L of O2 via NC)   Home Living   Type of Home SNF  ([facility])   Additional Comments Pt with multiple recent hospitalizatons with DCs to STRs since Jan 2022  Most recently, pt admitted from [facility]  At baseline, pt lives in 1 story home with spouse with ramped entrance and uses RW to ambulate  Has tub shower with shower chair + GB , but primarily sponge bathes  Raised toilet     Prior Function   Level of [place] Needs assistance with IADLS;Needs assistance with ADLs; Independent with functional mobility  ((I) with UB ADLs, A for LBs; at pt's true baseline)   Lives With Spouse  (however most recently has been at [facility])   [address] Help From Family;Personal care attendant  (facility staff, therapy at rehab, spouse)   IADLs Family/Friend/Other provides medication management; Family/Friend/Other provides meals; Family/Friend/Other provides transportation   Falls in the last 6 months 0  (denies)   Vocational Retired   Comments pt reports they have been using a nanette lift to complete transfers to/from [facility] recently  Per recent  therapy notes of recent admission, pt was requiring Max A x2 to clear buttocks from EOB  Lifestyle   Autonomy Pt admitted from [facility], has hx of multiple recent hospitalizations  At baseline lives c his spouse   Reciprocal Relationships supportive spouse   Service to Others retired   General   Additional Pertinent History Pt admitted for acute on chronic respiratory failure and SIRS  Recent hospitalization for covid PNA   PMHx of : EVERETT, CHF, hx of PE, morbid obesity with BMI of 56  , obesity hypoventilation syndrome, Fercho de la tourettes syndrome, post herpatic neuralgia   Family/Caregiver Present Yes  (spouse present)   ADL   Eating Assistance 5  Supervision/Setup   Grooming Assistance 5  Supervision/Setup   Grooming Deficit   (applied deodorant)   UB Bathing Assistance 4  Minimal Assistance   LB Bathing Assistance 1  Total Assistance   UB Dressing Assistance 4  Minimal Assistance   UB Dressing Deficit   (doffed/donned hospital gown)   LB Dressing Assistance 1  Total Assistance   LB Dressing Deficit   (dependent to don/doff socks )   Toileting Assistance  1  Total Assistance   Toileting Deficit   (condom cath, use of bed pan)   Functional Assistance 1  Total Assistance   Additional Comments Unable to formally assess bathing, full LB dressing/toileting at time of eval  The above levels of assistance are anticipated based on functional performance deficits with use of clinical judgement  Pt significantly limited by body habitus limiting functional reach  , decreased activity tolerance, generalized weakness, and pain  Bed Mobility   Rolling R 2  Maximal assistance   Additional items Assist x 1;HOB elevated; Bedrails; Increased time required;Verbal cues;LE management  (trunk management)   Rolling L 2  Maximal assistance   Additional items Assist x 1; Increased time required;Verbal cues;LE management;HOB elevated  (trunk management )   Supine to Sit 3  Moderate assistance   Additional items Assist x 1; Increased time required;Verbal cues;LE management;HOB elevated  (HHA,)   Sit to Supine 2  Maximal assistance   Additional items Increased time required;Verbal cues;LE management;Assist x 3  (trunk management )   Additional Comments Pt sat EOB ~10 minutes with supervision  Initial lightheadedness, /75, improved symptomatically with ~1 min rest  once returning ot bed, pt required Ax3 for sit > supine  Total A x2 + trendelenburg to boost to [facility]  Attempted to place pt into chair position for pressure redistribution, however bed malfunctioning  RN made aware   Transfers   Sit to Stand 1  Dependent   Additional items Increased time required;Verbal cues; Assist x 2   Stand to Sit 1  Dependent   Additional items Assist x 2; Increased time required;Verbal cues; Bedrails   Stand pivot Unable to assess   Additional Comments sit<>stand transfers attempted x2 from EOB, unable to clear buttocks from bed despite Max A x2  Rw used during transfers  Recommend use of nanette lift for OOB mobility with nursing staff   Balance   Static Sitting Fair +   Dynamic Sitting Fair   Activity Tolerance   Activity Tolerance Patient limited by fatigue;Patient limited by pain   Medical Staff Made Aware PTA Fran MCWILLIAMS   Nurse Made Aware RN, PCA,   RUE Assessment   RUE Assessment WFL  (Full AROM, MMT 4/5 based on functional assessment)   LUE Assessment   LUE Assessment WFL  (Full AROM, MMT 4/5 based on functional assessment)   Hand Function   Gross Motor Coordination Functional   Fine Motor Coordination Functional   Sensation   Light Touch   (neuralgia in upper back/L shoulder region s/p shingles )   Vision-Basic Assessment   Current Vision Wears glasses all the time   Cognition   Overall Cognitive Status [facility]   Arousal/Participation Alert; Responsive; Cooperative   Attention Attends with cues to redirect   Orientation Level Oriented X4   Memory Within functional limits   Following Commands Follows multistep commands with increased time or repetition   Comments Pt agreeable to OT session  intermittetly irritable throughout session , expressing frustrating re: current functional status   Assessment   Limitation Decreased ADL status; Decreased Safe judgement during ADL;Decreased endurance;Decreased cognition;Decreased self-care trans;Decreased high-level ADLs   Prognosis Fair   Assessment Patient is a 69 y o  male seen for OT evaluation at [facility] following admission on 4/7/2023  s/p Acute on chronic respiratory failure ([facility] )  Please see above for comprehensive list of comorbidities and significant PMHx impacting functional performance  Patient presents with active orders for OT eval and treat and up and OOB as tolerated   At baseline, pt ambulates with Rw and requires assistance for LB ADLs  Upon initial evaluation, patient requires Min A for UB ADLs, Total Dependent  for LB ADLs, and Total Dependent  for transfers (unable to complete with Max A x2)  Based on functional eval, pt presents with intact  attention, intact  safety awareness, intact  problem solving skills, and intact   memory  Occupational performance is affected by the following deficits: endurance ,  decreased muscular strength , decreased balance , decreased standing tolerance for self care tasks , decreased functional reach , decreased activity tolerance  and (+) pain    Patient would benefit from OT services within the acute care setting to maximize level of functional independence in the following areas energy conservation , UB ADLs, LB ADLs, self-care transfers, bed mobility  and functional mobility  From OT standpoint, recommendation at time of D/C would be post-acute rehabilitation   Goals   Patient Goals to walk, to get OOB to recliner   Plan   Treatment Interventions ADL retraining;Functional transfer training;UE strengthening/ROM; Endurance training;Patient/family training;Equipment evaluation/education; Compensatory technique education; Neuromuscular reeducation; Energy conservation   Goal Expiration Date 04/20/23   OT Treatment Day 0   OT Frequency 3-5x/wk   Recommendation   OT Discharge Recommendation Post acute rehabilitation services   Additional Comments  (S)  Recommend chair position in bed vs OOB to recliner for pressure offloading throughout the day  Use of nanette lift for OOB trasnfers with nursing staff   Additional Comments 2 The patient's raw score on the AM-PAC Daily Activity Inpatient Short Form is 12  A raw score of less than 19 suggests the patient may benefit from discharge to post-acute rehabilitation services  Please refer to the recommendation of the Occupational Therapist for safe discharge planning     AM-PAC Daily Activity Inpatient   Lower Body Dressing 1   Bathing 1   Toileting 1   Upper Body Dressing 2   Grooming 3   Eating 4   Daily Activity Raw Score 12   Daily Activity Standardized Score (Calc for Raw Score >=11) 30 6   AM-PAC Applied Cognition Inpatient   Following a Speech/Presentation 4   Understanding Ordinary Conversation 4   Taking Medications 4   Remembering Where Things Are Placed or Put Away 3   Remembering List of 4-5 Errands 3   Taking Care of Complicated Tasks 3   Applied Cognition Raw Score 21   Applied Cognition Standardized Score 44 3   End of Consult   Education Provided Yes;Family or social support of family present for education by provider  (spouse)   Patient Position at End of Consult Supine;Bed/Chair alarm activated; All needs within reach   Nurse Communication Nurse aware of consult   Pt will complete UB ADLs Independent  as needed for increased ADL independence within 10 days  Pt will complete LB ADLs Max A  with use of LHAE as needed for increased ADL independence within 10 days  Pt will complete toileting Max A  with use of DME for increased ADL independence within 10 days  Pt will demonstrate proper body mechanics to complete self-care transfers  with Max A x2 and use of AD PRN to / from [facility] vs recliner for increased safety and functional independence within 10 days  Pt will demonstrate sitting tolerance of 20 min with Mod independent  for increased activity tolerance during ADL/IADL tasks within 10 days  Pt will demonstrate proper body mechanics and fall prevention strategies during 100% of tx sessions for increased safety awareness during ADL/IADLs    Pt will demonstrate OOB sitting tolerance of 2-4 hr/day for increased activity tolerance and engagement in self care tasks within 10 days  Pt will verbalize and demonstrate understanding of energy conservation/deep breathing techniques for increased activity tolerance and endurance during meaningful activities  Pt benefited from co-session of skilled OT and PT therapists in order to most appropriately address functional deficits d/t extensive physical assistance required for safe mobility  and decreased activity tolerance  OT/PT objectives were addressed separately; please see PT note for specific goal areas targeted        Kathy Rico

## 2023-04-10 NOTE — PHYSICAL THERAPY NOTE
PHYSICAL THERAPY NOTE          Patient Name: Adilene Quijano  KVJJX'[id number] Date: 4/10/2023           04/10/23 1205   PT Last Visit   PT Visit Date 04/10/23   Note Type   Note Type Treatment   Pain Assessment   Pain Assessment Tool 0-10   Pain Score 8   Pain Location/Orientation Location: Back   Effect of Pain on Daily Activities limited activity tolerance and functional mobility   Patient's Stated Pain Goal No pain   Hospital Pain Intervention(s) Repositioned; Ambulation/increased activity; Rest;Emotional support   Multiple Pain Sites No   Restrictions/Precautions   Other Precautions Chair Alarm; Bed Alarm;O2;Fall Risk;Pain  (masimo)   General   Chart Reviewed Yes   Additional Pertinent History 5L NC02   Response to Previous Treatment Patient with no complaints from previous session  Family/Caregiver Present Yes   Cognition   Overall Cognitive Status Unable to assess   Arousal/Participation Alert; Responsive; Cooperative   Attention Attends with cues to redirect   Orientation Level Oriented X4   Memory Unable to assess   Following Commands Follows one step commands with increased time or repetition   Comments pt was cooperative throughout tx session   Subjective   Subjective pt was agreeable to participate in PT intervention   Bed Mobility   Rolling R 2  Maximal assistance   Additional items Assist x 1;HOB elevated; Bedrails; Increased time required;Verbal cues;LE management; Other  (trunk management)   Rolling L 2  Maximal assistance   Additional items Assist x 1;HOB elevated; Bedrails; Increased time required;Verbal cues;LE management; Other  (trunk management)   Supine to Sit 3  Moderate assistance   Additional items Assist x 1;HOB elevated; Bedrails; Increased time required;Verbal cues;LE management; Other  (HHA)   Sit to Supine 2  Maximal assistance   Additional items Assist x 2;HOB elevated; Bedrails; Increased time required;Verbal cues;LE management; Other  (trunk management)   Transfers   Sit to Stand 1  Dependent   Additional items (S)  Assist x 1; Increased time required;Verbal cues  (pt unable to complete STS from EOB with max Ax2 )   Stand to Sit 1  Dependent   Additional items Assist x 1; Increased time required;Verbal cues  (pt unable to complete STS from EOB with max Ax2)   Stand pivot Unable to assess   Additional Comments pt unable to complete STS from EOB with max Ax2 and requires dependent mean in order to transfer OOB into recliner   Ambulation/Elevation   Gait pattern Not appropriate   Balance   Static Sitting Fair   Static Standing Zero   Ambulatory Zero   Endurance Deficit   Endurance Deficit Yes   Endurance Deficit Description limited activity tolerance and standing ability   Activity Tolerance   Activity Tolerance Patient limited by fatigue   Nurse Made Aware Spoke to RN   Exercises   Quad Sets Supine;10 reps;AROM; Bilateral   Heelslides Supine;15 reps;AAROM; Bilateral   Hip Abduction Supine;10 reps;AAROM; Bilateral   Knee AROM Long Arc Quad Sitting;5 reps;AROM; Bilateral   Ankle Pumps Supine;15 reps;AROM; Bilateral   Assessment   Problem List Decreased strength;Decreased range of motion;Decreased endurance; Impaired balance;Decreased mobility; Decreased safety awareness; Impaired hearing;Obesity;Pain   Assessment pt began tx session lying supine in the bed and was agreeable to participate in PT intervention  care coordination with SAMANTHA Flores due to pt anticipate level of assistance required fro bed mobility and possible attempts at functional transfers  pt cotninues to be limited with bed mobility and requires max Ax1 for all rolling and repositioning in the bed from L to R with LE and trunk management  pt required less assistance to sit EOB from supine mod Ax1 with LE and HHA in order to pull up into a seated EOB postion  pt was able to tolerated seated EOB but required several therapeutic rest breaks due to fatigue and dizziness   pt was unable to complete STS in todays tx session from EOB to bariatric RW with max Ax2   pt demonstrated difficulty clearing buttocks from EOB on 2 attempts  pt also requires max Ax2 for all repositioning in the bed towards HOB  Continue to recommend post acute rehab services at the time odf D/C in order to maximize pt functional independence and safety with all OOB mobility when appropriate  Goals   Patient Goals to get into recliner   STG Expiration Date 04/21/23   PT Treatment Day 1   Plan   Treatment/Interventions Functional transfer training;LE strengthening/ROM; Therapeutic exercise; Endurance training;Cognitive reorientation;Patient/family training;Equipment eval/education; Bed mobility;Gait training;Spoke to nursing   Progress No functional improvements   PT Frequency 3-5x/wk   Recommendation   PT Discharge Recommendation Post acute rehabilitation services   AM-PAC Basic Mobility Inpatient   Turning in Flat Bed Without Bedrails 2   Lying on Back to Sitting on Edge of Flat Bed Without Bedrails 1   Moving Bed to Chair 1   Standing Up From Chair Using Arms 1   Walk in Room 1   Climb 3-5 Stairs With Railing 1   Basic Mobility Inpatient Raw Score 7   Highest Level Of Mobility   JH-HLM Goal 2: Bed activities/Dependent transfer   JH-HLM Achieved 3: Sit at edge of bed   Education   Education Provided Other  (bed mobility)   Patient Reinforcement needed   End of Consult   Patient Position at End of Consult Supine;Bed/Chair alarm activated; All needs within reach   The patient's AM-PAC Basic Mobility Inpatient Short Form Raw Score is 7  A Raw score of less than or equal to 16 suggests the patient may benefit from discharge to post-acute rehabilitation services  Please also refer to the recommendation of the Physical Therapist for safe discharge planning       Nathen Gaines

## 2023-04-10 NOTE — ASSESSMENT & PLAN NOTE
continue IV Lasix 40 mg twice daily, patient is having good diuresis so far total more than 9 liters  Continue Solu-Medrol to 40 mg daily, switch to prednisone from tomorrow  wean O2 as appropriate, Goal O2 sat 88-92%  Monitor respiratory status  Respiratory protocol, Xopenex/Atrovent, Airway clearance protocol, IS  Mucinex, Tessalon Perles   Out of bed to chair, PT and OT    Discussed with nursing staff

## 2023-04-10 NOTE — PROGRESS NOTES
[facility]  Progress Note  Name: Kilo Pike  MRN: [id number]  Unit/Bed#: S -01 I Date of Admission: 4/7/2023   Date of Service: 4/10/2023 I Hospital Day: 3    Assessment/Plan   * Acute on chronic respiratory failure [facility])  Assessment & Plan  continue IV Lasix 40 mg twice daily, patient is having good diuresis so far total more than 9 liters  Continue Solu-Medrol to 40 mg daily, switch to prednisone from tomorrow  wean O2 as appropriate, Goal O2 sat 88-92%  Monitor respiratory status  Respiratory protocol, Xopenex/Atrovent, Airway clearance protocol, IS  Mucinex, Tessalon Perles   Out of bed to chair, PT and OT  Discussed with nursing staff       Chronic diastolic congestive heart failure ([facility] [address] )  Assessment & Plan  Acute on chronic diastolic heart failure, continue IV Lasix, improving, patient reports improvement in shortness of breath as well  Daily weights monitor input and output            SIRS (systemic inflammatory response syndrome) (HCC)  Assessment & Plan  POA  as evidenced by tachycardia 112, leukocytosis 12 3, tachypnea 24  Blood Pressure: 132/68  Pulse: 98  Respirations: 20    Sepsis Unlikely as no source present  Previous COVID infxn, likely continued inflammatory state         Plan-  Blood Cx-pending  Continue to monitor off antibiotics  Encourage p o  intake     History of pulmonary embolism  Assessment & Plan  History of DVT, PE  Home dose of coumadin 10 mg Mondays, 9 mg tuesdays-sundays  INR 2 91, therapeutic   Coumadin 10 mg sat/sun and 9 mg Monday-Friday    Plan-   Continue current regime of warfarin (hold due to high INR)  Monitor INR    EVERETT (obstructive sleep apnea)  Assessment & Plan  Hx of EVERETT, morbid obesity, obesity hypoventilation syndrome   Recently hospitalization, used BiPAP HS     Plan-   Continue Bipap inpatient  CO2 elevated on BMP, check VBG     Patient not drowsy or lethargic    Primary hypertension  Assessment & Plan  BP on admission 121/55  Home medication Lasix 40mg QD       Morbid obesity ([address] )  Assessment & Plan  Recently DC to [facility]   Pt reports no PT/OT completed    Plan-   PT/OT Eval                 Subjective/Objective     Subjective:   Patient denies any chest pain, shortness of breath  Reports gradual improvement  He wants to get better, do PT/OT and get out of the bed and eventually go home  C/o pain left shoulder blade, acute on chronic  Objective:  Vitals: Blood pressure 132/68, pulse 98, temperature (!) 97 2 °F (36 2 °C), temperature source Oral, resp  rate 20, weight (!) 198 kg (436 lb 8 2 oz), SpO2 94 %  ,Body mass index is 56 04 kg/m²  Intake/Output Summary (Last 24 hours) at 4/10/2023 1310  Last data filed at 4/10/2023 0854  Gross per 24 hour   Intake 1440 ml   Output 3376 ml   Net -1936 ml       Invasive Devices     Peripheral Intravenous Line  Duration           Peripheral IV 04/07/23 Right Antecubital 3 days                Physical Exam:    General appearance: Patient not in acute distress  Eyes: Pupils equal and reactive to right  CVS: S1-S2 heard, regular rate and rhythm, 2+ pitting edema bilateral lower extremities  Chest: Decreased air entry in all lung fields  Abdomen: Obese, soft, nontender  CNS: No focal logical deficits  Skin: No acute rash    Lab, Imaging and other studies: I have personally reviewed pertinent reports  VTE Pharmacologic Prophylaxis: Warfarin (Coumadin)  VTE Mechanical Prophylaxis: sequential compression device      Discussed with the patient and his wife regarding further management and plan

## 2023-04-10 NOTE — CASE MANAGEMENT
Case Management Progress Note    Patient name Tyesha Cedeño  Location S /S -21 MRN [id number]  : 1955 Date 4/10/2023       LOS (days): 3  Geometric Mean LOS (GMLOS) (days):   Days to [facility]:        OBJECTIVE:        Current admission status: Inpatient  Preferred Pharmacy:   [facility] [place] - [address]  [address]  [address] [address]  Phone: [phone] Fax: [phone]    Primary Care Provider: Ana White MD    Primary Insurance: [address] [address] [address] [facility] RE[address]  Secondary Insurance:     PROGRESS NOTE:    CM received a call from pt's spouse-Shilpa (p: [phone]) expressing that she is very concerned about the care pt receives at [address]  Per discussion with spouse, she informed CM that if another facility is not found, he will continue to go back and forth between the [facility] and the hospital   Per spouse, she had several nice visits, however he became very short of breath, attempted to call for an RN at the facility, and over an hour went by so he called 911  CM acknowledged concerns, however reiterated that it is difficult to find another facility within the requested distance from pt's home that offers the specialized equipment needed to accommodate pt at this time  CM noted that pt is now off Covid isolation and this could potentially open up options for other facilities  Spouse confirmed she is not willing to drive an hour away as she works and does not feel pt will do well if she is unable to visit regularly  CM acknowledged family concerns and preference to not return to [address]  CM informed spouse that there is no guarantee another facility can be found close to his home, but that the effort will be made  Spouse expressed understanding and appreciation of this   CM informed spouse that although this writer is not pt's CM for this admission, CM will notify current CM of pt's back story      CM informed OJ-Sherry of the above

## 2023-04-10 NOTE — ASSESSMENT & PLAN NOTE
Hx of EVERETT, morbid obesity, obesity hypoventilation syndrome   Recently hospitalization, used BiPAP HS     Plan-   Continue Bipap inpatient  CO2 elevated on BMP, check VBG     Patient not drowsy or lethargic

## 2023-04-10 NOTE — ACP (ADVANCE CARE PLANNING)
"Advanced Care Planning Progress Note    Serious Illness Conversation    1  What is your understanding now of where you are with your illness? Prognostic Understanding: appropriate understanding of prognosis     2  How much information about what is likely to be ahead with your illness would you like to have? Information: patient wants to be fully informed     3  What did you (clinician) communicate to the patient? 4  If your health situation worsens, what are your most important goals? Goals: live as long as possible, no matter what     5  What are the biggest fears and worries about the future and your health? Fears/Worries: being dependent, loss of dignity, loss of control     6  What abilities are so critical to your life that you cannot imagine living without them? 7  What gives you strength as you think about the future with your illness? \"God and my wife\"     8  If you become sicker, how much are you willing to go through for the possibility of gaining more time? Have a feeding tube: Yes   Be in the ICU: Yes    Be on a ventilator: Yes    9  How much does your proxy and family know about your priorities and wishes? Discussion Discussion: extensive discussion with family about goals and wishes     Nitza Lai heard you say that getting better and start walking and going back home is really important to you  Keeping that in mind, and what we know about your illness, I recommend that we continue with current treatement  This will help us make sure that your treatment plans reflect what’s important to you  How does this plan sound to you? I will do everything I can to help you through this    Patient verbalized understanding of the plan     I have spent 35 minutes speaking with my patient on advanced care planning today or during this visit     Advanced directives         Indy Lawson MD     "

## 2023-04-10 NOTE — PLAN OF CARE
Problem: PHYSICAL THERAPY ADULT  Goal: Performs mobility at highest level of function for planned discharge setting  See evaluation for individualized goals  Description: Treatment/Interventions: Functional transfer training, LE strengthening/ROM, Therapeutic exercise, Endurance training, Cognitive reorientation, Patient/family training, Equipment eval/education, Gait training, Bed mobility          See flowsheet documentation for full assessment, interventions and recommendations  Outcome: Not Progressing  Note:    Problem List: Decreased strength, Decreased range of motion, Decreased endurance, Impaired balance, Decreased mobility, Decreased safety awareness, Impaired hearing, Obesity, Pain  Assessment: pt began tx session lying supine in the bed and was agreeable to participate in PT intervention  care coordination with SAMANTHA Flores due to pt anticipate level of assistance required fro bed mobility and possible attempts at functional transfers  pt cotninues to be limited with bed mobility and requires max Ax1 for all rolling and repositioning in the bed from L to R with LE and trunk management  pt required less assistance to sit EOB from supine mod Ax1 with LE and HHA in order to pull up into a seated EOB postion  pt was able to tolerated seated EOB but required several therapeutic rest breaks due to fatigue and dizziness  pt was unable to complete STS in todays tx session from EOB to bariatric RW with max Ax2   pt demonstrated difficulty clearing buttocks from EOB on 2 attempts  pt also requires max Ax2 for all repositioning in the bed towards HOB  Continue to recommend post acute rehab services at the time odf D/C in order to maximize pt functional independence and safety with all OOB mobility when appropriate  PT Discharge Recommendation: Post acute rehabilitation services    See flowsheet documentation for full assessment

## 2023-04-10 NOTE — ASSESSMENT & PLAN NOTE
POA  as evidenced by tachycardia 112, leukocytosis 12 3, tachypnea 24  Blood Pressure: 132/68  Pulse: 98  Respirations: 20    Sepsis Unlikely as no source present  Previous COVID infxn, likely continued inflammatory state         Plan-  Blood Cx-pending  Continue to monitor off antibiotics  Encourage p o  intake

## 2023-04-10 NOTE — PLAN OF CARE
Problem: OCCUPATIONAL THERAPY ADULT  Goal: Performs self-care activities at highest level of function for planned discharge setting  See evaluation for individualized goals  Description: Treatment Interventions: ADL retraining, Functional transfer training, UE strengthening/ROM, Endurance training, Patient/family training, Equipment evaluation/education, Compensatory technique education, Neuromuscular reeducation, Energy conservation          See flowsheet documentation for full assessment, interventions and recommendations  Note: Limitation: Decreased ADL status, Decreased Safe judgement during ADL, Decreased endurance, Decreased cognition, Decreased self-care trans, Decreased high-level ADLs  Prognosis: Fair  Assessment: Patient is a 69 y o  male seen for OT evaluation at [facility] following admission on 4/7/2023  s/p Acute on chronic respiratory failure ([facility] [address] )  Please see above for comprehensive list of comorbidities and significant PMHx impacting functional performance  Patient presents with active orders for OT eval and treat and up and OOB as tolerated   At baseline, pt ambulates with Rw and requires assistance for LB ADLs  Upon initial evaluation, patient requires Min A for UB ADLs, Total Dependent  for LB ADLs, and Total Dependent  for transfers (unable to complete with Max A x2)  Based on functional eval, pt presents with intact  attention, intact  safety awareness, intact  problem solving skills, and intact   memory  Occupational performance is affected by the following deficits: endurance ,  decreased muscular strength , decreased balance , decreased standing tolerance for self care tasks , decreased functional reach , decreased activity tolerance  and (+) pain    Patient would benefit from OT services within the acute care setting to maximize level of functional independence in the following areas energy conservation , UB ADLs, LB ADLs, self-care transfers, bed mobility  and functional mobility  From OT standpoint, recommendation at time of D/C would be post-acute rehabilitation        OT Discharge Recommendation: Post acute rehabilitation services     [facility]

## 2023-04-10 NOTE — CASE MANAGEMENT
Case Management Progress Note    Patient name Adilene Quijano  Location S /S -08 MRN [id number]  : 1955 Date 4/10/2023       LOS (days): 3  Geometric Mean LOS (GMLOS) (days):   Days to GMLOS:        OBJECTIVE:        Current admission status: Inpatient  Preferred Pharmacy:   [address] [place] - [address]  [address]  [address] [address]  Phone: [phone] Fax: [phone]    Primary Care Provider: Vamsi Mendes MD    Primary Insurance: [address]  Secondary Insurance:     PROGRESS NOTE:    TT sent to [facility] liaison, who reports only facility in their network that is able to accommodate patient would be [facility], where he was admitted from  Per responses in [address] - no other facility within 20 mile radius was able to accept  Referral expanded to 35 miles and messages sent to facility to inquire about availability  Per MDs, patient anticipated to be ready for d/c on Weds  Will need insurance authorization  Will follow-up with patient and spouse re: bed offers and possible facility return to [facility] vs transfer to home with home care services if no bed offers received from alternative facilities  Met with patient at bedside and reviewed above  Patient aware that no beds found within 20 mile radius, so referrals expanded to 35 miles  Offer made to contact spouse, but patient declined same stating he will let her know and this writer can follow-up in AM  Patient requesting pain meds, so LPN notified of same

## 2023-04-10 NOTE — CASE MANAGEMENT
Case Management Discharge Planning Note    Patient name Robert Mcadams  Location S /S -20 MRN [id number]  : 1955 Date 4/10/2023       Current Admission Date: 2023  Current Admission Diagnosis:Acute on chronic respiratory failure [facility])   Patient Active Problem List    Diagnosis Date Noted   • Pulmonary embolism ([facility] )    • COVID-19 2023   • SIRS (systemic inflammatory response syndrome) ([facility] ) 2023   • Cerumen debris on tympanic membrane of both ears 2023   • Generalized weakness 2023   • Impaired functional mobility, balance, gait, and endurance 2023   • BPH (benign prostatic hyperplasia) 2023   • Chronic anticoagulation 2023   • Pressure injury, unstageable, with suspected deep tissue injury ([facility] ) 2023   • Dyspnea on exertion 2023   • Morbid obesity ([facility] ) 2023   • Primary hypertension 2023   • History of pulmonary embolism 2023   • CHF (congestive heart failure) ([facility] ) 2023   • DVT (deep venous thrombosis) ([facility] ) 2023   • Chronic diastolic congestive heart failure ([facility] ) 2021   • DJD (degenerative joint disease), multiple sites 2021   • Ambulatory dysfunction 2021   • Chronic right-sided congestive heart failure ([facility] ) 2021   • BMI 50 0-59 9, adult ([facility] ) 2020   • Debility 2020   • Acute on chronic respiratory failure ([facility] ) 2020   • Restrictive lung disease 2020   • Medical marijuana use 2018   • EVERETT (obstructive sleep apnea) 2018   • Obesity hypoventilation syndrome ([facility] ) 2018   • Pulmonary hypertension ([facility] ) 01/15/2018   • Pain medication agreement signed 2017   • Neuralgia, post-herpetic 2017   • Sciatica 2012   • Fercho de la Tourette's syndrome 2012   • Chronic pain disorder 2012      LOS (days): 3  Geometric Mean LOS (GMLOS) (days):   Days to GMLOS:     OBJECTIVE:  Risk of Unplanned Readmission Score: 16 99 Current admission status: Inpatient   Preferred Pharmacy:   [address], [place] - [address]  [address] [id number]  Phone: [phone] Fax: [phone]    Primary Care Provider: Adrienne Johnson MD    Primary Insurance: [facility] REP  Secondary Insurance:     DISCHARGE DETAILS:    Discharge planning discussed with[de-identified] patient and spouse, Radha Thayer, at bedside  [place] of Choice: Yes (re: rehab)  Comments - Freedom of Choice: requesting rehab referrals be sent to facilities, but does not want to return to [address] [place] [address] contacted family/caregiver?: Yes (spouse, Radha Thayer, at bedside)  Were Treatment Team discharge recommendations reviewed with patient/caregiver?: Yes  Did patient/caregiver verbalize understanding of patient care needs?: N/A- going to facility  Were patient/caregiver advised of the risks associated with not following Treatment Team discharge recommendations?: Yes    Contacts  Patient Contacts: spouse, Radha Thayer  Relationship to Patient[de-identified] Family  Contact Method: In Person  Reason/Outcome: Continuity of Care, Emergency Contact, Referral, Discharge [address]         Is the patient interested in [facility] at discharge?: No         Other Referral/Resources/Interventions Provided:  Interventions: SNF, Short Term Rehab  Referral Comments: Met with patient and spouse, Radha Thayer, at bedside to review conversation with  over the weekend and their request for home care services to be arranged rather than rehab return  Per patient, he never declined going to rehab, and patient and spouse both report patient is unable to return home from hospital as spouse is unable to provide necessary care at this time  Patient and spouse report numerous issues while at [facility], so requesting patient not return to that facility   Requesting referrals be sent to additional facilities, and aware that this writer to follow-up as soon as bed offers recieved  Spouse reported being open to consider other [facility] facilities, if necessary, so blanket referral sent to area SNFs; awaiting bed offers/denials  Reviewed limitations to bed offers secondary to patient's weight and insurance  Spouse understanding of same, but relays concern about level of care he had been provided, and goal for him to get rehab and return home  Spouse aware that home care referrals had been sent, but requesting same be cancelled at this time, as she's unable to care for patient at home  Home care referrals cancelled in [address]  Patient will need insurance auth prior to any rehab transfer      Would you like to participate in our [address] service program?  : No - Declined    Treatment Team Recommendation: Short Term Rehab, SNF  Discharge Destination Plan[de-identified] SNF, Short Term Rehab  Transport at Discharge : BLS Ambulance

## 2023-04-11 RX ORDER — FUROSEMIDE 40 MG/1
40 TABLET ORAL DAILY
Status: DISCONTINUED | OUTPATIENT
Start: 2023-04-11 | End: 2023-04-21

## 2023-04-11 RX ADMIN — GUAIFENESIN AND DEXTROMETHORPHAN 10 ML: 100; 10 SYRUP ORAL at 21:07

## 2023-04-11 RX ADMIN — GUAIFENESIN AND DEXTROMETHORPHAN 10 ML: 100; 10 SYRUP ORAL at 16:38

## 2023-04-11 RX ADMIN — FUROSEMIDE 40 MG: 40 TABLET ORAL at 12:44

## 2023-04-11 RX ADMIN — TRAMADOL HYDROCHLORIDE 50 MG: 50 TABLET, COATED ORAL at 16:38

## 2023-04-11 RX ADMIN — PREGABALIN 150 MG: 75 CAPSULE ORAL at 16:38

## 2023-04-11 RX ADMIN — PREGABALIN 150 MG: 75 CAPSULE ORAL at 21:07

## 2023-04-11 RX ADMIN — GUAIFENESIN 600 MG: 600 TABLET ORAL at 08:24

## 2023-04-11 RX ADMIN — POTASSIUM CHLORIDE 20 MEQ: 1500 TABLET, EXTENDED RELEASE ORAL at 08:24

## 2023-04-11 RX ADMIN — BENZONATATE 200 MG: 100 CAPSULE ORAL at 16:38

## 2023-04-11 RX ADMIN — RISPERIDONE 1 MG: 1 TABLET ORAL at 21:06

## 2023-04-11 RX ADMIN — GUAIFENESIN AND DEXTROMETHORPHAN 10 ML: 100; 10 SYRUP ORAL at 08:24

## 2023-04-11 RX ADMIN — TRAMADOL HYDROCHLORIDE 50 MG: 50 TABLET, COATED ORAL at 08:24

## 2023-04-11 RX ADMIN — METOPROLOL SUCCINATE 25 MG: 25 TABLET, EXTENDED RELEASE ORAL at 08:24

## 2023-04-11 RX ADMIN — PREGABALIN 150 MG: 75 CAPSULE ORAL at 08:24

## 2023-04-11 RX ADMIN — METHOCARBAMOL TABLETS 750 MG: 750 TABLET, COATED ORAL at 08:28

## 2023-04-11 RX ADMIN — BENZONATATE 200 MG: 100 CAPSULE ORAL at 08:24

## 2023-04-11 RX ADMIN — METHOCARBAMOL TABLETS 750 MG: 750 TABLET, COATED ORAL at 16:38

## 2023-04-11 RX ADMIN — RISPERIDONE 1 MG: 1 TABLET ORAL at 08:24

## 2023-04-11 RX ADMIN — IPRATROPIUM BROMIDE 0.5 MG: 0.5 SOLUTION RESPIRATORY (INHALATION) at 19:36

## 2023-04-11 RX ADMIN — RISPERIDONE 1 MG: 1 TABLET ORAL at 16:38

## 2023-04-11 RX ADMIN — LEVALBUTEROL HYDROCHLORIDE 1.25 MG: 1.25 SOLUTION RESPIRATORY (INHALATION) at 14:34

## 2023-04-11 RX ADMIN — LEVALBUTEROL HYDROCHLORIDE 1.25 MG: 1.25 SOLUTION RESPIRATORY (INHALATION) at 19:36

## 2023-04-11 RX ADMIN — IPRATROPIUM BROMIDE 0.5 MG: 0.5 SOLUTION RESPIRATORY (INHALATION) at 14:34

## 2023-04-11 RX ADMIN — ACETAMINOPHEN 650 MG: 325 TABLET ORAL at 12:53

## 2023-04-11 RX ADMIN — GUAIFENESIN 600 MG: 600 TABLET ORAL at 21:07

## 2023-04-11 RX ADMIN — LEVALBUTEROL HYDROCHLORIDE 1.25 MG: 1.25 SOLUTION RESPIRATORY (INHALATION) at 07:17

## 2023-04-11 RX ADMIN — PREDNISONE 20 MG: 20 TABLET ORAL at 08:24

## 2023-04-11 RX ADMIN — IPRATROPIUM BROMIDE 0.5 MG: 0.5 SOLUTION RESPIRATORY (INHALATION) at 07:17

## 2023-04-11 NOTE — CASE MANAGEMENT
Case Management Progress Note    Patient name Cici Ralph  Location S /S -36 MRN [id number]  : 1955 Date 2023       LOS (days): 4  Geometric Mean LOS (GMLOS) (days): 3 90  Days to GMLOS:-0 3        OBJECTIVE:        Current admission status: Inpatient  Preferred Pharmacy:   [address], [place] - [address]  [address] [place] [address]  Phone: [phone] Fax: [phone]    Primary Care Provider: Jai Tucker MD    Primary Insurance: [address] REP  Secondary Insurance:     PROGRESS NOTE:    Denials received from all responding facilities at this time  Follow-up by phone to all facilities where responses not received in [address] as follows:     Call made to Rehab at [facility] ([phone]) and voicemail left for admissions requesting return call  Response then received in [address] [place] [address] Road stating that they are unable to accept  Spoke with Yuni Hsu at [place] who reported they are unable to accommodate bariatric patient at this time; response changed to a denial in [address]  Call made to [facility] and they also confirmed they are not able to accommodate bariatric patient; response changed to denial      Spoke with Aruna for [facility]; states that they cannot accept with current PT/OT notes as they require bariatric patients to be an assist x1 with bed mobility, transfers/standing  Per PT notes yesterday, patient was max assist x2-3 for bed mobility and dependent for transfers  Call made to [facility] ([phone]) and spoke with Aruna who relayed she will need to review case with her ; will be back in touch to discuss once determination received  Call made to [facility] ([phone]) and voicemail left for Krueger Flicker in admissions      Call made to [facility] ([phone] prompt 2, then 1) and voicemail left for Cordelia, admissions director, requesting return call  Call made to [facility] ([phone]) and spoke with Rashida Reyes - responded that they can accommodate patient's up to 500lbs, but do not currently have any bariatric rooms open at this time  Call made to the [facility] at [facility] ([phone]) and voicemail left for business office requesting return call  At this time, patient is anticipated to be medically stable for d/c tomorrow and will require insurance authorization for rehab  Call made to [facility] to see if patient is able to return to their facility; awaiting return call

## 2023-04-11 NOTE — ASSESSMENT & PLAN NOTE
POA  as evidenced by tachycardia 112, leukocytosis 12 3, tachypnea 24  Blood Pressure: 127/65  Pulse: 93  Respirations: 18    Sepsis Unlikely as no source present  Previous COVID infxn, likely continued inflammatory state         Plan-  Blood Cx-pending  Continue to monitor off antibiotics  Encourage p o  intake

## 2023-04-11 NOTE — ASSESSMENT & PLAN NOTE
· No further shortness of breath  · Continue on p o   Lasix  · Daily weights monitor input and output

## 2023-04-11 NOTE — PROGRESS NOTES
[facility]  Progress Note  Name: Андрей Bowman  MRN: [id number]  Unit/Bed#: S -01 I Date of Admission: 4/7/2023   Date of Service: 4/11/2023 I Hospital Day: 4    Assessment/Plan   * Acute on chronic respiratory failure (HCC)  Assessment & Plan  · Continue PO prednisone 20 mg daily  · PO Lasix 40 mg daily resumed today (home dose)  · Monitor respiratory status, currently on baseline 5L  · Respiratory protocol, Xopenex/Atrovent, Airway clearance protocol, IS  · Mucinex, Tessalon Perles   · Out of bed to chair, PT and OT      SIRS (systemic inflammatory response syndrome) (HCC)  Assessment & Plan  POA  as evidenced by tachycardia 112, leukocytosis 12 3, tachypnea 24  Blood Pressure: 127/65  Pulse: 93  Respirations: 18    Sepsis Unlikely as no source present  Previous COVID infxn, likely continued inflammatory state         Plan-  Blood Cx-pending  Continue to monitor off antibiotics  Encourage p o  intake     History of pulmonary embolism  Assessment & Plan  History of DVT, PE  Home dose of coumadin 10 mg Mondays, 9 mg tuesdays-sundays  INR 2 91, therapeutic   Coumadin 10 mg sat/sun and 9 mg Monday-Friday    Plan-   · Continue current regime of warfarin  · Monitor INR    Chronic diastolic congestive heart failure (HCC)  Assessment & Plan  · No further shortness of breath  · Continue on p o  Lasix  · Daily weights monitor input and output            EVERETT (obstructive sleep apnea)  Assessment & Plan  Hx of EVERETT, morbid obesity, obesity hypoventilation syndrome   Continue BiPAP HS     Primary hypertension  Assessment & Plan  BP on admission 121/55  Home medication Lasix 40mg QD       Morbid obesity ([address] )  Assessment & Plan  Recently DC to [facility]   PT/OT: post acute rehab         VTE Pharmacologic Prophylaxis: VTE Score: 10 High Risk (Score >/= 5) - Pharmacological DVT Prophylaxis Ordered: warfarin (Coumadin)  Sequential Compression Devices Ordered  Patient Centered Rounds:  I performed bedside rounds with nursing staff today  Discussions with Specialists or Other Care Team Provider:     Education and Discussions with Family / Patient: Patient declined call to   Total Time Spent on Date of Encounter in care of patient: 35 minutes This time was spent on one or more of the following: performing physical exam; counseling and coordination of care; obtaining or reviewing history; documenting in the medical record; reviewing/ordering tests, medications or procedures; communicating with other healthcare professionals and discussing with patient's family/caregivers  Current Length of Stay: 4 day(s)  Current Patient Status: Inpatient   Certification Statement: The patient will continue to require additional inpatient hospital stay due to Rehab placement  Discharge Plan: Anticipate discharge in 24-48 hrs to discharge location to be determined pending rehab evaluations  Code Status: Level 1 - Full Code    Subjective:   Seen this morning in no acute distress, reporting chronic back pain, no new complaints  Objective:     Vitals:   Temp (24hrs), Av 6 °F (36 4 °C), Min:97 5 °F (36 4 °C), Max:97 7 °F (36 5 °C)    Temp:  [97 5 °F (36 4 °C)-97 7 °F (36 5 °C)] 97 7 °F (36 5 °C)  HR:  [] 93  Resp:  [18] 18  BP: (110-130)/(65-78) 127/65  SpO2:  [91 %-99 %] 91 %  Body mass index is 56 04 kg/m²  Input and Output Summary (last 24 hours): Intake/Output Summary (Last 24 hours) at 2023 1651  Last data filed at 2023 1617  Gross per 24 hour   Intake 2260 ml   Output 1500 ml   Net 760 ml       Physical Exam:   Physical Exam  Vitals reviewed  Constitutional:       General: He is not in acute distress  Appearance: He is obese  He is not toxic-appearing  HENT:      Head: Normocephalic and atraumatic  Mouth/Throat:      Pharynx: Oropharynx is clear  Eyes:      Extraocular Movements: Extraocular movements intact     Cardiovascular:      Rate and Rhythm: Normal rate and regular rhythm  Pulses: Normal pulses  Pulmonary:      Effort: No respiratory distress  Comments: Decreased breath sounds bilaterally  Abdominal:      Palpations: Abdomen is soft  Tenderness: There is no abdominal tenderness  Musculoskeletal:         General: No tenderness  Right lower leg: Edema present  Left lower leg: Edema present  Neurological:      Mental Status: He is alert and oriented to person, place, and time  Psychiatric:         Mood and Affect: Mood normal          Behavior: Behavior normal          Thought Content: Thought content normal          Judgment: Judgment normal           Additional Data:     Labs:  Results from last 7 days   Lab Units 04/10/23  0526   WBC Thousand/uL 12 40*   HEMOGLOBIN g/dL 12 8   HEMATOCRIT % 41 1   PLATELETS Thousands/uL 272   NEUTROS PCT % 78*   LYMPHS PCT % 11*   MONOS PCT % 10   EOS PCT % 0     Results from last 7 days   Lab Units 04/10/23  0526 04/09/23  0641 04/08/23 0524 04/07/23  0941   SODIUM mmol/L 138 142   < > 138   POTASSIUM mmol/L 4 1 3 8   < > 3 7   CHLORIDE mmol/L 90* 86*   < > 91*   CO2 mmol/L >45* 43*   < > 44*   BUN mg/dL 15 14   < > 13   CREATININE mg/dL 0 55* 0 46*   < > 0 48*   ANION GAP mmol/L  --  13   < > 3*   CALCIUM mg/dL 8 7 9 0   < > 8 6   ALBUMIN g/dL  --   --   --  3 1*   TOTAL BILIRUBIN mg/dL  --   --   --  0 53   ALK PHOS U/L  --   --   --  65   ALT U/L  --   --   --  32   AST U/L  --   --   --  14   GLUCOSE RANDOM mg/dL 137 116   < > 126    < > = values in this interval not displayed       Results from last 7 days   Lab Units 04/10/23  0526   INR  4 93*             Results from last 7 days   Lab Units 04/08/23  0524 04/07/23  0941   LACTIC ACID mmol/L  --  1 3   PROCALCITONIN ng/ml 0 07 0 10       Lines/Drains:  Invasive Devices     Peripheral Intravenous Line  Duration           Peripheral IV 04/07/23 Right Antecubital 4 days                  Recent Cultures (last 7 days): Results from last 7 days   Lab Units 04/08/23  1024 04/07/23  1018 04/07/23  0941   BLOOD CULTURE   --  No Growth After 4 Days  No Growth After 4 Days  C DIFF TOXIN B BY PCR  Negative  --   --        Last 24 Hours Medication List:   Current Facility-Administered Medications   Medication Dose Route Frequency Provider Last Rate   • acetaminophen  650 mg Oral Q6H PRN Eulalia Stallings MD     • albuterol  2 5 mg Nebulization Q4H PRN Eulalia Stallings MD     • benzonatate  200 mg Oral TID PRN Eulalia Stallings MD     • dextromethorphan-guaiFENesin  10 mL Oral Q4H PRN Zunilda Ashby MD     • furosemide  40 mg Oral Daily Jarett rBice MD     • guaiFENesin  600 mg Oral Q12H Clarence Gloria MD     • ipratropium  0 5 mg Nebulization TID Judie Mendez MD     • levalbuterol  1 25 mg Nebulization TID Judie Mendez MD     • methocarbamol  750 mg Oral Q6H PRN Eulalia Stallings MD     • metoprolol succinate  25 mg Oral Daily Luz Maria Hager MD     • potassium chloride  20 mEq Oral Daily Luz Maria Hager MD     • predniSONE  20 mg Oral Daily Luz Maria Hager MD     • pregabalin  150 mg Oral TID Eulalia Stallings MD     • risperiDONE  1 mg Oral TID Eulalia Stallings MD     • traMADol  50 mg Oral Q6H PRN Eulalia Stallings MD     • [START ON 4/12/2023] warfarin  9 mg Oral Daily (warfarin) Jarett Brice MD          Today, Patient Was Seen By: Con Muniz MD    **Please Note: This note may have been constructed using a voice recognition system  **

## 2023-04-11 NOTE — CASE MANAGEMENT
Case Management Discharge Planning Note    Patient name Billy Gallagher  Location S /S -98 MRN [id number]  : 1955 Date 2023       Current Admission Date: 2023  Current Admission Diagnosis:Acute on chronic respiratory failure [facility])   Patient Active Problem List    Diagnosis Date Noted   • Pulmonary embolism ([facility] )    • COVID-19 2023   • SIRS (systemic inflammatory response syndrome) ([facility] ) 2023   • Cerumen debris on tympanic membrane of both ears 2023   • Generalized weakness 2023   • Impaired functional mobility, balance, gait, and endurance 2023   • BPH (benign prostatic hyperplasia) 2023   • Chronic anticoagulation 2023   • Pressure injury, unstageable, with suspected deep tissue injury ([facility] ) 2023   • Dyspnea on exertion 2023   • Morbid obesity ([facility] ) 2023   • Primary hypertension 2023   • History of pulmonary embolism 2023   • CHF (congestive heart failure) ([facility] ) 2023   • DVT (deep venous thrombosis) ([facility] ) 2023   • Chronic diastolic congestive heart failure ([facility] ) 2021   • DJD (degenerative joint disease), multiple sites 2021   • Ambulatory dysfunction 2021   • Chronic right-sided congestive heart failure ([facility] ) 2021   • BMI 50 0-59 9, adult ([facility] ) 2020   • Debility 2020   • Acute on chronic respiratory failure ([facility] ) 2020   • Restrictive lung disease 2020   • Medical marijuana use 2018   • EVERETT (obstructive sleep apnea) 2018   • Obesity hypoventilation syndrome ([facility] ) 2018   • Pulmonary hypertension ([facility] ) 01/15/2018   • Pain medication agreement signed 2017   • Neuralgia, post-herpetic 2017   • Sciatica 2012   • Fercho de la Tourette's syndrome 2012   • Chronic pain disorder 2012      LOS (days): 4  Geometric Mean LOS (GMLOS) (days): 3 90  Days to GMLOS:-0 4     OBJECTIVE:  Risk of Unplanned Readmission Score: 17 14         Current admission status: Inpatient   Preferred Pharmacy:   [address], [place] - [address]  [facility] [address]  Phone: [phone] Fax: [phone]    Primary Care Provider: Indy Moreno MD    Primary Insurance: [address] [facility] REP  Secondary Insurance:     DISCHARGE DETAILS:    Discharge planning discussed with[de-identified] patient and spouse, Sophie Ortiz, at bedside  [place] of Choice: Yes (re: rehab)  Comments - Freedom of Choice: listing of all referred to facilities provided to patient and spouse; aware that denials received or no responses have been received from all facilities within a 35 mile radius  Relayed option to either contact [facility] to consider patient's return vs expand referral to 45-60 miles from their address  Patient now relaying agreement to consider return to [address] [place] [address] contacted family/caregiver?: Yes (spouse, Sophie Ortiz, at bedside)  Were Treatment Team discharge recommendations reviewed with patient/caregiver?: Yes  Did patient/caregiver verbalize understanding of patient care needs?: N/A- going to facility  Were patient/caregiver advised of the risks associated with not following Treatment Team discharge recommendations?: Yes    Contacts  Patient Contacts: spouseSophie  Relationship to Patient[de-identified] Family  Contact Method: In Person  Reason/Outcome: Continuity of Care, Emergency Contact, Referral, Discharge Planning              Other Referral/Resources/Interventions Provided:  Interventions: SNF, Short Term Rehab  Referral Comments: Met with patient and spouse at bedside to review referrals sent to facilities within 35 mile radius of their address and denials received from all but three who have not returned calls or messages in [address]   Patient provided with option for this writer to reach out to [facility] about possible return vs expand referral to further radius  Patient aware that majority of denials were related to need for bariatric equipment and minimal mobility requirements for patient his weight  Per responses from [facility], limited mobility for consideration at any of their facilities would be for patient to be min assist x1 for bed mobility and transfers  Patient's spouse leaving decision up to patient as to whether he'd want to return to [facility] vs explore options further away  Patient states that he does not want to expand referral, and now requesting to return to [address] [place] [address]  Aware that this writer will follow-up with liaison re: return; aware that insurance auth still needed  Return call then received from [facility] at [facility], but she relays that she spoke with administration and they also have concerns about decline in patient's mobility and need for 3-person assist with his care  Aware that PT/OT to see tomorrow per request today, as same needed for insurance auth anyway  States that they will re-consider patient if able to demonstrate better mobility  Referrals expanded in [address] to 39 mile radius in anticipation of need, as now no facilities able to accommodate  Will follow-up with PT/OT re: increased therapy visits as staffing can allow  Also spoke with charge RN, patient's RN about encouraging mobility outside of therapy visits  Will need to follow-up with patient/family re: likely denial now from [address] [place] [address] in AM due to decline in mobility      Would you like to participate in our [address] service program?  : No - Declined    Treatment Team Recommendation: Short Term Rehab, SNF  Discharge Destination Plan[de-identified] Short Term Rehab, SNF

## 2023-04-11 NOTE — ASSESSMENT & PLAN NOTE
· Continue PO prednisone 20 mg daily  · PO Lasix 40 mg daily resumed today (home dose)  · Monitor respiratory status, currently on baseline 5L  · Respiratory protocol, Xopenex/Atrovent, Airway clearance protocol, IS  · Mucinex, Tessalon Perles   · Out of bed to chair, PT and OT

## 2023-04-11 NOTE — ASSESSMENT & PLAN NOTE
History of DVT, PE  Home dose of coumadin 10 mg Mondays, 9 mg tuesdays-sundays  INR 2 91, therapeutic   Coumadin 10 mg sat/sun and 9 mg Monday-Friday    Plan-   · Continue current regime of warfarin  · Monitor INR

## 2023-04-12 LAB
ANION GAP SERPL CALCULATED.3IONS-SCNC: 4 MMOL/L (ref 4–13)
BACTERIA BLD CULT: NORMAL
BACTERIA BLD CULT: NORMAL
BUN SERPL-MCNC: 16 MG/DL (ref 5–25)
CALCIUM SERPL-MCNC: 8.7 MG/DL (ref 8.4–10.2)
CHLORIDE SERPL-SCNC: 92 MMOL/L (ref 96–108)
CO2 SERPL-SCNC: 40 MMOL/L (ref 21–32)
CREAT SERPL-MCNC: 0.53 MG/DL (ref 0.6–1.3)
ERYTHROCYTE [DISTWIDTH] IN BLOOD BY AUTOMATED COUNT: 12.5 % (ref 11.6–15.1)
GFR SERPL CREATININE-BSD FRML MDRD: 107 ML/MIN/1.73SQ M
GLUCOSE SERPL-MCNC: 103 MG/DL (ref 65–140)
HCT VFR BLD AUTO: 43.3 % (ref 36.5–49.3)
HGB BLD-MCNC: 13.4 G/DL (ref 12–17)
INR PPP: 2.3 (ref 0.84–1.19)
MCH RBC QN AUTO: 32.5 PG (ref 26.8–34.3)
MCHC RBC AUTO-ENTMCNC: 30.9 G/DL (ref 31.4–37.4)
MCV RBC AUTO: 105 FL (ref 82–98)
PLATELET # BLD AUTO: 225 THOUSANDS/UL (ref 149–390)
PMV BLD AUTO: 9.3 FL (ref 8.9–12.7)
POTASSIUM SERPL-SCNC: 4.3 MMOL/L (ref 3.5–5.3)
PROTHROMBIN TIME: 25.5 SECONDS (ref 11.6–14.5)
RBC # BLD AUTO: 4.12 MILLION/UL (ref 3.88–5.62)
SODIUM SERPL-SCNC: 136 MMOL/L (ref 135–147)
WBC # BLD AUTO: 10.72 THOUSAND/UL (ref 4.31–10.16)

## 2023-04-12 RX ORDER — SODIUM CHLORIDE FOR INHALATION 3 %
4 VIAL, NEBULIZER (ML) INHALATION
Status: DISCONTINUED | OUTPATIENT
Start: 2023-04-12 | End: 2023-04-22

## 2023-04-12 RX ORDER — SODIUM CHLORIDE FOR INHALATION 3 %
4 VIAL, NEBULIZER (ML) INHALATION EVERY 8 HOURS
Status: DISCONTINUED | OUTPATIENT
Start: 2023-04-12 | End: 2023-04-12

## 2023-04-12 RX ORDER — GUAIFENESIN 600 MG/1
1200 TABLET, EXTENDED RELEASE ORAL EVERY 12 HOURS SCHEDULED
Status: DISCONTINUED | OUTPATIENT
Start: 2023-04-12 | End: 2023-05-06 | Stop reason: HOSPADM

## 2023-04-12 RX ADMIN — METOPROLOL SUCCINATE 25 MG: 25 TABLET, EXTENDED RELEASE ORAL at 09:58

## 2023-04-12 RX ADMIN — METHOCARBAMOL TABLETS 750 MG: 750 TABLET, COATED ORAL at 04:10

## 2023-04-12 RX ADMIN — LEVALBUTEROL HYDROCHLORIDE 1.25 MG: 1.25 SOLUTION RESPIRATORY (INHALATION) at 07:41

## 2023-04-12 RX ADMIN — SODIUM CHLORIDE SOLN NEBU 3% 4 ML: 3 NEBU SOLN at 13:15

## 2023-04-12 RX ADMIN — WARFARIN SODIUM 9 MG: 6 TABLET ORAL at 17:36

## 2023-04-12 RX ADMIN — RISPERIDONE 1 MG: 1 TABLET ORAL at 09:58

## 2023-04-12 RX ADMIN — PREGABALIN 150 MG: 75 CAPSULE ORAL at 20:44

## 2023-04-12 RX ADMIN — IPRATROPIUM BROMIDE 0.5 MG: 0.5 SOLUTION RESPIRATORY (INHALATION) at 13:15

## 2023-04-12 RX ADMIN — LEVALBUTEROL HYDROCHLORIDE 1.25 MG: 1.25 SOLUTION RESPIRATORY (INHALATION) at 19:20

## 2023-04-12 RX ADMIN — GUAIFENESIN AND DEXTROMETHORPHAN 10 ML: 100; 10 SYRUP ORAL at 18:05

## 2023-04-12 RX ADMIN — FUROSEMIDE 40 MG: 40 TABLET ORAL at 09:58

## 2023-04-12 RX ADMIN — TRAMADOL HYDROCHLORIDE 50 MG: 50 TABLET, COATED ORAL at 10:26

## 2023-04-12 RX ADMIN — BENZONATATE 200 MG: 100 CAPSULE ORAL at 05:18

## 2023-04-12 RX ADMIN — ACETAMINOPHEN 650 MG: 325 TABLET ORAL at 04:12

## 2023-04-12 RX ADMIN — METHOCARBAMOL TABLETS 750 MG: 750 TABLET, COATED ORAL at 10:26

## 2023-04-12 RX ADMIN — RISPERIDONE 1 MG: 1 TABLET ORAL at 20:44

## 2023-04-12 RX ADMIN — PREDNISONE 20 MG: 20 TABLET ORAL at 09:58

## 2023-04-12 RX ADMIN — IPRATROPIUM BROMIDE 0.5 MG: 0.5 SOLUTION RESPIRATORY (INHALATION) at 19:20

## 2023-04-12 RX ADMIN — GUAIFENESIN AND DEXTROMETHORPHAN 10 ML: 100; 10 SYRUP ORAL at 05:18

## 2023-04-12 RX ADMIN — TRAMADOL HYDROCHLORIDE 50 MG: 50 TABLET, COATED ORAL at 04:10

## 2023-04-12 RX ADMIN — ACETAMINOPHEN 650 MG: 325 TABLET ORAL at 17:36

## 2023-04-12 RX ADMIN — PREGABALIN 150 MG: 75 CAPSULE ORAL at 09:58

## 2023-04-12 RX ADMIN — GUAIFENESIN 600 MG: 600 TABLET ORAL at 09:58

## 2023-04-12 RX ADMIN — ACETAMINOPHEN 650 MG: 325 TABLET ORAL at 10:26

## 2023-04-12 RX ADMIN — LEVALBUTEROL HYDROCHLORIDE 1.25 MG: 1.25 SOLUTION RESPIRATORY (INHALATION) at 13:15

## 2023-04-12 RX ADMIN — PREGABALIN 150 MG: 75 CAPSULE ORAL at 17:36

## 2023-04-12 RX ADMIN — IPRATROPIUM BROMIDE 0.5 MG: 0.5 SOLUTION RESPIRATORY (INHALATION) at 07:41

## 2023-04-12 RX ADMIN — TRAMADOL HYDROCHLORIDE 50 MG: 50 TABLET, COATED ORAL at 17:36

## 2023-04-12 RX ADMIN — METHOCARBAMOL TABLETS 750 MG: 750 TABLET, COATED ORAL at 17:36

## 2023-04-12 RX ADMIN — SODIUM CHLORIDE SOLN NEBU 3% 4 ML: 3 NEBU SOLN at 19:20

## 2023-04-12 RX ADMIN — BENZONATATE 200 MG: 100 CAPSULE ORAL at 18:05

## 2023-04-12 RX ADMIN — POTASSIUM CHLORIDE 20 MEQ: 1500 TABLET, EXTENDED RELEASE ORAL at 09:58

## 2023-04-12 RX ADMIN — RISPERIDONE 1 MG: 1 TABLET ORAL at 17:36

## 2023-04-12 RX ADMIN — GUAIFENESIN 1200 MG: 600 TABLET ORAL at 20:44

## 2023-04-12 NOTE — PLAN OF CARE
Problem: PHYSICAL THERAPY ADULT  Goal: Performs mobility at highest level of function for planned discharge setting  See evaluation for individualized goals  Description: Treatment/Interventions: Functional transfer training, LE strengthening/ROM, Therapeutic exercise, Endurance training, Cognitive reorientation, Patient/family training, Equipment eval/education, Gait training, Bed mobility          See flowsheet documentation for full assessment, interventions and recommendations  Outcome: Progressing  Note:    Problem List: Decreased strength, Decreased range of motion, Decreased endurance, Impaired balance, Decreased mobility, Decreased safety awareness, Impaired hearing, Obesity, Pain  Assessment: pt shows improvement from previous session w/ increased standing tolerance  pt needed assist x1 to 3 to perform bed mobility and transfers  pt was unable to complete weight shifts or mobilize to bedside chair  pt remains at risk for falling and continued inpatient PT is needed to reduce fall risk factors  discharge recommendation is for inpatient rehab to maximize level of independence  PT Discharge Recommendation: Post acute rehabilitation services    See flowsheet documentation for full assessment

## 2023-04-12 NOTE — PHYSICAL THERAPY NOTE
PHYSICAL THERAPY TREATMENT NOTE    Patient Name: Supriya Beard  UTMXZ'R Date: 4/12/2023 04/12/23 1457   PT Last Visit   PT Visit Date 04/12/23   Pain Assessment   Pain Assessment Tool 0-10   Pain Score 7   Pain Location/Orientation Other (Comment)  (lower back, left shoulder)   Hospital Pain Intervention(s) Repositioned; Ambulation/increased activity   Restrictions/Precautions   Other Precautions Chair Alarm; Bed Alarm;Multiple lines;O2;Fall Risk;Pain  ([place])   General   Chart Reviewed Yes   Additional Pertinent History 5L oxygen via nasal cannula  resting pulse ox 94% and 100 BPM, active 92% and 108 BPM    Family/Caregiver Present No   Cognition   Arousal/Participation Cooperative   Attention Attends with cues to redirect   Orientation Level Oriented to person; Other (Comment)  (pt was identified w/ full name, birth date)   Following Commands Follows one step commands with increased time or repetition   Subjective   Subjective pt seen supine in bed  agreed to PT session  reports having low back and left shoulder pain  pt needed input for mobility technique  Bed Mobility   Supine to Sit 2  Maximal assistance   Additional items Assist x 1;HOB elevated; Bedrails; Increased time required;Verbal cues;LE management  (for bedrail use, trunk/LE positioning)   Sit to Supine 2  Maximal assistance   Additional items Assist x 3;HOB elevated; Bedrails; Increased time required;Verbal cues;LE management  (for trunk/LE positioning)   Additional Comments pt's bed was placed in [place] to assist w/ boosting  pt's bed became stuck in this position for approximately 2 to 3 min  pt had decrease in pulse ox until optimal positioning could be achieved  Armando Phong  were notified  contacted [facility] Unit Clerk to have new bed ordered     Transfers   Sit to Stand 2  Maximal assistance  (belt and hammock technique was used ) Additional items Assist x 2; Increased time required;Verbal cues  (for LE positioning )   Stand to Sit 2  Maximal assistance  (belt and hammock technique)   Additional items Assist x 2; Increased time required;Verbal cues  (for body positioning)   Stand pivot Unable to assess   Additional Comments pt stood 10 seconds, 5 seconds, and 15 seconds w/ belt and hammock technique w/ maxx2  pt was unable to achieve full upright standing position  seated rest breaks were required  additional standing was not possible due to pain and fatigue  pt was unable to complete pregait activities  Ambulation/Elevation   Gait pattern Not appropriate   Ambulation/Elevation Additional Comments pt is Brenda's Egress test FAIL and needs dependent means for out of bed mobilization, per hospital policy  Balance   Static Sitting Fair +   Static Standing Zero  (w/ assist x2)   Ambulatory Zero   Activity Tolerance   Activity Tolerance Patient limited by fatigue;Patient limited by pain   Nurse Made Aware spoke to [facility], Sandra LAMA, Hai Horner [facility]    Assessment   Problem List Decreased strength;Decreased range of motion;Decreased endurance; Impaired balance;Decreased mobility; Decreased safety awareness; Impaired hearing;Obesity;Pain   Assessment pt shows improvement from previous session w/ increased standing tolerance  pt needed assist x1 to 3 to perform bed mobility and transfers  pt was unable to complete weight shifts or mobilize to bedside chair  pt remains at risk for falling and continued inpatient PT is needed to reduce fall risk factors  discharge recommendation is for inpatient rehab to maximize level of independence  Goals   Patient Goals i want to stand  i want to sit in the chair   STG Expiration Date 04/21/23   Short Term Goal #1 pt will:  Increase bilateral LE strength 1/2 grade to facilitate independent mobility, Perform rolling and repositioning in bed w/ minx1 to decrease caregiver burden, Perform supine <--> sitting edge of bed transition w/ modx1 to improve level of function, Perform sit <---> stand transfers w/ maxx1 to improve independence, Complete stand pivot transfers w/ least restrictive assistive device w/ maxx1 w/o LOB to improve functional independence, Increase static standing balance 1 grade to decrease risk for falls, Tolerate 3 hr OOB to faciliate upright tolerance, Tolerate standing 2 minutes w/ maxx1 w/ least restrictive assistive device to facilitate functional task performance, and Improve Barthel Index score to 55 or greater to facilitate independence  PT to see when ambulation training is appropriate   PT Treatment Day 2   Plan   Treatment/Interventions Functional transfer training;LE strengthening/ROM; Therapeutic exercise; Endurance training;Cognitive reorientation;Patient/family training;Equipment eval/education; Bed mobility  (PT to see when gait training is appropriate )   Progress Progressing toward goals   PT Frequency Other (Comment)  (5x/week)   Recommendation   PT Discharge Recommendation Post acute rehabilitation services   Additional Comments pt needs mechanical lift for out of bed mobilization  AM-PAC Basic Mobility Inpatient   Turning in Flat Bed Without Bedrails 2   Lying on Back to Sitting on Edge of Flat Bed Without Bedrails 1   Moving Bed to Chair 1   Standing Up From Chair Using Arms 1   Walk in Room 1   Climb 3-5 Stairs With Railing 1   Basic Mobility Inpatient Raw Score 7   Turning Head Towards Sound 4   Follow Simple Instructions 3   Low Function Basic Mobility Raw Score  14   Low Function Basic Mobility Standardized Score  22 01   Highest Level Of Mobility   -HLM Goal 2: Bed activities/Dependent transfer   -HLM Achieved 3: Sit at edge of bed   End of Consult   Patient Position at End of Consult Supine;Bed/Chair alarm activated; All needs within reach     The patient's AM-PAC Basic Mobility Inpatient Short Form Raw Score is 7   A Raw score of less than or equal to 16 suggests the patient may benefit from discharge to post-acute rehabilitation services  Please also refer to the recommendation of the Physical Therapist for safe discharge planning  Skilled inpatient PT recommended while in hospital to progress pt toward treatment goals      Aric Roberts, PT

## 2023-04-12 NOTE — CASE MANAGEMENT
Case Management Progress Note    Patient name Lulu Calzada  Location S /S -88 MRN [id number]  : 1955 Date 2023       LOS (days): 5  Geometric Mean LOS (GMLOS) (days): 3 90  Days to GMLOS:-1 1        OBJECTIVE:        Current admission status: Inpatient  Preferred Pharmacy:   [address], [place] - [address] [facility] [place]  [address] [address]  Phone: [phone] Fax: [phone]    Primary Care Provider: Belinda Aldana MD    Primary Insurance: [facility] REP  Secondary Insurance:     PROGRESS NOTE:    TT sent to resident and attending about no accepting rehab facilities at this time; requested care team meeting for patient - awaiting response  Attempted to meet with patient to discuss inability to return to [address], but patient sleeping soundly  Call made to spouse, Claudine Leon, but she reports that she's currently at the grocery store and requesting a call in a few minutes so she can talk more openly  Discussed case with PT/OT re: need to optimize patient to help with trying to find bed; both to see patient today  Case discussed with CM director and CC; referrals sent within 150 [place] radius as no available beds at this time  No bed offers received at this time  Will continue to follow for any responses - messages sent in [address] to all pending facilities to inquire about availability

## 2023-04-12 NOTE — CASE MANAGEMENT
Case Management Discharge Planning Note    Patient name Luis Tellez  Location S /S -02 MRN [id number]  : 1955 Date 2023       Current Admission Date: 2023  Current Admission Diagnosis:Acute on chronic respiratory failure [facility])   Patient Active Problem List    Diagnosis Date Noted   • Pulmonary embolism ([facility] )    • COVID-19 2023   • SIRS (systemic inflammatory response syndrome) ([facility] ) 2023   • Cerumen debris on tympanic membrane of both ears 2023   • Generalized weakness 2023   • Impaired functional mobility, balance, gait, and endurance 2023   • BPH (benign prostatic hyperplasia) 2023   • Chronic anticoagulation 2023   • Pressure injury, unstageable, with suspected deep tissue injury ([facility] ) 2023   • Dyspnea on exertion 2023   • Morbid obesity ([facility] ) 2023   • Primary hypertension 2023   • History of pulmonary embolism 2023   • CHF (congestive heart failure) ([facility] ) 2023   • DVT (deep venous thrombosis) ([facility] ) 2023   • Chronic diastolic congestive heart failure ([facility] ) 2021   • DJD (degenerative joint disease), multiple sites 2021   • Ambulatory dysfunction 2021   • Chronic right-sided congestive heart failure ([facility] ) 2021   • BMI 50 0-59 9, adult ([facility] ) 2020   • Debility 2020   • Acute on chronic respiratory failure ([facility] ) 2020   • Restrictive lung disease 2020   • Medical marijuana use 2018   • EVERETT (obstructive sleep apnea) 2018   • Obesity hypoventilation syndrome ([facility] ) 2018   • Pulmonary hypertension ([facility] ) 01/15/2018   • Pain medication agreement signed 2017   • Neuralgia, post-herpetic 2017   • Sciatica 2012   • Fercho de la Tourette's syndrome 2012   • Chronic pain disorder 2012      LOS (days): 5  Geometric Mean LOS (GMLOS) (days): 3 90  Days to GMLOS:-1 4     OBJECTIVE:  Risk of Unplanned Readmission Score: 17 76         Current admission status: Inpatient   Preferred Pharmacy:   [address], [place] - [address]  [address] [address]  Phone: [phone] Fax: [phone]    Primary Care Provider: Adelaide Bamberger, MD    Primary Insurance: [facility] REP  Secondary Insurance:     DISCHARGE DETAILS:    Discharge planning discussed with[de-identified] patient at bedside; spouse, Otf Eric, by phone (on patient's cell in room)  [place] of Choice: Yes (re: rehab)  Comments - [place] of Choice: aware that [facility] unable to accept back at current functional level and that referrals expanded in [address] to farther distance  CM contacted family/caregiver?: Yes (spouse, Otf Eric)  Were Treatment Team discharge recommendations reviewed with patient/caregiver?: Yes  Did patient/caregiver verbalize understanding of patient care needs?: N/A- going to facility  Were patient/caregiver advised of the risks associated with not following Treatment Team discharge recommendations?: Yes    Contacts  Patient Contacts: spouse, Otf Eric  Relationship to Patient[de-identified] Family  Contact Method: Phone  Reason/Outcome: Continuity of Care, Emergency Contact, Referral, Discharge [address]         Is the patient interested in [facility] at discharge?: No    DME Referral Provided  Referral made for DME?: No    Other Referral/Resources/Interventions Provided:  Interventions: SNF, Short Term Rehab  Referral Comments: Met with patient at bedside to review decision from [address] not to have patient return at this time  Patient contacted spouse, Otf Eric, on his phone, so spoke with her on speaker phone as well  Reviewed need for rehab referral to be expanded to farther distance and mobility goals, as outlined by responding facilities (assist x1 with mobility, ability to stand)   Relayed that request has been made to increase therapy while in the hospital (currently on schedule for daily visits, but aware that this may not always be able to be acommodated) and conversation with nursing re: increasing mobility/transfers outside of bed  Encouraged participation re: same, as once improvements are noted, local facilities (including [facility] and [facility]) would be able to re-evaluate  Patient aware that in the meantime, if any bed offers are received, despite distance from hospital, he/spouse will need to consider as only available option  Both understanding of same  Spouse states she will be to hospital tomorrow around 2:00pm and aware that ongoing conversation about care will occur at that time  Spoke with MD re: meeting with patient/spouse at 2:00pm tomorrow      Would you like to participate in our [address] service program?  : No - Declined    Treatment Team Recommendation: Short Term Rehab  Discharge Destination Plan[de-identified] Short Term Rehab  Transport at Discharge : [facility] Amb[facility]

## 2023-04-12 NOTE — OCCUPATIONAL THERAPY NOTE
Occupational Therapy Treatment Note      Darshan Fee    4/12/2023    Principal Problem:    Acute on chronic respiratory failure [facility])  Active Problems: Morbid obesity ([facility] )    Primary hypertension    EVERETT (obstructive sleep apnea)    Chronic diastolic congestive heart failure ([facility])    History of pulmonary embolism    SIRS (systemic inflammatory response syndrome) ([facility] )      Past Medical History:   Diagnosis Date   • CHF (congestive heart failure) ([facility])    • DVT (deep venous thrombosis) ([facility])    • HTN (hypertension)    • Morbid obesity ([facility])    • EVERETT (obstructive sleep apnea)    • Pulmonary embolism [facility])        Past Surgical History:   Procedure Laterality Date   • KNEE SURGERY          04/12/23 1425   OT Last Visit   OT Visit Date 04/12/23   Note Type   Note Type Treatment   Pain Assessment   Pain Assessment Tool 0-10   Pain Location/Orientation Location: Back;Orientation: Left; Location: Shoulder   Effect of Pain on Daily Activities limits tolerance of participating in purposeful tasks   Lifestyle   Intrinsic Gratification TV, watching sports and outdoor types of shows   ADL   Toileting Comments condom cath in place  Functional Standing Tolerance   Comments Functional SITTING tolerance: pt sat EOB x for approx 26 mins in between trials of sit<>Stand  Pt required significant time between sets of sit/stand trails due to fatigue and feelings of dizziness  STANDING TOLERANCE: approx 15sec, 5sec, and 5-10 sec  3rd trial, pt was encouraged to sing the alphabet in an effort to increase standing tolerance- pt was able to recite alphabet QUICKLY while in standing and then returned to sit  After 3rd trial, pt with severe back spasm  Bed Mobility   Supine to Sit 2  Maximal assistance   Additional items Assist x 1;Verbal cues; Bedrails;LE management   Sit to Supine 2  Maximal assistance   Additional items Assist x 2;Assist x 3;LE management;Verbal cues; Bedrails  (3 persons present; 2 person (A) for each LE and assist "of a 3rd to guide uppper body back to back and for safety  3rd person providing minimal (A) only )   Additional Comments Pt able to scoot self toward [facility] with bed in [facility]  Bed became stuck in trendelenburg position and pt was in this position for at least 2mins- noted SpO2 dec'd to 85  Once bed returned to normal positioning, SpO2 returned to >92%  RN made aware of bed malfunctioning  Transfers   Sit to Stand 2  Maximal assistance   Additional items Assist x 2  (belt and hammock technique)   Stand to Sit 2  Maximal assistance   Additional items Assist x 2   Functional Mobility   Additional Comments not appropriate at this time  Subjective   Subjective \"I want to get out of this bed and in to that chair! \"    \"Everyone passes the colón to somebody else  I am told nursing can get me to the chair and then nursing says that you will get me to the chair  Now you guys are telling me nursing needs to get me to the chair  \"   Additional Activities   Additional Activities Comments Education: Pt expressing much frustration and becoming tearful re: inability to sit in the chair at this time  OT provided much education re: role of services and explained why use of nanette lift is nonbillable service for therapy  Explained how todays session was more beneficial to pt as pt was an ACTIVE participant working towards goals rather than a session would be spent positioning nanette lift pad and transferring to chair  Pt understanding OT's explanation as to why therapy did not perform nanette transfer  That said, reassured pt that this writer would speak to staff to achieve personal goal of getting to chair  PT/OT met with nursing following session and staff agreeable to transfer to chair as schedule permits     Activity Tolerance   Activity Tolerance Patient limited by fatigue;Patient tolerated treatment well;Patient limited by pain  (Tolerated seated EOB well, limited standing tolerance due to pain and fatigue )   Medical Staff " Made Aware Co-tx with PT due to pt heavy assist x2 and need for skilled 2 person assist with transfers  Assessment   Assessment Pt seen at bedside for OT tx session focused on transfer training and challenging overall activity tolerance in prep to progress with higher level ADL tasks  Pt requires frequent rest breaks t/o session due to dizziness, occasional pain (back spasm), as well noted elevated heart rate with minimal activity  As noted, pt expressing frustration with inability to be placed in chair for therapy session  Recommend staff to nanette pt OOB>chair daily  If being OOB in chair is unattainable for any reason, pt was able to demonstrate ability to sit EOB for a significant time (approx 25+mins) on this date and therefore it is recommended pt sit EOB for meals with at least (S) due to dizziness with change of position initially  Plan for follow up OT session to have pt participate in purposeful ADL tasks seated EOB since pt was able to demonstrate adequate sitting tolerance today  Pt would benefit from continued OT services to address deficits and goals listed in evaluation  Post acute inpatient rehab remains appropriate upon discharge  Plan   Treatment Interventions ADL retraining;Functional transfer training;UE strengthening/ROM; Endurance training;Patient/family training;Equipment evaluation/education; Compensatory technique education; Neuromuscular reeducation; Energy conservation   Goal Expiration Date 04/20/23   OT Treatment Day 1   OT Frequency 3-5x/wk   Recommendation   OT Discharge Recommendation Post acute rehabilitation services   AM-PAC Daily Activity Inpatient   Lower Body Dressing 1   Bathing 1   Toileting 1   Upper Body Dressing 2   Grooming 3   Eating 4   Daily Activity Raw Score 12   Daily Activity Standardized Score (Calc for Raw Score >=11) 30 6   AM-PAC Applied Cognition Inpatient   Following a Speech/Presentation 4   Understanding Ordinary Conversation 4   Taking Medications 4 Remembering Where Things Are Placed or Put Away 2   Remembering List of 4-5 Errands 2   Taking Care of Complicated Tasks 2   Applied Cognition Raw Score 18   Applied Cognition Standardized Score 38 07   End of Consult   Education Provided Yes   Patient Position at End of Consult Supine; All needs within reach   Nurse Communication Nurse aware of consult   Joaquin Gage, OT

## 2023-04-12 NOTE — ASSESSMENT & PLAN NOTE
POA  as evidenced by tachycardia 112, leukocytosis 12 3, tachypnea 24  Blood Pressure: 110/66  Pulse: 90  Respirations: 18    Sepsis Unlikely as no source present  Previous COVID infxn, likely continued inflammatory state         Plan-  Blood Cx-pending  Continue to monitor off antibiotics  Encourage p o  intake

## 2023-04-12 NOTE — ASSESSMENT & PLAN NOTE
· Continue PO prednisone 20 mg daily  · Continue PO Lasix 40 mg daily  · Monitor respiratory status, currently on baseline 5L  · Respiratory protocol, Xopenex/Atrovent, Airway clearance protocol, IS  · Mucinex, Tessalon Perles   · Out of bed to chair, PT and OT

## 2023-04-12 NOTE — PLAN OF CARE
Problem: OCCUPATIONAL THERAPY ADULT  Goal: Performs self-care activities at highest level of function for planned discharge setting  See evaluation for individualized goals  Description: Treatment Interventions: ADL retraining, Functional transfer training, UE strengthening/ROM, Endurance training, Patient/family training, Equipment evaluation/education, Compensatory technique education, Neuromuscular reeducation, Energy conservation          See flowsheet documentation for full assessment, interventions and recommendations  Outcome: Progressing  Note: Limitation: Decreased ADL status, Decreased Safe judgement during ADL, Decreased endurance, Decreased cognition, Decreased self-care trans, Decreased high-level ADLs  Prognosis: Fair  Assessment:Pt seen at bedside for OT tx session focused on transfer training and challenging overall activity tolerance in prep to progress with higher level ADL tasks  Pt requires frequent rest breaks t/o session due to dizziness, occasional pain (back spasm), as well noted elevated heart rate with minimal activity  As noted, pt expressing frustration with inability to be placed in chair for therapy session  Recommend staff to nanette pt OOB>chair daily  If being OOB in chair is unattainable for any reason, pt was able to demonstrate ability to sit EOB for a significant time (approx 25+mins) on this date and therefore it is recommended pt sit EOB for meals with at least (S) due to dizziness with change of position initially  Plan for follow up OT session to have pt participate in purposeful ADL tasks seated EOB since pt was able to demonstrate adequate sitting tolerance today  Pt would benefit from continued OT services to address deficits and goals listed in evaluation  Post acute inpatient rehab remains appropriate upon discharge       OT Discharge Recommendation: Post acute rehabilitation services

## 2023-04-12 NOTE — PROGRESS NOTES
[facility]  Progress Note  Name: Roxana Khalil  MRN: [id number]  Unit/Bed#: S -01 I Date of Admission: 4/7/2023   Date of Service: 4/12/2023 I Hospital Day: 5    Assessment/Plan   * Acute on chronic respiratory failure (HCC)  Assessment & Plan  · Continue PO prednisone 20 mg daily  · Continue PO Lasix 40 mg daily  · Monitor respiratory status, currently on baseline 5L  · Respiratory protocol, Xopenex/Atrovent, Airway clearance protocol, IS  · Mucinex, Tessalon Perles   · Out of bed to chair, PT and OT      SIRS (systemic inflammatory response syndrome) ([facility])  Assessment & Plan  POA  as evidenced by tachycardia 112, leukocytosis 12 3, tachypnea 24  Blood Pressure: 110/66  Pulse: 90  Respirations: 18    Sepsis Unlikely as no source present  Previous COVID infxn, likely continued inflammatory state         Plan-  Blood Cx-pending  Continue to monitor off antibiotics  Encourage p o  intake     History of pulmonary embolism  Assessment & Plan  History of DVT, PE  Home dose of coumadin 10 mg Mondays, 9 mg tuesdays-sundays  INR 2 91, therapeutic   Coumadin 10 mg sat/sun and 9 mg Monday-Friday    Plan-   · Continue current regime of warfarin  · Monitor INR    Chronic diastolic congestive heart failure (HCC)  Assessment & Plan  · No further shortness of breath  · Continue on p o  Lasix  · Daily weights monitor input and output            EVERETT (obstructive sleep apnea)  Assessment & Plan  Hx of EVERETT, morbid obesity, obesity hypoventilation syndrome   Continue BiPAP HS     Primary hypertension  Assessment & Plan  BP on admission 121/55  Home medication Lasix 40mg QD       Morbid obesity ([address] )  Assessment & Plan  Recently DC to [facility]   PT/OT: post acute rehab  Body mass index is 55 85 kg/m²    • Recommend incorporating a more whole foods plant-predominant diet along with decreasing consumption of red meats and processed foods               VTE Pharmacologic Prophylaxis: VTE Score: 10 High Risk (Score >/= 5) - Pharmacological DVT Prophylaxis Ordered: warfarin (Coumadin)  Sequential Compression Devices Ordered  Patient Centered Rounds: I performed bedside rounds with nursing staff today  Discussions with Specialists or Other Care Team Provider:     Education and Discussions with Family / Patient: Family care plan meeting today  Total Time Spent on Date of Encounter in care of patient: 35 minutes This time was spent on one or more of the following: performing physical exam; counseling and coordination of care; obtaining or reviewing history; documenting in the medical record; reviewing/ordering tests, medications or procedures; communicating with other healthcare professionals and discussing with patient's family/caregivers  Current Length of Stay: 5 day(s)  Current Patient Status: Inpatient   Certification Statement: The patient will continue to require additional inpatient hospital stay due to SNF placement  Discharge Plan: Pending placement    Code Status: Level 1 - Full Code    Subjective:   Seen this morning in no acute distress, reports persistent chronic back pain  Denies chest pain or SOB  Objective:     Vitals:   Temp (24hrs), Av 1 °F (36 7 °C), Min:97 7 °F (36 5 °C), Max:98 4 °F (36 9 °C)    Temp:  [97 7 °F (36 5 °C)-98 4 °F (36 9 °C)] 97 7 °F (36 5 °C)  HR:  [90-97] 90  Resp:  [18] 18  BP: (110-127)/(53-66) 110/66  SpO2:  [91 %-97 %] 96 %  Body mass index is 55 85 kg/m²  Input and Output Summary (last 24 hours): Intake/Output Summary (Last 24 hours) at 2023 1217  Last data filed at 2023 0913  Gross per 24 hour   Intake 1640 ml   Output 2800 ml   Net -1160 ml       Physical Exam:   Physical Exam  Vitals reviewed  Constitutional:       General: He is not in acute distress  Appearance: He is obese  He is not toxic-appearing  HENT:      Head: Normocephalic and atraumatic  Mouth/Throat:      Pharynx: Oropharynx is clear     Eyes: Extraocular Movements: Extraocular movements intact  Cardiovascular:      Rate and Rhythm: Normal rate and regular rhythm  Pulses: Normal pulses  Pulmonary:      Effort: No respiratory distress  Comments: Decreased breath sounds bilaterally  Abdominal:      Palpations: Abdomen is soft  Tenderness: There is no abdominal tenderness  Musculoskeletal:         General: No tenderness  Right lower leg: Edema present  Left lower leg: Edema present  Neurological:      Mental Status: He is alert and oriented to person, place, and time  Psychiatric:         Mood and Affect: Mood normal          Behavior: Behavior normal          Thought Content: Thought content normal          Judgment: Judgment normal           Additional Data:     Labs:  Results from last 7 days   Lab Units 04/12/23  0514 04/10/23  0526   WBC Thousand/uL 10 72* 12 40*   HEMOGLOBIN g/dL 13 4 12 8   HEMATOCRIT % 43 3 41 1   PLATELETS Thousands/uL 225 272   NEUTROS PCT %  --  78*   LYMPHS PCT %  --  11*   MONOS PCT %  --  10   EOS PCT %  --  0     Results from last 7 days   Lab Units 04/12/23 0514 04/08/23 0524 04/07/23  0941   SODIUM mmol/L 136   < > 138   POTASSIUM mmol/L 4 3   < > 3 7   CHLORIDE mmol/L 92*   < > 91*   CO2 mmol/L 40*   < > 44*   BUN mg/dL 16   < > 13   CREATININE mg/dL 0 53*   < > 0 48*   ANION GAP mmol/L 4   < > 3*   CALCIUM mg/dL 8 7   < > 8 6   ALBUMIN g/dL  --   --  3 1*   TOTAL BILIRUBIN mg/dL  --   --  0 53   ALK PHOS U/L  --   --  65   ALT U/L  --   --  32   AST U/L  --   --  14   GLUCOSE RANDOM mg/dL 103   < > 126    < > = values in this interval not displayed       Results from last 7 days   Lab Units 04/12/23 0514   INR  2 30*             Results from last 7 days   Lab Units 04/08/23 0524 04/07/23  0941   LACTIC ACID mmol/L  --  1 3   PROCALCITONIN ng/ml 0 07 0 10       Lines/Drains:  Invasive Devices     Peripheral Intravenous Line  Duration           Peripheral IV 04/07/23 Right Antecubital 5 days    Peripheral IV 04/12/23 Left Antecubital <1 day                Recent Cultures (last 7 days):   Results from last 7 days   Lab Units 04/08/23  1024 04/07/23  1018 04/07/23  0941   BLOOD CULTURE   --  No Growth After 4 Days  No Growth After 4 Days  C DIFF TOXIN B BY PCR  Negative  --   --        Last 24 Hours Medication List:   Current Facility-Administered Medications   Medication Dose Route Frequency Provider Last Rate   • acetaminophen  650 mg Oral Q6H PRN Inocencia Snyder MD     • albuterol  2 5 mg Nebulization Q4H PRN Inocencia Snyder MD     • benzonatate  200 mg Oral TID PRN Inocencia Snyder MD     • dextromethorphan-guaiFENesin  10 mL Oral Q4H PRN Colleen Mcqueen MD     • furosemide  40 mg Oral Daily Jason Storm MD     • guaiFENesin  1,200 mg Oral Q12H Ivan Tony MD     • ipratropium  0 5 mg Nebulization TID Kal Morocho MD     • levalbuterol  1 25 mg Nebulization TID Kal Morocho MD     • methocarbamol  750 mg Oral Q6H PRN Inocencia Snyder MD     • metoprolol succinate  25 mg Oral Daily Sang Selby MD     • potassium chloride  20 mEq Oral Daily Sang Selby MD     • predniSONE  20 mg Oral Daily Sang Selby MD     • pregabalin  150 mg Oral TID Inocencia Snyder MD     • risperiDONE  1 mg Oral TID Inocencia Snyder MD     • sodium chloride  4 mL Nebulization TID Sondra Romero MD     • traMADol  50 mg Oral Q6H PRN Inocencia Snyder MD     • warfarin  9 mg Oral Daily (warfarin) Jason Storm MD          Today, Patient Was Seen By: Dayana Pham MD    **Please Note: This note may have been constructed using a voice recognition system  **

## 2023-04-12 NOTE — CASE MANAGEMENT
Case Management Progress Note    Patient name Guillermina Pod  Location S /S -63 MRN [id number]  : 1955 Date 2023       LOS (days): 5  Geometric Mean LOS (GMLOS) (days): 3 90  Days to GMLOS:-1 2        OBJECTIVE:        Current admission status: Inpatient  Preferred Pharmacy:   [address], [place] [address] [place] [address]  Phone: [phone] Fax: [phone]    Primary Care Provider: Indy Moreno MD    Primary Insurance: [facility] REP  Secondary Insurance:     PROGRESS NOTE:    Second VM left for patient's spouse, Sophie rOtiz; awaiting return call  No bed offers received at this time

## 2023-04-12 NOTE — ASSESSMENT & PLAN NOTE
Recently DC to [facility]   PT/OT: post acute rehab  Body mass index is 55 85 kg/m²    • Recommend incorporating a more whole foods plant-predominant diet along with decreasing consumption of red meats and processed foods

## 2023-04-13 PROBLEM — R65.10 SIRS (SYSTEMIC INFLAMMATORY RESPONSE SYNDROME) (HCC): Status: RESOLVED | Noted: 2023-03-29 | Resolved: 2023-04-13

## 2023-04-13 LAB
ANION GAP SERPL CALCULATED.3IONS-SCNC: 1 MMOL/L (ref 4–13)
BUN SERPL-MCNC: 14 MG/DL (ref 5–25)
CALCIUM SERPL-MCNC: 8.8 MG/DL (ref 8.4–10.2)
CHLORIDE SERPL-SCNC: 93 MMOL/L (ref 96–108)
CO2 SERPL-SCNC: 43 MMOL/L (ref 21–32)
CREAT SERPL-MCNC: 0.47 MG/DL (ref 0.6–1.3)
GFR SERPL CREATININE-BSD FRML MDRD: 113 ML/MIN/1.73SQ M
GLUCOSE SERPL-MCNC: 99 MG/DL (ref 65–140)
INR PPP: 1.79 (ref 0.84–1.19)
POTASSIUM SERPL-SCNC: 4.1 MMOL/L (ref 3.5–5.3)
PROTHROMBIN TIME: 21 SECONDS (ref 11.6–14.5)
SODIUM SERPL-SCNC: 137 MMOL/L (ref 135–147)

## 2023-04-13 RX ORDER — BENZONATATE 100 MG/1
200 CAPSULE ORAL 3 TIMES DAILY
Status: DISCONTINUED | OUTPATIENT
Start: 2023-04-13 | End: 2023-05-06 | Stop reason: HOSPADM

## 2023-04-13 RX ORDER — DOCUSATE SODIUM 100 MG/1
100 CAPSULE, LIQUID FILLED ORAL 2 TIMES DAILY PRN
Status: DISCONTINUED | OUTPATIENT
Start: 2023-04-13 | End: 2023-04-21

## 2023-04-13 RX ORDER — WARFARIN SODIUM 2 MG/1
2 TABLET ORAL
Status: COMPLETED | OUTPATIENT
Start: 2023-04-13 | End: 2023-04-13

## 2023-04-13 RX ORDER — ACETAMINOPHEN 325 MG/1
975 TABLET ORAL EVERY 8 HOURS SCHEDULED
Status: DISCONTINUED | OUTPATIENT
Start: 2023-04-13 | End: 2023-05-06 | Stop reason: HOSPADM

## 2023-04-13 RX ORDER — PANTOPRAZOLE SODIUM 40 MG/1
40 TABLET, DELAYED RELEASE ORAL
Status: DISCONTINUED | OUTPATIENT
Start: 2023-04-13 | End: 2023-05-06 | Stop reason: HOSPADM

## 2023-04-13 RX ORDER — OXYCODONE HYDROCHLORIDE 5 MG/1
5 TABLET ORAL EVERY 8 HOURS PRN
Status: DISCONTINUED | OUTPATIENT
Start: 2023-04-13 | End: 2023-04-15

## 2023-04-13 RX ADMIN — WARFARIN SODIUM 2 MG: 3 TABLET ORAL at 18:19

## 2023-04-13 RX ADMIN — ACETAMINOPHEN 650 MG: 325 TABLET ORAL at 14:17

## 2023-04-13 RX ADMIN — BENZONATATE 200 MG: 100 CAPSULE ORAL at 22:15

## 2023-04-13 RX ADMIN — OXYCODONE HYDROCHLORIDE 5 MG: 5 TABLET ORAL at 22:19

## 2023-04-13 RX ADMIN — GUAIFENESIN 1200 MG: 600 TABLET ORAL at 10:01

## 2023-04-13 RX ADMIN — LEVALBUTEROL HYDROCHLORIDE 1.25 MG: 1.25 SOLUTION RESPIRATORY (INHALATION) at 07:50

## 2023-04-13 RX ADMIN — GUAIFENESIN 1200 MG: 600 TABLET ORAL at 22:15

## 2023-04-13 RX ADMIN — WARFARIN SODIUM 9 MG: 6 TABLET ORAL at 18:20

## 2023-04-13 RX ADMIN — ACETAMINOPHEN 650 MG: 325 TABLET ORAL at 07:45

## 2023-04-13 RX ADMIN — SODIUM CHLORIDE SOLN NEBU 3% 4 ML: 3 NEBU SOLN at 07:50

## 2023-04-13 RX ADMIN — RISPERIDONE 1 MG: 1 TABLET ORAL at 16:56

## 2023-04-13 RX ADMIN — ACETAMINOPHEN 975 MG: 325 TABLET, FILM COATED ORAL at 22:16

## 2023-04-13 RX ADMIN — BENZONATATE 200 MG: 100 CAPSULE ORAL at 16:56

## 2023-04-13 RX ADMIN — TRAMADOL HYDROCHLORIDE 50 MG: 50 TABLET, COATED ORAL at 07:45

## 2023-04-13 RX ADMIN — IPRATROPIUM BROMIDE 0.5 MG: 0.5 SOLUTION RESPIRATORY (INHALATION) at 07:50

## 2023-04-13 RX ADMIN — PREGABALIN 150 MG: 75 CAPSULE ORAL at 10:01

## 2023-04-13 RX ADMIN — PANTOPRAZOLE SODIUM 40 MG: 40 TABLET, DELAYED RELEASE ORAL at 16:56

## 2023-04-13 RX ADMIN — METOPROLOL SUCCINATE 25 MG: 25 TABLET, EXTENDED RELEASE ORAL at 10:01

## 2023-04-13 RX ADMIN — RISPERIDONE 1 MG: 1 TABLET ORAL at 22:15

## 2023-04-13 RX ADMIN — FUROSEMIDE 40 MG: 40 TABLET ORAL at 10:01

## 2023-04-13 RX ADMIN — RISPERIDONE 1 MG: 1 TABLET ORAL at 10:01

## 2023-04-13 RX ADMIN — GUAIFENESIN AND DEXTROMETHORPHAN 10 ML: 100; 10 SYRUP ORAL at 18:20

## 2023-04-13 RX ADMIN — METHOCARBAMOL TABLETS 750 MG: 750 TABLET, COATED ORAL at 07:45

## 2023-04-13 RX ADMIN — PREDNISONE 20 MG: 20 TABLET ORAL at 10:01

## 2023-04-13 RX ADMIN — TRAMADOL HYDROCHLORIDE 50 MG: 50 TABLET, COATED ORAL at 14:17

## 2023-04-13 RX ADMIN — PREGABALIN 150 MG: 75 CAPSULE ORAL at 22:15

## 2023-04-13 RX ADMIN — PREGABALIN 150 MG: 75 CAPSULE ORAL at 16:56

## 2023-04-13 RX ADMIN — POTASSIUM CHLORIDE 20 MEQ: 1500 TABLET, EXTENDED RELEASE ORAL at 10:01

## 2023-04-13 RX ADMIN — METHOCARBAMOL TABLETS 750 MG: 750 TABLET, COATED ORAL at 14:17

## 2023-04-13 NOTE — PHYSICAL THERAPY NOTE
"PHYSICAL THERAPY TREATMENT NOTE  NAME:  Yeison Lopez  DATE: 04/13/23    Length Of Stay: 6  Performed at least 2 patient identifiers during session: Name and Birthday    TREATMENT:    04/13/23 1554   PT Last Visit   PT Visit Date 04/13/23   Note Type   Note Type BID visit/treatment   Pain Assessment   Pain Assessment Tool 0-10   Pain Score 9  (initially upon walking into room > 30 min prior, however pt now back at 8 @ rest s/p pain medication  Increased to FLACC Score of 10 with activity)   Pain Location/Orientation Orientation: Mid;Orientation: Left; Location: Back  (and R knee w/ WB activities)   Effect of Pain on Daily Activities limits speed and indep Of transitional movements  Limits standing tolerance   Patient's Stated Pain Goal No pain   Hospital Pain Intervention(s) Repositioned; Ambulation/increased activity; Medication (See MAR)  (medicated prior to both sessions; declined ice)   Multiple Pain Sites Yes   Restrictions/Precautions   Weight Bearing Precautions Per Order No   Other Precautions Bed Alarm; Chair Alarm; Fall Risk;Fluid restriction;Pain;Telemetry  (condom cath)   General   Chart Reviewed Yes   Family/Caregiver Present Yes  (spouse presen)   Cognition   Overall Cognitive Status WFL   Arousal/Participation Alert   Attention Attends with cues to redirect   Orientation Level Oriented X4   Subjective   Subjective Pt initially apprehensive about sit<>stand trials until taller PT staff present to assist  At end of session, spouse reported that PT would stand from his recliner prior to illness leaning substantially forward and using a lower support on walker to initiate stand, and then \"Walk his hands up\" to stand more upright  Bed Mobility   Supine to Sit Unable to assess   Transfers   Sit to Stand 2  Maximal assistance   Additional items Assist x 2; Increased time required;Verbal cues;Armrests  (using belt and hammock technique from recliner chair)   Stand to Sit 2  Maximal assistance   Additional " items Assist x 2; Increased time required;Verbal cues;Armrests  (using belt and hammock technique from recliner chair)   Additional Comments All sit>stand transfers using belt and hammock technique w/ A of 2  Pt performed one partial sit<>stand initially for clearing chair  additional trials performed w/ UE support @ PT biceps (belt and hammock) for UE support only, one trial w/ UE support of fixed bed railing in front of pt, and one trial w/ transition osit<>stand w/ UE support at biceps and later reaching for UE support  Pt able to achieve more than 50% upright at most, limited by back and R knee pain  Ambulation/Elevation   Gait pattern Not appropriate   Ambulation/Elevation Additional Comments Brenda's Egress test FAIL; Brenda's Bariatric body type APPLE ASCITES   Balance   Static Sitting Fair +   Dynamic Sitting Fair -   Static Standing Zero  (20 seconds max w/ 2 staff A and BUE support of PT/bedrail )   Endurance Deficit   Endurance Deficit Yes   Endurance Deficit Description needs therapeutic rests between mobility trials of standing   Activity Tolerance   Activity Tolerance Patient limited by pain; Patient limited by fatigue   Nurse Made Aware spoke to RN @ end of session (16:00) to have pt return to bed now with nanette lift sling as pt's replacement bed is not coming until tomorrow, and pt has difficulty repositioning in chair  Medical Staff Made Aware care coordination w/ Agata Pires from case management   Assessment   Prognosis Guarded   Problem List Decreased strength;Decreased range of motion;Decreased endurance; Impaired balance;Decreased mobility;Pain;Obesity; Decreased safety awareness; Impaired judgement;Decreased cognition   Assessment (S)  Patient was seen for additional p m  split session once medicated for pain for transfer training and pre-gait activity with specific manual techniques including belt and hammock technique    Marce Peng reports feeling more confident with technique especially with abducting "bilateral upper extremities closer to body to minimize telescoping of trunk during transfers  He does report feeling comfortable using therapists biceps is upper extremity assist for promoting sit to stand, but is not consistent with where he can hold when he is upright for back support as patient's limitations currently is due to back pain  Based on conversation with patient and spouse, anticipate patient may make mobility progress with transfers by flexing trunk and using forearm support on an anteriorly placed stable object, and seeing if he can \" walk his arms back\" to gain more upright posture (such as a knee extension first then trunk extension second transfer)  Eventually, patient would benefit from having a walking sling under him while performing this test to minimize risk for falls and then progressed to ambulation trials with use of a rolling walker, but would initiate this task first with belt and hammock technique to see if patient would be successful in the same  Skilled PT recommended to progress patient towards treatment goals   Barriers to Discharge Decreased caregiver support; Inaccessible home environment   Goals   Patient Goals to stand and walk   STG Expiration Date 04/21/23   PT Treatment Day 3   Plan   Treatment/Interventions Functional transfer training;LE strengthening/ROM; Therapeutic exercise;Cognitive reorientation;Patient/family training; Endurance training;Equipment eval/education; Bed mobility;Gait training;Continued evaluation;Spoke to nursing;Spoke to case management   Progress Slow progress, decreased activity tolerance  (pain)   PT Frequency Other (Comment)  (5x/wk Mon-fri)   Recommendation   PT Discharge Recommendation Post acute rehabilitation services   Equipment Recommended   (nanette lift by nursing for OOB transfers; potential walking sling and wide rolling walker in future with mobility progression from recliner chair)   Jose 8 in " Flat Bed Without Bedrails 2   Lying on Back to Sitting on Edge of Flat Bed Without Bedrails 1   Moving Bed to Chair 1   Standing Up From Chair Using Arms 1   Walk in Room 1   Climb 3-5 Stairs With Railing 1   Basic Mobility Inpatient Raw Score 7   Turning Head Towards Sound 4   Follow Simple Instructions 3   Low Function Basic Mobility Raw Score  14   Low Function Basic Mobility Standardized Score  22 01   Highest Level Of Mobility   JH-HLM Goal 2: Bed activities/Dependent transfer   JH-HLM Achieved 4: Move to chair/commode   Education   Education Provided Mobility training;Assistive device   Patient Reinforcement needed; Explanation/teachback used   End of Consult   Patient Position at End of Consult (S)  All needs within reach;Bed/Chair alarm activated; Bedside chair  (with request to nursing immediately at end of session to help patient [place] lifted back to bed, including prior to dinner)     The patient's -[facility] Basic Mobility Inpatient Short Form Raw Score is 7  A Raw score of less than or equal to 16 suggests the patient may benefit from discharge to post-acute rehabilitation services, which DOES coincide with CURRENT above PT recommendations  However please refer to therapist recommendation for discharge planning given other factors that may influence destination  Adapted from [employer] [facility] “6-Clicks” Basic Mobility and Daily Activity Scores With Discharge Destination  Physical Therapy, 2021;101:1-9   DOI: [email or web address]    Marciano Lewis PT, DPT

## 2023-04-13 NOTE — ASSESSMENT & PLAN NOTE
POA  as evidenced by tachycardia 112, leukocytosis 12 3, tachypnea 24  Blood Pressure: 121/67  Pulse: 91  Respirations: 18    Sepsis Unlikely as no source present  Previous COVID infxn, likely continued inflammatory state         Plan-  Blood Cx-pending  Continue to monitor off antibiotics  Encourage p o  intake

## 2023-04-13 NOTE — PLAN OF CARE
"  Problem: PHYSICAL THERAPY ADULT  Goal: Performs mobility at highest level of function for planned discharge setting  See evaluation for individualized goals  Description: Treatment/Interventions: Functional transfer training, LE strengthening/ROM, Therapeutic exercise, Endurance training, Cognitive reorientation, Patient/family training, Equipment eval/education, Gait training, Bed mobility          See flowsheet documentation for full assessment, interventions and recommendations  Outcome: Progressing  Note: Prognosis: Guarded  Problem List: Decreased strength, Decreased range of motion, Decreased endurance, Impaired balance, Decreased mobility, Pain, Obesity, Decreased safety awareness, Impaired judgement, Decreased cognition  Assessment: (S) Patient was seen for additional p m  split session once medicated for pain for transfer training and pre-gait activity with specific manual techniques including belt and hammock technique  Patient reports feeling more confident with technique especially with abducting bilateral upper extremities closer to body to minimize telescoping of trunk during transfers  He does report feeling comfortable using therapists biceps is upper extremity assist for promoting sit to stand, but is not consistent with where he can hold when he is upright for back support as patient's limitations currently is due to back pain  Based on conversation with patient and spouse, anticipate patient may make mobility progress with transfers by flexing trunk and using forearm support on an anteriorly placed stable object, and seeing if he can \" walk his arms back\" to gain more upright posture (such as a knee extension first then trunk extension second transfer)    Eventually, patient would benefit from having a walking sling under him while performing this test to minimize risk for falls and then progressed to ambulation trials with use of a rolling walker, but would initiate this task first with " belt and hammock technique to see if patient would be successful in the same  Skilled PT recommended to progress patient towards treatment goals  Barriers to Discharge: Decreased caregiver support, Inaccessible home environment     PT Discharge Recommendation: Post acute rehabilitation services    See flowsheet documentation for full assessment

## 2023-04-13 NOTE — ASSESSMENT & PLAN NOTE
· Recently DC to [facility]   PT/OT: post acute rehab  · Pending placement  Body mass index is 55 85 kg/m²    • Recommend incorporating a more whole foods plant-predominant diet along with decreasing consumption of red meats and processed foods

## 2023-04-13 NOTE — PROGRESS NOTES
[facility]  Progress Note  Name: Manuelito Adan  MRN: [id number]  Unit/Bed#: S -01 I Date of Admission: 4/7/2023   Date of Service: 4/13/2023 I Hospital Day: 6    Assessment/Plan   * Acute on chronic respiratory failure (HCC)  Assessment & Plan  · Continue PO prednisone 20 mg daily  · Continue PO Lasix 40 mg daily, having good urine output on Lasix  · Monitor respiratory status, currently on baseline 5L  · Respiratory protocol, Xopenex/Atrovent, Airway clearance protocol, IS  · Mucinex, Tessalon Perles   · Out of bed to chair, PT and OT      History of pulmonary embolism  Assessment & Plan  History of DVT, PE  Home dose of coumadin alternating 9mg and 10 mg     Plan-   · INR today 1 79  · Will give an additional 2 mg warfarin tonight, in addition to the usual 9 mg daily  · Monitor INR in AM    Chronic diastolic congestive heart failure ([address] )  Assessment & Plan  · No further shortness of breath  · Continue on p o  Lasix  · Daily weights monitor input and output    Morbid obesity (HCC)  Assessment & Plan  · Recently DC to [facility]   PT/OT: post acute rehab  · Pending placement  Body mass index is 55 85 kg/m²    • Recommend incorporating a more whole foods plant-predominant diet along with decreasing consumption of red meats and processed foods      EVERETT (obstructive sleep apnea)  Assessment & Plan  Hx of EVERETT, morbid obesity, obesity hypoventilation syndrome   Continue BiPAP HS     Primary hypertension  Assessment & Plan  BP on admission 121/55  Home medication Lasix 40mg QD       SIRS (systemic inflammatory response syndrome) ([facility])-resolved as of 4/13/2023  Assessment & Plan  POA  as evidenced by tachycardia 112, leukocytosis 12 3, tachypnea 24  Blood Pressure: 121/67  Pulse: 91  Respirations: 18    Sepsis Unlikely as no source present  Previous COVID infxn, likely continued inflammatory state         Plan-  Blood Cx-pending  Continue to monitor off antibiotics  Encourage p o  intake VTE Pharmacologic Prophylaxis: VTE Score: 10 High Risk (Score >/= 5) - Pharmacological DVT Prophylaxis Ordered: warfarin (Coumadin)  Sequential Compression Devices Ordered  Patient Centered Rounds: I performed bedside rounds with nursing staff today  Discussions with Specialists or Other Care Team Provider:     Education and Discussions with Family / Patient: Family meeting at 2 PM with patient's wife  Total Time Spent on Date of Encounter in care of patient: 35 minutes This time was spent on one or more of the following: performing physical exam; counseling and coordination of care; obtaining or reviewing history; documenting in the medical record; reviewing/ordering tests, medications or procedures; communicating with other healthcare professionals and discussing with patient's family/caregivers  Current Length of Stay: 6 day(s)  Current Patient Status: Inpatient   Certification Statement: The patient will continue to require additional inpatient hospital stay due to Rehab placement  Discharge Plan: Pending placement    Code Status: Level 1 - Full Code    Subjective:   Seen this morning in no acute distress, no new complaints    Objective:     Vitals:   Temp (24hrs), Av °F (36 7 °C), Min:97 8 °F (36 6 °C), Max:98 1 °F (36 7 °C)    Temp:  [97 8 °F (36 6 °C)-98 1 °F (36 7 °C)] 98 1 °F (36 7 °C)  HR:  [] 91  Resp:  [18-19] 18  BP: (113-121)/(61-70) 121/67  SpO2:  [89 %-97 %] 97 %  Body mass index is 55 85 kg/m²  Input and Output Summary (last 24 hours): Intake/Output Summary (Last 24 hours) at 2023 1241  Last data filed at 2023 0655  Gross per 24 hour   Intake 1040 ml   Output 2625 ml   Net -1585 ml       Physical Exam:   Physical Exam  Vitals reviewed  Constitutional:       General: He is not in acute distress  Appearance: He is obese  He is not toxic-appearing  HENT:      Head: Normocephalic and atraumatic        Mouth/Throat:      Pharynx: Oropharynx is clear  Eyes:      Extraocular Movements: Extraocular movements intact  Cardiovascular:      Rate and Rhythm: Normal rate and regular rhythm  Pulses: Normal pulses  Pulmonary:      Effort: No respiratory distress  Breath sounds: No wheezing, rhonchi or rales  Comments: Decreased breath sounds bilaterally  Abdominal:      Palpations: Abdomen is soft  Tenderness: There is no abdominal tenderness  Musculoskeletal:         General: No tenderness  Right lower leg: Edema present  Left lower leg: Edema present  Comments: At baseline   Neurological:      Mental Status: He is alert and oriented to person, place, and time  Psychiatric:         Mood and Affect: Mood normal          Behavior: Behavior normal          Thought Content: Thought content normal          Judgment: Judgment normal           Additional Data:     Labs:  Results from last 7 days   Lab Units 04/12/23  0514 04/10/23  0526   WBC Thousand/uL 10 72* 12 40*   HEMOGLOBIN g/dL 13 4 12 8   HEMATOCRIT % 43 3 41 1   PLATELETS Thousands/uL 225 272   NEUTROS PCT %  --  78*   LYMPHS PCT %  --  11*   MONOS PCT %  --  10   EOS PCT %  --  0     Results from last 7 days   Lab Units 04/13/23  1130 04/08/23  0524 04/07/23  0941   SODIUM mmol/L 137   < > 138   POTASSIUM mmol/L 4 1   < > 3 7   CHLORIDE mmol/L 93*   < > 91*   CO2 mmol/L 43*   < > 44*   BUN mg/dL 14   < > 13   CREATININE mg/dL 0 47*   < > 0 48*   ANION GAP mmol/L 1*   < > 3*   CALCIUM mg/dL 8 8   < > 8 6   ALBUMIN g/dL  --   --  3 1*   TOTAL BILIRUBIN mg/dL  --   --  0 53   ALK PHOS U/L  --   --  65   ALT U/L  --   --  32   AST U/L  --   --  14   GLUCOSE RANDOM mg/dL 99   < > 126    < > = values in this interval not displayed       Results from last 7 days   Lab Units 04/13/23  1130   INR  1 79*             Results from last 7 days   Lab Units 04/08/23  0524 04/07/23  0941   LACTIC ACID mmol/L  --  1 3   PROCALCITONIN ng/ml 0 07 0 10 Lines/Drains:  Invasive Devices     Peripheral Intravenous Line  Duration           Peripheral IV 04/12/23 Left Antecubital 1 day    Peripheral IV 04/13/23 Right;Upper;Ventral (anterior) Arm <1 day                  Recent Cultures (last 7 days):   Results from last 7 days   Lab Units 04/08/23  1024 04/07/23  1018 04/07/23  0941   BLOOD CULTURE   --  No Growth After 5 Days  No Growth After 5 Days  C DIFF TOXIN B BY PCR  Negative  --   --        Last 24 Hours Medication List:   Current Facility-Administered Medications   Medication Dose Route Frequency Provider Last Rate   • acetaminophen  650 mg Oral Q6H PRN Governor Nabeel MD     • albuterol  2 5 mg Nebulization Q4H PRN Governor Nabeel MD     • benzonatate  200 mg Oral TID Wade Delgado MD     • dextromethorphan-guaiFENesin  10 mL Oral Q4H PRN Taylor Jaquez MD     • furosemide  40 mg Oral Daily Anastacio Sherwood MD     • guaiFENesin  1,200 mg Oral Q12H [address] Anastacio Sherwood MD     • ipratropium  0 5 mg Nebulization TID Darin Cole MD     • levalbuterol  1 25 mg Nebulization TID Darin Cole MD     • methocarbamol  750 mg Oral Q6H PRN Governor Nabeel MD     • metoprolol succinate  25 mg Oral Daily Aristeo Blunt MD     • potassium chloride  20 mEq Oral Daily Aristeo Blunt MD     • pregabalin  150 mg Oral TID Governor Nabeel MD     • risperiDONE  1 mg Oral TID Governor Nabeel MD     • sodium chloride  4 mL Nebulization TID Wade Delgado MD     • traMADol  50 mg Oral Q6H PRN Governor Nabeel MD     • warfarin  2 mg Oral Once (warfarin) Anastacio Sherwood MD     • warfarin  9 mg Oral Daily (warfarin) Anastacio Sherwood MD          Today, Patient Was Seen By: Alaina Hamilton MD    **Please Note: This note may have been constructed using a voice recognition system  **

## 2023-04-13 NOTE — ASSESSMENT & PLAN NOTE
· Continue PO prednisone 20 mg daily  · Continue PO Lasix 40 mg daily, having good urine output on Lasix  · Monitor respiratory status, currently on baseline 5L  · Respiratory protocol, Xopenex/Atrovent, Airway clearance protocol, IS  · Mucinex, Tessalon Perles   · Out of bed to chair, PT and OT

## 2023-04-13 NOTE — PROGRESS NOTES
04/13/23 1801   Team Meeting   Meeting Type Tx Team Meeting   Initial Conference Date 04/13/23   Team Members Present   Team Members Present Physician;Nurse;   Physician Team Member Dr Kimberli Rodriguez, [facility]   Care Management Team Member Leslie Nguyen [place]   Patient/Family Present   Patient Present Yes   Patient's Family Present Yes   Family Relationship Spouse   Spouse [facility]   OTHER   Team Meeting - Additional Comments Meeting scheduled with patient, spouse, MD and nursing to discuss plan of care moving forward  Patient/spouse aware that medically, patient is stable for d/c, but currently there are no rehab beds available  MD discussed pain management, as patient reporting shoulder pain which has been impacting ability to work with PT/OT; to adjust medications accordingly  Also discussed fluid restriction and compliance, as this impacts weight gain and mobility  Discussed plan for PT/OT to prioritize patient during the week, but aware that visits cannot be confirmed on weekend secondary to limited staffing  Reviewed goal of nursing mobilizing patient out of bed with nanette lift, and ordering of different bariatric bed that can help with mobility as well  Patient and family aware that rehab referrals sent to multiple facilities, but bed offers not yet obtained  Discussed goal of therapy outlined by Aruna at [facility] (assist x1 with transfers, standing), as they report son, Kenia Koroma, has connection at [facility]  Patient/family aware that once PT/OT able to show more mobility - will reach out to facilities that have denied locally for reconsideration  In meantime, will continue to look at further facilities to see if any alternative options can be confirmed

## 2023-04-13 NOTE — ASSESSMENT & PLAN NOTE
History of DVT, PE  Home dose of coumadin alternating 9mg and 10 mg     Plan-   · INR today 1 79  · Will give an additional 2 mg warfarin tonight, in addition to the usual 9 mg daily  · Monitor INR in AM

## 2023-04-14 LAB
ANION GAP SERPL CALCULATED.3IONS-SCNC: 3 MMOL/L (ref 4–13)
BUN SERPL-MCNC: 15 MG/DL (ref 5–25)
CALCIUM SERPL-MCNC: 8.7 MG/DL (ref 8.4–10.2)
CHLORIDE SERPL-SCNC: 94 MMOL/L (ref 96–108)
CO2 SERPL-SCNC: 40 MMOL/L (ref 21–32)
CREAT SERPL-MCNC: 0.45 MG/DL (ref 0.6–1.3)
GFR SERPL CREATININE-BSD FRML MDRD: 115 ML/MIN/1.73SQ M
GLUCOSE SERPL-MCNC: 106 MG/DL (ref 65–140)
INR PPP: 2.22 (ref 0.84–1.19)
POTASSIUM SERPL-SCNC: 3.9 MMOL/L (ref 3.5–5.3)
PROTHROMBIN TIME: 24.8 SECONDS (ref 11.6–14.5)
SODIUM SERPL-SCNC: 137 MMOL/L (ref 135–147)

## 2023-04-14 RX ADMIN — FUROSEMIDE 40 MG: 40 TABLET ORAL at 10:29

## 2023-04-14 RX ADMIN — POTASSIUM CHLORIDE 20 MEQ: 1500 TABLET, EXTENDED RELEASE ORAL at 10:29

## 2023-04-14 RX ADMIN — BENZONATATE 200 MG: 100 CAPSULE ORAL at 21:50

## 2023-04-14 RX ADMIN — WARFARIN SODIUM 9 MG: 6 TABLET ORAL at 18:34

## 2023-04-14 RX ADMIN — ACETAMINOPHEN 975 MG: 325 TABLET, FILM COATED ORAL at 22:49

## 2023-04-14 RX ADMIN — BENZONATATE 200 MG: 100 CAPSULE ORAL at 18:34

## 2023-04-14 RX ADMIN — GUAIFENESIN AND DEXTROMETHORPHAN 10 ML: 100; 10 SYRUP ORAL at 18:37

## 2023-04-14 RX ADMIN — BENZONATATE 200 MG: 100 CAPSULE ORAL at 10:29

## 2023-04-14 RX ADMIN — SODIUM CHLORIDE SOLN NEBU 3% 4 ML: 3 NEBU SOLN at 07:52

## 2023-04-14 RX ADMIN — ACETAMINOPHEN 975 MG: 325 TABLET, FILM COATED ORAL at 05:37

## 2023-04-14 RX ADMIN — METHOCARBAMOL TABLETS 750 MG: 750 TABLET, COATED ORAL at 05:37

## 2023-04-14 RX ADMIN — METOPROLOL SUCCINATE 25 MG: 25 TABLET, EXTENDED RELEASE ORAL at 10:29

## 2023-04-14 RX ADMIN — PREGABALIN 150 MG: 75 CAPSULE ORAL at 18:34

## 2023-04-14 RX ADMIN — ACETAMINOPHEN 975 MG: 325 TABLET, FILM COATED ORAL at 14:53

## 2023-04-14 RX ADMIN — GUAIFENESIN AND DEXTROMETHORPHAN 10 ML: 100; 10 SYRUP ORAL at 23:05

## 2023-04-14 RX ADMIN — OXYCODONE HYDROCHLORIDE 5 MG: 5 TABLET ORAL at 22:48

## 2023-04-14 RX ADMIN — IPRATROPIUM BROMIDE 0.5 MG: 0.5 SOLUTION RESPIRATORY (INHALATION) at 07:52

## 2023-04-14 RX ADMIN — SODIUM CHLORIDE SOLN NEBU 3% 4 ML: 3 NEBU SOLN at 19:34

## 2023-04-14 RX ADMIN — RISPERIDONE 1 MG: 1 TABLET ORAL at 18:34

## 2023-04-14 RX ADMIN — OXYCODONE HYDROCHLORIDE 5 MG: 5 TABLET ORAL at 06:28

## 2023-04-14 RX ADMIN — IPRATROPIUM BROMIDE 0.5 MG: 0.5 SOLUTION RESPIRATORY (INHALATION) at 19:34

## 2023-04-14 RX ADMIN — LEVALBUTEROL HYDROCHLORIDE 1.25 MG: 1.25 SOLUTION RESPIRATORY (INHALATION) at 19:34

## 2023-04-14 RX ADMIN — LEVALBUTEROL HYDROCHLORIDE 1.25 MG: 1.25 SOLUTION RESPIRATORY (INHALATION) at 07:52

## 2023-04-14 RX ADMIN — OXYCODONE HYDROCHLORIDE 5 MG: 5 TABLET ORAL at 14:53

## 2023-04-14 RX ADMIN — PREGABALIN 150 MG: 75 CAPSULE ORAL at 10:28

## 2023-04-14 RX ADMIN — RISPERIDONE 1 MG: 1 TABLET ORAL at 21:50

## 2023-04-14 RX ADMIN — GUAIFENESIN 1200 MG: 600 TABLET ORAL at 10:29

## 2023-04-14 RX ADMIN — RISPERIDONE 1 MG: 1 TABLET ORAL at 10:28

## 2023-04-14 RX ADMIN — PREGABALIN 150 MG: 75 CAPSULE ORAL at 21:50

## 2023-04-14 RX ADMIN — GUAIFENESIN 1200 MG: 600 TABLET ORAL at 21:49

## 2023-04-14 RX ADMIN — GUAIFENESIN AND DEXTROMETHORPHAN 10 ML: 100; 10 SYRUP ORAL at 10:35

## 2023-04-14 NOTE — PHYSICAL THERAPY NOTE
"PHYSICAL THERAPY TREATMENT NOTE  NAME:  Paulette Diez  DATE: 04/14/23    Length Of Stay: 7  Performed at least 2 patient identifiers during session: Name and Birthday    TREATMENT:    04/14/23 1645   PT Last Visit   PT Visit Date 04/14/23   Note Type   Note Type Treatment   Pain Assessment   Pain Assessment Tool 0-10   Pain Score 9   Pain Location/Orientation Location: Back;Orientation: Left;Orientation: Mid;Orientation: Upper  (L>R)   Effect of Pain on Daily Activities limits speed and independence with functional mobility, especially transitional movement and weightbearing  Hospital Pain Intervention(s) Repositioned; Ambulation/increased activity; Medication (See MAR); Emotional support  (Patient premedicated by LPN prior session)   Multiple Pain Sites Yes  (buttocks)   Restrictions/Precautions   Weight Bearing Precautions Per Order No   Other Precautions Bed Alarm; Chair Alarm;Multiple lines;O2;Fall Risk;Pain   General   Chart Reviewed Yes   Family/Caregiver Present No   Cognition   Overall Cognitive Status WFL   Arousal/Participation Alert   Attention Within functional limits   Orientation Level Oriented X4   Memory Within functional limits   Following Commands Follows all commands and directions without difficulty   Subjective   Subjective Patient asked when I was alone in the room : \"Do you think I'm Ugly? \"  He continued to express how he and his wife were talking recently and she stated that she is not sure how he \" got this way\"  Provided encouragement to patient to work with people who have a plan to assist him to \" be it until he sees it\"  Patient also eager to trial new Compella bed with trapeze  Bed Mobility   Rolling R 4  Minimal assistance   Additional items Assist x 1;HOB elevated; Bedrails; Increased time required;Verbal cues;LE management   Rolling L 4  Minimal assistance   Additional items Assist x 1;HOB elevated; Bedrails; Increased time required;Verbal cues;LE management   Supine to Sit Unable to " assess  (Into short sit, but patient was able to use trapeze to lift trunk from almost flat head of bed for at least 10seconds  Verbal instructions to minimize Valsalva)   Transfers   Sit to Stand 2  Maximal assistance  (With less frequently mod assist of 2 for 2 trials)   Additional items Assist x 2; Increased time required;Verbal cues;Armrests  (Standard versus modified belt and hammock technique with pillow at knees)   Stand to Sit 2  Maximal assistance  (With less frequently mod assist of 2 for  trials)   Additional items Assist x 2; Increased time required;Verbal cues;Armrests  (Standard versus modified belt and hammock technique with pillow at knees)   Mechanical lift 1  Dependent  (Verbal instructions to nursing to use proper technique of crossing chair sling straps at ceiling lift to minimize pain/abduction at lower extremities)   Additional items Assist x 3; Increased time required;Verbal cues;Armrests   Additional Comments sit<>stand trials x6 total completed from recliner, with first trial being for clearance  Variable max A x2 and mod A x2, more often requiring Max A x 2   Variable UE support and hand placements trialed including but not limited to standard belt and hammock technique with grabbing a bicep bilaterally, pushing BUE @ armrests, using fixed recliner in front of Pt for UE support and variations of L arm @ recliner and RUE @ forward stabilized surface  Pt was most successful with LUE on RW, RUE pushing from armrests  Assistance of 3rd staff member to stabalize walker in place during that trial  Aashish Jewell was able to tolerate static standing between 5 - 15s each transfer, limited d/t weakness, intermittent pain  functional seated rest breaks ~2 min in between trials  verbal instruction for technique, forward weight shift  Extensive time prior to session re: options for mobility to minimize fear/retreat     Ambulation/Elevation   Gait pattern Not appropriate   Ambulation/Elevation Additional Comments Brenda's Egress test FAIL; Brenda's Bariatric body type APPLE ASCITES   Balance   Static Sitting Fair +   Static Standing (S)  Zero  (However patient was at least up to 90% upright on 2 of 6 trials)   Endurance Deficit   Endurance Deficit Yes   Endurance Deficit Description Needs more than anticipated therapeutic rest between mobility trials especially between standing trials  Limited standing tolerance  Needs therapeutic rest including for bed mobility/repositioning, using trapeze  Activity Tolerance   Activity Tolerance Patient limited by fatigue;Patient limited by pain   Nurse Rashi coordination with [address] [place] [address] coordination with Zackery Martinez from OT, spoke to Hari Navarrete from case management as well as Zunilda Hicks LPN regarding patient's comments initially during session and recommendation for possible psych consult vs screening for eating disorder  Exercises   Hip Flexion   (Able to flex hip against gravity but needed physical assist to maintain for extended  Time)   Assessment   Prognosis Guarded   Problem List Decreased strength;Decreased range of motion;Decreased endurance; Impaired balance;Decreased mobility; Decreased coordination;Decreased cognition;Pain;Obesity; Decreased safety awareness; Impaired judgement  (Lower extremity edema, gait deviations)   Assessment  Merrill Oconnell did make mobility progress this session as compared to yesterday  He needed much less assist than in the past to mobilize using the width of the compella bed and with a trapeze attachment  He was able to perform more sit to stand attempts with more upright posture and BUE support than in the past   He continues to complain of low back pain as well as left shoulder pain which she attributes to shingles pain          • Based on conversations during treatment, I anticipate that he will likely make mobility progress to trial the walking sling within the next week more likely from the recliner and less likely from the new compella bariatric bed  Would recommend patient continue to use strategies of pushing from seated surface with at least 1 upper extremity  • Continue to recommend that nursing mobilize patient out of bed into a chair for at least 2 to 3 hours a day, but would minimize out of bed time due to patient's difficulty repositioning at buttocks and self-reports of pain after sitting tolerance time despite cushion  • Skilled PT recommended to progress patient towards treatment goals        Barriers to Discharge Decreased caregiver support; Inaccessible home environment   Goals   Patient Goals To have less pain to move, be able to walk   STG Expiration Date 04/21/23   PT Treatment Day 4   Plan   Treatment/Interventions Functional transfer training;LE strengthening/ROM; Therapeutic exercise;Patient/family training;Equipment eval/education;Gait training;Bed mobility;Cognitive reorientation; Endurance training;Spoke to nursing;Spoke to case management;OT   Progress Slow progress, decreased activity tolerance   PT Frequency   (5x/wk mon-fri)   Recommendation   PT Discharge Recommendation Post acute rehabilitation services   Equipment Recommended   (seated nanette lift sling using overhed lift by nursing for OOB transfers)   Additional Comments (S)  From PT standpoint:  More likely to trial walking sling and wide rolling walker from recliner starting position , epsecially with pt stating that he prefers to push from surface w/ one UE support that is CLOSE to him   unless pt can get to [facility] bed with low deck height AND level thigh with limited fear/retreat (will likely need to deflate seat)     AM-PAC Basic Mobility Inpatient   Turning in Flat Bed Without Bedrails 2   Lying on Back to Sitting on Edge of Flat Bed Without Bedrails 1   Moving Bed to Chair 1   Standing Up From Chair Using Arms 1   Walk in Room 1   Climb 3-5 Stairs With Railing 1   Basic Mobility Inpatient Raw Score 7   Turning Head Towards Sound 4   Follow Simple Instructions 3   Low Function Basic Mobility Raw Score  14   Low Function Basic Mobility Standardized Score  22 01   Highest Level Of Mobility   -[facility] Goal 2: Bed activities/Dependent transfer   -HLM Achieved 4: Move to chair/commode   Pt requires PT /OT co-treat due to required skilled interventions of at least 2 clinicians for care delivery especially with bariatric manual techniques, medical complexity, limited activity tolerance, and cognitive-behavioral impairments  PT and OT goals addressed separately  Nursing Recommendations:   • Mobility Plan as of 04/14/23: Pt is dependent assist with  bariatric seated sling and ceiling lift  to/from recliner  Encourage OOB for all meals  The patient's -[facility] Basic Mobility Inpatient Short Form Raw Score is 7  A Raw score of less than or equal to 16 suggests the patient may benefit from discharge to post-acute rehabilitation services, which does coincide with CURRENT above PT recommendations  However please refer to therapist recommendation for discharge planning given other factors that may influence destination  Adapted from [facility] -[facility] “6-Clicks” Basic Mobility and Daily Activity Scores With Discharge Destination  Physical Therapy, 2021;101:1-9   DOI: [email or web address]    Janna Carbone, PT, DPT

## 2023-04-14 NOTE — OCCUPATIONAL THERAPY NOTE
Occupational Therapy Treatment Note     Patient Name: Supriya Beard  CDQVI'L Date: 4/14/2023  Problem List  Principal Problem:    Acute on chronic respiratory failure [facility])  Active Problems: Morbid obesity ([facility] )    Primary hypertension    EVERETT (obstructive sleep apnea)    Chronic diastolic congestive heart failure ([facility] )    History of pulmonary embolism       04/14/23 1542   OT Last Visit   OT Visit Date 04/14/23   Note Type   Note Type Treatment   Pain Assessment   Pain Assessment Tool 0-10   Pain Score 9   Pain Location/Orientation Location: Back; Location: Buttocks   Pain Onset/Description Onset: Ongoing;Frequency: Constant/Continuous   Effect of Pain on Daily Activities limits comfort   Hospital Pain Intervention(s) Repositioned; Ambulation/increased activity  (pressure relief )   Multiple Pain Sites Yes   Pain 2   Pain Location/Orientation 2 Orientation: Left; Location: Shoulder  (chronic in nature)   Restrictions/Precautions   Weight Bearing Precautions Per Order No   Other Precautions Bed Alarm; Chair Alarm;Multiple lines;O2;Fall Risk;Pain  (masimo  5L O2 via NC)   Lifestyle   Autonomy Pt admitted from [facility], has hx of multiple recent hospitalizations  At baseline lives c his spouse   Reciprocal Relationships supportive spouse   Service to Others retired   Intrinsic Gratification TV, watching sports and outdoor types of shows   ADL   Where Assessed Edge of bed   Functional Standing Tolerance   Time variable 5-15s x6 trials   Activity functional trasnfer training   Comments variable mod A x2 - max A x2  + B/L knee blocking + Rw + belt and hammock   Bed Mobility   Additional Comments pt seated in recliner upon arrival  Places into bed at end of session via nanette lift at end of session   Transfers   Sit to Stand 2  Maximal assistance   Additional items Assist x 2; Increased time required;Verbal cues   Stand to Sit 2  Maximal assistance   Additional items Assist x 2; Increased time required;Verbal cues   Stand pivot Unable to assess   Mechanical lift 1  Dependent  (assisted nursing staff with transfer recliner > bed via nanette at end of tx session,)   Additional Comments sit<>stand trials x6 completed from recliner  Variable max A x2 and mod A x2, more often requiring Max  B/L knee blocking c pillow in place  Belt and hammock technique used for all transfers  Variable hand placements trialed  pt most successful with LUE on RW, RUE pushing from armrests  Assistance of 3rd staff member to stabalize walker in place  able to tolerate static standing 5 - 15s each transfer, limited d/t weakness, intermittent pain  functional seated rest breaks ~2 min in between trials  Achieved ~90% upright standing positioning on transfers  Subjective   Subjective pt expressing frustration intermittently re: current functional status  encouragement throughout session with improved spirits at conclusion of session  Pt thankful for therapists assistance/time, motivated to continue working with therapy   Cognition   Overall Cognitive Status [facility]   Arousal/Participation Alert; Cooperative   Attention Within functional limits   Orientation Level Oriented X4   Memory Within functional limits   Following Commands Follows all commands and directions without difficulty   Comments pt intermittently with frustration towards functional status but expressing high motivation to continue making progress  Good engagement throughout session  Activity Tolerance   Activity Tolerance Patient limited by fatigue;Patient limited by pain   Medical Staff Made Aware BIANCA Olivarez pre/post session , care coordination with PT  Assessment   Assessment Patient seen for OT treatment on 4/14/2023 s/p admission for Acute on chronic respiratory failure [facility]) Patient presents with active orders for OT eval and treat and Up with assistance   Upon arrival, pt found resting in recliner showing no signs of distress  Patient agreeable to OT session   Patient participated in fall prevention and self-care transfers with intervention focus on functional strength training, increasing activity tolerance, and increasing independence in self care transfers  Paulette Diez is showing improvements in standing tolerance, transfers, and activity tolerance but is continuing to perform below baseline due to the following deficits: endurance ,  decreased muscular strength , decreased balance , decreased standing tolerance for self care tasks , decreased trunk control  and (+) pain   From OT standpoint, patient would benefit from skilled intervention to maximize independence with ADLs and functional mobility  Goals remain appropriate, continue POC  At this time, recommending D/C to: post-acute rehabilitation   Plan   Treatment Interventions ADL retraining;Functional transfer training;UE strengthening/ROM; Endurance training;Patient/family training;Equipment evaluation/education; Compensatory technique education;Cardiac education   Goal Expiration Date 04/20/23   OT Treatment Day 2   OT Frequency 3-5x/wk   Recommendation   OT Discharge Recommendation Post acute rehabilitation services   Additional Comments  (S)  Recommend chair position in bed vs OOB to recliner for pressure offloading throughout the day  Use of nanette lift for OOB trasnfers with nursing staff  recommend seated EOB for meals / meds if not in recliner  Additional Comments 2 The patient's raw score on the AM-PAC Daily Activity Inpatient Short Form is 12  A raw score of less than 19 suggests the patient may benefit from discharge to post-acute rehabilitation services  Please refer to the recommendation of the Occupational Therapist for safe discharge planning     AM-PAC Daily Activity Inpatient   Lower Body Dressing 1   Bathing 1   Toileting 1   Upper Body Dressing 2   Grooming 3   Eating 4   Daily Activity Raw Score 12   Daily Activity Standardized Score (Calc for Raw Score >=11) 30 6   AM-PAC Applied Cognition Inpatient   Following a Speech/Presentation 4   Understanding Ordinary Conversation 4   Taking Medications 3   Remembering Where Things Are Placed or Put Away 4   Remembering List of 4-5 Errands 3   Taking Care of Complicated Tasks 4   Applied Cognition Raw Score 22   Applied Cognition Standardized Score 47 83   End of Consult   Education Provided Yes   Patient Position at End of Consult Supine; All needs within reach   Nurse Communication Nurse aware of consult   Pt benefited from co-session of skilled OT and PT therapists in order to most appropriately address functional deficits d/t extensive physical assistance required for safe mobility   OT/PT objectives were addressed separately; please see PT note for specific goal areas targeted    Kat Wolff

## 2023-04-14 NOTE — PLAN OF CARE
Problem: OCCUPATIONAL THERAPY ADULT  Goal: Performs self-care activities at highest level of function for planned discharge setting  See evaluation for individualized goals  Description: Treatment Interventions: ADL retraining, Functional transfer training, UE strengthening/ROM, Endurance training, Patient/family training, Equipment evaluation/education, Compensatory technique education, Neuromuscular reeducation, Energy conservation          See flowsheet documentation for full assessment, interventions and recommendations  Outcome: Progressing  Note: Limitation: Decreased ADL status, Decreased Safe judgement during ADL, Decreased endurance, Decreased cognition, Decreased self-care trans, Decreased high-level ADLs  Prognosis: Fair  Assessment: Patient seen for OT treatment on 4/14/2023 s/p admission for Acute on chronic respiratory failure [facility]) Patient presents with active orders for OT eval and treat and Up with assistance   Upon arrival, pt found resting in recliner showing no signs of distress  Patient agreeable to OT session  Patient participated in fall prevention  and self-care transfers with intervention focus on functional strength training, increasing activity tolerance, and increasing independence in self care transfers  Orestes Quan is showing improvements in standing tolerance, transfers, and activity tolerance but is continuing to perform below baseline due to the following deficits: endurance ,  decreased muscular strength , decreased balance , decreased standing tolerance for self care tasks , decreased trunk control  and (+) pain   From OT standpoint, patient would benefit from skilled intervention to maximize independence with ADLs and functional mobility  Goals remain appropriate, continue POC   At this time, recommending D/C to: post-acute rehabilitation     OT Discharge Recommendation: Post acute rehabilitation services        [facility]

## 2023-04-14 NOTE — PLAN OF CARE
Problem: PHYSICAL THERAPY ADULT  Goal: Performs mobility at highest level of function for planned discharge setting  See evaluation for individualized goals  Description: Treatment/Interventions: Functional transfer training, LE strengthening/ROM, Therapeutic exercise, Endurance training, Cognitive reorientation, Patient/family training, Equipment eval/education, Gait training, Bed mobility          See flowsheet documentation for full assessment, interventions and recommendations  Outcome: Progressing  Note: Prognosis: Guarded  Problem List: Decreased strength, Decreased range of motion, Decreased endurance, Impaired balance, Decreased mobility, Decreased coordination, Decreased cognition, Pain, Obesity, Decreased safety awareness, Impaired judgement (Lower extremity edema, gait deviations)  Assessment: Geneva Zamorano did make mobility progress this session as compared to yesterday  He needed much less assist than in the past to mobilize using the width of the compella bed and with a trapeze attachment  He was able to perform more sit to stand attempts with more upright posture and BUE support than in the past   He continues to complain of low back pain as well as left shoulder pain which she attributes to shingles pain  Based on conversations during treatment, I anticipate that he will likely make mobility progress to trial the walking sling within the next week more likely from the recliner and less likely from the new compella bariatric bed  Continue to recommend that nursing mobilize patient out of bed into a chair for at least 2 to 3 hours a day, but would minimize out of bed time due to patient's difficulty repositioning at buttocks and self-reports of pain after sitting tolerance time despite cushion  Skilled PT recommended to progress patient towards treatment goals     Barriers to Discharge: Decreased caregiver support, Inaccessible home environment     PT Discharge Recommendation: Post acute rehabilitation services    See flowsheet documentation for full assessment

## 2023-04-14 NOTE — PROGRESS NOTES
[facility]  Progress Note  Name: Manuelito Adan  MRN: [id number]  Unit/Bed#: S -01 I Date of Admission: 4/7/2023   Date of Service: 4/14/2023 I Hospital Day: 7    Assessment/Plan   * Acute on chronic respiratory failure (HCC)  Assessment & Plan  · Continue PO prednisone 20 mg daily  · Continue PO Lasix 40 mg daily, having good urine output on Lasix  · Monitor respiratory status, currently on baseline 5L  · Respiratory protocol, Xopenex/Atrovent, Airway clearance protocol, IS  · Mucinex, Tessalon Perles   · Out of bed to chair, PT and OT      History of pulmonary embolism  Assessment & Plan  · History of DVT, PE  · Home dose of coumadin alternating 9mg and 10 mg   · Monitor INR in AM    Chronic diastolic congestive heart failure (HCC)  Assessment & Plan  · No further shortness of breath  · Continue on p o  Lasix  · Daily weights monitor input and output    Morbid obesity (HCC)  Assessment & Plan  · Recently DC to [facility]   PT/OT: post acute rehab  · Pending placement  Body mass index is 55 85 kg/m²  • Recommend incorporating a more whole foods plant-predominant diet along with decreasing consumption of red meats and processed foods      EVERETT (obstructive sleep apnea)  Assessment & Plan  Hx of EVERETT, morbid obesity, obesity hypoventilation syndrome   Continue BiPAP HS     Primary hypertension  Assessment & Plan  BP on admission 121/55  Home medication Lasix 40mg QD              VTE Pharmacologic Prophylaxis: VTE Score: 10 High Risk (Score >/= 5) - Pharmacological DVT Prophylaxis Ordered: warfarin (Coumadin)  Sequential Compression Devices Ordered  Patient Centered Rounds: I performed bedside rounds with nursing staff today  Discussions with Specialists or Other Care Team Provider:     Education and Discussions with Family / Patient: Patient declined call to        Total Time Spent on Date of Encounter in care of patient: 35 minutes This time was spent on one or more of the following: performing physical exam; counseling and coordination of care; obtaining or reviewing history; documenting in the medical record; reviewing/ordering tests, medications or procedures; communicating with other healthcare professionals and discussing with patient's family/caregivers  Current Length of Stay: 7 day(s)  Current Patient Status: Inpatient   Certification Statement: Pending placement  Discharge Plan: Pending placement    Code Status: Level 1 - Full Code    Subjective:   Seen this morning in no acute distress, no new complaints  Objective:     Vitals:   Temp (24hrs), Av 8 °F (36 6 °C), Min:97 7 °F (36 5 °C), Max:98 1 °F (36 7 °C)    Temp:  [97 7 °F (36 5 °C)-98 1 °F (36 7 °C)] 97 7 °F (36 5 °C)  HR:  [85-97] 85  Resp:  [17] 17  BP: (105-123)/(66-70) 123/70  SpO2:  [94 %-97 %] 96 %  Body mass index is 55 85 kg/m²  Input and Output Summary (last 24 hours): Intake/Output Summary (Last 24 hours) at 2023 1335  Last data filed at 2023  Gross per 24 hour   Intake 350 ml   Output 2200 ml   Net -1850 ml       Physical Exam:   Physical Exam  Vitals reviewed  Constitutional:       General: He is not in acute distress  Appearance: He is obese  He is not toxic-appearing  HENT:      Head: Normocephalic and atraumatic  Mouth/Throat:      Pharynx: Oropharynx is clear  Eyes:      Extraocular Movements: Extraocular movements intact  Cardiovascular:      Rate and Rhythm: Normal rate and regular rhythm  Pulses: Normal pulses  Pulmonary:      Effort: No respiratory distress  Breath sounds: No wheezing, rhonchi or rales  Comments: Decreased breath sounds bilaterally  Abdominal:      Palpations: Abdomen is soft  Tenderness: There is no abdominal tenderness  Musculoskeletal:         General: No tenderness  Right lower leg: Edema present  Left lower leg: Edema present  Comments:  At baseline   Neurological: Mental Status: He is alert and oriented to person, place, and time  Psychiatric:         Mood and Affect: Mood normal          Behavior: Behavior normal          Thought Content:  Thought content normal          Judgment: Judgment normal           Additional Data:     Labs:  Results from last 7 days   Lab Units 04/12/23  0514 04/10/23  0526   WBC Thousand/uL 10 72* 12 40*   HEMOGLOBIN g/dL 13 4 12 8   HEMATOCRIT % 43 3 41 1   PLATELETS Thousands/uL 225 272   NEUTROS PCT %  --  78*   LYMPHS PCT %  --  11*   MONOS PCT %  --  10   EOS PCT %  --  0     Results from last 7 days   Lab Units 04/14/23  0513   SODIUM mmol/L 137   POTASSIUM mmol/L 3 9   CHLORIDE mmol/L 94*   CO2 mmol/L 40*   BUN mg/dL 15   CREATININE mg/dL 0 45*   ANION GAP mmol/L 3*   CALCIUM mg/dL 8 7   GLUCOSE RANDOM mg/dL 106     Results from last 7 days   Lab Units 04/14/23  0513   INR  2 22*             Results from last 7 days   Lab Units 04/08/23  0524   PROCALCITONIN ng/ml 0 07       Lines/Drains:  Invasive Devices     Peripheral Intravenous Line  Duration           Peripheral IV 04/12/23 Left Antecubital 2 days    Peripheral IV 04/13/23 Right;Upper;Ventral (anterior) Arm 1 day                  Recent Cultures (last 7 days):   Results from last 7 days   Lab Units 04/08/23  1024   C DIFF TOXIN B BY PCR  Negative       Last 24 Hours Medication List:   Current Facility-Administered Medications   Medication Dose Route Frequency Provider Last Rate   • acetaminophen  975 mg Oral Q8H [address] Brooks Amaro MD     • albuterol  2 5 mg Nebulization Q4H PRN Maged Newman MD     • benzonatate  200 mg Oral TID Brooks Amaro MD     • dextromethorphan-guaiFENesin  10 mL Oral Q4H PRN Kimberly Ramirez MD     • docusate sodium  100 mg Oral BID PRN Brooks Amaro MD     • furosemide  40 mg Oral Daily Gildardo Del Castillo MD     • guaiFENesin  1,200 mg Oral Q12H [address] Gildardo Del Castillo MD     • ipratropium  0 5 mg Nebulization TID Chucho Valdez MD     • levalbuterol  1 25 mg Nebulization TID Bita Alan MD     • methocarbamol  750 mg Oral Q6H PRN Vickie Álvarez MD     • metoprolol succinate  25 mg Oral Daily Roosevelt Rebolledo MD     • oxyCODONE  5 mg Oral Q8H PRN Brayden Crawford MD     • pantoprazole  40 mg Oral Early Morning Brayden Crawford MD     • potassium chloride  20 mEq Oral Daily Roosevelt Rebolledo MD     • pregabalin  150 mg Oral TID Vickie Álvarez MD     • risperiDONE  1 mg Oral TID Vickie Álvarez MD     • sodium chloride  4 mL Nebulization TID Brayden Crawford MD     • warfarin  9 mg Oral Daily (warfarin) Froylan Devries MD          Today, Patient Was Seen By: Yoselyn Montalvo MD    **Please Note: This note may have been constructed using a voice recognition system  **

## 2023-04-14 NOTE — CASE MANAGEMENT
Case Management Progress Note    Patient name Lulu Calzada  Location S /S -30 MRN [id number]  : 1955 Date 2023       LOS (days): 7  Geometric Mean LOS (GMLOS) (days): 3 90  Days to GMLOS:-3 1        OBJECTIVE:        Current admission status: Inpatient  Preferred Pharmacy:   [address], [place] [address]  [address] [address]  Phone: [phone] Fax: [phone]    Primary Care Provider: Belinda Aldana MD    Primary Insurance: [facility]  Secondary Insurance:     PROGRESS NOTE:    Call made to Adolfo at [facility] ([phone]) and VM left requesting return call  Per conversation with patient/family yesterday, their son, Belen King, had spoken with someone at the [facility] location and they were interested to see if that could be a possibility  Awaiting return call  Multiple facilities messaging in [facility], but per ongoing communication, most have responded they are unable to accommodate patient  More than 100 facilities have not viewed or responded to referral in [facility]  Eight facilities have viewed referral, but have not provided determination  Attempted to contact facilities who have reviewed referral as follows:    [facility] ([phone]) - call made, but VM nondescript so generic message left requesting return call  Response then received in [facility] that they are only accepting patient's for their neurological programs at this time, so unable to offer a bed  [facility] ([phone]) - call made and transferred to admissions, but no answer  VM left requesting return call  [facility] ([phone]) - phone rings with no answer/ability to leave [facility] ([phone]) - call transferred to admissions office and VM left with [facility] requesting return call       [facility] ([phone])  left requesting return call  Updated PT notes added to referral, and messages sent to all pending facilities  Will continue to follow

## 2023-04-15 LAB
ANION GAP SERPL CALCULATED.3IONS-SCNC: 1 MMOL/L (ref 4–13)
BUN SERPL-MCNC: 14 MG/DL (ref 5–25)
CALCIUM SERPL-MCNC: 8.5 MG/DL (ref 8.4–10.2)
CHLORIDE SERPL-SCNC: 95 MMOL/L (ref 96–108)
CO2 SERPL-SCNC: 41 MMOL/L (ref 21–32)
CREAT SERPL-MCNC: 0.48 MG/DL (ref 0.6–1.3)
GFR SERPL CREATININE-BSD FRML MDRD: 112 ML/MIN/1.73SQ M
GLUCOSE SERPL-MCNC: 99 MG/DL (ref 65–140)
INR PPP: 2.84 (ref 0.84–1.19)
POTASSIUM SERPL-SCNC: 4.2 MMOL/L (ref 3.5–5.3)
PROTHROMBIN TIME: 30.1 SECONDS (ref 11.6–14.5)
SODIUM SERPL-SCNC: 137 MMOL/L (ref 135–147)

## 2023-04-15 RX ORDER — FUROSEMIDE 10 MG/ML
40 INJECTION INTRAMUSCULAR; INTRAVENOUS ONCE
Status: COMPLETED | OUTPATIENT
Start: 2023-04-15 | End: 2023-04-15

## 2023-04-15 RX ORDER — OXYCODONE HYDROCHLORIDE 5 MG/1
5 TABLET ORAL EVERY 6 HOURS PRN
Status: DISCONTINUED | OUTPATIENT
Start: 2023-04-15 | End: 2023-05-06 | Stop reason: HOSPADM

## 2023-04-15 RX ORDER — OXYCODONE HYDROCHLORIDE 5 MG/1
5 TABLET ORAL EVERY 6 HOURS SCHEDULED
Status: DISCONTINUED | OUTPATIENT
Start: 2023-04-16 | End: 2023-04-15

## 2023-04-15 RX ADMIN — PREGABALIN 150 MG: 75 CAPSULE ORAL at 09:30

## 2023-04-15 RX ADMIN — GUAIFENESIN AND DEXTROMETHORPHAN 10 ML: 100; 10 SYRUP ORAL at 09:28

## 2023-04-15 RX ADMIN — FUROSEMIDE 40 MG: 40 TABLET ORAL at 09:28

## 2023-04-15 RX ADMIN — IPRATROPIUM BROMIDE 0.5 MG: 0.5 SOLUTION RESPIRATORY (INHALATION) at 13:41

## 2023-04-15 RX ADMIN — LEVALBUTEROL HYDROCHLORIDE 1.25 MG: 1.25 SOLUTION RESPIRATORY (INHALATION) at 20:15

## 2023-04-15 RX ADMIN — IPRATROPIUM BROMIDE 0.5 MG: 0.5 SOLUTION RESPIRATORY (INHALATION) at 20:15

## 2023-04-15 RX ADMIN — METHOCARBAMOL TABLETS 750 MG: 750 TABLET, COATED ORAL at 15:01

## 2023-04-15 RX ADMIN — SODIUM CHLORIDE SOLN NEBU 3% 4 ML: 3 NEBU SOLN at 13:41

## 2023-04-15 RX ADMIN — BENZONATATE 200 MG: 100 CAPSULE ORAL at 21:38

## 2023-04-15 RX ADMIN — GUAIFENESIN 1200 MG: 600 TABLET ORAL at 09:29

## 2023-04-15 RX ADMIN — IPRATROPIUM BROMIDE 0.5 MG: 0.5 SOLUTION RESPIRATORY (INHALATION) at 08:25

## 2023-04-15 RX ADMIN — GUAIFENESIN AND DEXTROMETHORPHAN 10 ML: 100; 10 SYRUP ORAL at 21:41

## 2023-04-15 RX ADMIN — BENZONATATE 200 MG: 100 CAPSULE ORAL at 16:23

## 2023-04-15 RX ADMIN — RISPERIDONE 1 MG: 1 TABLET ORAL at 21:38

## 2023-04-15 RX ADMIN — ACETAMINOPHEN 975 MG: 325 TABLET, FILM COATED ORAL at 21:38

## 2023-04-15 RX ADMIN — SODIUM CHLORIDE SOLN NEBU 3% 4 ML: 3 NEBU SOLN at 08:25

## 2023-04-15 RX ADMIN — GUAIFENESIN 1200 MG: 600 TABLET ORAL at 21:38

## 2023-04-15 RX ADMIN — METHOCARBAMOL TABLETS 750 MG: 750 TABLET, COATED ORAL at 21:38

## 2023-04-15 RX ADMIN — LEVALBUTEROL HYDROCHLORIDE 1.25 MG: 1.25 SOLUTION RESPIRATORY (INHALATION) at 08:25

## 2023-04-15 RX ADMIN — PREGABALIN 150 MG: 75 CAPSULE ORAL at 21:38

## 2023-04-15 RX ADMIN — PREGABALIN 150 MG: 75 CAPSULE ORAL at 16:23

## 2023-04-15 RX ADMIN — METHOCARBAMOL TABLETS 750 MG: 750 TABLET, COATED ORAL at 05:42

## 2023-04-15 RX ADMIN — OXYCODONE HYDROCHLORIDE 5 MG: 5 TABLET ORAL at 05:42

## 2023-04-15 RX ADMIN — POTASSIUM CHLORIDE 20 MEQ: 1500 TABLET, EXTENDED RELEASE ORAL at 09:29

## 2023-04-15 RX ADMIN — SODIUM CHLORIDE SOLN NEBU 3% 4 ML: 3 NEBU SOLN at 20:16

## 2023-04-15 RX ADMIN — OXYCODONE HYDROCHLORIDE 5 MG: 5 TABLET ORAL at 21:38

## 2023-04-15 RX ADMIN — WARFARIN SODIUM 9 MG: 6 TABLET ORAL at 16:23

## 2023-04-15 RX ADMIN — OXYCODONE HYDROCHLORIDE 5 MG: 5 TABLET ORAL at 14:58

## 2023-04-15 RX ADMIN — RISPERIDONE 1 MG: 1 TABLET ORAL at 09:30

## 2023-04-15 RX ADMIN — ACETAMINOPHEN 975 MG: 325 TABLET, FILM COATED ORAL at 05:39

## 2023-04-15 RX ADMIN — PANTOPRAZOLE SODIUM 40 MG: 40 TABLET, DELAYED RELEASE ORAL at 05:39

## 2023-04-15 RX ADMIN — ACETAMINOPHEN 975 MG: 325 TABLET, FILM COATED ORAL at 14:58

## 2023-04-15 RX ADMIN — FUROSEMIDE 40 MG: 10 INJECTION, SOLUTION INTRAMUSCULAR; INTRAVENOUS at 15:04

## 2023-04-15 RX ADMIN — LEVALBUTEROL HYDROCHLORIDE 1.25 MG: 1.25 SOLUTION RESPIRATORY (INHALATION) at 13:41

## 2023-04-15 RX ADMIN — RISPERIDONE 1 MG: 1 TABLET ORAL at 16:23

## 2023-04-15 RX ADMIN — BENZONATATE 200 MG: 100 CAPSULE ORAL at 09:29

## 2023-04-15 NOTE — ASSESSMENT & PLAN NOTE
· Continue PO prednisone 20 mg daily  · Continue PO Lasix 40 mg daily  · We will give an additional IV Lasix 40 mg x 1 today given inadequate urine output overnight  · Monitor respiratory status, currently on baseline 5L  · Respiratory protocol, Xopenex/Atrovent, Airway clearance protocol, IS  · Mucinex, Tessalon Perles   · Out of bed to chair, PT and OT

## 2023-04-15 NOTE — PROGRESS NOTES
[facility]  Progress Note  Name: Haritha Campuzano  MRN: [id number]  Unit/Bed#: S -01 I Date of Admission: 4/7/2023   Date of Service: 4/15/2023 I Hospital Day: 8    Assessment/Plan   * Acute on chronic respiratory failure (HCC)  Assessment & Plan  · Continue PO prednisone 20 mg daily  · Continue PO Lasix 40 mg daily  · We will give an additional IV Lasix 40 mg x 1 today given inadequate urine output overnight  · Monitor respiratory status, currently on baseline 5L  · Respiratory protocol, Xopenex/Atrovent, Airway clearance protocol, IS  · Mucinex, Tessalon Perles   · Out of bed to chair, PT and OT      History of pulmonary embolism  Assessment & Plan  · History of DVT, PE  · Home dose of coumadin alternating 9mg and 10 mg   · Monitor INR in AM    Chronic diastolic congestive heart failure (HCC)  Assessment & Plan  · No further shortness of breath  · Continue on p o  Lasix  · Daily weights monitor input and output    Morbid obesity (HCC)  Assessment & Plan  · Recently DC to [facility]   PT/OT: post acute rehab  · Pending placement  · BMI 60  • Recommend incorporating a more whole foods plant-predominant diet along with decreasing consumption of red meats and processed foods      EVERETT (obstructive sleep apnea)  Assessment & Plan  Hx of EVERETT, morbid obesity, obesity hypoventilation syndrome   Continue BiPAP HS     Primary hypertension  Assessment & Plan  BP on admission 121/55  Home medication Lasix 40mg QD                VTE Pharmacologic Prophylaxis: VTE Score: 10 High Risk (Score >/= 5) - Pharmacological DVT Prophylaxis Ordered: warfarin (Coumadin)  Sequential Compression Devices Ordered  Patient Centered Rounds: I performed bedside rounds with nursing staff today  Discussions with Specialists or Other Care Team Provider: OJ    Education and Discussions with Family / Patient: Updated  (wife) via phone      Total Time Spent on Date of Encounter in care of patient: 28 minutes This time was spent on one or more of the following: performing physical exam; counseling and coordination of care; obtaining or reviewing history; documenting in the medical record; reviewing/ordering tests, medications or procedures; communicating with other healthcare professionals and discussing with patient's family/caregivers  Current Length of Stay: 8 day(s)  Current Patient Status: Inpatient   Certification Statement: The patient will continue to require additional inpatient hospital stay due to Placement  Discharge Plan: Pending placement    Code Status: Level 1 - Full Code    Subjective:   Patient seen this morning in no acute distress, reports that pain medications are helping with his back pain  Denies SOB or chest pain, states that he did not urinate as much overnight as he normally does  Objective:     Vitals:   Temp (24hrs), Av °F (36 7 °C), Min:97 7 °F (36 5 °C), Max:98 3 °F (36 8 °C)    Temp:  [97 7 °F (36 5 °C)-98 3 °F (36 8 °C)] 98 3 °F (36 8 °C)  HR:  [90-97] 90  Resp:  [18] 18  BP: (108-119)/(64-74) 108/69  SpO2:  [95 %-98 %] 97 %  Body mass index is 60 74 kg/m²  Input and Output Summary (last 24 hours): Intake/Output Summary (Last 24 hours) at 4/15/2023 1421  Last data filed at 4/15/2023 0730  Gross per 24 hour   Intake 1210 ml   Output --   Net 1210 ml       Physical Exam:   Physical Exam  Vitals reviewed  Constitutional:       General: He is not in acute distress  Appearance: He is obese  He is not toxic-appearing  HENT:      Head: Normocephalic and atraumatic  Mouth/Throat:      Pharynx: Oropharynx is clear  Eyes:      Extraocular Movements: Extraocular movements intact  Cardiovascular:      Rate and Rhythm: Normal rate and regular rhythm  Pulses: Normal pulses  Pulmonary:      Effort: No respiratory distress  Breath sounds: No wheezing, rhonchi or rales        Comments: Decreased breath sounds bilaterally  Abdominal:      Palpations: Abdomen is soft  Tenderness: There is no abdominal tenderness  Musculoskeletal:         General: No tenderness  Right lower leg: Edema present  Left lower leg: Edema present  Comments: At baseline   Neurological:      Mental Status: He is alert and oriented to person, place, and time  Psychiatric:         Mood and Affect: Mood normal          Behavior: Behavior normal          Thought Content:  Thought content normal          Judgment: Judgment normal           Additional Data:     Labs:  Results from last 7 days   Lab Units 04/12/23  0514 04/10/23  0526   WBC Thousand/uL 10 72* 12 40*   HEMOGLOBIN g/dL 13 4 12 8   HEMATOCRIT % 43 3 41 1   PLATELETS Thousands/uL 225 272   NEUTROS PCT %  --  78*   LYMPHS PCT %  --  11*   MONOS PCT %  --  10   EOS PCT %  --  0     Results from last 7 days   Lab Units 04/15/23  0637   SODIUM mmol/L 137   POTASSIUM mmol/L 4 2   CHLORIDE mmol/L 95*   CO2 mmol/L 41*   BUN mg/dL 14   CREATININE mg/dL 0 48*   ANION GAP mmol/L 1*   CALCIUM mg/dL 8 5   GLUCOSE RANDOM mg/dL 99     Results from last 7 days   Lab Units 04/15/23  0536   INR  2 84*                   Lines/Drains:  Invasive Devices     Peripheral Intravenous Line  Duration           Peripheral IV 04/12/23 Left Antecubital 3 days                Recent Cultures (last 7 days):         Last 24 Hours Medication List:   Current Facility-Administered Medications   Medication Dose Route Frequency Provider Last Rate   • acetaminophen  975 mg Oral Q8H [address] Liseth Salmon MD     • albuterol  2 5 mg Nebulization Q4H PRN Clement Romano MD     • benzonatate  200 mg Oral TID Liseth Salmon MD     • dextromethorphan-guaiFENesin  10 mL Oral Q4H PRN Jimmy Ramos MD     • docusate sodium  100 mg Oral BID PRN Liseth Salmon MD     • furosemide  40 mg Intravenous Once Liseth Salmon MD     • furosemide  40 mg Oral Daily Liana Vasquez MD     • guaiFENesin  1,200 mg Oral Q12H [address] Liana Vasquez MD     • ipratropium 0 5 mg Nebulization TID Galen Monson MD     • levalbuterol  1 25 mg Nebulization TID Galen Monson MD     • methocarbamol  750 mg Oral Q6H PRN Maribel Hall MD     • metoprolol succinate  25 mg Oral Daily Linda Calloway MD     • oxyCODONE  5 mg Oral Q8H PRN Elma Apgar, MD     • pantoprazole  40 mg Oral Early Morning Elma Apgar, MD     • potassium chloride  20 mEq Oral Daily Linda Calloway MD     • pregabalin  150 mg Oral TID Maribel Hall MD     • risperiDONE  1 mg Oral TID Maribel Hall MD     • sodium chloride  4 mL Nebulization TID Elma Apgar, MD     • warfarin  9 mg Oral Daily (warfarin) Pernell Pelletier MD          Today, Patient Was Seen By: Damien Bailon MD    **Please Note: This note may have been constructed using a voice recognition system  **

## 2023-04-15 NOTE — ASSESSMENT & PLAN NOTE
· Recently DC to [facility]   PT/OT: post acute rehab  · Pending placement  · BMI 60  • Recommend incorporating a more whole foods plant-predominant diet along with decreasing consumption of red meats and processed foods

## 2023-04-16 LAB
ANION GAP SERPL CALCULATED.3IONS-SCNC: 4 MMOL/L (ref 4–13)
BUN SERPL-MCNC: 14 MG/DL (ref 5–25)
CALCIUM SERPL-MCNC: 8.6 MG/DL (ref 8.4–10.2)
CHLORIDE SERPL-SCNC: 92 MMOL/L (ref 96–108)
CO2 SERPL-SCNC: 40 MMOL/L (ref 21–32)
CREAT SERPL-MCNC: 0.47 MG/DL (ref 0.6–1.3)
GFR SERPL CREATININE-BSD FRML MDRD: 113 ML/MIN/1.73SQ M
GLUCOSE SERPL-MCNC: 127 MG/DL (ref 65–140)
INR PPP: 4.13 (ref 0.84–1.19)
POTASSIUM SERPL-SCNC: 3.8 MMOL/L (ref 3.5–5.3)
PROTHROMBIN TIME: 40.2 SECONDS (ref 11.6–14.5)
SODIUM SERPL-SCNC: 136 MMOL/L (ref 135–147)

## 2023-04-16 RX ADMIN — RISPERIDONE 1 MG: 1 TABLET ORAL at 20:52

## 2023-04-16 RX ADMIN — METHOCARBAMOL TABLETS 750 MG: 750 TABLET, COATED ORAL at 03:53

## 2023-04-16 RX ADMIN — ACETAMINOPHEN 975 MG: 325 TABLET, FILM COATED ORAL at 13:23

## 2023-04-16 RX ADMIN — METOPROLOL SUCCINATE 25 MG: 25 TABLET, EXTENDED RELEASE ORAL at 09:19

## 2023-04-16 RX ADMIN — SODIUM CHLORIDE SOLN NEBU 3% 4 ML: 3 NEBU SOLN at 13:39

## 2023-04-16 RX ADMIN — BENZONATATE 200 MG: 100 CAPSULE ORAL at 18:49

## 2023-04-16 RX ADMIN — PREGABALIN 150 MG: 75 CAPSULE ORAL at 20:52

## 2023-04-16 RX ADMIN — GUAIFENESIN AND DEXTROMETHORPHAN 10 ML: 100; 10 SYRUP ORAL at 20:52

## 2023-04-16 RX ADMIN — BENZONATATE 200 MG: 100 CAPSULE ORAL at 09:19

## 2023-04-16 RX ADMIN — GUAIFENESIN 1200 MG: 600 TABLET ORAL at 09:19

## 2023-04-16 RX ADMIN — LEVALBUTEROL HYDROCHLORIDE 1.25 MG: 1.25 SOLUTION RESPIRATORY (INHALATION) at 07:57

## 2023-04-16 RX ADMIN — LEVALBUTEROL HYDROCHLORIDE 1.25 MG: 1.25 SOLUTION RESPIRATORY (INHALATION) at 13:39

## 2023-04-16 RX ADMIN — PREGABALIN 150 MG: 75 CAPSULE ORAL at 18:48

## 2023-04-16 RX ADMIN — IPRATROPIUM BROMIDE 0.5 MG: 0.5 SOLUTION RESPIRATORY (INHALATION) at 13:39

## 2023-04-16 RX ADMIN — ACETAMINOPHEN 975 MG: 325 TABLET, FILM COATED ORAL at 03:53

## 2023-04-16 RX ADMIN — POTASSIUM CHLORIDE 20 MEQ: 1500 TABLET, EXTENDED RELEASE ORAL at 09:19

## 2023-04-16 RX ADMIN — OXYCODONE HYDROCHLORIDE 5 MG: 5 TABLET ORAL at 20:05

## 2023-04-16 RX ADMIN — PREGABALIN 150 MG: 75 CAPSULE ORAL at 09:19

## 2023-04-16 RX ADMIN — RISPERIDONE 1 MG: 1 TABLET ORAL at 09:19

## 2023-04-16 RX ADMIN — METHOCARBAMOL TABLETS 750 MG: 750 TABLET, COATED ORAL at 20:05

## 2023-04-16 RX ADMIN — PANTOPRAZOLE SODIUM 40 MG: 40 TABLET, DELAYED RELEASE ORAL at 03:53

## 2023-04-16 RX ADMIN — METHOCARBAMOL TABLETS 750 MG: 750 TABLET, COATED ORAL at 13:23

## 2023-04-16 RX ADMIN — BENZONATATE 200 MG: 100 CAPSULE ORAL at 20:52

## 2023-04-16 RX ADMIN — OXYCODONE HYDROCHLORIDE 5 MG: 5 TABLET ORAL at 13:23

## 2023-04-16 RX ADMIN — RISPERIDONE 1 MG: 1 TABLET ORAL at 18:49

## 2023-04-16 RX ADMIN — IPRATROPIUM BROMIDE 0.5 MG: 0.5 SOLUTION RESPIRATORY (INHALATION) at 19:59

## 2023-04-16 RX ADMIN — SODIUM CHLORIDE SOLN NEBU 3% 4 ML: 3 NEBU SOLN at 07:57

## 2023-04-16 RX ADMIN — FUROSEMIDE 40 MG: 40 TABLET ORAL at 09:19

## 2023-04-16 RX ADMIN — GUAIFENESIN 1200 MG: 600 TABLET ORAL at 20:52

## 2023-04-16 RX ADMIN — ACETAMINOPHEN 975 MG: 325 TABLET, FILM COATED ORAL at 20:52

## 2023-04-16 RX ADMIN — OXYCODONE HYDROCHLORIDE 5 MG: 5 TABLET ORAL at 04:01

## 2023-04-16 RX ADMIN — GUAIFENESIN AND DEXTROMETHORPHAN 10 ML: 100; 10 SYRUP ORAL at 09:25

## 2023-04-16 RX ADMIN — LEVALBUTEROL HYDROCHLORIDE 1.25 MG: 1.25 SOLUTION RESPIRATORY (INHALATION) at 19:59

## 2023-04-16 RX ADMIN — SODIUM CHLORIDE SOLN NEBU 3% 4 ML: 3 NEBU SOLN at 19:59

## 2023-04-16 RX ADMIN — IPRATROPIUM BROMIDE 0.5 MG: 0.5 SOLUTION RESPIRATORY (INHALATION) at 07:57

## 2023-04-16 NOTE — PROGRESS NOTES
[facility]  Progress Note  Name: Manuelito Adan  MRN: [id number]  Unit/Bed#: S -09 I Date of Admission: 4/7/2023   Date of Service: 4/16/2023 I Hospital Day: 9    Assessment/Plan   * Acute on chronic respiratory failure (HCC)  Assessment & Plan  · Continue PO prednisone 20 mg daily  · Continue PO Lasix 40 mg daily  · Given additional IV Lasix 40 mg x 1 4/15 with good UO  Will hold off further IV ;asix for now  · Monitor respiratory status, currently on baseline 5L  · Respiratory protocol, Xopenex/Atrovent, Airway clearance protocol, IS  · Mucinex, Tessalon Perles   · Out of bed to chair, PT and OT      Chronic diastolic congestive heart failure (HCC)  Assessment & Plan  · No further shortness of breath  · Continue on p o  Lasix  Intermittent IV lasix ad needed  · Daily weights monitor input and output    Morbid obesity (HCC)  Assessment & Plan  · Recently DC to [facility]   PT/OT: post acute rehab  · Pending placement  · BMI 60  • Recommend incorporating a more whole foods plant-predominant diet along with decreasing consumption of red meats and processed foods      EVERETT (obstructive sleep apnea)  Assessment & Plan  Hx of EVERETT, morbid obesity, obesity hypoventilation syndrome   Continue BiPAP HS     History of pulmonary embolism  Assessment & Plan  · History of DVT, PE  · Home dose of coumadin alternating 9mg and 10 mg   · INR above 3  Will hold Coumadin today         VTE Pharmacologic Prophylaxis:   Pharmacologic: Warfarin (Coumadin)     Education and Discussions with Family / Patient: I attempted to call pt's wife    Current Length of Stay: 9 day(s)    Current Patient Status: Inpatient   Certification Statement: The patient will continue to require additional inpatient hospital stay due to Waiting for rehab    Discharge Plan: Waiting for rehab    Code Status: Level 1 - Full Code      Subjective:   No overnight events  Good urine output after IV Lasix one dose given yesterday  This bed was saturated with urine earlier per nursing  Objective:     Vitals:   Temp (24hrs), Av 4 °F (36 9 °C), Min:98 4 °F (36 9 °C), Max:98 4 °F (36 9 °C)    Temp:  [98 4 °F (36 9 °C)] 98 4 °F (36 9 °C)  HR:  [109-110] 110  Resp:  [18] 18  BP: (112-139)/(62-89) 139/89  SpO2:  [95 %-99 %] 99 %  Body mass index is 60 74 kg/m²  Input and Output Summary (last 24 hours): Intake/Output Summary (Last 24 hours) at 2023 1526  Last data filed at 2023 7783  Gross per 24 hour   Intake 480 ml   Output 1450 ml   Net -970 ml       Physical Exam:     Physical Exam  Constitutional:       General: He is not in acute distress  Appearance: He is not ill-appearing, toxic-appearing or diaphoretic  Eyes:      General:         Right eye: No discharge  Left eye: No discharge  Cardiovascular:      Rate and Rhythm: Normal rate  Pulmonary:      Effort: Pulmonary effort is normal  No respiratory distress  Abdominal:      Palpations: Abdomen is soft  Musculoskeletal:         General: Swelling present  Skin:     General: Skin is warm  Neurological:      Mental Status: He is oriented to person, place, and time     Psychiatric:         Mood and Affect: Mood normal          Behavior: Behavior normal          Additional Data:     Labs:    Results from last 7 days   Lab Units 23  0514 04/10/23  0526   WBC Thousand/uL 10 72* 12 40*   HEMOGLOBIN g/dL 13 4 12 8   HEMATOCRIT % 43 3 41 1   PLATELETS Thousands/uL 225 272   NEUTROS PCT %  --  78*   LYMPHS PCT %  --  11*   MONOS PCT %  --  10   EOS PCT %  --  0     Results from last 7 days   Lab Units 23  0400   POTASSIUM mmol/L 3 8   CHLORIDE mmol/L 92*   CO2 mmol/L 40*   BUN mg/dL 14   CREATININE mg/dL 0 47*   CALCIUM mg/dL 8 6     Results from last 7 days   Lab Units 23  0400   INR  4 13*       *  Recent Cultures (last 7 days):           Last 24 Hours Medication List:   Current Facility-Administered Medications   Medication Dose Route Frequency Provider Last Rate   • acetaminophen  975 mg Oral Q8H [address] Renata Pineda MD     • albuterol  2 5 mg Nebulization Q4H PRN Tae Gomez MD     • benzonatate  200 mg Oral TID Renata Pineda MD     • dextromethorphan-guaiFENesin  10 mL Oral Q4H PRN Christopher Bingham MD     • docusate sodium  100 mg Oral BID PRN Renata Pineda MD     • furosemide  40 mg Oral Daily Vivian Trujillo MD     • guaiFENesin  1,200 mg Oral Q12H [address] Vivian Trujillo MD     • ipratropium  0 5 mg Nebulization TID Jorge A Angel MD     • levalbuterol  1 25 mg Nebulization TID Jorge A Angel MD     • methocarbamol  750 mg Oral Q6H PRN Tae Gomez MD     • metoprolol succinate  25 mg Oral Daily Pierre Madrigal MD     • oxyCODONE  5 mg Oral Q6H PRN Milagros Castellano MD     • pantoprazole  40 mg Oral Early Morning Renata Pineda MD     • potassium chloride  20 mEq Oral Daily Pierre Madrigal MD     • pregabalin  150 mg Oral TID Tae Gomez MD     • risperiDONE  1 mg Oral TID Tae Gomez MD     • sodium chloride  4 mL Nebulization TID Renata Pineda MD          Today, Patient Was Seen By: Renata Pineda MD    ** Please Note: Dictation voice to text software may have been used in the creation of this document   **

## 2023-04-16 NOTE — ASSESSMENT & PLAN NOTE
· No further shortness of breath  · Continue on p o  Lasix   Intermittent IV lasix ad needed  · Daily weights monitor input and output

## 2023-04-16 NOTE — ASSESSMENT & PLAN NOTE
· Continue PO prednisone 20 mg daily  · Continue PO Lasix 40 mg daily  · Given additional IV Lasix 40 mg x 1 4/15 with good UO   Will hold off further IV ;asix for now  · Monitor respiratory status, currently on baseline 5L  · Respiratory protocol, Xopenex/Atrovent, Airway clearance protocol, IS  · Mucinex, Tessalon Perles   · Out of bed to chair, PT and OT

## 2023-04-16 NOTE — ASSESSMENT & PLAN NOTE
· History of DVT, PE  · Home dose of coumadin alternating 9mg and 10 mg   · INR above 3    Will hold Coumadin today

## 2023-04-16 NOTE — CASE MANAGEMENT
Case Management Discharge Planning Note    Patient name Lulu Calzada  Location S /S -35 MRN [id number]  : 1955 Date 2023       Current Admission Date: 2023  Current Admission Diagnosis:Acute on chronic respiratory failure [facility])   Patient Active Problem List    Diagnosis Date Noted   • Pulmonary embolism ([facility] [address] )    • COVID-19 2023   • Cerumen debris on tympanic membrane of both ears 2023   • Generalized weakness 2023   • Impaired functional mobility, balance, gait, and endurance 2023   • BPH (benign prostatic hyperplasia) 2023   • Chronic anticoagulation 2023   • Pressure injury, unstageable, with suspected deep tissue injury ([facility] ) 2023   • Dyspnea on exertion 2023   • Morbid obesity ([facility] [address] ) 2023   • Primary hypertension 2023   • History of pulmonary embolism 2023   • CHF (congestive heart failure) ([facility] [address] ) 2023   • DVT (deep venous thrombosis) ([facility] ) 2023   • Chronic diastolic congestive heart failure ([facility] [address] ) 2021   • DJD (degenerative joint disease), multiple sites 2021   • Ambulatory dysfunction 2021   • Chronic right-sided congestive heart failure ([facility] ) 2021   • BMI 50 0-59 9, adult ([facility] [address] ) 2020   • Debility 2020   • Acute on chronic respiratory failure ([facility] [address] ) 2020   • Restrictive lung disease 2020   • Medical marijuana use 2018   • EVERETT (obstructive sleep apnea) 2018   • Obesity hypoventilation syndrome ([facility] [address] ) 2018   • Pulmonary hypertension ([facility] ) 01/15/2018   • Pain medication agreement signed 2017   • Neuralgia, post-herpetic 2017   • Sciatica 2012   • Fercho de la Tourette's syndrome 2012   • Chronic pain disorder 2012      LOS (days): 9  Geometric Mean LOS (GMLOS) (days): 3 90  Days to GMLOS:-5 3     OBJECTIVE:  Risk of Unplanned Readmission Score: 18 01         Current admission status: Inpatient   Preferred Pharmacy:   [facility] - [address]  [address] [facility] [address]  Phone: [phone] Fax: [phone]    Primary Care Provider: Kadi Hicks MD    Primary Insurance: [address] REP  Secondary Insurance:     DISCHARGE DETAILS:    CM f/u with SNF referrals sent via aidin, sent message to all reviewing/pending facilities re: bed availability   No bed availability at this time, CM to f/u in AM

## 2023-04-17 LAB
ANION GAP SERPL CALCULATED.3IONS-SCNC: 5 MMOL/L (ref 4–13)
BUN SERPL-MCNC: 9 MG/DL (ref 5–25)
CALCIUM SERPL-MCNC: 8.7 MG/DL (ref 8.4–10.2)
CHLORIDE SERPL-SCNC: 91 MMOL/L (ref 96–108)
CO2 SERPL-SCNC: 39 MMOL/L (ref 21–32)
CREAT SERPL-MCNC: 0.46 MG/DL (ref 0.6–1.3)
GFR SERPL CREATININE-BSD FRML MDRD: 114 ML/MIN/1.73SQ M
GLUCOSE SERPL-MCNC: 135 MG/DL (ref 65–140)
INR PPP: 3.99 (ref 0.84–1.19)
POTASSIUM SERPL-SCNC: 3.6 MMOL/L (ref 3.5–5.3)
PROTHROMBIN TIME: 39.2 SECONDS (ref 11.6–14.5)
SODIUM SERPL-SCNC: 135 MMOL/L (ref 135–147)

## 2023-04-17 RX ORDER — SIMETHICONE 80 MG
80 TABLET,CHEWABLE ORAL EVERY 6 HOURS PRN
Status: DISCONTINUED | OUTPATIENT
Start: 2023-04-17 | End: 2023-05-06 | Stop reason: HOSPADM

## 2023-04-17 RX ADMIN — IPRATROPIUM BROMIDE 0.5 MG: 0.5 SOLUTION RESPIRATORY (INHALATION) at 20:09

## 2023-04-17 RX ADMIN — RISPERIDONE 1 MG: 1 TABLET ORAL at 22:00

## 2023-04-17 RX ADMIN — FUROSEMIDE 40 MG: 40 TABLET ORAL at 08:53

## 2023-04-17 RX ADMIN — SIMETHICONE 80 MG: 80 TABLET, CHEWABLE ORAL at 09:41

## 2023-04-17 RX ADMIN — BENZONATATE 200 MG: 100 CAPSULE ORAL at 08:53

## 2023-04-17 RX ADMIN — SODIUM CHLORIDE SOLN NEBU 3% 4 ML: 3 NEBU SOLN at 07:32

## 2023-04-17 RX ADMIN — METHOCARBAMOL TABLETS 750 MG: 750 TABLET, COATED ORAL at 09:41

## 2023-04-17 RX ADMIN — ACETAMINOPHEN 975 MG: 325 TABLET, FILM COATED ORAL at 03:38

## 2023-04-17 RX ADMIN — PREGABALIN 150 MG: 75 CAPSULE ORAL at 17:44

## 2023-04-17 RX ADMIN — PANTOPRAZOLE SODIUM 40 MG: 40 TABLET, DELAYED RELEASE ORAL at 03:37

## 2023-04-17 RX ADMIN — OXYCODONE HYDROCHLORIDE 5 MG: 5 TABLET ORAL at 03:37

## 2023-04-17 RX ADMIN — GUAIFENESIN 1200 MG: 600 TABLET ORAL at 22:00

## 2023-04-17 RX ADMIN — OXYCODONE HYDROCHLORIDE 5 MG: 5 TABLET ORAL at 17:50

## 2023-04-17 RX ADMIN — METOPROLOL SUCCINATE 25 MG: 25 TABLET, EXTENDED RELEASE ORAL at 08:53

## 2023-04-17 RX ADMIN — SODIUM CHLORIDE SOLN NEBU 3% 4 ML: 3 NEBU SOLN at 14:02

## 2023-04-17 RX ADMIN — POTASSIUM CHLORIDE 20 MEQ: 1500 TABLET, EXTENDED RELEASE ORAL at 08:53

## 2023-04-17 RX ADMIN — IPRATROPIUM BROMIDE 0.5 MG: 0.5 SOLUTION RESPIRATORY (INHALATION) at 07:32

## 2023-04-17 RX ADMIN — RISPERIDONE 1 MG: 1 TABLET ORAL at 17:44

## 2023-04-17 RX ADMIN — LEVALBUTEROL HYDROCHLORIDE 1.25 MG: 1.25 SOLUTION RESPIRATORY (INHALATION) at 20:09

## 2023-04-17 RX ADMIN — LEVALBUTEROL HYDROCHLORIDE 1.25 MG: 1.25 SOLUTION RESPIRATORY (INHALATION) at 14:02

## 2023-04-17 RX ADMIN — LEVALBUTEROL HYDROCHLORIDE 1.25 MG: 1.25 SOLUTION RESPIRATORY (INHALATION) at 07:32

## 2023-04-17 RX ADMIN — IPRATROPIUM BROMIDE 0.5 MG: 0.5 SOLUTION RESPIRATORY (INHALATION) at 14:02

## 2023-04-17 RX ADMIN — SODIUM CHLORIDE SOLN NEBU 3% 4 ML: 3 NEBU SOLN at 20:09

## 2023-04-17 RX ADMIN — ACETAMINOPHEN 975 MG: 325 TABLET, FILM COATED ORAL at 21:59

## 2023-04-17 RX ADMIN — BENZONATATE 200 MG: 100 CAPSULE ORAL at 22:00

## 2023-04-17 RX ADMIN — PREGABALIN 150 MG: 75 CAPSULE ORAL at 08:53

## 2023-04-17 RX ADMIN — RISPERIDONE 1 MG: 1 TABLET ORAL at 08:53

## 2023-04-17 RX ADMIN — BENZONATATE 200 MG: 100 CAPSULE ORAL at 17:44

## 2023-04-17 RX ADMIN — PREGABALIN 150 MG: 75 CAPSULE ORAL at 22:00

## 2023-04-17 RX ADMIN — GUAIFENESIN AND DEXTROMETHORPHAN 10 ML: 100; 10 SYRUP ORAL at 17:50

## 2023-04-17 RX ADMIN — GUAIFENESIN 1200 MG: 600 TABLET ORAL at 08:53

## 2023-04-17 RX ADMIN — ACETAMINOPHEN 975 MG: 325 TABLET, FILM COATED ORAL at 14:32

## 2023-04-17 RX ADMIN — METHOCARBAMOL TABLETS 750 MG: 750 TABLET, COATED ORAL at 03:37

## 2023-04-17 RX ADMIN — OXYCODONE HYDROCHLORIDE 5 MG: 5 TABLET ORAL at 09:41

## 2023-04-17 RX ADMIN — METHOCARBAMOL TABLETS 750 MG: 750 TABLET, COATED ORAL at 17:50

## 2023-04-17 NOTE — ASSESSMENT & PLAN NOTE
· No further shortness of breath  · Continue on p o  Lasix   Intermittent IV lasix as needed  · Daily weights monitor input and output

## 2023-04-17 NOTE — PROGRESS NOTES
[facility]  Progress Note  Name: Divina Walton  MRN: [id number]  Unit/Bed#: S -95 I Date of Admission: 4/7/2023   Date of Service: 4/17/2023 I Hospital Day: 10    Assessment/Plan   * Acute on chronic respiratory failure [facility])  Assessment & Plan  · Completed prednisone therapy  · Continue PO Lasix 40 mg daily  · Given additional IV Lasix 40 mg x 1 4/15 with good UO  Will hold off further IV Lasix for now  · Monitor respiratory status, currently on baseline 5L  · Respiratory protocol, Xopenex/Atrovent, Airway clearance protocol, IS  · Mucinex, Tessalon Perles   · Out of bed to chair, PT and OT      History of pulmonary embolism  Assessment & Plan  · History of DVT, PE  · Home dose of coumadin alternating 9mg and 10 mg   · INR above 3 today  · Will continue to hold Coumadin today  · On restarting Coumadin, will likely start at a lower dose, 7 5 mg daily    Chronic diastolic congestive heart failure (HCC)  Assessment & Plan  · No further shortness of breath  · Continue on p o  Lasix  Intermittent IV lasix as needed  · Daily weights monitor input and output    Morbid obesity (HCC)  Assessment & Plan  · Recently DC to [facility]   PT/OT: post acute rehab  · Pending placement  · BMI 60  • Recommend incorporating a more whole foods plant-predominant diet along with decreasing consumption of red meats and processed foods      EVERETT (obstructive sleep apnea)  Assessment & Plan  Hx of EVERETT, morbid obesity, obesity hypoventilation syndrome   Continue BiPAP HS     Primary hypertension  Assessment & Plan  BP on admission 121/55  Home medication Lasix 40mg QD              VTE Pharmacologic Prophylaxis: VTE Score: 10 High Risk (Score >/= 5) - Pharmacological DVT Prophylaxis Ordered: warfarin (Coumadin)  Sequential Compression Devices Ordered  Patient Centered Rounds: I performed bedside rounds with nursing staff today    Discussions with Specialists or Other Care Team Provider: CM    Education and Discussions with Family / Patient: Updated  (wife) via phone  Total Time Spent on Date of Encounter in care of patient: 35 minutes This time was spent on one or more of the following: performing physical exam; counseling and coordination of care; obtaining or reviewing history; documenting in the medical record; reviewing/ordering tests, medications or procedures; communicating with other healthcare professionals and discussing with patient's family/caregivers  Current Length of Stay: 10 day(s)  Current Patient Status: Inpatient   Certification Statement: The patient will continue to require additional inpatient hospital stay due to pending placement  Discharge Plan: pending placement    Code Status: Level 1 - Full Code    Subjective:   Seen this morning in no acute distress, denies chest pain, shortness of breath, fever or chills  Objective:     Vitals:   Temp (24hrs), Av 8 °F (36 6 °C), Min:97 4 °F (36 3 °C), Max:98 1 °F (36 7 °C)    Temp:  [97 4 °F (36 3 °C)-98 1 °F (36 7 °C)] 98 1 °F (36 7 °C)  HR:  [] 103  Resp:  [17-18] 18  BP: (100-128)/(62-63) 127/62  SpO2:  [89 %-97 %] 97 %  Body mass index is 61 25 kg/m²  Input and Output Summary (last 24 hours): Intake/Output Summary (Last 24 hours) at 2023 1428  Last data filed at 2023 1100  Gross per 24 hour   Intake 2400 ml   Output 4450 ml   Net -2050 ml       Physical Exam:   Physical Exam  Vitals reviewed  Constitutional:       General: He is not in acute distress  Appearance: He is obese  He is not toxic-appearing  HENT:      Head: Normocephalic and atraumatic  Mouth/Throat:      Pharynx: Oropharynx is clear  Eyes:      Extraocular Movements: Extraocular movements intact  Cardiovascular:      Rate and Rhythm: Normal rate and regular rhythm  Pulses: Normal pulses  Pulmonary:      Effort: No respiratory distress  Breath sounds: No wheezing, rhonchi or rales        Comments: Decreased breath sounds bilaterally  Abdominal:      Palpations: Abdomen is soft  Tenderness: There is no abdominal tenderness  Musculoskeletal:         General: No tenderness  Right lower leg: Edema present  Left lower leg: Edema present  Comments: At baseline   Neurological:      Mental Status: He is alert and oriented to person, place, and time  Psychiatric:         Mood and Affect: Mood normal          Behavior: Behavior normal          Thought Content:  Thought content normal          Judgment: Judgment normal           Additional Data:     Labs:  Results from last 7 days   Lab Units 04/12/23  0514   WBC Thousand/uL 10 72*   HEMOGLOBIN g/dL 13 4   HEMATOCRIT % 43 3   PLATELETS Thousands/uL 225     Results from last 7 days   Lab Units 04/17/23  0616   SODIUM mmol/L 135   POTASSIUM mmol/L 3 6   CHLORIDE mmol/L 91*   CO2 mmol/L 39*   BUN mg/dL 9   CREATININE mg/dL 0 46*   ANION GAP mmol/L 5   CALCIUM mg/dL 8 7   GLUCOSE RANDOM mg/dL 135     Results from last 7 days   Lab Units 04/17/23  0616   INR  3 99*                   Lines/Drains:  Invasive Devices     Peripheral Intravenous Line  Duration           Peripheral IV 04/15/23 Right;Ventral (anterior) Forearm 1 day                  Recent Cultures (last 7 days):         Last 24 Hours Medication List:   Current Facility-Administered Medications   Medication Dose Route Frequency Provider Last Rate   • acetaminophen  975 mg Oral Q8H [address] Cristiano Sahu MD     • albuterol  2 5 mg Nebulization Q4H PRN Issac Moscoso MD     • benzonatate  200 mg Oral TID Cristiano Sahu MD     • dextromethorphan-guaiFENesin  10 mL Oral Q4H PRN Charley Valdovinos MD     • docusate sodium  100 mg Oral BID PRN Cristiano Sahu MD     • furosemide  40 mg Oral Daily Freddie Townsend MD     • guaiFENesin  1,200 mg Oral Q12H James Mckeon MD     • ipratropium  0 5 mg Nebulization TID Koko Cleary MD     • levalbuterol  1 25 mg Nebulization TID Koko Cleary MD • methocarbamol  750 mg Oral Q6H PRN Devin Pittman MD     • metoprolol succinate  25 mg Oral Daily Neal Heck MD     • oxyCODONE  5 mg Oral Q6H PRN Angela Harvey MD     • pantoprazole  40 mg Oral Early Morning Nancy Cruz MD     • potassium chloride  20 mEq Oral Daily Neal Heck MD     • pregabalin  150 mg Oral TID Devin Pittman MD     • risperiDONE  1 mg Oral TID Devin Pittman MD     • simethicone  80 mg Oral Q6H PRN Pastor Jayy MD     • sodium chloride  4 mL Nebulization TID Nancy Cruz MD          Today, Patient Was Seen By: Sowmya Moreno MD    **Please Note: This note may have been constructed using a voice recognition system  **

## 2023-04-17 NOTE — PHYSICAL THERAPY NOTE
PHYSICAL THERAPY TREATMENT NOTE  NAME:  Jimmie Bhat  DATE: 04/17/23    Length Of Stay: 10  Performed at least 2 patient identifiers during session: Name and Birthday    TREATMENT:    04/17/23 1154   PT Last Visit   PT Visit Date 04/17/23   Note Type   Note Type Treatment   Pain Assessment   Pain Assessment Tool 0-10   Pain Score 9   Pain Location/Orientation Location: Back  (L>R; chronic)   Effect of Pain on Daily Activities Patient tender to palpation at left upper body/posterior trunk   Patient's Stated Pain Goal No pain   Hospital Pain Intervention(s) Repositioned; Ambulation/increased activity; Emotional support   Multiple Pain Sites Yes  (Groin after performing standing tolerance trials  --RN inspected skin area with PT with some redness noted at right thigh from condom catheter tubing)   Restrictions/Precautions   Weight Bearing Precautions Per Order No   Other Precautions Bed Alarm; Chair Alarm;Cognitive; Fall Risk   General   Chart Reviewed Yes   Response to Previous Treatment Patient reporting fatigue but able to participate  Family/Caregiver Present No   Cognition   Overall Cognitive Status WFL   Arousal/Participation Alert   Attention Within functional limits   Orientation Level Oriented X4   Memory Within functional limits   Following Commands Follows all commands and directions without difficulty   Subjective   Subjective Upon entering room, patient out of bed in the chair--reportedly dependently transferred via seated Yuridia lift sling by nursing  After demonstration of walking sling, patient eager and agreeable to attempt-however during session patient requesting more assistance via walking sling than intended  Patient does express fear of falling   Bed Mobility   Supine to Sit Unable to assess   Transfers   Sit to Stand 3  Moderate assistance   Additional items (S)  Assist x 2; Increased time required;Verbal cues;Armrests  (for hand placement, LE positioning    Patient did progress to mod assist of 1 plus walking sling/ceiling lift support)   Stand to Sit 3  Moderate assistance   Additional items Assist x 2; Increased time required;Verbal cues;Armrests   Additional Comments Sit to stand trials most successful with left hand at walker, right hand at recliner  Patient wearing walking sling for majority of the trials however therapy did perform to sit to stands using a modified belt and hammock technique on the patient  Ambulation/Elevation   Gait pattern Not appropriate   Balance   Static Sitting Fair +   Static Standing Zero  (Patient used walking sling for support, likely greater than 75% of body weight as well as upper extremity support into walker)   Ambulatory   (NA)   Endurance Deficit   Endurance Deficit Yes   Activity Tolerance   Activity Tolerance Patient limited by fatigue;Patient limited by pain  (Limited by fear and retreat)   Nurse Rashi coordination with Sergio Victor from nursing   Exercises   Knee AROM Long Arc Quad Sitting;10 reps;Bilateral;AROM   Ankle Pumps Sitting;10 reps;Bilateral;AROM   Neuro re-ed Standing tolerance trials using walking sling and upper extremity support of walker x 2 trials, with standing tolerance time of 1 minute  Focus during standing trials on isometric contractions at quads and glutes  Attempted upright posture with posterior pelvic tilt, but patient was unsuccessful  Patient also was able to perform buttock clearances using a modified belt and hammock technique 3 trials   Assessment   Prognosis Fair   Problem List Decreased strength;Decreased range of motion;Decreased endurance; Impaired balance;Decreased mobility; Decreased coordination;Decreased cognition;Pain;Obesity; Decreased safety awareness; Impaired judgement  (Lower extremity edema, gait deviations)   Assessment  Marce Peng continues to make slow mobility progress even compared to last session        He continues to be performing more of the sit to stand transfer on his own, and requires less physical assistance for performing transfers both with belt and hammock technique and using a walking sling  He was able to stand for up to 1 minute with BUE support of walker but substantial assistance from the sling  Patient able to perform some lower extremity contractions for standing tolerance while in the sling, but I anticipate Aicha Johns will have difficulty performing upright posture with his reports of pain/difficulty with performing posterior pelvic tilt  S    killed PT recommended to progress patient towards treatment goals  Would continue trials using walking sling but with providing less physical support to patient using sling as he allows by having back up of belt and hammock technique underneath a walking sling  Barriers to Discharge Decreased caregiver support; Inaccessible home environment   Goals   Patient Goals to have less pain, not fall   STG Expiration Date 04/21/23   PT Treatment Day 5   Plan   Treatment/Interventions Functional transfer training;LE strengthening/ROM; Therapeutic exercise; Endurance training;Equipment eval/education; Bed mobility;Gait training;Patient/family training;Cognitive reorientation;Spoke to nursing   Progress Slow progress, decreased activity tolerance   PT Frequency   (5x/wk (mon-fri))   Recommendation   PT Discharge Recommendation Post acute rehabilitation services   Equipment Recommended Gracie Swain  (nanette lift by nursing for OOB transfers; walking sling and wide rolling walker in future with mobility progression from recliner chair)   AM-PAC Basic Mobility Inpatient   Turning in Flat Bed Without Bedrails 2   Lying on Back to Sitting on Edge of Flat Bed Without Bedrails 1   Moving Bed to Chair 1   Standing Up From Chair Using Arms 1   Walk in Room 1   Climb 3-5 Stairs With Railing 1   Basic Mobility Inpatient Raw Score 7   Turning Head Towards Sound 4   Follow Simple Instructions 3   Low Function Basic Mobility Raw Score  14   Low Function Basic Mobility Standardized Score 22 01   Highest Level Of Mobility   -[facility] Goal 2: Bed activities/Dependent transfer   -HLM Achieved 5: Stand (1 or more minutes)   Education   Education Provided Mobility training;Assistive device;Home exercise program   Patient Explanation/teachback used; Reinforcement needed   End of Consult   Patient Position at End of Consult All needs within reach;Bed/Chair alarm activated; Bedside chair     Nursing Recommendations:   Mobility Plan as of 04/17/23: Pt is dependent Assist with  bariatric seated chair sling  to/from recliner  Encourage OOB for all meals  The patient's -[facility] Basic Mobility Inpatient Short Form Raw Score is 7  A Raw score of less than or equal to 16 suggests the patient may benefit from discharge to post-acute rehabilitation services, which DOES coincide with CURRENT above PT recommendations  However please refer to therapist recommendation for discharge planning given other factors that may influence destination  Adapted from [facility] “6-Clicks” Basic Mobility and Daily Activity Scores With Discharge Destination  Physical Therapy, 2021;101:1-9   DOI: 10 [email or web address]    Aaron Cruz PT, DPT

## 2023-04-17 NOTE — ASSESSMENT & PLAN NOTE
· Completed prednisone therapy  · Continue PO Lasix 40 mg daily  · Given additional IV Lasix 40 mg x 1 4/15 with good UO   Will hold off further IV Lasix for now  · Monitor respiratory status, currently on baseline 5L  · Respiratory protocol, Xopenex/Atrovent, Airway clearance protocol, IS  · Mucinex, Tessalon Perles   · Out of bed to chair, PT and OT

## 2023-04-17 NOTE — UTILIZATION REVIEW
Continued Stay Review    Date: 4/17/23                          Current Patient Class: Inpatient  Current Level of Care: Med Surg    HPI:67 y o  male initially admitted on 4/7     Assessment/Plan: Completed prednisone therapy  Continue PO Lasix 40 mg daily  Given additional IV Lasix 40 mg x 1 4/15 with good UO  Will hold off further IV Lasix for now  Currently at baseline O2 5L  Mucinex, Tessalon Perles  Continue holding coumadin today, INR above 3      Vital Signs:     Date/Time Temp Pulse Resp BP MAP (mmHg) SpO2 FiO2 (%) Calculated FIO2 (%) - Nasal Cannula Nasal Cannula O2 Flow Rate (L/min) O2 Device   04/17/23 0941 -- -- -- -- -- 95 % -- 36 4 L/min Nasal cannula   04/17/23 08:32:56 98 1 °F (36 7 °C) 100 -- 127/62 84 92 % -- -- -- --   04/17/23 0733 -- -- -- -- -- 94 % -- -- -- --   04/17/23 0425 -- -- -- -- -- -- -- 36 4 L/min Nasal cannula   04/16/23 2357 -- -- -- -- -- -- -- -- -- --   04/16/23 2231 -- -- -- -- -- -- 40 -- -- --   04/16/23 21:01:15 98 °F (36 7 °C) 95 18 128/63 85 95 % -- 36 4 L/min Nasal cannula   04/16/23 2001 -- -- -- -- -- 94 % -- 36 4 L/min Nasal cannula   04/16/23 16:34:54 97 4 °F (36 3 °C) Abnormal  98 17 100/63 75 89 % Abnormal  -- 36 4 L/min Nasal cannula   04/16/23 1340 -- -- -- -- -- 99 % -- 36 4 L/min Nasal cannula   04/16/23 0802 -- -- -- -- -- 95 % -- 40 5 L/min Nasal cannula   04/16/23 08:01:38 -- 110 Abnormal  -- 139/89 106 95 % -- -- -- --   04/16/23 0346 -- -- -- -- -- 96 % -- 40 5 L/min Nasal cannula   04/16/23 0300 -- -- -- -- -- -- 40 -- -- --   04/15/23 2234 -- -- -- -- -- 96 % 40 -- -- BiPAP   04/15/23 21:00:36 98 4 °F (36 9 °C) 109 Abnormal  18 112/62 79 96 % -- -- -- Nasal cannula   04/15/23 2017 -- -- -- -- -- -- -- 40 5 L/min Nasal cannula   04/15/23 1500 -- -- -- 113/68 83 -- -- -- -- --   04/15/23 1342 -- -- -- -- -- 97 % -- 40 5 L/min Nasal cannula   04/15/23 0825 -- -- -- -- -- 95 % -- 40 5 L/min Nasal cannula   04/15/23 08:19:02 98 3 °F (36 8 °C) 90 -- 108/69 82 96 % -- -- -- --       Pertinent Labs/Diagnostic Results:       Results from last 7 days   Lab Units 04/12/23  0514   WBC Thousand/uL 10 72*   HEMOGLOBIN g/dL 13 4   HEMATOCRIT % 43 3   PLATELETS Thousands/uL 225         Results from last 7 days   Lab Units 04/17/23  0616 04/16/23  0400 04/15/23  0637 04/14/23  0513 04/13/23  1130   SODIUM mmol/L 135 136 137 137 137   POTASSIUM mmol/L 3 6 3 8 4 2 3 9 4 1   CHLORIDE mmol/L 91* 92* 95* 94* 93*   CO2 mmol/L 39* 40* 41* 40* 43*   ANION GAP mmol/L 5 4 1* 3* 1*   BUN mg/dL 9 14 14 15 14   CREATININE mg/dL 0 46* 0 47* 0 48* 0 45* 0 47*   EGFR ml/min/1 73sq m 114 113 112 115 113   CALCIUM mg/dL 8 7 8 6 8 5 8 7 8 8             Results from last 7 days   Lab Units 04/17/23  0616 04/16/23  0400 04/15/23  0637 04/14/23  0513 04/13/23  1130 04/12/23  0514   GLUCOSE RANDOM mg/dL 135 127 99 106 99 103         Results from last 7 days   Lab Units 04/10/23  1248   PH BRYANT  7 481*   PCO2 BRYANT mm Hg 55 1*   PO2 BRYANT mm Hg 191 6*   HCO3 BRYANT mmol/L 40 2*   BASE EXC BRYANT mmol/L 14 0   O2 CONTENT BRYANT ml/dL 21 2   O2 HGB, VENOUS % 97 3*                     Results from last 7 days   Lab Units 04/17/23  0616 04/16/23  0400 04/15/23  0536   PROTIME seconds 39 2* 40 2* 30 1*   INR  3 99* 4 13* 2 84*         Medications:   Scheduled Medications:  acetaminophen, 975 mg, Oral, Q8H [address]  benzonatate, 200 mg, Oral, TID  furosemide, 40 mg, Oral, Daily  guaiFENesin, 1,200 mg, Oral, Q12H GRIFFIN  ipratropium, 0 5 mg, Nebulization, TID  levalbuterol, 1 25 mg, Nebulization, TID  metoprolol succinate, 25 mg, Oral, Daily  pantoprazole, 40 mg, Oral, Early Morning  potassium chloride, 20 mEq, Oral, Daily  pregabalin, 150 mg, Oral, TID  risperiDONE, 1 mg, Oral, TID  sodium chloride, 4 mL, Nebulization, TID      Continuous IV Infusions:     PRN Meds:  albuterol, 2 5 mg, Nebulization, Q4H PRN  dextromethorphan-guaiFENesin, 10 mL, Oral, Q4H PRN  docusate sodium, 100 mg, Oral, BID PRN  methocarbamol, 750 mg, Oral, Q6H PRN  oxyCODONE, 5 mg, Oral, Q6H PRN  simethicone, 80 mg, Oral, Q6H PRN        Discharge Plan: TBD    Network Utilization Review Department  ATTENTION: Please call with any questions or concerns to [phone] and carefully listen to the prompts so that you are directed to the right person  All voicemails are confidential   Jonathan Martines all requests for admission clinical reviews, approved or denied determinations and any other requests to dedicated fax number below belonging to the campus where the patient is receiving treatment   List of dedicated fax numbers for the Facilities:  [address] DENIALS (Administrative/Medical Necessity) [phone]   [address] (Maternity/NICU/Pediatrics) [phone]   914 Aparna Gould [phone]   Ros[address] Canales 77 [phone]   [address] [address] [facility] [phone]   1556 [facility] 134 815 [facility] [phone]

## 2023-04-17 NOTE — ASSESSMENT & PLAN NOTE
· History of DVT, PE  · Home dose of coumadin alternating 9mg and 10 mg   · INR above 3 today  · Will continue to hold Coumadin today  · On restarting Coumadin, will likely start at a lower dose, 7 5 mg daily

## 2023-04-17 NOTE — PLAN OF CARE
Problem: PHYSICAL THERAPY ADULT  Goal: Performs mobility at highest level of function for planned discharge setting  See evaluation for individualized goals  Description: Treatment/Interventions: Functional transfer training, LE strengthening/ROM, Therapeutic exercise, Endurance training, Cognitive reorientation, Patient/family training, Equipment eval/education, Gait training, Bed mobility          See flowsheet documentation for full assessment, interventions and recommendations  Outcome: Progressing  Note: Prognosis: Fair  Problem List: Decreased strength, Decreased range of motion, Decreased endurance, Impaired balance, Decreased mobility, Decreased coordination, Decreased cognition, Pain, Obesity, Decreased safety awareness, Impaired judgement (Lower extremity edema, gait deviations)  Assessment: Serjio Hsu continues to make slow mobility progress even compared to last session  He continues to be performing more of the sit to stand transfer on his own, and requires less physical assistance for performing transfers both with belt and hammock technique and using a walking sling  He was able to stand for up to 1 minute with BUE support of walker but substantial assistance from the sling  Patient able to perform some lower extremity contractions for standing tolerance while in the sling, but I anticipate Ayana Reyes will have difficulty performing upright posture with his reports of pain/difficulty with performing posterior pelvic tilt  Skilled PT recommended to progress patient towards treatment goals  Would continue trials using walking sling but with providing less physical support to patient using sling as he allows by having back up of belt and hammock technique underneath a walking sling  Barriers to Discharge: Decreased caregiver support, Inaccessible home environment     PT Discharge Recommendation: Post acute rehabilitation services    See flowsheet documentation for full assessment

## 2023-04-17 NOTE — CASE MANAGEMENT
Case Management Progress Note    Patient name Aziza Valentin  Location S /S -15 MRN [id number]  : 1955 Date 2023       LOS (days): 10  Geometric Mean LOS (GMLOS) (days): 3 90  Days to GMLOS:-6 3        OBJECTIVE:        Current admission status: Inpatient  Preferred Pharmacy:   [address], [place] - [address] [facility]  [address] [address]  Phone: [phone] Fax: [phone]    Primary Care Provider: Rivas Ye MD    Primary Insurance: [address] REP  Secondary Insurance:     PROGRESS NOTE:    Message received from  [facility] requesting med list - same uploaded in [address] for them to review; awaiting response re: ability to accept patient at their facility  No other bed offers received at this time  PT saw patient this afternoon; will forward PT notes to facilities for review once available  Messages sent to all pending facilities to inquire about availability  TT sent to Aleyda Lucero, [facility], to follow-up re: their facilities; awaiting response  VM also left for Mey at [facility] requesting return call

## 2023-04-18 LAB
ANION GAP SERPL CALCULATED.3IONS-SCNC: 5 MMOL/L (ref 4–13)
BUN SERPL-MCNC: 11 MG/DL (ref 5–25)
CALCIUM SERPL-MCNC: 8.9 MG/DL (ref 8.4–10.2)
CHLORIDE SERPL-SCNC: 92 MMOL/L (ref 96–108)
CO2 SERPL-SCNC: 38 MMOL/L (ref 21–32)
CREAT SERPL-MCNC: 0.56 MG/DL (ref 0.6–1.3)
GFR SERPL CREATININE-BSD FRML MDRD: 105 ML/MIN/1.73SQ M
GLUCOSE SERPL-MCNC: 125 MG/DL (ref 65–140)
INR PPP: 3.08 (ref 0.84–1.19)
POTASSIUM SERPL-SCNC: 4 MMOL/L (ref 3.5–5.3)
PROTHROMBIN TIME: 32 SECONDS (ref 11.6–14.5)
SODIUM SERPL-SCNC: 135 MMOL/L (ref 135–147)

## 2023-04-18 RX ORDER — WARFARIN SODIUM 7.5 MG/1
7.5 TABLET ORAL
Status: DISCONTINUED | OUTPATIENT
Start: 2023-04-19 | End: 2023-04-24

## 2023-04-18 RX ADMIN — LEVALBUTEROL HYDROCHLORIDE 1.25 MG: 1.25 SOLUTION RESPIRATORY (INHALATION) at 15:17

## 2023-04-18 RX ADMIN — PREGABALIN 150 MG: 75 CAPSULE ORAL at 21:25

## 2023-04-18 RX ADMIN — GUAIFENESIN 1200 MG: 600 TABLET ORAL at 21:24

## 2023-04-18 RX ADMIN — IPRATROPIUM BROMIDE 0.5 MG: 0.5 SOLUTION RESPIRATORY (INHALATION) at 19:56

## 2023-04-18 RX ADMIN — RISPERIDONE 1 MG: 1 TABLET ORAL at 21:24

## 2023-04-18 RX ADMIN — POTASSIUM CHLORIDE 20 MEQ: 1500 TABLET, EXTENDED RELEASE ORAL at 08:19

## 2023-04-18 RX ADMIN — PREGABALIN 150 MG: 75 CAPSULE ORAL at 16:53

## 2023-04-18 RX ADMIN — PANTOPRAZOLE SODIUM 40 MG: 40 TABLET, DELAYED RELEASE ORAL at 05:09

## 2023-04-18 RX ADMIN — METHOCARBAMOL TABLETS 750 MG: 750 TABLET, COATED ORAL at 08:19

## 2023-04-18 RX ADMIN — LEVALBUTEROL HYDROCHLORIDE 1.25 MG: 1.25 SOLUTION RESPIRATORY (INHALATION) at 07:31

## 2023-04-18 RX ADMIN — SODIUM CHLORIDE SOLN NEBU 3% 4 ML: 3 NEBU SOLN at 15:18

## 2023-04-18 RX ADMIN — METHOCARBAMOL TABLETS 750 MG: 750 TABLET, COATED ORAL at 00:05

## 2023-04-18 RX ADMIN — SODIUM CHLORIDE SOLN NEBU 3% 4 ML: 3 NEBU SOLN at 07:32

## 2023-04-18 RX ADMIN — OXYCODONE HYDROCHLORIDE 5 MG: 5 TABLET ORAL at 08:19

## 2023-04-18 RX ADMIN — RISPERIDONE 1 MG: 1 TABLET ORAL at 16:53

## 2023-04-18 RX ADMIN — IPRATROPIUM BROMIDE 0.5 MG: 0.5 SOLUTION RESPIRATORY (INHALATION) at 07:32

## 2023-04-18 RX ADMIN — ACETAMINOPHEN 975 MG: 325 TABLET, FILM COATED ORAL at 15:15

## 2023-04-18 RX ADMIN — BENZONATATE 200 MG: 100 CAPSULE ORAL at 21:25

## 2023-04-18 RX ADMIN — BENZONATATE 200 MG: 100 CAPSULE ORAL at 08:19

## 2023-04-18 RX ADMIN — OXYCODONE HYDROCHLORIDE 5 MG: 5 TABLET ORAL at 00:05

## 2023-04-18 RX ADMIN — IPRATROPIUM BROMIDE 0.5 MG: 0.5 SOLUTION RESPIRATORY (INHALATION) at 15:18

## 2023-04-18 RX ADMIN — OXYCODONE HYDROCHLORIDE 5 MG: 5 TABLET ORAL at 21:24

## 2023-04-18 RX ADMIN — BENZONATATE 200 MG: 100 CAPSULE ORAL at 16:53

## 2023-04-18 RX ADMIN — OXYCODONE HYDROCHLORIDE 5 MG: 5 TABLET ORAL at 15:15

## 2023-04-18 RX ADMIN — METHOCARBAMOL TABLETS 750 MG: 750 TABLET, COATED ORAL at 15:15

## 2023-04-18 RX ADMIN — ACETAMINOPHEN 975 MG: 325 TABLET, FILM COATED ORAL at 21:25

## 2023-04-18 RX ADMIN — FUROSEMIDE 40 MG: 40 TABLET ORAL at 08:19

## 2023-04-18 RX ADMIN — GUAIFENESIN 1200 MG: 600 TABLET ORAL at 08:19

## 2023-04-18 RX ADMIN — METHOCARBAMOL TABLETS 750 MG: 750 TABLET, COATED ORAL at 21:25

## 2023-04-18 RX ADMIN — RISPERIDONE 1 MG: 1 TABLET ORAL at 08:19

## 2023-04-18 RX ADMIN — METOPROLOL SUCCINATE 25 MG: 25 TABLET, EXTENDED RELEASE ORAL at 08:19

## 2023-04-18 RX ADMIN — LEVALBUTEROL HYDROCHLORIDE 1.25 MG: 1.25 SOLUTION RESPIRATORY (INHALATION) at 19:56

## 2023-04-18 RX ADMIN — PREGABALIN 150 MG: 75 CAPSULE ORAL at 08:19

## 2023-04-18 RX ADMIN — SODIUM CHLORIDE SOLN NEBU 3% 4 ML: 3 NEBU SOLN at 19:56

## 2023-04-18 RX ADMIN — ACETAMINOPHEN 975 MG: 325 TABLET, FILM COATED ORAL at 05:09

## 2023-04-18 NOTE — OCCUPATIONAL THERAPY NOTE
Occupational Therapy Treatment Note     Patient Name: Remigio Reid  KVINP'E Date: 4/18/2023  Problem List  Principal Problem:    Acute on chronic respiratory failure [facility])  Active Problems: Morbid obesity ([facility] [address] )    Primary hypertension    EVERETT (obstructive sleep apnea)    Ch[facility]    History of pulmonary embolism        04/18/23 1324   OT Last Visit   OT Visit Date 04/18/23   Note Type   Note Type Treatment   Pain Assessment   Pain Assessment Tool 0-10   Pain Score 8   Pain Location/Orientation Orientation: Lower; Location: Back  (+ scrotum)   Pain Onset/Description Descriptor: Sore  (chronic in nature)   Hospital Pain Intervention(s) Repositioned; Ambulation/increased activity  (RN made aware)   Multiple Pain Sites Yes   Pain 2   Pain Score 2 8   Pain Location/Orientation 2 Location: Neck  (radiating to L shoulder, chronic in nature)   Restrictions/Precautions   Weight Bearing Precautions Per Order No   Other Precautions Chair Alarm; Bed Alarm;O2;Fall Risk;Pain  (masimo)   Lifestyle   Autonomy Pt admitted from [facility], has hx of multiple recent hospitalizations  At baseline lives c his spouse   Reciprocal Relationships supportive spouse and son  Service to Others retired   Intrinsic Gratification (S)  Expressing that his goal is to attend his sons wedding in June   ADL   Where Assessed Chair   Toileting Comments condom cath in place  Functional Standing Tolerance   Time variable 5s-15s x5 trials   Activity functional transfer training in order to work towards goal of increased functional independence during self care tasks and transferring to/from [facility] vs W/C/reclienr   Comments B/L knee blocking + Max A x2 - total A x2  Walking sling in place for safety  Bed Mobility   Additional Comments pt seated in recliner upon arrival and end of session  Transfers   Sit to Stand 2  Maximal assistance   Additional items Assist x 2; Increased time required;Verbal cues  (hand / foot placement)   Stand to Sit 2  Maximal assistance   Additional items Assist x 2; Increased time required;Verbal cues   Additional Comments sit<>stand trials x6 completed from recliner  Variable max A x2 and total A x2, more often requiring Max  B/L knee blocking c pillow in place  Belt and hammock technique used for all transfers + walking sling in place for safety via ceiling lift  able to tolerate static standing 5 - 15s each transfer, limited d/t weakness and pain  Achieved full clearance of buttocks on 3/6 trials  Pt able to transition hands from armrests to RW upon standing, however difficulty bearing weight through UE effectively to assist with transfer  -115BPM following standing trials  functional seated rest breaks ~2 min in between trials  Subjective   Subjective pt expressing frustration following todays session  self reflecting that he was more successful in yesterdays therapy session (c PT) as compared to today  Supportive listenting/emotional support provided  Reminded pt of short term goals in order to achieve LTG  Cognition   Overall Cognitive Status WFL   Arousal/Participation Alert; Responsive   Attention Within functional limits   Orientation Level Oriented X4   Memory Within functional limits   Following Commands Follows one step commands with increased time or repetition   Comments pt intermittently with frustration towards functional status but expressing high motivation to continue making progress  Activity Tolerance   Activity Tolerance Patient limited by fatigue;Patient limited by pain   Medical Staff Made Aware PT Gera Woodson RN [facility]   Assessment   Assessment Patient seen for OT treatment on 4/18/2023 s/p admission for Acute on chronic respiratory failure [facility]) Patient presents with active orders for OT eval and treat and Up with assistance   Upon arrival, pt found resting in recliner showing no signs of distress  Patient agreeable to OT session   Patient participated in fall prevention  and functional transfer training with intervention focus on functional strength training, increasing activity tolerance, and increasing independence in self care transfers  Toma Shaikh is showing improvements in standing tolerance, transfers, and activity tolerance but is continuing to perform below baseline due to the following deficits: endurance ,  decreased muscular strength , decreased balance , decreased standing tolerance for self care tasks , decreased trunk control  and (+) pain   From OT standpoint, patient would benefit from skilled intervention to maximize independence with ADLs and functional mobility  Goals remain appropriate, continue POC  At this time, recommending D/C to: post-acute rehabilitation   Plan   Treatment Interventions ADL retraining;Functional transfer training;UE strengthening/ROM; Endurance training;Patient/family training;Equipment evaluation/education; Neuromuscular reeducation   Goal Expiration Date 04/20/23  (see below for additional goal expressed by pt on this date  Will update POC to include this goal for client centered tasks )   OT Treatment Day 3   OT Frequency 3-5x/wk   Recommendation   OT Discharge Recommendation Post acute rehabilitation services   Additional Comments  (S)  Recommend chair position in bed vs OOB to recliner for pressure offloading throughout the day  Use of nanette lift for OOB trasnfers with nursing staff   Additional Comments 2 The patient's raw score on the -PAC Daily Activity Inpatient Short Form is 12  A raw score of less than 19 suggests the patient may benefit from discharge to post-acute rehabilitation services  Please refer to the recommendation of the Occupational Therapist for safe discharge planning     -PAC Daily Activity Inpatient   Lower Body Dressing 1   Bathing 1   Toileting 1   Upper Body Dressing 2   Grooming 3   Eating 4   Daily Activity Raw Score 12   Daily Activity Standardized Score (Calc for Raw Score >=11) 30 6 AM-PAC Applied Cognition Inpatient   Following a Speech/Presentation 4   Understanding Ordinary Conversation 4   Taking Medications 3   Remembering Where Things Are Placed or Put Away 4   Remembering List of 4-5 Errands 3   Taking Care of Complicated Tasks 4   Applied Cognition Raw Score 22   Applied Cognition Standardized Score 47 83   End of Consult   Education Provided Yes   Patient Position at End of Consult Bedside chair;Bed/Chair alarm activated; All needs within reach   Nurse Communication Nurse aware of consult  Domingo Mackenzie)   End of Consult Comments Pt benefited from co-session of skilled OT and PT therapists in order to most appropriately address functional deficits d/t extensive physical assistance required for safe mobility  and decreased activity tolerance  OT/PT objectives were addressed separately; please see PT note for specific goal areas targeted  Updated goal added to pt's plan of care:     Pt will complete stand pivot transfer to/from W/C with Max A x2 with LRAD to maximize independence with functional mobility within 15 days in order to attend pt's son's wedding       Jazmin Reis

## 2023-04-18 NOTE — ASSESSMENT & PLAN NOTE
· History of DVT, PE  · Home dose of coumadin alternating 9mg and 10 mg   · INR supratherapeutic the last two days, 3 08 today  · Check INR in AM  · Will resume Coumadin at a lower dose of 7 5 mg daily from tomorrow as long as INR is therapeutic

## 2023-04-18 NOTE — PLAN OF CARE
Problem: PHYSICAL THERAPY ADULT  Goal: Performs mobility at highest level of function for planned discharge setting  See evaluation for individualized goals  Description: Treatment/Interventions: Functional transfer training, LE strengthening/ROM, Therapeutic exercise, Endurance training, Cognitive reorientation, Patient/family training, Equipment eval/education, Gait training, Bed mobility          See flowsheet documentation for full assessment, interventions and recommendations  Outcome: Progressing  Note: Prognosis: Fair  Problem List: Decreased strength, Decreased range of motion, Decreased endurance, Impaired balance, Decreased mobility, Pain, Obesity, Decreased skin integrity, Impaired hearing  Assessment: Geneva Kay Ming needed more physical A this session for transfers as compared to yesterday, and had less standing tolerance time despite use of the walking sling and more physical A  Pt attributed it to more pain @ scrotum and longer time from pain pre-medication-> hands on PT time compared to yesterday  Additionally, patient had less upright posture and was unable to complete knee extension on any of the trials today  As a positive note, he was able to perform partial buttock clearance over 2 trials with a modified gait belt technique with a max assist of 1 using upper extremity support of recliner  Based on this performance, would recommend continued mobility trials for sit to stand primarily using belt and hammock technique AND NOT WALKING SLING until patient can perform further gait training/standing tolerance with knees extended so that sling is not promoting too much pressure at the scrotum area  Barriers to Discharge: Decreased caregiver support, Inaccessible home environment     PT Discharge Recommendation: Post acute rehabilitation services    See flowsheet documentation for full assessment

## 2023-04-18 NOTE — PHYSICAL THERAPY NOTE
PHYSICAL THERAPY TREATMENT NOTE  NAME:  Paulette Diez  DATE: 04/18/23    Length Of Stay: 11  Performed at least 2 patient identifiers during session: Name and Birthday    TREATMENT:    04/18/23 1405   PT Last Visit   PT Visit Date 04/18/23   Note Type   Note Type Treatment   Pain Assessment   Pain Assessment Tool 0-10   Pain Score 8   Pain Location/Orientation Location: Back;Orientation: Left;Orientation: Upper;Orientation: Mid   Patient's Stated Pain Goal No pain   Hospital Pain Intervention(s) Repositioned; Ambulation/increased activity; Emotional support  (RN notified of pt's pain med request and to return back to bed )   Pain 2   Pain Score 2 8   Pain Location/Orientation 2 Location: Neck  (radiating to L shoulder, chronic in nature)   Hospital Pain Intervention(s) 2   (additionally messaged Dr Kovacs Getting re: Pt' s distended neck @ L>R sides)   Restrictions/Precautions   Weight Bearing Precautions Per Order No   Other Precautions Chair Alarm; Bed Alarm;O2;Fall Risk;Pain   General   Chart Reviewed Yes   Response to Previous Treatment Patient reporting fatigue but able to participate  Family/Caregiver Present No   Cognition   Overall Cognitive Status WFL   Arousal/Participation Alert   Attention Attends with cues to redirect   Memory Within functional limits   Following Commands Follows one step commands with increased time or repetition   Subjective   Subjective Pt needs encouragement to participate  Denies lightheadedness  Pt appears more apprehensive to participate today   Bed Mobility   Supine to Sit Unable to assess  (Pt OOB in chair upon entering room)   Transfers   Sit to Stand 2  Maximal assistance   Additional items Assist x 2; Increased time required;Verbal cues;Armrests; Bedrails   Stand to Sit 2  Maximal assistance   Additional items Assist x 2; Increased time required;Verbal cues;Armrests; Bedrails   Additional Comments Sit<>stand trials x6 total completed from recliner, primarily A using modified belt and "hammock technique even when pt had walking sling donned  Pt primarily needed max A x2<>total A x2, but pt did have 2 episodes of buttock clearance w/dependent A of 1 only w/ Pt using BUE support recliner  Ambulation/Elevation   Gait pattern Not appropriate   Ambulation/Elevation Additional Comments Brenda's Egress test FAIL   Wheelchair Activities   Pressure Relief Type Lateral lean  (@ recliner, especially to R side to completely remove walking sling )   Balance   Static Sitting Fair +   Static Standing Zero   Endurance Deficit   Endurance Deficit Yes   Endurance Deficit Description needs therapeutic rests between mobility, limited standing tolerance   Activity Tolerance   Activity Tolerance Patient limited by pain; Patient limited by fatigue   Nurse Made Aware spoke to RN @ 8:30 this am for care coordination, immediately before session and after session  Coordinated w/ RN prior to stand for skin check @proximal /medial R thigh, requested potential DC of condom cath  RN placed allevyn pad @ redened area @ leg (no skin open area)  However during adjustment for positioning of walking sling, condom cath came off  Medical Staff Made Aware care coordination w/Sandra OT; messaged Dr Chong Land Sitting;10 reps;Bilateral;AAROM;AROM  (with glut set prior to performance)   Knee AROM Short Arc Quad Sitting;10 reps;Bilateral;AROM   Ankle Pumps Sitting;10 reps;Bilateral;AROM   Neuro re-ed Extensive time needed for proper positioning of walking sling for pt in recliner, with pt's reports that his \"testicles and scrotum were pinched\"  This writer did reposition pt while in sitting with witness OT present  Pt able to tolerate partial upright static \"standing\" 5 - 15s each transfer, limited d/t weakness and pain  Achieved full clearance of buttocks on 3/6 trials  Pt performing variable UE support again this session but was unable to complete knee ext on ANY of the upright standing trials   Pt " requires functional seated rest breaks ~2 min in between trials  Assessment   Prognosis Fair   Problem List Decreased strength;Decreased range of motion;Decreased endurance; Impaired balance;Decreased mobility;Pain;Obesity; Decreased skin integrity; Impaired hearing   Assessment (S)  Ming needed more physical A this session for transfers as compared to yesterday, and had less standing tolerance time despite use of the walking sling and more physical A  Pt attributed it to more pain @ scrotum and longer time from pain pre-medication-> hands on PT time compared to yesterday  Additionally, patient had less upright posture and was unable to complete knee extension on any of the trials today  On a positive note, he was able to perform partial buttock clearance over 2 trials with a modified gait belt technique with a max assist of 1 using upper extremity support of recliner  Based on this performance, would recommend continued mobility trials for sit to stand during PT primarily using belt and hammock technique AND NOT WALKING SLING until patient can perform further gait training/standing tolerance with knees extended so that sling is not promoting too much pressure at the scrotum area  Barriers to Discharge Decreased caregiver support; Inaccessible home environment   Goals   Patient Goals To get up and walk   STG Expiration Date 04/21/23   PT Treatment Day 6   Plan   Treatment/Interventions Functional transfer training;LE strengthening/ROM; Therapeutic exercise; Endurance training;Cognitive reorientation;Patient/family training;Equipment eval/education; Bed mobility;Spoke to nursing;OT;Spoke to MD  (Standing tolerance, eventual gait training and patient has more upright posture to stand)   Progress Slow progress, decreased activity tolerance   PT Frequency   (5x/wk mon-fri)   Recommendation   PT Discharge Recommendation Post acute rehabilitation services   Equipment Recommended (S)  Kisha ruelas lift by nursing for OOB transfers; belt and hammock technique +/- wide rolling walker for transfer training  Eventual resumption of walking sling and wide rolling walker in future for gait training )   AM-PAC Basic Mobility Inpatient   Turning in Flat Bed Without Bedrails 2   Lying on Back to Sitting on Edge of Flat Bed Without Bedrails 1   Moving Bed to Chair 1   Standing Up From Chair Using Arms 1   Walk in Room 1   Climb 3-5 Stairs With Railing 1   Basic Mobility Inpatient Raw Score 7   Turning Head Towards Sound 4   Follow Simple Instructions 3   Low Function Basic Mobility Raw Score  14   Low Function Basic Mobility Standardized Score  22 01   Highest Level Of Mobility   JH-HLM Goal 2: Bed activities/Dependent transfer   JH-HLM Achieved 4: Move to chair/commode   Education   Education Provided Mobility training;Home exercise program;Assistive device   Patient Reinforcement needed; Explanation/teachback used   End of Consult   Patient Position at End of Consult All needs within reach; Bedside chair   End of Consult Comments Instructed RN at patient's request to return back to bed   Pt requires PT /OT co-eval due to required skilled interventions of at least 2 clinicians for care delivery, medical complexity, limited activity tolerance, and cognitive-behavioral impairments  PT and OT goals addressed separately  Nursing Recommendations:   Mobility Plan as of 04/18/23: Pt is dependent Assist with  seated nanette lift sling  and ceiling lift to/from recliner  Encourage OOB for all meals  The patient's AM-PAC Basic Mobility Inpatient Short Form Raw Score is 7  A Raw score of less than or equal to 16 suggests the patient may benefit from discharge to post-acute rehabilitation services, which DOES coincide with CURRENT above PT recommendations  However please refer to therapist recommendation for discharge planning given other factors that may influence destination       Adapted from Deni Gray Kirit Hawley  [facility] “6-Clicks” Basic Mobility and Daily Activity Scores With Discharge Destination  Physical Therapy, 2021;101:1-9   DOI: 10 [email or web address]    Mirza Henderson PT, DPT

## 2023-04-18 NOTE — PROGRESS NOTES
[facility]  Progress Note  Name: Viola Ram  MRN: [id number]  Unit/Bed#: S -01 I Date of Admission: 4/7/2023   Date of Service: 4/18/2023 I Hospital Day: 11    Assessment/Plan   * Acute on chronic respiratory failure ([facility] [address] )  Assessment & Plan  · Completed prednisone therapy  · Continue PO Lasix 40 mg daily  · Given additional IV Lasix 40 mg x 1 4/15 with good UO  · Monitor respiratory status, currently on baseline 5L  · Respiratory protocol, Xopenex/Atrovent, Airway clearance protocol, IS  · Mucinex, Tessalon Perles   · Out of bed to chair, PT and OT      History of pulmonary embolism  Assessment & Plan  · History of DVT, PE  · Home dose of coumadin alternating 9mg and 10 mg   · INR supratherapeutic the last two days, 3 08 today  · Check INR in AM  · Will resume Coumadin at a lower dose of 7 5 mg daily from tomorrow as long as INR is therapeutic    Chronic diastolic congestive heart failure (HCC)  Assessment & Plan  · No further shortness of breath  · Continue on p o  Lasix  Intermittent IV lasix as needed  · Daily weights monitor input and output    Morbid obesity (HCC)  Assessment & Plan  · Recently DC to [facility]   PT/OT: post acute rehab  · Pending placement  · BMI 60  • Recommend incorporating a more whole foods plant-predominant diet along with decreasing consumption of red meats and processed foods      EVERETT (obstructive sleep apnea)  Assessment & Plan  Hx of EVERETT, morbid obesity, obesity hypoventilation syndrome   Continue BiPAP HS     Primary hypertension  Assessment & Plan  BP on admission 121/55  Home medication Lasix 40mg QD              VTE Pharmacologic Prophylaxis: VTE Score: 10 High Risk (Score >/= 5) - Pharmacological DVT Prophylaxis Ordered: warfarin (Coumadin)  Sequential Compression Devices Ordered  Patient Centered Rounds: I performed bedside rounds with nursing staff today    Discussions with Specialists or Other Care Team Provider: Case manager    Education and Discussions with Family / Patient: Patient declined call to   Total Time Spent on Date of Encounter in care of patient: 35 minutes This time was spent on one or more of the following: performing physical exam; counseling and coordination of care; obtaining or reviewing history; documenting in the medical record; reviewing/ordering tests, medications or procedures; communicating with other healthcare professionals and discussing with patient's family/caregivers  Current Length of Stay: 11 day(s)  Current Patient Status: Inpatient   Certification Statement: The patient will continue to require additional inpatient hospital stay due to Pending placement  Discharge Plan: Pending placement    Code Status: Level 1 - Full Code    Subjective:   Seen this morning in no acute distress, no new complaints  Objective:     Vitals:   Temp (24hrs), Av 7 °F (37 1 °C), Min:98 4 °F (36 9 °C), Max:99 °F (37 2 °C)    Temp:  [98 4 °F (36 9 °C)-99 °F (37 2 °C)] 99 °F (37 2 °C)  HR:  [100-111] 102  Resp:  [20-21] 20  BP: (106-118)/(56-61) 106/56  SpO2:  [94 %-99 %] 94 %  Body mass index is 61 26 kg/m²  Input and Output Summary (last 24 hours): Intake/Output Summary (Last 24 hours) at 2023 1510  Last data filed at 2023 1243  Gross per 24 hour   Intake 960 ml   Output 650 ml   Net 310 ml       Physical Exam:   Physical Exam  Vitals reviewed  Constitutional:       General: He is not in acute distress  Appearance: He is obese  He is not toxic-appearing  HENT:      Head: Normocephalic and atraumatic  Mouth/Throat:      Pharynx: Oropharynx is clear  Eyes:      Extraocular Movements: Extraocular movements intact  Cardiovascular:      Rate and Rhythm: Normal rate and regular rhythm  Pulses: Normal pulses  Pulmonary:      Effort: No respiratory distress  Breath sounds: No wheezing, rhonchi or rales        Comments: Decreased breath sounds bilaterally  Abdominal:      Palpations: Abdomen is soft  Tenderness: There is no abdominal tenderness  Musculoskeletal:         General: No tenderness  Right lower leg: Edema present  Left lower leg: Edema present  Comments: At baseline   Neurological:      Mental Status: He is alert and oriented to person, place, and time  Psychiatric:         Mood and Affect: Mood normal          Behavior: Behavior normal          Thought Content:  Thought content normal          Judgment: Judgment normal           Additional Data:     Labs:  Results from last 7 days   Lab Units 04/12/23  0514   WBC Thousand/uL 10 72*   HEMOGLOBIN g/dL 13 4   HEMATOCRIT % 43 3   PLATELETS Thousands/uL 225     Results from last 7 days   Lab Units 04/18/23  0508   SODIUM mmol/L 135   POTASSIUM mmol/L 4 0   CHLORIDE mmol/L 92*   CO2 mmol/L 38*   BUN mg/dL 11   CREATININE mg/dL 0 56*   ANION GAP mmol/L 5   CALCIUM mg/dL 8 9   GLUCOSE RANDOM mg/dL 125     Results from last 7 days   Lab Units 04/18/23  0508   INR  3 08*                   Lines/Drains:  Invasive Devices     Peripheral Intravenous Line  Duration           Peripheral IV 04/15/23 Right;Ventral (anterior) Forearm 2 days              Recent Cultures (last 7 days):         Last 24 Hours Medication List:   Current Facility-Administered Medications   Medication Dose Route Frequency Provider Last Rate   • acetaminophen  975 mg Oral Q8H [address] Enid Mosqueda MD     • albuterol  2 5 mg Nebulization Q4H PRN Lita Farrar MD     • benzonatate  200 mg Oral TID Enid Mosqueda MD     • dextromethorphan-guaiFENesin  10 mL Oral Q4H PRN Mansoor Santamaria MD     • docusate sodium  100 mg Oral BID PRN Enid Mosqueda MD     • furosemide  40 mg Oral Daily Ryan Chowdhury MD     • guaiFENesin  1,200 mg Oral Q12H Ruchi Akhtar MD     • ipratropium  0 5 mg Nebulization TID Brooks Rosas MD     • levalbuterol  1 25 mg Nebulization TID Brooks Rosas MD     • methocarbamol  750 mg Oral Q6H PRN Governor Nabeel MD     • metoprolol succinate  25 mg Oral Daily Aristeo Blunt MD     • oxyCODONE  5 mg Oral Q6H PRN Marisela Pettit MD     • pantoprazole  40 mg Oral Early Morning Wade Delgado MD     • potassium chloride  20 mEq Oral Daily Aristeo Blunt MD     • pregabalin  150 mg Oral TID Governor Nabeel MD     • risperiDONE  1 mg Oral TID Governor Nabeel MD     • simethicone  80 mg Oral Q6H PRN Anastacio Sherwood MD     • sodium chloride  4 mL Nebulization TID Wade Delgado MD     • [START ON 4/19/2023] warfarin  7 5 mg Oral Daily (warfarin) Anastacio Sherwood MD          Today, Patient Was Seen By: Alaina Hamilton MD    **Please Note: This note may have been constructed using a voice recognition system  **

## 2023-04-18 NOTE — PLAN OF CARE
Problem: OCCUPATIONAL THERAPY ADULT  Goal: Performs self-care activities at highest level of function for planned discharge setting  See evaluation for individualized goals  Description: Treatment Interventions: ADL retraining, Functional transfer training, UE strengthening/ROM, Endurance training, Patient/family training, Equipment evaluation/education, Compensatory technique education, Neuromuscular reeducation, Energy conservation          See flowsheet documentation for full assessment, interventions and recommendations  4/18/2023 1551 by Wendy Miranda OT  Outcome: Progressing  Note: Limitation: Decreased ADL status, Decreased Safe judgement during ADL, Decreased endurance, Decreased cognition, Decreased self-care trans, Decreased high-level ADLs  Prognosis: Fair  Assessment: Patient seen for OT treatment on 4/18/2023 s/p admission for Acute on chronic respiratory failure [facility]) Patient presents with active orders for OT eval and treat and Up with assistance   Upon arrival, pt found resting in recliner showing no signs of distress  Patient agreeable to OT session  Patient participated in fall prevention  and functional transfer training with intervention focus on functional strength training, increasing activity tolerance, and increasing independence in self care transfers  Zarirachel Esparza is showing improvements in standing tolerance, transfers, and activity tolerance but is continuing to perform below baseline due to the following deficits: endurance ,  decreased muscular strength , decreased balance , decreased standing tolerance for self care tasks , decreased trunk control  and (+) pain   From OT standpoint, patient would benefit from skilled intervention to maximize independence with ADLs and functional mobility  Goals remain appropriate, continue POC   At this time, recommending D/C to: post-acute rehabilitation     OT Discharge Recommendation: Post acute rehabilitation services     Galo Yusuf Natacha Clay

## 2023-04-18 NOTE — ASSESSMENT & PLAN NOTE
· Completed prednisone therapy  · Continue PO Lasix 40 mg daily  · Given additional IV Lasix 40 mg x 1 4/15 with good UO  · Monitor respiratory status, currently on baseline 5L  · Respiratory protocol, Xopenex/Atrovent, Airway clearance protocol, IS  · Mucinex, Tessalon Perles   · Out of bed to chair, PT and OT

## 2023-04-19 LAB
ANION GAP SERPL CALCULATED.3IONS-SCNC: 5 MMOL/L (ref 4–13)
BUN SERPL-MCNC: 10 MG/DL (ref 5–25)
CALCIUM SERPL-MCNC: 8.8 MG/DL (ref 8.4–10.2)
CHLORIDE SERPL-SCNC: 91 MMOL/L (ref 96–108)
CO2 SERPL-SCNC: 39 MMOL/L (ref 21–32)
CREAT SERPL-MCNC: 0.41 MG/DL (ref 0.6–1.3)
GFR SERPL CREATININE-BSD FRML MDRD: 119 ML/MIN/1.73SQ M
GLUCOSE SERPL-MCNC: 133 MG/DL (ref 65–140)
INR PPP: 2.55 (ref 0.84–1.19)
POTASSIUM SERPL-SCNC: 3.6 MMOL/L (ref 3.5–5.3)
PROTHROMBIN TIME: 27.7 SECONDS (ref 11.6–14.5)
SODIUM SERPL-SCNC: 135 MMOL/L (ref 135–147)

## 2023-04-19 RX ORDER — FUROSEMIDE 10 MG/ML
40 INJECTION INTRAMUSCULAR; INTRAVENOUS ONCE
Status: COMPLETED | OUTPATIENT
Start: 2023-04-19 | End: 2023-04-19

## 2023-04-19 RX ADMIN — RISPERIDONE 1 MG: 1 TABLET ORAL at 09:01

## 2023-04-19 RX ADMIN — METHOCARBAMOL TABLETS 750 MG: 750 TABLET, COATED ORAL at 10:00

## 2023-04-19 RX ADMIN — METHOCARBAMOL TABLETS 750 MG: 750 TABLET, COATED ORAL at 03:36

## 2023-04-19 RX ADMIN — FUROSEMIDE 40 MG: 10 INJECTION, SOLUTION INTRAMUSCULAR; INTRAVENOUS at 16:59

## 2023-04-19 RX ADMIN — IPRATROPIUM BROMIDE 0.5 MG: 0.5 SOLUTION RESPIRATORY (INHALATION) at 19:30

## 2023-04-19 RX ADMIN — FUROSEMIDE 40 MG: 40 TABLET ORAL at 09:01

## 2023-04-19 RX ADMIN — BENZONATATE 200 MG: 100 CAPSULE ORAL at 09:01

## 2023-04-19 RX ADMIN — IPRATROPIUM BROMIDE 0.5 MG: 0.5 SOLUTION RESPIRATORY (INHALATION) at 07:47

## 2023-04-19 RX ADMIN — ACETAMINOPHEN 975 MG: 325 TABLET, FILM COATED ORAL at 05:59

## 2023-04-19 RX ADMIN — BENZONATATE 200 MG: 100 CAPSULE ORAL at 15:57

## 2023-04-19 RX ADMIN — OXYCODONE HYDROCHLORIDE 5 MG: 5 TABLET ORAL at 10:00

## 2023-04-19 RX ADMIN — SODIUM CHLORIDE SOLN NEBU 3% 4 ML: 3 NEBU SOLN at 07:47

## 2023-04-19 RX ADMIN — GUAIFENESIN 1200 MG: 600 TABLET ORAL at 09:01

## 2023-04-19 RX ADMIN — POTASSIUM CHLORIDE 20 MEQ: 1500 TABLET, EXTENDED RELEASE ORAL at 09:01

## 2023-04-19 RX ADMIN — GUAIFENESIN AND DEXTROMETHORPHAN 10 ML: 100; 10 SYRUP ORAL at 09:01

## 2023-04-19 RX ADMIN — WARFARIN SODIUM 7.5 MG: 7.5 TABLET ORAL at 17:02

## 2023-04-19 RX ADMIN — RISPERIDONE 1 MG: 1 TABLET ORAL at 21:23

## 2023-04-19 RX ADMIN — GUAIFENESIN AND DEXTROMETHORPHAN 10 ML: 100; 10 SYRUP ORAL at 15:57

## 2023-04-19 RX ADMIN — OXYCODONE HYDROCHLORIDE 5 MG: 5 TABLET ORAL at 03:36

## 2023-04-19 RX ADMIN — METHOCARBAMOL TABLETS 750 MG: 750 TABLET, COATED ORAL at 16:24

## 2023-04-19 RX ADMIN — PANTOPRAZOLE SODIUM 40 MG: 40 TABLET, DELAYED RELEASE ORAL at 05:59

## 2023-04-19 RX ADMIN — DOCUSATE SODIUM 100 MG: 100 CAPSULE, LIQUID FILLED ORAL at 10:00

## 2023-04-19 RX ADMIN — ACETAMINOPHEN 975 MG: 325 TABLET, FILM COATED ORAL at 21:23

## 2023-04-19 RX ADMIN — ACETAMINOPHEN 975 MG: 325 TABLET, FILM COATED ORAL at 15:57

## 2023-04-19 RX ADMIN — LEVALBUTEROL HYDROCHLORIDE 1.25 MG: 1.25 SOLUTION RESPIRATORY (INHALATION) at 19:30

## 2023-04-19 RX ADMIN — RISPERIDONE 1 MG: 1 TABLET ORAL at 15:57

## 2023-04-19 RX ADMIN — METOPROLOL SUCCINATE 25 MG: 25 TABLET, EXTENDED RELEASE ORAL at 09:01

## 2023-04-19 RX ADMIN — IPRATROPIUM BROMIDE 0.5 MG: 0.5 SOLUTION RESPIRATORY (INHALATION) at 13:45

## 2023-04-19 RX ADMIN — PREGABALIN 150 MG: 75 CAPSULE ORAL at 09:01

## 2023-04-19 RX ADMIN — OXYCODONE HYDROCHLORIDE 5 MG: 5 TABLET ORAL at 21:23

## 2023-04-19 RX ADMIN — LEVALBUTEROL HYDROCHLORIDE 1.25 MG: 1.25 SOLUTION RESPIRATORY (INHALATION) at 07:47

## 2023-04-19 RX ADMIN — SODIUM CHLORIDE SOLN NEBU 3% 4 ML: 3 NEBU SOLN at 13:45

## 2023-04-19 RX ADMIN — GUAIFENESIN 1200 MG: 600 TABLET ORAL at 21:23

## 2023-04-19 RX ADMIN — SODIUM CHLORIDE SOLN NEBU 3% 4 ML: 3 NEBU SOLN at 19:30

## 2023-04-19 RX ADMIN — PREGABALIN 150 MG: 75 CAPSULE ORAL at 21:22

## 2023-04-19 RX ADMIN — BENZONATATE 200 MG: 100 CAPSULE ORAL at 21:23

## 2023-04-19 RX ADMIN — PREGABALIN 150 MG: 75 CAPSULE ORAL at 15:57

## 2023-04-19 RX ADMIN — METHOCARBAMOL TABLETS 750 MG: 750 TABLET, COATED ORAL at 21:23

## 2023-04-19 RX ADMIN — OXYCODONE HYDROCHLORIDE 5 MG: 5 TABLET ORAL at 15:57

## 2023-04-19 RX ADMIN — LEVALBUTEROL HYDROCHLORIDE 1.25 MG: 1.25 SOLUTION RESPIRATORY (INHALATION) at 13:45

## 2023-04-19 NOTE — CASE MANAGEMENT
Case Management Progress Note    Patient name Frida [facility]  Location S /S -66 MRN [id number]  : 1955 Date 2023       LOS (days): 12  Geometric Mean LOS (GMLOS) (days): 3 90  Days to GMLOS:-8 2        OBJECTIVE:        Current admission status: Inpatient  Preferred Pharmacy:   [address], [place] [address]  [address] [address]  Phone: [phone] Fax: [phone]    Primary Care Provider: Mariah Soto MD    Primary Insurance: [address] REP  Secondary Insurance:     PROGRESS NOTE:    Call made to Aruna at [facility] ([phone]) to review referral; contact number provided and expecting return call  Return call received then from Aruna and she reports that until patient can demonstrate better mobility (assist x1 with all tasks- transfers, bed mobility, ambulating) she will not be able to accommodate at any of their facilities  Discussed case with PT - patient needed additional encouragement and assistance compared to sessions earlier this week  Inquired about possible psych consult - so request forwarded to MD  Patient's weight on admission was 434lbs - current weight noted to be 480lbs - inquired about possible restriction of his fluids and diet - possible nutrition consult, as continued weight gain will limit facilities further as most bariatric facilities only accommodate up to 500lbs  MD relays spike in weight (as from bed scale) is likely secondary to a change in hospital beds last week, however since new bed arrived, he still has gained 7lbs  Patient is getting a dose of IV lasix today  Updated therapy notes attached to referral in [place]  No bed offers received at this time   Follow-up calls made to facilities that had viewed referral but have not provided determination as noted below:    [facility] ([phone]) and spoke with Melida in admissions - reports that they are unable to accommodate patient secondary to weight  Response changed in [address] [place] [address] to declined  [facility] ([phone], option 3) - message left for Sabino Lynn in admissions requesting return call  [facility] ([phone]) and VM left for Alanna in admissions requesting return call  [place] [place] [address] for Rehab ([phone], option 2, then option 1) - VM left for Damaris Buerger in admissions requesting return call  [facility] ([phone]) - VM left for admissions requesting return call  [facility] ([phone])- call transferred to [facility] and VM left requesting return call  [facility] ([phone], option 1) - voicemail left for admissions requesting return call  Had requested med list in [address] [place] [address] 4/17 which had been provided; message also sent in [address] [place] [address] to follow-up on determination; awaiting response  [facility] ([phone]) VM left for Wong Oden in admissions requesting return call  Attempted to call [facility] in [place], [place] ([phone]), but phone number rings busy  Referral expanded to up to 200 mile radius; awaiting responses

## 2023-04-19 NOTE — ASSESSMENT & PLAN NOTE
· History of DVT, PE  · Home dose of coumadin alternating 9mg and 10 mg   · INR supratherapeutic the last 3 days, 2 5 today  · Check INR in AM  · Will resume Coumadin at a lower dose of 7 5 mg daily from tonight

## 2023-04-19 NOTE — PLAN OF CARE
Problem: PHYSICAL THERAPY ADULT  Goal: Performs mobility at highest level of function for planned discharge setting  See evaluation for individualized goals  Description: Treatment/Interventions: Functional transfer training, LE strengthening/ROM, Therapeutic exercise, Endurance training, Cognitive reorientation, Patient/family training, Equipment eval/education, Gait training, Bed mobility          See flowsheet documentation for full assessment, interventions and recommendations  Outcome: Not Progressing  Note: Prognosis: Fair  Problem List: Decreased strength, Decreased range of motion, Decreased endurance, Impaired balance, Decreased mobility, Pain, Obesity, Decreased skin integrity, Impaired hearing, Decreased safety awareness, Impaired judgement  Assessment: pt shows decline in mobility status from previous session w/ decreased standing tolerance and activity tolerance  pt needed frequent input for appropriate level of participation  pt remains at continued risk for falling due to physical and safety awareness issues  continued inpatient PT is needed to reduce fall risk and progress mobility training as appropriate  discharge recommendation is for inpatient rehab to maximize level of independence  Barriers to Discharge: Decreased caregiver support, Inaccessible home environment     PT Discharge Recommendation: Post acute rehabilitation services    See flowsheet documentation for full assessment

## 2023-04-19 NOTE — PROGRESS NOTES
[facility]  Progress Note  Name: Matthew Botello  MRN: [id number]  Unit/Bed#: S -01 I Date of Admission: 4/7/2023   Date of Service: 4/19/2023 I Hospital Day: 12    Assessment/Plan   * Acute on chronic respiratory failure ([facility], [place] [address] )  Assessment & Plan  · Completed prednisone therapy  · Continue PO Lasix 40 mg daily  · Given additional IV Lasix 40 mg x 1 4/15 with good UO  · IV Lasix 40 mg x 1 on 4/19 for inadequate urine output  · Monitor respiratory status, currently on baseline 5L  · Respiratory protocol, Xopenex/Atrovent, Airway clearance protocol, IS  · Mucinex, Tessalon Perles   · Out of bed to chair, PT and OT      History of pulmonary embolism  Assessment & Plan  · History of DVT, PE  · Home dose of coumadin alternating 9mg and 10 mg   · INR supratherapeutic the last 3 days, 2 5 today  · Check INR in AM  · Will resume Coumadin at a lower dose of 7 5 mg daily from tonight    Chronic diastolic congestive heart failure (HCC)  Assessment & Plan  · No further shortness of breath  · Continue on p o  Lasix  Intermittent IV lasix as needed  · Daily weights monitor input and output    Morbid obesity (HCC)  Assessment & Plan  · Recently DC to [facility]   PT/OT: post acute rehab  · Pending placement  · BMI 60      EVERETT (obstructive sleep apnea)  Assessment & Plan  Hx of EVERETT, morbid obesity, obesity hypoventilation syndrome   Continue BiPAP HS     Primary hypertension  Assessment & Plan  BP on admission 121/55  Home medication Lasix 40mg QD              VTE Pharmacologic Prophylaxis: VTE Score: 10 High Risk (Score >/= 5) - Pharmacological DVT Prophylaxis Ordered: warfarin (Coumadin)  Sequential Compression Devices Ordered  Patient Centered Rounds: I performed bedside rounds with nursing staff today  Discussions with Specialists or Other Care Team Provider: , PT    Education and Discussions with Family / Patient: Patient declined call to        Total Time Spent on Date of Encounter in care of patient: 35 minutes This time was spent on one or more of the following: performing physical exam; counseling and coordination of care; obtaining or reviewing history; documenting in the medical record; reviewing/ordering tests, medications or procedures; communicating with other healthcare professionals and discussing with patient's family/caregivers  Current Length of Stay: 12 day(s)  Current Patient Status: Inpatient   Certification Statement: The patient will continue to require additional inpatient hospital stay due to Pending placement  Discharge Plan: Pending placement    Code Status: Level 1 - Full Code    Subjective:   Seen this morning in no acute distress, no new complaints  Objective:     Vitals:   Temp (24hrs), Av 5 °F (36 9 °C), Min:98 3 °F (36 8 °C), Max:98 6 °F (37 °C)    Temp:  [98 3 °F (36 8 °C)-98 6 °F (37 °C)] 98 5 °F (36 9 °C)  HR:  [] 104  Resp:  [18] 18  BP: (126-175)/(56-78) 126/56  SpO2:  [93 %-97 %] 93 %  Body mass index is 61 65 kg/m²  Input and Output Summary (last 24 hours): Intake/Output Summary (Last 24 hours) at 2023 1234  Last data filed at 2023 1101  Gross per 24 hour   Intake 1310 ml   Output 1550 ml   Net -240 ml       Physical Exam:   Physical Exam  Vitals reviewed  Constitutional:       General: He is not in acute distress  Appearance: He is obese  He is not toxic-appearing  HENT:      Head: Normocephalic and atraumatic  Mouth/Throat:      Pharynx: Oropharynx is clear  Eyes:      Extraocular Movements: Extraocular movements intact  Cardiovascular:      Rate and Rhythm: Normal rate and regular rhythm  Pulses: Normal pulses  Pulmonary:      Effort: No respiratory distress  Breath sounds: No wheezing, rhonchi or rales  Comments: Decreased breath sounds bilaterally  Abdominal:      Palpations: Abdomen is soft  Tenderness: There is no abdominal tenderness     Musculoskeletal: General: No tenderness  Right lower leg: Edema present  Left lower leg: Edema present  Comments: At baseline   Neurological:      Mental Status: He is alert and oriented to person, place, and time  Psychiatric:         Mood and Affect: Mood normal          Behavior: Behavior normal          Thought Content:  Thought content normal          Judgment: Judgment normal           Additional Data:     Labs:      Results from last 7 days   Lab Units 04/19/23  0526   SODIUM mmol/L 135   POTASSIUM mmol/L 3 6   CHLORIDE mmol/L 91*   CO2 mmol/L 39*   BUN mg/dL 10   CREATININE mg/dL 0 41*   ANION GAP mmol/L 5   CALCIUM mg/dL 8 8   GLUCOSE RANDOM mg/dL 133     Results from last 7 days   Lab Units 04/19/23  0526   INR  2 55*                     Recent Cultures (last 7 days):         Last 24 Hours Medication List:   Current Facility-Administered Medications   Medication Dose Route Frequency Provider Last Rate   • acetaminophen  975 mg Oral Q8H [facility] HOME Wilda Blizzard, MD     • albuterol  2 5 mg Nebulization Q4H PRN Aparna Mitchell MD     • benzonatate  200 mg Oral TID Wilda Blizzard, MD     • dextromethorphan-guaiFENesin  10 mL Oral Q4H PRN Amanda Michel MD     • docusate sodium  100 mg Oral BID PRN Wilda Blizzard, MD     • furosemide  40 mg Intravenous Once Iraj Mckinley MD     • furosemide  40 mg Oral Daily Iraj Mckinley MD     • guaiFENesin  1,200 mg Oral Q12H [facility] HOME Iraj Mckinley MD     • ipratropium  0 5 mg Nebulization TID David Bautista MD     • levalbuterol  1 25 mg Nebulization TID David Bautista MD     • methocarbamol  750 mg Oral Q6H PRN Aparna Mitchell MD     • metoprolol succinate  25 mg Oral Daily Nataly Buckley MD     • oxyCODONE  5 mg Oral Q6H PRN Chin Andres MD     • pantoprazole  40 mg Oral Early Morning Wilda Blizzard, MD     • potassium chloride  20 mEq Oral Daily Nataly Buckley MD     • pregabalin  150 mg Oral TID Aparna Mitchell MD     • risperiDONE  1 mg Oral TID Christian Mitchell MD     • simethicone  80 mg Oral Q6H PRN Alexandra Ruffin MD     • sodium chloride  4 mL Nebulization TID Nonda Gowers, MD     • warfarin  7 5 mg Oral Daily (warfarin) Alexandra Ruffin MD          Today, Patient Was Seen By: Tino Karimi MD    **Please Note: This note may have been constructed using a voice recognition system  **

## 2023-04-19 NOTE — ASSESSMENT & PLAN NOTE
· Completed prednisone therapy  · Continue PO Lasix 40 mg daily  · Given additional IV Lasix 40 mg x 1 4/15 with good UO  · IV Lasix 40 mg x 1 on 4/19 for inadequate urine output  · Monitor respiratory status, currently on baseline 5L  · Respiratory protocol, Xopenex/Atrovent, Airway clearance protocol, IS  · Mucinex, Tessalon Perles   · Out of bed to chair, PT and OT

## 2023-04-19 NOTE — PHYSICAL THERAPY NOTE
PHYSICAL THERAPY TREATMENT NOTE    Patient Name: Paulette Diez  ILJBY'Q Date: 4/19/2023 04/19/23 1102   PT Last Visit   PT Visit Date 04/19/23   Pain Assessment   Pain Assessment Tool 0-10   Pain Score 7   Pain Location/Orientation Other (Comment)  (left shoulder)   Hospital Pain Intervention(s) Repositioned; Ambulation/increased activity   Restrictions/Precautions   Other Precautions Chair Alarm; Bed Alarm;Multiple lines;O2;Fall Risk;Pain   General   Chart Reviewed Yes   Additional Pertinent History 5L oxygen via nasal cannula  resting pulse ox 95% and 104 BPM, active 93% and 122 BPM    Family/Caregiver Present No   Cognition   Arousal/Participation Alert; Cooperative   Attention Attends with cues to redirect   Orientation Level Oriented to person; Other (Comment)  (pt was identified w/ full name, birth date)   Following Commands Follows one step commands with increased time or repetition   Subjective   Subjective pt seen supine in bed  agreed to PT session after motivation was provided  states having left shoulder pain  redirection was needed for task focus  pt was frequently resistant to following instructions from therapist    Bed Mobility   Rolling R 3  Moderate assistance   Additional items Assist x 1;Bedrails; Increased time required   Rolling L 3  Moderate assistance   Additional items Assist x 1;Bedrails; Increased time required   Additional Comments pt was mobilized from bed to bedside chair utilizing ceiling lift  Transfers   Sit to Stand 1  Dependent  (using belt and hammock technique)   Additional items Assist x 2; Increased time required;Verbal cues   Stand to Sit 2  Maximal assistance   Additional items Assist x 2; Increased time required;Verbal cues  (for body positioning)   Stand pivot Unable to assess   Additional Comments 4 attempts made for sit <---> stand transfers   pt was able to clear buttocks from chair 3x, for 5 to 10 seconds each  seated reste breaks were required  pt was in forward flexed position and was unable to fully extend knees and hips  additional standing was not possible due to fatigue and pain  standing sling not used per instructions from previous session  (pt was frequently reluctant to receive and follow instructions during session  redirection was needed for task focus and to maintain safety )   Ambulation/Elevation   Gait pattern Not appropriate   Ambulation/Elevation Additional Comments pt was Brenda's Egress test FAIL and needs dependent means for out of bed mobilization, per hospital policy  Balance   Static Sitting Fair +   Static Standing Zero  (assist x2)   Activity Tolerance   Activity Tolerance Patient limited by fatigue;Patient limited by pain   Nurse Made Aware spoke to [facility] NSG   Assessment   Problem List Decreased strength;Decreased range of motion;Decreased endurance; Impaired balance;Decreased mobility;Pain;Obesity; Decreased skin integrity; Impaired hearing;Decreased safety awareness; Impaired judgement   Assessment pt shows decline in mobility status from previous session w/ decreased standing tolerance and activity tolerance  pt needed frequent input for appropriate level of participation  pt remains at continued risk for falling due to physical and safety awareness issues  continued inpatient PT is needed to reduce fall risk and progress mobility training as appropriate  discharge recommendation is for inpatient rehab to maximize level of independence  Goals   Patient Goals I want to lock my knees and stand   STG Expiration Date 04/21/23   Short Term Goal #1 pt will:  Increase bilateral LE strength 1/2 grade to facilitate independent mobility, Perform rolling and repositioning in bed w/ minx1 to decrease caregiver burden, Perform supine <--> sitting edge of bed transition w/ modx1 to improve level of function, Perform sit <---> stand transfers w/ maxx1 to improve independence, Complete stand pivot transfers w/ least restrictive assistive device w/ maxx1 w/o LOB to improve functional independence, Increase static standing balance 1 grade to decrease risk for falls, Tolerate 3 hr OOB to faciliate upright tolerance, Tolerate standing 2 minutes w/ maxx1 w/ least restrictive assistive device to facilitate functional task performance, and Improve Barthel Index score to 55 or greater to facilitate independence  PT to see when ambulation training is appropriate   PT Treatment Day 7   Plan   Treatment/Interventions Functional transfer training;LE strengthening/ROM; Therapeutic exercise; Endurance training;Cognitive reorientation;Patient/family training;Equipment eval/education; Bed mobility  (Standing tolerance, eventual gait training and patient has more upright posture to stand)   Progress Slow progress, decreased activity tolerance   PT Frequency Other (Comment)  (5x/wk Monday - Friday)   Recommendation   PT Discharge Recommendation Post acute rehabilitation services   AM-PAC Basic Mobility Inpatient   Turning in Flat Bed Without Bedrails 2   Lying on Back to Sitting on Edge of Flat Bed Without Bedrails 1   Moving Bed to Chair 1   Standing Up From Chair Using Arms 1   Walk in Room 1   Climb 3-5 Stairs With Railing 1   Basic Mobility Inpatient Raw Score 7   Turning Head Towards Sound 4   Follow Simple Instructions 3   Low Function Basic Mobility Raw Score  14   Low Function Basic Mobility Standardized Score  22 01   Highest Level Of Mobility   -[facility]M Goal 2: Bed activities/Dependent transfer   -HL Achieved 4: Move to chair/commode   End of Consult   Patient Position at End of Consult Bedside chair;Bed/Chair alarm activated; All needs within reach     The patient's AM-PAC Basic Mobility Inpatient Short Form Raw Score is 7  A Raw score of less than or equal to 16 suggests the patient may benefit from discharge to post-acute rehabilitation services   Please also refer to the recommendation of the Physical Therapist for safe discharge planning  Skilled inpatient PT recommended while in hospital to progress pt toward treatment goals      [place], PT

## 2023-04-20 LAB
ANION GAP SERPL CALCULATED.3IONS-SCNC: 6 MMOL/L (ref 4–13)
BUN SERPL-MCNC: 11 MG/DL (ref 5–25)
CALCIUM SERPL-MCNC: 8.9 MG/DL (ref 8.4–10.2)
CHLORIDE SERPL-SCNC: 92 MMOL/L (ref 96–108)
CO2 SERPL-SCNC: 39 MMOL/L (ref 21–32)
CREAT SERPL-MCNC: 0.36 MG/DL (ref 0.6–1.3)
GFR SERPL CREATININE-BSD FRML MDRD: 126 ML/MIN/1.73SQ M
GLUCOSE SERPL-MCNC: 137 MG/DL (ref 65–140)
INR PPP: 2.5 (ref 0.84–1.19)
MAGNESIUM SERPL-MCNC: 1.9 MG/DL (ref 1.9–2.7)
POTASSIUM SERPL-SCNC: 3.6 MMOL/L (ref 3.5–5.3)
PROTHROMBIN TIME: 27.2 SECONDS (ref 11.6–14.5)
SODIUM SERPL-SCNC: 137 MMOL/L (ref 135–147)

## 2023-04-20 RX ORDER — POLYETHYLENE GLYCOL 3350 17 G/17G
17 POWDER, FOR SOLUTION ORAL DAILY PRN
Status: DISCONTINUED | OUTPATIENT
Start: 2023-04-20 | End: 2023-04-21

## 2023-04-20 RX ORDER — HYDROXYZINE 50 MG/1
50 TABLET, FILM COATED ORAL EVERY 6 HOURS PRN
Status: DISCONTINUED | OUTPATIENT
Start: 2023-04-20 | End: 2023-05-06 | Stop reason: HOSPADM

## 2023-04-20 RX ADMIN — LEVALBUTEROL HYDROCHLORIDE 1.25 MG: 1.25 SOLUTION RESPIRATORY (INHALATION) at 07:44

## 2023-04-20 RX ADMIN — GUAIFENESIN AND DEXTROMETHORPHAN 10 ML: 100; 10 SYRUP ORAL at 08:28

## 2023-04-20 RX ADMIN — IPRATROPIUM BROMIDE 0.5 MG: 0.5 SOLUTION RESPIRATORY (INHALATION) at 07:44

## 2023-04-20 RX ADMIN — PANTOPRAZOLE SODIUM 40 MG: 40 TABLET, DELAYED RELEASE ORAL at 05:04

## 2023-04-20 RX ADMIN — IPRATROPIUM BROMIDE 0.5 MG: 0.5 SOLUTION RESPIRATORY (INHALATION) at 14:42

## 2023-04-20 RX ADMIN — DOCUSATE SODIUM 100 MG: 100 CAPSULE, LIQUID FILLED ORAL at 08:43

## 2023-04-20 RX ADMIN — OXYCODONE HYDROCHLORIDE 5 MG: 5 TABLET ORAL at 11:07

## 2023-04-20 RX ADMIN — GUAIFENESIN 1200 MG: 600 TABLET ORAL at 22:38

## 2023-04-20 RX ADMIN — BENZONATATE 200 MG: 100 CAPSULE ORAL at 08:28

## 2023-04-20 RX ADMIN — RISPERIDONE 1 MG: 1 TABLET ORAL at 22:33

## 2023-04-20 RX ADMIN — OXYCODONE HYDROCHLORIDE 5 MG: 5 TABLET ORAL at 05:04

## 2023-04-20 RX ADMIN — PREGABALIN 150 MG: 75 CAPSULE ORAL at 17:22

## 2023-04-20 RX ADMIN — METHOCARBAMOL TABLETS 750 MG: 750 TABLET, COATED ORAL at 05:05

## 2023-04-20 RX ADMIN — GUAIFENESIN AND DEXTROMETHORPHAN 10 ML: 100; 10 SYRUP ORAL at 22:43

## 2023-04-20 RX ADMIN — METHOCARBAMOL TABLETS 750 MG: 750 TABLET, COATED ORAL at 11:10

## 2023-04-20 RX ADMIN — LEVALBUTEROL HYDROCHLORIDE 1.25 MG: 1.25 SOLUTION RESPIRATORY (INHALATION) at 20:25

## 2023-04-20 RX ADMIN — GUAIFENESIN 1200 MG: 600 TABLET ORAL at 08:28

## 2023-04-20 RX ADMIN — PREGABALIN 150 MG: 75 CAPSULE ORAL at 08:28

## 2023-04-20 RX ADMIN — ACETAMINOPHEN 975 MG: 325 TABLET, FILM COATED ORAL at 22:33

## 2023-04-20 RX ADMIN — WARFARIN SODIUM 7.5 MG: 7.5 TABLET ORAL at 17:22

## 2023-04-20 RX ADMIN — POLYETHYLENE GLYCOL 3350 17 G: 17 POWDER, FOR SOLUTION ORAL at 08:44

## 2023-04-20 RX ADMIN — METHOCARBAMOL TABLETS 750 MG: 750 TABLET, COATED ORAL at 17:22

## 2023-04-20 RX ADMIN — BENZONATATE 200 MG: 100 CAPSULE ORAL at 22:38

## 2023-04-20 RX ADMIN — ACETAMINOPHEN 975 MG: 325 TABLET, FILM COATED ORAL at 17:22

## 2023-04-20 RX ADMIN — FUROSEMIDE 40 MG: 40 TABLET ORAL at 08:28

## 2023-04-20 RX ADMIN — PREGABALIN 150 MG: 75 CAPSULE ORAL at 22:33

## 2023-04-20 RX ADMIN — POTASSIUM CHLORIDE 20 MEQ: 1500 TABLET, EXTENDED RELEASE ORAL at 08:28

## 2023-04-20 RX ADMIN — SODIUM CHLORIDE SOLN NEBU 3% 4 ML: 3 NEBU SOLN at 14:42

## 2023-04-20 RX ADMIN — LEVALBUTEROL HYDROCHLORIDE 1.25 MG: 1.25 SOLUTION RESPIRATORY (INHALATION) at 14:41

## 2023-04-20 RX ADMIN — RISPERIDONE 1 MG: 1 TABLET ORAL at 08:28

## 2023-04-20 RX ADMIN — SODIUM CHLORIDE SOLN NEBU 3% 4 ML: 3 NEBU SOLN at 07:44

## 2023-04-20 RX ADMIN — SODIUM CHLORIDE SOLN NEBU 3% 4 ML: 3 NEBU SOLN at 20:25

## 2023-04-20 RX ADMIN — OXYCODONE HYDROCHLORIDE 5 MG: 5 TABLET ORAL at 17:22

## 2023-04-20 RX ADMIN — RISPERIDONE 1 MG: 1 TABLET ORAL at 17:22

## 2023-04-20 RX ADMIN — ACETAMINOPHEN 975 MG: 325 TABLET, FILM COATED ORAL at 05:04

## 2023-04-20 RX ADMIN — OXYCODONE HYDROCHLORIDE 5 MG: 5 TABLET ORAL at 22:33

## 2023-04-20 RX ADMIN — IPRATROPIUM BROMIDE 0.5 MG: 0.5 SOLUTION RESPIRATORY (INHALATION) at 20:25

## 2023-04-20 RX ADMIN — METOPROLOL SUCCINATE 25 MG: 25 TABLET, EXTENDED RELEASE ORAL at 08:28

## 2023-04-20 RX ADMIN — METHOCARBAMOL TABLETS 750 MG: 750 TABLET, COATED ORAL at 22:33

## 2023-04-20 RX ADMIN — BENZONATATE 200 MG: 100 CAPSULE ORAL at 17:22

## 2023-04-20 NOTE — PHYSICAL THERAPY NOTE
" PHYSICAL THERAPY TREATMENT NOTE  NAME:  Aziza Valentin  DATE: 04/20/23    Length Of Stay: 13  Performed at least 2 patient identifiers during session: Name and Birthday    TREATMENT:    04/20/23 1352   PT Last Visit   PT Visit Date 04/20/23   Note Type   Note Type Treatment   Pain Assessment   Pain Assessment Tool 0-10   Pain Score 9   Pain Location/Orientation Location: Back   Pain Radiating Towards L>R back   Pain Onset/Description Descriptor: Burning; Descriptor: Stabbing   Effect of Pain on Daily Activities limits participation, needs longer therapeutic rests between standing trials  Patient's Stated Pain Goal No pain   Hospital Pain Intervention(s) Repositioned; Ambulation/increased activity; Emotional support;Medication (See MAR)  (Pt premedicated prior to session around 11 AM (care coordination w/ [facility] RN))   Multiple Pain Sites Yes  (scrotum)   Restrictions/Precautions   Weight Bearing Precautions Per Order No   Other Precautions Bed Alarm; Chair Alarm;Cognitive;Pain; Fall Risk;O2   General   Chart Reviewed Yes   Response to Previous Treatment Patient reporting fatigue but able to participate  Family/Caregiver Present No   Cognition   Arousal/Participation Cooperative   Attention Attends with cues to redirect   Orientation Level Oriented to person   Memory Decreased recall of precautions;Decreased recall of recent events   Following Commands Follows one step commands with increased time or repetition   Subjective   Subjective Pt pleasant and cooperative, initially reporting he is in good spirits and \"has an idea to try using UE support of shortened rolling walker for UE Support\"  Instructed pt of my suggestions and pt agreeable to trial both after this writer provided demonstration of trial w/using forearm support @ nightstand  Bed Mobility   Supine to Sit Unable to assess   Transfers   Sit to Stand 2  Maximal assistance   Additional items Assist x 2; Increased time required;Verbal cues;Armrests  (8 trials " total)   Stand to Sit 2  Maximal assistance   Additional items Assist x 2; Increased time required;Verbal cues;Armrests  (8 trials total)   Ambulation/Elevation   Gait pattern Not appropriate   Wheelchair Activities   Pressure Relief Type Push up  (@ recliner, x4 reps w/o blocking knees with UE support @ Armrests )   Balance   Static Sitting Fair -   Static Standing Zero  (A of 2; up to 25 second max with using forearm technique)   Endurance Deficit   Endurance Deficit Yes   Endurance Deficit Description needs therapeutic rests between mobility trials, limited reps of therex   Activity Tolerance   Activity Tolerance Patient limited by fatigue;Patient limited by pain   Nurse Made Aware spoke to whit VALENZUELA 8:30, prior to session, during session  Messaged after session   Medical Staff Made Aware spoke to Main Davis from case [facility], [address] [place] [address] PT  Spoke to Dr Swapna Jon re: pt's limited chronic pain during functional mobility   Exercises   Hip Flexion Sitting;10 reps;AAROM; Bilateral   Knee AROM Long Arc Quad Sitting;5 reps;Bilateral;AAROM;AROM   Ankle Pumps Sitting;Bilateral;AAROM;AROM;10 reps   Neuro re-ed Standing tolerance trials belt and hammock and upper extremity support of nightstand x 2 trials, and short wide walker x 2 trials, with standing tolerance time of 25 seconds max  Focus during standing trials on getting to terminal knee ext as opposed to trunk upright  Patient also was able to perform buttock clearances using a modified belt and hammock technique 4 trials   Assessment   Prognosis Fair   Problem List Decreased strength;Decreased range of motion;Decreased endurance; Impaired balance;Decreased mobility;Pain;Obesity; Decreased skin integrity; Impaired hearing   Assessment Teodoro made inconsistent progress from last sessions  He was able to perform more WB transfers that in the last 2 days, and needed slightly less consistent A for sit<>stand activities    He reports feeling more successful with standing tolerance time using "forearm support of night stand while straddling it w/ LE\"s and having staff block knees/use belt and hammock technique  He reports his limitations are primarily pain based (chronic neuropathic pain) and this writer added that hammock placement was not adequate for all transfer trials  Spoke to [facility] from nursing/Teodoro LAMA about placing sheet perpendicular under pt instead of parallel  Skilled PT recommended to progress pt toward treatment goals  Recommend continued trials with sit<>stand using forearm support, standing tolerance w/ belt and hammock, and eventual trials w/ walking sling as transfers improve  Barriers to Discharge Decreased caregiver support; Inaccessible home environment   Goals   Patient Goals to stand easier and have less pain   STG Expiration Date 04/21/23   PT Treatment Day 8   Plan   Treatment/Interventions Functional transfer training;LE strengthening/ROM; Therapeutic exercise;Equipment eval/education;Gait training;Bed mobility; Patient/family training;Cognitive reorientation; Endurance training;Spoke to nursing;Spoke to case management;Spoke to advanced practitioner;Spoke to MD   Progress Slow progress, decreased activity tolerance   PT Frequency   (5x/wk)   Recommendation   PT Discharge Recommendation Post acute rehabilitation services   Equipment Recommended Walker  (nanette lift by nursing for OOB transfers; belt and hammock technique +/- wide rolling walker for transfer training   Eventual resumption of walking sling and wide rolling walker in future for gait training)   AM-PAC Basic Mobility Inpatient   Turning in Flat Bed Without Bedrails 2   Lying on Back to Sitting on Edge of Flat Bed Without Bedrails 1   Moving Bed to Chair 1   Standing Up From Chair Using Arms 1   Walk in Room 1   Climb 3-5 Stairs With Railing 1   Basic Mobility Inpatient Raw Score 7   Turning Head Towards Sound 4   Follow Simple Instructions 3   Low Function Basic Mobility Raw Score  14   Low Function Basic " Mobility Standardized Score  22 01   Highest Level Of Mobility   -[facility] Goal 2: Bed activities/Dependent transfer   -[facility] Achieved 4: Move to chair/commode   Education   Education Provided Mobility training;Home exercise program;Assistive device   Patient Reinforcement needed; Explanation/teachback used   End of Consult   Patient Position at End of Consult Bedside chair; All needs within reach  (RN notified that pt wished to return back to bed via nanette lift)     Nursing Recommendations:   Mobility Plan as of 04/20/23: Pt is dependent [facility]  Encourage OOB for all meals  The patient's -[facility] Basic Mobility Inpatient Short Form Raw Score is 7  A Raw score of less than or equal to 16 suggests the patient may benefit from discharge to post-acute rehabilitation services, which DOES coincide with CURRENT above PT recommendations  However please refer to therapist recommendation for discharge planning given other factors that may influence destination  Adapted from [facility] “6-Clicks” Basic Mobility and Daily Activity Scores With Discharge Destination  Physical Therapy, 2021;101:1-9   DOI: [email or web address]    Lexy Brennan, PT, DPT

## 2023-04-20 NOTE — UTILIZATION REVIEW
Continued Stay Review    Date: 4/20/2023                         Current Patient Class: IP  Current Level of Care: Eleazar Stallings  male initially admitted on 4/7/2023    Assessment/Plan:  More alert & following commands 4/19  Reports productive cough  Lasix 40 mg IV x 1 4/19 due to inadequate urine output  Coumadin resumed @ lower dose 4/19 4/20: Remains on 4L NC  Feels motivated to work with PT  Continue coumadin -check INR in am  Continue po lasix with intermittent IV lasix as needed    Referral expanded to up to 200 mile radius; awaiting responses      Vital Signs:   04/20/23 08:03:25 -- 89 19 123/58 83 100 % -- -- -- -- -- -- Sitting   04/20/23 0744 -- -- -- -- -- -- -- 36 4 L/min 4 L/min Nasal cannula -- --   04/20/23 0301 -- -- -- -- -- 98 % -- -- -- -- -- Full face mask --   04/19/23 2242 -- -- -- -- -- 98 % -- -- -- -- -- Full face mask --   04/19/23 22:32:16 97 7 °F (36 5 °C) 97 19 126/58 83 97 % -- -- -- -- -- -- --   04/19/23 2200 -- -- -- -- -- -- 40 -- -- -- -- -- --   04/19/23 1930 -- -- -- -- -- 95 % -- 36 -- 4 L/min Nasal cannula -- --   04/19/23 1602 98 5 °F (36 9 °C) 106 Abnormal  18 151/65 94 -- -- -- -- -- Nasal cannula -- Sitting   04/19/23 1346 -- -- -- -- -- 95 % -- -- -- -- -- -- --   04/19/23 0900 -- -- -- -- -- -- -- 36 -- 4 L/min -- -- --   04/19/23 0813 98 5 °F (36 9 °C) 104 18 126/56 85 93 % -- -- -- -- Nasal cannula -- Lying   04/19/23 0747 -- -- -- -- -- 94 % -- 36 -- 4 L/min Nasal cannula -- --   04/19/23 0308 -- -- -- -- -- 94 % -- 36 -- 4 L/min Nasal cannula -- --   04/19/23 0001 98 6 °F (37 °C) 92 18 175/78 Abnormal  112 -- -- -- -- -- None (Room air) --          Pertinent Labs/Diagnostic Results:     Results from last 7 days   Lab Units 04/20/23  0512 04/19/23  0526 04/18/23  0508 04/17/23  0616 04/16/23  0400   SODIUM mmol/L 137 135 135 135 136   POTASSIUM mmol/L 3 6 3 6 4 0 3 6 3 8   CHLORIDE mmol/L 92* 91* 92* 91* 92*   CO2 mmol/L 39* 39* 38* 39* 40*   ANION GAP mmol/L 6 5 5 5 4   BUN mg/dL 11 10 11 9 14   CREATININE mg/dL 0 36* 0 41* 0 56* 0 46* 0 47*   EGFR ml/min/1 73sq m 126 119 105 114 113   CALCIUM mg/dL 8 9 8 8 8 9 8 7 8 6   MAGNESIUM mg/dL 1 9  --   --   --   --      Results from last 7 days   Lab Units 04/20/23  0512 04/19/23  0526 04/18/23  0508 04/17/23  0616 04/16/23  0400 04/15/23  0637 04/14/23  0513   GLUCOSE RANDOM mg/dL 137 133 125 135 127 99 106     Results from last 7 days   Lab Units 04/20/23  0512 04/19/23  0526 04/18/23  0508   PROTIME seconds 27 2* 27 7* 32 0*   INR  2 50* 2 55* 3 08*     Medications:   Scheduled Medications:  acetaminophen, 975 mg, Oral, Q8H [facility]  benzonatate, 200 mg, Oral, TID  furosemide, 40 mg, Oral, Daily  guaiFENesin, 1,200 mg, Oral, Q12H [facility]  ipratropium, 0 5 mg, Nebulization, TID  levalbuterol, 1 25 mg, Nebulization, TID  metoprolol succinate, 25 mg, Oral, Daily  pantoprazole, 40 mg, Oral, Early Morning  potassium chloride, 20 mEq, Oral, Daily  pregabalin, 150 mg, Oral, TID  risperiDONE, 1 mg, Oral, TID  sodium chloride, 4 mL, Nebulization, TID  warfarin, 7 5 mg, Oral, Daily (warfarin)    Continuous IV Infusions:     PRN Meds:  albuterol, 2 5 mg, Nebulization, Q4H PRN  dextromethorphan-guaiFENesin, 10 mL, Oral, Q4H PRN x 1 4/20  docusate sodium, 100 mg, Oral, BID PRN x 1 4/20  methocarbamol, 750 mg, Oral, Q6H PRN x 2 4/20  oxyCODONE, 5 mg, Oral, Q6H PRN x 2 4/20   polyethylene glycol, 17 g, Oral, Daily PRN x 1 4/20  simethicone, 80 mg, Oral, Q6H PRN    Discharge Plan: TBD    Network Utilization Review Department  ATTENTION: Please call with any questions or concerns to [phone] and carefully listen to the prompts so that you are directed to the right person  All voicemails are confidential   Dianne Parson all requests for admission clinical reviews, approved or denied determinations and any other requests to dedicated fax number below belonging to the campus where the patient is receiving treatment   List of dedicated fax numbers for the Facilities:  FACILITY NAME UR FAX NUMBER   ADMISSION DENIALS (Administrative/Medical Necessity) [phone]   [address]  (Maternity/NICU/Pediatrics) [phone]   [address] [place]anna [place]ales  [phone]   [address] [address] [facility] [address] [facility] 310 [facility] 134 815 [facility] [phone]

## 2023-04-20 NOTE — ASSESSMENT & PLAN NOTE
· History of DVT, PE  · Home dose of coumadin alternating 9mg and 10 mg   · Continue Coumadin at a lower dose of 7 5 mg daily  · Check INR in a m

## 2023-04-20 NOTE — ASSESSMENT & PLAN NOTE
· Completed prednisone therapy  · Continue PO Lasix 40 mg daily  · Additional IV Lasix 40 mg x 1 on 4/15 and 4/19, with adequate urine output  · Monitor respiratory status, currently on baseline 5L  · Respiratory protocol, Xopenex/Atrovent, Airway clearance protocol, IS  · Mucinex, Tessalon Perles   · Out of bed to chair, PT and OT

## 2023-04-20 NOTE — PLAN OF CARE
"  Problem: PHYSICAL THERAPY ADULT  Goal: Performs mobility at highest level of function for planned discharge setting  See evaluation for individualized goals  Description: Treatment/Interventions: Functional transfer training, LE strengthening/ROM, Therapeutic exercise, Endurance training, Cognitive reorientation, Patient/family training, Equipment eval/education, Gait training, Bed mobility          See flowsheet documentation for full assessment, interventions and recommendations  Outcome: Progressing  Note: Prognosis: Fair  Problem List: Decreased strength, Decreased range of motion, Decreased endurance, Impaired balance, Decreased mobility, Pain, Obesity, Decreased skin integrity, Impaired hearing  Assessment: Teodoro made inconsistent progress from last sessions  He was able to perform more WB transfers that in the last 2 days, and needed slightly less consistent A for sit<>stand activities  He reports feeling more successful with standing tolerance time using forearm support of night stand while straddling it w/ LE\"s and having staff block knees/use belt and hammock technique  He reports his limitations are primarily pain based (chronic neuropathic pain) and this writer added that hammock placement was not adequate for all transfer trials  Spoke to [facility] from nursing/Teodoro PCA about placing sheet perpendicular under pt instead of parallel  Skilled PT recommended to progress pt toward treatment goals  Recommend continued trials with sit<>stand using forearm support, standing tolerance w/ belt and hammock, and eventual trials w/ walking sling as transfers improve  Barriers to Discharge: Decreased caregiver support, Inaccessible home environment     PT Discharge Recommendation: Post acute rehabilitation services    See flowsheet documentation for full assessment          "

## 2023-04-20 NOTE — PROGRESS NOTES
[facility]  Progress Note  Name: Fritz Jaquez  MRN: [id number]  Unit/Bed#: S -01 I Date of Admission: 4/7/2023   Date of Service: 4/20/2023 I Hospital Day: 13    Assessment/Plan   * Acute on chronic respiratory failure ([facility] [address] )  Assessment & Plan  · Completed prednisone therapy  · Continue PO Lasix 40 mg daily  · Additional IV Lasix 40 mg x 1 on 4/15 and 4/19, with adequate urine output  · Monitor respiratory status, currently on baseline 5L  · Respiratory protocol, Xopenex/Atrovent, Airway clearance protocol, IS  · Mucinex, Tessalon Perles   · Out of bed to chair, PT and OT      History of pulmonary embolism  Assessment & Plan  · History of DVT, PE  · Home dose of coumadin alternating 9mg and 10 mg   · Continue Coumadin at a lower dose of 7 5 mg daily  · Check INR in a m  Chronic diastolic congestive heart failure (HCC)  Assessment & Plan  · No further shortness of breath  · Continue on p o  Lasix  Intermittent IV lasix as needed  · Daily weights monitor input and output    Morbid obesity (HCC)  Assessment & Plan  · Recently DC to [facility]   PT/OT: post acute rehab  · Pending placement  · BMI 60      EVERETT (obstructive sleep apnea)  Assessment & Plan  Hx of EVERETT, morbid obesity, obesity hypoventilation syndrome   Continue BiPAP HS     Primary hypertension  Assessment & Plan  BP on admission 121/55  Home medication Lasix 40mg QD              VTE Pharmacologic Prophylaxis: VTE Score: 10 High Risk (Score >/= 5) - Pharmacological DVT Prophylaxis Ordered: warfarin (Coumadin)  Sequential Compression Devices Ordered  Patient Centered Rounds: I performed bedside rounds with nursing staff today  Discussions with Specialists or Other Care Team Provider: OJ    Education and Discussions with Family / Patient: Patient declined call to        Total Time Spent on Date of Encounter in care of patient: 35 minutes This time was spent on one or more of the following: performing physical exam; counseling and coordination of care; obtaining or reviewing history; documenting in the medical record; reviewing/ordering tests, medications or procedures; communicating with other healthcare professionals and discussing with patient's family/caregivers  Current Length of Stay: 13 day(s)  Current Patient Status: Inpatient   Certification Statement: The patient will continue to require additional inpatient hospital stay due to Pending placement  Discharge Plan: Pending rehab placement    Code Status: Level 1 - Full Code    Subjective:   Seen this morning in no acute distress, no new changes  He reports feeling motivated to work with physical therapy today  Objective:     Vitals:   Temp (24hrs), Av 1 °F (36 7 °C), Min:97 7 °F (36 5 °C), Max:98 5 °F (36 9 °C)    Temp:  [97 7 °F (36 5 °C)-98 5 °F (36 9 °C)] 97 7 °F (36 5 °C)  HR:  [] 89  Resp:  [18-19] 19  BP: (123-151)/(58-65) 123/58  SpO2:  [95 %-100 %] 100 %  Body mass index is 61 65 kg/m²  Input and Output Summary (last 24 hours): Intake/Output Summary (Last 24 hours) at 2023 1339  Last data filed at 2023 0847  Gross per 24 hour   Intake 1610 ml   Output 675 ml   Net 935 ml       Physical Exam:   Physical Exam  Vitals reviewed  Constitutional:       General: He is not in acute distress  Appearance: He is obese  He is not toxic-appearing  HENT:      Head: Normocephalic and atraumatic  Mouth/Throat:      Pharynx: Oropharynx is clear  Eyes:      Extraocular Movements: Extraocular movements intact  Cardiovascular:      Rate and Rhythm: Normal rate and regular rhythm  Pulses: Normal pulses  Pulmonary:      Effort: No respiratory distress  Breath sounds: No wheezing, rhonchi or rales  Comments: Decreased breath sounds bilaterally  Abdominal:      Palpations: Abdomen is soft  Tenderness: There is no abdominal tenderness  Musculoskeletal:         General: No tenderness        Right lower leg: Edema present  Left lower leg: Edema present  Comments: At baseline   Neurological:      Mental Status: He is alert and oriented to person, place, and time  Psychiatric:         Mood and Affect: Mood normal          Behavior: Behavior normal          Thought Content:  Thought content normal          Judgment: Judgment normal           Additional Data:     Labs:      Results from last 7 days   Lab Units 04/20/23  0512   SODIUM mmol/L 137   POTASSIUM mmol/L 3 6   CHLORIDE mmol/L 92*   CO2 mmol/L 39*   BUN mg/dL 11   CREATININE mg/dL 0 36*   ANION GAP mmol/L 6   CALCIUM mg/dL 8 9   GLUCOSE RANDOM mg/dL 137     Results from last 7 days   Lab Units 04/20/23  0512   INR  2 50*                   Lines/Drains:  Invasive Devices     Peripheral Intravenous Line  Duration           Peripheral IV 04/19/23 Right;Ventral (anterior) Forearm <1 day                Recent Cultures (last 7 days):         Last 24 Hours Medication List:   Current Facility-Administered Medications   Medication Dose Route Frequency Provider Last Rate   • acetaminophen  975 mg Oral Q8H [facility] HOME Elaine Lutz MD     • albuterol  2 5 mg Nebulization Q4H PRN Alicia Golden MD     • benzonatate  200 mg Oral TID Elaine Lutz MD     • dextromethorphan-guaiFENesin  10 mL Oral Q4H PRN Nery Sutton MD     • docusate sodium  100 mg Oral BID PRN Elaine Lutz MD     • furosemide  40 mg Oral Daily Manulea Pedro MD     • guaiFENesin  1,200 mg Oral Q12H [facility] Manuela Pedro MD     • ipratropium  0 5 mg Nebulization TID Antony Pineda MD     • levalbuterol  1 25 mg Nebulization TID Antony Pineda MD     • methocarbamol  750 mg Oral Q6H PRN Alicia Golden MD     • metoprolol succinate  25 mg Oral Daily Sam Villar MD     • oxyCODONE  5 mg Oral Q6H PRN Randol Holstein, MD     • pantoprazole  40 mg Oral Early Morning Elaine Lutz MD     • polyethylene glycol  17 g Oral Daily PRN Manuela Pedro MD     • potassium chloride  20 mEq Oral Daily Kalin Dixon MD     • pregabalin  150 mg Oral TID Newton Pryor MD     • risperiDONE  1 mg Oral TID Newton Pryor MD     • simethicone  80 mg Oral Q6H PRN Ryann Vilchis MD     • sodium chloride  4 mL Nebulization TID Renuka Rod MD     • warfarin  7 5 mg Oral Daily (warfarin) Ryann Vilchis MD          Today, Patient Was Seen By: Bethany Eason MD    **Please Note: This note may have been constructed using a voice recognition system  **

## 2023-04-21 LAB
ANION GAP SERPL CALCULATED.3IONS-SCNC: 6 MMOL/L (ref 4–13)
BUN SERPL-MCNC: 10 MG/DL (ref 5–25)
CALCIUM SERPL-MCNC: 8.9 MG/DL (ref 8.4–10.2)
CHLORIDE SERPL-SCNC: 94 MMOL/L (ref 96–108)
CO2 SERPL-SCNC: 37 MMOL/L (ref 21–32)
CREAT SERPL-MCNC: 0.44 MG/DL (ref 0.6–1.3)
GFR SERPL CREATININE-BSD FRML MDRD: 116 ML/MIN/1.73SQ M
GLUCOSE SERPL-MCNC: 103 MG/DL (ref 65–140)
INR PPP: 2.5 (ref 0.84–1.19)
MAGNESIUM SERPL-MCNC: 2 MG/DL (ref 1.9–2.7)
POTASSIUM SERPL-SCNC: 3.9 MMOL/L (ref 3.5–5.3)
PROTHROMBIN TIME: 27.2 SECONDS (ref 11.6–14.5)
SODIUM SERPL-SCNC: 137 MMOL/L (ref 135–147)

## 2023-04-21 RX ORDER — FLUTICASONE PROPIONATE 50 MCG
1 SPRAY, SUSPENSION (ML) NASAL DAILY
Status: DISCONTINUED | OUTPATIENT
Start: 2023-04-21 | End: 2023-04-21

## 2023-04-21 RX ORDER — FLUTICASONE PROPIONATE 50 MCG
2 SPRAY, SUSPENSION (ML) NASAL DAILY
Status: DISCONTINUED | OUTPATIENT
Start: 2023-04-22 | End: 2023-05-06 | Stop reason: HOSPADM

## 2023-04-21 RX ORDER — BISACODYL 10 MG
10 SUPPOSITORY, RECTAL RECTAL DAILY PRN
Status: DISCONTINUED | OUTPATIENT
Start: 2023-04-21 | End: 2023-04-30

## 2023-04-21 RX ORDER — POLYETHYLENE GLYCOL 3350 17 G/17G
17 POWDER, FOR SOLUTION ORAL DAILY
Status: DISCONTINUED | OUTPATIENT
Start: 2023-04-22 | End: 2023-05-06 | Stop reason: HOSPADM

## 2023-04-21 RX ORDER — FUROSEMIDE 40 MG/1
40 TABLET ORAL
Status: DISCONTINUED | OUTPATIENT
Start: 2023-04-21 | End: 2023-05-06 | Stop reason: HOSPADM

## 2023-04-21 RX ORDER — AMOXICILLIN 250 MG
1 CAPSULE ORAL 2 TIMES DAILY
Status: DISCONTINUED | OUTPATIENT
Start: 2023-04-21 | End: 2023-05-01

## 2023-04-21 RX ADMIN — SODIUM CHLORIDE SOLN NEBU 3% 4 ML: 3 NEBU SOLN at 07:42

## 2023-04-21 RX ADMIN — POTASSIUM CHLORIDE 20 MEQ: 1500 TABLET, EXTENDED RELEASE ORAL at 09:58

## 2023-04-21 RX ADMIN — RISPERIDONE 1 MG: 1 TABLET ORAL at 09:58

## 2023-04-21 RX ADMIN — GUAIFENESIN AND DEXTROMETHORPHAN 10 ML: 100; 10 SYRUP ORAL at 05:24

## 2023-04-21 RX ADMIN — IPRATROPIUM BROMIDE 0.5 MG: 0.5 SOLUTION RESPIRATORY (INHALATION) at 13:53

## 2023-04-21 RX ADMIN — RISPERIDONE 1 MG: 1 TABLET ORAL at 16:17

## 2023-04-21 RX ADMIN — SODIUM CHLORIDE SOLN NEBU 3% 4 ML: 3 NEBU SOLN at 20:29

## 2023-04-21 RX ADMIN — ACETAMINOPHEN 975 MG: 325 TABLET, FILM COATED ORAL at 21:22

## 2023-04-21 RX ADMIN — WARFARIN SODIUM 7.5 MG: 7.5 TABLET ORAL at 16:17

## 2023-04-21 RX ADMIN — ACETAMINOPHEN 975 MG: 325 TABLET, FILM COATED ORAL at 05:24

## 2023-04-21 RX ADMIN — POLYETHYLENE GLYCOL 3350 17 G: 17 POWDER, FOR SOLUTION ORAL at 09:58

## 2023-04-21 RX ADMIN — RISPERIDONE 1 MG: 1 TABLET ORAL at 21:22

## 2023-04-21 RX ADMIN — METHOCARBAMOL TABLETS 750 MG: 750 TABLET, COATED ORAL at 12:00

## 2023-04-21 RX ADMIN — FUROSEMIDE 40 MG: 40 TABLET ORAL at 16:17

## 2023-04-21 RX ADMIN — IPRATROPIUM BROMIDE 0.5 MG: 0.5 SOLUTION RESPIRATORY (INHALATION) at 20:28

## 2023-04-21 RX ADMIN — SENNOSIDES AND DOCUSATE SODIUM 1 TABLET: 8.6; 5 TABLET ORAL at 10:01

## 2023-04-21 RX ADMIN — PREGABALIN 150 MG: 75 CAPSULE ORAL at 21:22

## 2023-04-21 RX ADMIN — PREGABALIN 150 MG: 75 CAPSULE ORAL at 16:17

## 2023-04-21 RX ADMIN — LEVALBUTEROL HYDROCHLORIDE 1.25 MG: 1.25 SOLUTION RESPIRATORY (INHALATION) at 20:28

## 2023-04-21 RX ADMIN — FUROSEMIDE 40 MG: 40 TABLET ORAL at 09:58

## 2023-04-21 RX ADMIN — BENZONATATE 200 MG: 100 CAPSULE ORAL at 09:58

## 2023-04-21 RX ADMIN — BENZONATATE 200 MG: 100 CAPSULE ORAL at 21:22

## 2023-04-21 RX ADMIN — METOPROLOL SUCCINATE 25 MG: 25 TABLET, EXTENDED RELEASE ORAL at 09:58

## 2023-04-21 RX ADMIN — GUAIFENESIN 1200 MG: 600 TABLET ORAL at 21:22

## 2023-04-21 RX ADMIN — GUAIFENESIN AND DEXTROMETHORPHAN 10 ML: 100; 10 SYRUP ORAL at 16:23

## 2023-04-21 RX ADMIN — OXYCODONE HYDROCHLORIDE 5 MG: 5 TABLET ORAL at 18:18

## 2023-04-21 RX ADMIN — GUAIFENESIN AND DEXTROMETHORPHAN 10 ML: 100; 10 SYRUP ORAL at 21:32

## 2023-04-21 RX ADMIN — ACETAMINOPHEN 975 MG: 325 TABLET, FILM COATED ORAL at 12:00

## 2023-04-21 RX ADMIN — PANTOPRAZOLE SODIUM 40 MG: 40 TABLET, DELAYED RELEASE ORAL at 05:24

## 2023-04-21 RX ADMIN — METHOCARBAMOL TABLETS 750 MG: 750 TABLET, COATED ORAL at 18:18

## 2023-04-21 RX ADMIN — LEVALBUTEROL HYDROCHLORIDE 1.25 MG: 1.25 SOLUTION RESPIRATORY (INHALATION) at 07:42

## 2023-04-21 RX ADMIN — PREGABALIN 150 MG: 75 CAPSULE ORAL at 09:58

## 2023-04-21 RX ADMIN — OXYCODONE HYDROCHLORIDE 5 MG: 5 TABLET ORAL at 12:00

## 2023-04-21 RX ADMIN — GUAIFENESIN 1200 MG: 600 TABLET ORAL at 09:58

## 2023-04-21 RX ADMIN — METHOCARBAMOL TABLETS 750 MG: 750 TABLET, COATED ORAL at 05:24

## 2023-04-21 RX ADMIN — IPRATROPIUM BROMIDE 0.5 MG: 0.5 SOLUTION RESPIRATORY (INHALATION) at 07:42

## 2023-04-21 RX ADMIN — OXYCODONE HYDROCHLORIDE 5 MG: 5 TABLET ORAL at 05:24

## 2023-04-21 RX ADMIN — BENZONATATE 200 MG: 100 CAPSULE ORAL at 16:17

## 2023-04-21 RX ADMIN — SODIUM CHLORIDE SOLN NEBU 3% 4 ML: 3 NEBU SOLN at 13:53

## 2023-04-21 RX ADMIN — LEVALBUTEROL HYDROCHLORIDE 1.25 MG: 1.25 SOLUTION RESPIRATORY (INHALATION) at 13:53

## 2023-04-21 NOTE — ASSESSMENT & PLAN NOTE
· Completed prednisone therapy  · Increased diuretic dose of PO Lasix to 40 mg BID from daily due to decreased urine output  · Monitor respiratory status, currently on baseline 5L  · Respiratory protocol, Xopenex/Atrovent, Airway clearance protocol, IS  · Mucinex, Tessalon Baldeves   · Out of bed to chair, PT and OT

## 2023-04-21 NOTE — PHYSICAL THERAPY NOTE
"PHYSICAL THERAPY TREATMENT NOTE  NAME:  Zari Esparza  DATE: 04/21/23    Length Of Stay: 14  Performed at least 2 patient identifiers during session: Name and Birthday    TREATMENT:    04/21/23 1251   PT Last Visit   PT Visit Date 04/21/23   Note Type   Note Type Treatment   Pain Assessment   Pain Assessment Tool 0-10   Pain Score 8   Pain Location/Orientation Location: Neck; Location: Back   Pain Radiating Towards radiating to L shoulder   Pain Onset/Description Frequency: Intermittent; Descriptor: Cramping; Onset: Ongoing  (chornic in nature but occasionally reports more consistent than in past)   Effect of Pain on Daily Activities limits functional use of UEs, limits standing tolerance and sitting tolerance  Increased pain noted after seated in recliner from standing despite primairly using hammock for buttock support as opposed to \"belt\"   Patient's Stated Pain Goal No pain   Hospital Pain Intervention(s) Repositioned; Ambulation/increased activity;MD notified (Comment)  (spoke to Dr Salvatore Rubio prior to session)   Multiple Pain Sites Yes  (periarea)   Restrictions/Precautions   Weight Bearing Precautions Per Order No   Other Precautions Cognitive; Fall Risk;O2;Pain   General   Chart Reviewed Yes   Response to Previous Treatment Patient reporting fatigue but able to participate  Family/Caregiver Present No   Cognition   Overall Cognitive Status WFL   Arousal/Participation Cooperative   Attention Attends with cues to redirect   Orientation Level Oriented to person   Memory Decreased recall of precautions;Decreased recall of recent events   Following Commands Follows one step commands with increased time or repetition   Subjective   Subjective Upon entering room, patient found in supine in bed and Not OOB in chair  Pt Frequently requesting things such as use of urinal after condom cath [facility] during bed mobility   Pt outright declined trials for supine->EOB mobility trials to potentially perform transfers from bed surface " "due to fear of falling, and fear that matress would be too soft (despite my education that attess can be deflated)   Bed Mobility   Rolling R 5  Supervision  (multiple trials)   Additional items Assist x 1;HOB elevated; Bedrails; Increased time required;Verbal cues   Rolling L 3  Moderate assistance  (multiple trials)   Additional items Assist x 1;HOB elevated; Increased time required;Verbal cues;LE management;Trapeze bar   Additional Comments Ptwas able to lift trunk for elevated HOB using trapeze w/o physical A  Pt was able to reposition toward the [facility] w/ B feet stabilized and performing leg press into knee ext while rolling side to side prior to OOB transfer to recliner   Transfers   Sit to Stand 3  Moderate assistance   Additional items Assist x 2; Increased time required;Verbal cues;Armrests  (using belt and hammock technique from recliner  B knees blocked + pillow)   Stand to Sit 3  Moderate assistance   Additional items Assist x 2; Increased time required;Verbal cues;Armrests   Mechanical lift 1  Dependent   Additional items Assist x 2  (transfer bed to/from recliner completed at beginning (with nursing) and end (with OT) of session via nanette sling with overhead ceiling lift to mobilize pt to/from recliner per pt's request )   Additional Comments Sit<>stand trials x6 completed from recliner using some form of belt and hammock technique, with hammock as a sheet PERPENDICULARLY under pt, B knee block with pillows @ knees to increase level arm  Pt requires primarily mod A x2 and infrequent max A x2  Initial sit<>stand trials as clearance and to get hamock tight under pt  BUE in front of pt WBing on RW vs bedside table (stabilized against wall), with focus of sit<>stand trials to get B knee ext and NOT complete trunk ext  Variations of \"proper\" belt and hammock attempted with typical \"inside\" arm of therapy staff on outside of pt to potentially allow better WB into forearm   Teodoro Achieves full knee extension and " "buttocks clearance on 4/6 trials , flexed forward 90 degrees at hips and WBing onto forearm on table or hands @ walker  able to tolerate static standing 5 - 15s each transfer, limited d/t weakness and pain  Ambulation/Elevation   Ambulation/Elevation Additional Comments Brenda's Egress test FAIL   Wheelchair Activities   Pressure Relief Type Push up  (@ recliner, x4 reps w/o blocking knees with UE support @ Armrests )   Balance   Static Sitting Fair +   Static Standing Zero   Endurance Deficit   Endurance Deficit Yes   Endurance Deficit Description Needs therapeutic rests between mobility trials including standing, bed mobility, as well as intermittent head elevation between rolling   Activity Tolerance   Activity Tolerance Patient limited by fatigue;Patient limited by pain  (limited by FEAR)   Nurse Made Aware spoke to [facility] RN earlier this AM for care coordination for nursing to perform OOB w/ lift, later with Almas Monson  Care coordination w/ Stacia clinlical coordinator to assist OOB mobility   Medical Staff Made Aware care coordination w/Sandra from OT   Exercises   Neuro re-ed Emil Lot full knee extension and buttocks clearance on 4/6 trials , flexed forward 90 degrees at hips and WBing onto forearm on table or hands @ walker  able to tolerate static standing 5 - 15s each transfer, limited d/t weakness and pain  Assessment   Prognosis Fair   Problem List Decreased strength;Decreased range of motion;Decreased endurance; Impaired balance;Decreased mobility;Pain;Obesity; Decreased skin integrity; Impaired hearing  (fear and retreat)   Assessment Although Cheryl Paul continues to require A of 2 for sit<>clearance/stand using belt and hammock technique, he was able to perform complete knee ext on all trials intended, with needing less A when therapist inside arm is outside of pt and NOT under pt's arm   He was able to perform more consistent \"knee ext\" clearance times this session than in past without use of walking " sling, performing more of the activity on his own  Based on this, Pt may be more ready to attempt walking sling activities with improved LE strength (naa w/ knee ext) @ LE's using either wide/sky walker +/- platform support vs U walker  Skilled PT recommended to progress pt toward treatment goals  Barriers to Discharge Decreased caregiver support; Inaccessible home environment   Goals   Patient Goals (S)  Pt's written goal for next Friday is to be able to perform full upright stand w/ A of 2 using walker and in sling for up to one min  (pt reports he will not have filaed if unable to stand for less than one min)   STG Expiration Date (S)  05/01/23   Short Term Goal #1 pt will:  Perform rolling and repositioning in bed w/no more than minx1 to left side using trapeze/rail to  decrease caregiver burden;  Perform supine <--> sitting edge of bed transition w/ modx1 to improve level of function and minimize need for sit->stand Trials from recliner as appropriate  Perform sit <---> stand transfers w/ max Ax1 to minimize burden of care, Perform 90% upright standing tolerance w/ UE support for 30 sec w/ A of 1 plus sling to promote longer standing tolerance and progress to pregait activities as appropriate  Assess gait and wide WC (as seated mobility target and potentially as mobility w/ Le's--even backward)  and set goals  PT Treatment Day 1   Plan   Treatment/Interventions Functional transfer training;LE strengthening/ROM; Therapeutic exercise; Endurance training;Cognitive reorientation;Equipment eval/education; Bed mobility; Patient/family training;Spoke to nursing;OT   Progress Slow progress, decreased activity tolerance   PT Frequency   (5x/wk mon -fri)   Recommendation   PT Discharge Recommendation Post acute rehabilitation services   Equipment Recommended Walker; Wheelchair  (nanette lift by nursing for OOB transfers; potential walking sling and wide rolling walker in future with mobility progression from recliner chair)   [facility] Basic Mobility Inpatient   Turning in Flat Bed Without Bedrails 2   Lying on Back to Sitting on Edge of Flat Bed Without Bedrails 1   Moving Bed to Chair 1   Standing Up From Chair Using Arms 1   Walk in Room 1   Climb 3-5 Stairs With Railing 1   Basic Mobility Inpatient Raw Score 7   Turning Head Towards Sound 4   Follow Simple Instructions 3   Low Function Basic Mobility Raw Score  14   Low Function Basic Mobility Standardized Score  22 01   Highest Level Of Mobility   -HL Goal 2: Bed activities/Dependent transfer   -HL Achieved 4: Move to chair/commode   Education   Education Provided Mobility training;Home exercise program;Assistive device   Patient Reinforcement needed; Explanation/teachback used   End of Consult   Patient Position at End of Consult Supine; All needs within reach   Pt requires PT /OT co-treat due to required skilled interventions of at least 2 clinicians for care delivery especially bariatric manual skills, medical complexity, limited activity tolerance, and cognitive-behavioral impairments  PT and OT goals addressed separately  Nursing Recommendations:   Mobility Plan as of 04/21/23: Pt is dependent Assist with  nanette lift sling  to/from recliner  Encourage OOB for at least Lunch meals  The patient's [facility] Basic Mobility Inpatient Short Form Raw Score is 7  A Raw score of less than or equal to 16 suggests the patient may benefit from discharge to post-acute rehabilitation services, which DOES coincide with CURRENT above PT recommendations  However please refer to therapist recommendation for discharge planning given other factors that may influence destination  Adapted from [employer] [facility] “6-Clicks” Basic Mobility and Daily Activity Scores With Discharge Destination  Physical Therapy, 2021;101:1-9   DOI: [email or web address]    Zak Calle PT, DPT

## 2023-04-21 NOTE — PROGRESS NOTES
[facility]  Progress Note  Name: Kilo Pike  MRN: [id number]  Unit/Bed#: S -01 I Date of Admission: 4/7/2023   Date of Service: 4/21/2023 I Hospital Day: 14    Assessment/Plan   * Acute on chronic respiratory failure ([facility] [address] )  Assessment & Plan  · Completed prednisone therapy  · Increased diuretic dose of PO Lasix to 40 mg BID from daily due to decreased urine output  · Monitor respiratory status, currently on baseline 5L  · Respiratory protocol, Xopenex/Atrovent, Airway clearance protocol, IS  · Mucinex, Tessalon Perles   · Out of bed to chair, PT and OT      History of pulmonary embolism  Assessment & Plan  · History of DVT, PE  · Home dose of coumadin alternating 9mg and 10 mg   · Continue Coumadin at a lower dose of 7 5 mg daily  · INR within limits    Chronic diastolic congestive heart failure (HCC)  Assessment & Plan  · No further shortness of breath  · Continue on p o  Lasix  · Daily weights monitor input and output    Morbid obesity (HCC)  Assessment & Plan  · Recently DC to [facility]   PT/OT: post acute rehab  · Pending placement  · BMI 60  · Nutrition consult for bariatric diet      EVERETT (obstructive sleep apnea)  Assessment & Plan  Hx of EVERETT, morbid obesity, obesity hypoventilation syndrome   Continue BiPAP HS     Primary hypertension  Assessment & Plan  BP stable  Home medication Lasix 40mg bid             VTE Pharmacologic Prophylaxis: VTE Score: 10 High Risk (Score >/= 5) - Pharmacological DVT Prophylaxis Ordered: warfarin (Coumadin)  Sequential Compression Devices Ordered  Patient Centered Rounds: I performed bedside rounds with nursing staff today  Discussions with Specialists or Other Care Team Provider: OJ    Education and Discussions with Family / Patient: Updated  (wife) via phone      Total Time Spent on Date of Encounter in care of patient: 35 minutes This time was spent on one or more of the following: performing physical exam; counseling and coordination of care; obtaining or reviewing history; documenting in the medical record; reviewing/ordering tests, medications or procedures; communicating with other healthcare professionals and discussing with patient's family/caregivers  Current Length of Stay: 14 day(s)  Current Patient Status: Inpatient   Certification Statement: The patient will continue to require additional inpatient hospital stay due to pending placement  Discharge Plan: pending placement    Code Status: Level 1 - Full Code    Subjective:   Seen this AM in NAD, no new complaints  Objective:     Vitals:   Temp (24hrs), Av 8 °F (36 6 °C), Min:97 7 °F (36 5 °C), Max:97 8 °F (36 6 °C)    Temp:  [97 7 °F (36 5 °C)-97 8 °F (36 6 °C)] 97 7 °F (36 5 °C)  HR:  [] 101  Resp:  [16-18] 18  BP: (111-150)/(53-67) 111/53  SpO2:  [91 %-98 %] 92 %  Body mass index is 61 68 kg/m²  Input and Output Summary (last 24 hours): Intake/Output Summary (Last 24 hours) at 2023 1726  Last data filed at 2023 1555  Gross per 24 hour   Intake 850 ml   Output 2050 ml   Net -1200 ml       Physical Exam:   Physical Exam  Vitals reviewed  Constitutional:       General: He is not in acute distress  Appearance: He is obese  He is not toxic-appearing  HENT:      Head: Normocephalic and atraumatic  Mouth/Throat:      Pharynx: Oropharynx is clear  Eyes:      Extraocular Movements: Extraocular movements intact  Cardiovascular:      Rate and Rhythm: Normal rate and regular rhythm  Pulses: Normal pulses  Pulmonary:      Effort: No respiratory distress  Breath sounds: No wheezing, rhonchi or rales  Comments: Decreased breath sounds bilaterally  Abdominal:      Palpations: Abdomen is soft  Tenderness: There is no abdominal tenderness  Musculoskeletal:         General: No tenderness  Right lower leg: Edema present  Left lower leg: Edema present  Comments:  At baseline   Neurological:      Mental Status: He is alert and oriented to person, place, and time  Psychiatric:         Mood and Affect: Mood normal          Behavior: Behavior normal          Thought Content:  Thought content normal          Judgment: Judgment normal           Additional Data:     Labs:      Results from last 7 days   Lab Units 04/21/23  0523   SODIUM mmol/L 137   POTASSIUM mmol/L 3 9   CHLORIDE mmol/L 94*   CO2 mmol/L 37*   BUN mg/dL 10   CREATININE mg/dL 0 44*   ANION GAP mmol/L 6   CALCIUM mg/dL 8 9   GLUCOSE RANDOM mg/dL 103     Results from last 7 days   Lab Units 04/21/23  0523   INR  2 50*                   Lines/Drains:  Invasive Devices     Peripheral Intravenous Line  Duration           Peripheral IV 04/19/23 Right;Ventral (anterior) Forearm 2 days                Recent Cultures (last 7 days):         Last 24 Hours Medication List:   Current Facility-Administered Medications   Medication Dose Route Frequency Provider Last Rate   • acetaminophen  975 mg Oral Q8H [address] Hadassah Landau, MD     • albuterol  2 5 mg Nebulization Q4H PRN Priscila Saba MD     • benzonatate  200 mg Oral TID Hadassah Landau, MD     • bisacodyl  10 mg Rectal Daily PRN Tae Segura MD     • dextromethorphan-guaiFENesin  10 mL Oral Q4H PRN Philip Mayberry MD     • [START ON 4/22/2023] fluticasone  2 spray Each Nare Daily Tae Segura MD     • furosemide  40 mg Oral BID (diuretic) Tae Segura MD     • guaiFENesin  1,200 mg Oral Q12H [address] Tae Segura MD     • hydrOXYzine HCL  50 mg Oral Q6H PRN Tae Segura MD     • ipratropium  0 5 mg Nebulization TID Georgi Mccarthy MD     • levalbuterol  1 25 mg Nebulization TID Georgi Mccarthy MD     • methocarbamol  750 mg Oral Q6H PRN Priscila Saba MD     • metoprolol succinate  25 mg Oral Daily Cade Palacios MD     • oxyCODONE  5 mg Oral Q6H PRN Waynette Baumgarten, MD     • pantoprazole  40 mg Oral Early Morning Hadassah Landau, MD     • [START ON 4/22/2023] polyethylene glycol  17 g Oral Daily Liana Vasquez MD     • potassium chloride  20 mEq Oral Daily José Russ MD     • pregabalin  150 mg Oral TID Clement Romano MD     • risperiDONE  1 mg Oral TID Clement Romano MD     • senna-docusate sodium  1 tablet Oral BID Kiana Starr MD     • simethicone  80 mg Oral Q6H PRN Liana Vasquez MD     • sodium chloride  4 mL Nebulization TID Liseth Salmon MD     • warfarin  7 5 mg Oral Daily (warfarin) Liana Vasquez MD          Today, Patient Was Seen By: Bowen Delcid MD    **Please Note: This note may have been constructed using a voice recognition system  **

## 2023-04-21 NOTE — ASSESSMENT & PLAN NOTE
· History of DVT, PE  · Home dose of coumadin alternating 9mg and 10 mg   · Continue Coumadin at a lower dose of 7 5 mg daily  · INR within limits

## 2023-04-21 NOTE — OCCUPATIONAL THERAPY NOTE
Occupational Therapy Treatment Note     Patient Name: Chantelle Ramos  [id number] Date: 4/21/2023  Problem List  Principal Problem:    Acute on chronic respiratory failure [facility])  Active Problems: Morbid obesity ([facility] )    Primary hypertension    EVERETT (obstructive sleep apnea)    Chronic diastolic congestive heart failure ([facility] )    History of pulmonary embolism       04/21/23 1243   OT Last Visit   OT Visit Date 04/21/23   Note Type   Note Type Treatment   Pain Assessment   Pain Assessment Tool 0-10   Pain Score 8   Pain Location/Orientation Location: Neck; Location: Back   Pain Radiating Towards radiating to L shoulder   Pain Onset/Description Frequency: Intermittent; Descriptor: Cramping; Onset: Ongoing  (chronic in nature)   Effect of Pain on Daily Activities limits functional use of [facility] Pain Intervention(s) Repositioned; Ambulation/increased activity   Multiple Pain Sites Yes   Restrictions/Precautions   Weight Bearing Precautions Per Order No   Other Precautions Cognitive; Bed Alarm; Chair Alarm;O2;Fall Risk;Pain;Multiple lines   Lifestyle   Autonomy Pt admitted from [facility], has hx of multiple recent hospitalizations  At baseline lives c his spouse   Reciprocal Relationships supportive spouse and son  Service to Others retired   Intrinsic Gratification (S)  Expressing that his goal is to attend his sons wedding in June  ADL   Where Assessed Supine, bed  (Versus recliner)   LB Bathing Assistance 2  Maximal Assistance   LB Bathing Comments Patient completed bathing of perineal area in both supine and reclined positions  Encouragement to attempt task independently, as patient quick to state he is unable to  Assistance required for thoroughness   UB Dressing Assistance 4  Minimal Assistance   UB Dressing Deficit Pull around back; Increased time to complete;Supervision/safety;Verbal cueing;Setup   UB Dressing Comments Patient donned/doffed gown    Encouragement for active participation in task, patient able to thread BLE through sleeves without difficulty  Required assistance to pull around back  Toileting Deficit Setup;Use of bedpan/urinal setup; Increased time to complete  (Min A for use of urinal only)   Toileting Comments Patient requesting use of urinal 3 times throughout session  Initially requesting total assistance for use of urinal, encouragement for attempt in completing task  Patient able to utilize urinal with assistance for set up only  Functional Standing Tolerance   Time 15sx2 trials, 5-10s x4 trials   Activity Functional transfer training and functional strengthening   Comments Variable mod to max a x2 with use of belt and hammock, weightbearing through BUEs onto table versus recliner  Flexed forward at hips 90 degrees   Bed Mobility   Rolling R 5  Supervision   Additional items Assist x 1;Bedrails; Increased time required  (Maintains side-lying with supervision with use of bedrail)   Rolling L 3  Moderate assistance   Additional items Assist x 1; Increased time required;Trapeze bar;Verbal cues; Bedrails   Additional Comments Patient able to lift upper trunk of bed with use of trapeze bar for repositioning  Able to reposition self to head of bed with use of footboard   Transfers   Sit to Stand 3  Moderate assistance   Additional items Assist x 2; Increased time required;Verbal cues  (with belt and hammock technique, B/L pillow blocking)   Stand to Sit 3  Moderate assistance   Additional items Assist x 2; Increased time required;Verbal cues   Stand pivot Unable to assess   Mechanical lift 1  Dependent   Additional items Assist x 2; Increased time required;Verbal cues  (transfer bed to/from recliner completed at beginning and end of session via nanette sling with overhead ceiling lift)   Additional Comments sit<>stand trials x6 completed from recliner  Variable mod A x2 and max A x2, more often requiring Mod A x2  B/L knee blocking c pillow in place  Belt and hammock technique used for all transfers   BUE in front of pt WBing on RW vs bedside table (pushed against wall)  Pt more successful with therapist placement of arms outside of pt arms, assisting more with hammock than belt  Achieves full knee extension and buttocks clearance on 4/6 trials , flexed forward 90 degrees at hips and WBing onto forearms  able to tolerate static standing 5 - 15s each transfer, limited d/t weakness and pain  Subjective   Subjective Expressing frustration during today's session regarding being unable to achieve full upright standing  Emotional support and goal writing completed with patient to assist with motivation and continued engagement  Cognition   Overall Cognitive Status WFL   Arousal/Participation Cooperative; Alert   Attention Attends with cues to redirect   Orientation Level Oriented to person   Memory Decreased recall of recent events;Decreased recall of precautions   Following Commands Follows one step commands with increased time or repetition   Comments fair carryover noted of learned techniques  Pt able to recall successful /unsuccessful strategies used ~75% of time  Additional Activities   Additional Activities Other (Comment)  (goal writing with patient)   Additional Activities Comments (S)  Patient expressing frustration regarding slow moving progress with functional status  Time spent during today's session to focus on goal writing  Patient created goal to be able to stand fully upright with use of rolling walker and walking sling by April 28  Patient's long-term goal is to be able to attend his son's wedding in June  Goal written together with patient and placed in a visible area in his room  Additional supportive listening provided to patient     Activity Tolerance   Activity Tolerance Patient limited by fatigue;Patient limited by pain   Medical Staff Made Aware PT Jimbo Henson, RN, CM   Assessment   Assessment Patient seen for OT treatment on 4/21/2023 s/p admission for Acute on chronic respiratory failure ([facility] [address] ) Patient presents with active orders for OT eval and treat and Up with assistance   Upon arrival, pt found resting in bed showing no signs of distress  Patient agreeable to OT session  Patient participated in toileting, UB dressing, and functional transfer training with intervention focus on functional strength training, increasing activity tolerance, and increasing independence in self care transfers  Guillermina Pod is showing improvements in standing tolerance, transfers, and independence with urinal use but is continuing to perform below baseline due to the following deficits: endurance ,  decreased muscular strength , decreased balance , decreased standing tolerance for self care tasks , decreased trunk control  and (+) pain   From OT standpoint, patient would benefit from skilled intervention to maximize independence with ADLs and functional mobility  Goals reviewed and remain appropriate, continue POC and extended goal date +10 days  At this time, recommending D/C to: post-acute rehabilitation   Plan   Treatment Interventions ADL retraining;Functional transfer training;UE strengthening/ROM; Patient/family training; Endurance training;Neuromuscular reeducation; Compensatory technique education;Equipment evaluation/education   Goal Expiration Date 05/01/23  (POC reviewed, goals remain appropraite, extended 10 days)   OT Treatment Day 4   OT Frequency 3-5x/wk   Recommendation   OT Discharge Recommendation Post acute rehabilitation services   Additional Comments  Recommend chair position in bed vs OOB to recliner for pressure offloading throughout the day  Use of nanette lift for OOB trasnfers with nursing staff   Encourage participation in self care tasks to increase functional independence  Additional Comments 2 The patient's raw score on the AM-PAC Daily Activity Inpatient Short Form is 13  A raw score of less than 19 suggests the patient may benefit from discharge to post-acute rehabilitation services   Please refer to the recommendation of the Occupational Therapist for safe discharge planning  AM-PAC Daily Activity Inpatient   Lower Body Dressing 1   Bathing 1   Toileting 1   Upper Body Dressing 3   Grooming 3   Eating 4   Daily Activity Raw Score 13   Daily Activity Standardized Score (Calc for Raw Score >=11) 32 03   AM-PAC Applied Cognition Inpatient   Following a Speech/Presentation 4   Understanding Ordinary Conversation 4   Taking Medications 3   Remembering Where Things Are Placed or Put Away 4   Remembering List of 4-5 Errands 3   Taking Care of Complicated Tasks 3   Applied Cognition Raw Score 21   Applied Cognition Standardized Score 44 3   End of Consult   Education Provided Yes;Family or social support of family present for education by provider   Patient Position at End of Consult Bed/Chair alarm activated; All needs within reach; Supine   Nurse Communication Nurse aware of consult   End of Consult Comments Pt benefited from co-session of skilled OT and PT therapists in order to most appropriately address functional deficits d/t extensive physical assistance required for safe mobility  and decreased activity tolerance  OT/PT objectives were addressed separately; please see PT note for specific goal areas targeted  Goals remain appropriate, extended +10 days; continue MOMO Carrillo

## 2023-04-21 NOTE — PLAN OF CARE
"  Problem: PHYSICAL THERAPY ADULT  Goal: Performs mobility at highest level of function for planned discharge setting  See evaluation for individualized goals  Description: Treatment/Interventions: Functional transfer training, LE strengthening/ROM, Therapeutic exercise, Endurance training, Cognitive reorientation, Patient/family training, Equipment eval/education, Gait training, Bed mobility          See flowsheet documentation for full assessment, interventions and recommendations  Outcome: Progressing  Note: Prognosis: Fair  Problem List: Decreased strength, Decreased range of motion, Decreased endurance, Impaired balance, Decreased mobility, Pain, Obesity, Decreased skin integrity, Impaired hearing (fear and retreat)  Assessment: Although Bill Narayanan continues to require A of 2 for sit<>clearance/stand using belt and hammock technique, he was able to perform complete knee ext on all trials intended, with needing less A when therapist inside arm is outside of pt and NOT under pt's arm  He was able to perform more consistent \"knee ext\" clearance times this session than in past without use of walking sling, performing more of the activity on his own  Based on this, Pt may be more ready to attempt walking sling activities with improved LE strength (naa w/ knee ext) @ LE's using either wide/sky walker +/- platform support vs U walker  Skilled PT recommended to progress pt toward treatment goals  Barriers to Discharge: Decreased caregiver support, Inaccessible home environment     PT Discharge Recommendation: Post acute rehabilitation services    See flowsheet documentation for full assessment          "

## 2023-04-21 NOTE — PLAN OF CARE
Problem: OCCUPATIONAL THERAPY ADULT  Goal: Performs self-care activities at highest level of function for planned discharge setting  See evaluation for individualized goals  Description: Treatment Interventions: ADL retraining, Functional transfer training, UE strengthening/ROM, Endurance training, Patient/family training, Equipment evaluation/education, Compensatory technique education, Neuromuscular reeducation, Energy conservation          See flowsheet documentation for full assessment, interventions and recommendations  Outcome: Progressing  Note: Limitation: Decreased ADL status, Decreased Safe judgement during ADL, Decreased endurance, Decreased cognition, Decreased self-care trans, Decreased high-level ADLs  Prognosis: Fair  Assessment: Patient seen for OT treatment on 4/21/2023 s/p admission for Acute on chronic respiratory failure [facility]) Patient presents with active orders for OT eval and treat and Up with assistance   Upon arrival, pt found resting in bed showing no signs of distress  Patient agreeable to OT session  Patient participated in toileting, UB dressing, and functional transfer training with intervention focus on functional strength training, increasing activity tolerance, and increasing independence in self care transfers  Chantelle Ramos is showing improvements in standing tolerance, transfers, and independence with urinal use but is continuing to perform below baseline due to the following deficits: endurance ,  decreased muscular strength , decreased balance , decreased standing tolerance for self care tasks , decreased trunk control  and (+) pain   From OT standpoint, patient would benefit from skilled intervention to maximize independence with ADLs and functional mobility  Goals reviewed and remain appropriate, continue POC and extended goal date +10 days   At this time, recommending D/C to: post-acute rehabilitation     OT Discharge Recommendation: Post acute rehabilitation services Zeina Barrera

## 2023-04-21 NOTE — ASSESSMENT & PLAN NOTE
· Recently DC to [facility]   PT/OT: post acute rehab  · Pending placement  · BMI 60  · Nutrition consult for bariatric diet

## 2023-04-22 RX ADMIN — LEVALBUTEROL HYDROCHLORIDE 1.25 MG: 1.25 SOLUTION RESPIRATORY (INHALATION) at 19:53

## 2023-04-22 RX ADMIN — ACETAMINOPHEN 975 MG: 325 TABLET, FILM COATED ORAL at 05:16

## 2023-04-22 RX ADMIN — OXYCODONE HYDROCHLORIDE 5 MG: 5 TABLET ORAL at 09:17

## 2023-04-22 RX ADMIN — RISPERIDONE 1 MG: 1 TABLET ORAL at 09:16

## 2023-04-22 RX ADMIN — IPRATROPIUM BROMIDE 0.5 MG: 0.5 SOLUTION RESPIRATORY (INHALATION) at 07:51

## 2023-04-22 RX ADMIN — RISPERIDONE 1 MG: 1 TABLET ORAL at 15:50

## 2023-04-22 RX ADMIN — BENZONATATE 200 MG: 100 CAPSULE ORAL at 15:50

## 2023-04-22 RX ADMIN — PREGABALIN 150 MG: 75 CAPSULE ORAL at 20:45

## 2023-04-22 RX ADMIN — RISPERIDONE 1 MG: 1 TABLET ORAL at 20:45

## 2023-04-22 RX ADMIN — GUAIFENESIN 1200 MG: 600 TABLET ORAL at 09:17

## 2023-04-22 RX ADMIN — POTASSIUM CHLORIDE 20 MEQ: 1500 TABLET, EXTENDED RELEASE ORAL at 09:16

## 2023-04-22 RX ADMIN — PREGABALIN 150 MG: 75 CAPSULE ORAL at 09:17

## 2023-04-22 RX ADMIN — METOPROLOL SUCCINATE 25 MG: 25 TABLET, EXTENDED RELEASE ORAL at 09:16

## 2023-04-22 RX ADMIN — BENZONATATE 200 MG: 100 CAPSULE ORAL at 09:16

## 2023-04-22 RX ADMIN — SENNOSIDES AND DOCUSATE SODIUM 1 TABLET: 8.6; 5 TABLET ORAL at 09:16

## 2023-04-22 RX ADMIN — FUROSEMIDE 40 MG: 40 TABLET ORAL at 09:16

## 2023-04-22 RX ADMIN — ACETAMINOPHEN 975 MG: 325 TABLET, FILM COATED ORAL at 14:12

## 2023-04-22 RX ADMIN — METHOCARBAMOL TABLETS 750 MG: 750 TABLET, COATED ORAL at 15:50

## 2023-04-22 RX ADMIN — FUROSEMIDE 40 MG: 40 TABLET ORAL at 15:50

## 2023-04-22 RX ADMIN — GUAIFENESIN AND DEXTROMETHORPHAN 10 ML: 100; 10 SYRUP ORAL at 15:50

## 2023-04-22 RX ADMIN — BENZONATATE 200 MG: 100 CAPSULE ORAL at 20:45

## 2023-04-22 RX ADMIN — WARFARIN SODIUM 7.5 MG: 7.5 TABLET ORAL at 17:08

## 2023-04-22 RX ADMIN — LEVALBUTEROL HYDROCHLORIDE 1.25 MG: 1.25 SOLUTION RESPIRATORY (INHALATION) at 13:38

## 2023-04-22 RX ADMIN — OXYCODONE HYDROCHLORIDE 5 MG: 5 TABLET ORAL at 03:23

## 2023-04-22 RX ADMIN — METHOCARBAMOL TABLETS 750 MG: 750 TABLET, COATED ORAL at 03:23

## 2023-04-22 RX ADMIN — METHOCARBAMOL TABLETS 750 MG: 750 TABLET, COATED ORAL at 09:17

## 2023-04-22 RX ADMIN — METHOCARBAMOL TABLETS 750 MG: 750 TABLET, COATED ORAL at 20:45

## 2023-04-22 RX ADMIN — LEVALBUTEROL HYDROCHLORIDE 1.25 MG: 1.25 SOLUTION RESPIRATORY (INHALATION) at 07:51

## 2023-04-22 RX ADMIN — GUAIFENESIN 1200 MG: 600 TABLET ORAL at 20:45

## 2023-04-22 RX ADMIN — PREGABALIN 150 MG: 75 CAPSULE ORAL at 15:50

## 2023-04-22 RX ADMIN — POLYETHYLENE GLYCOL 3350 17 G: 17 POWDER, FOR SOLUTION ORAL at 09:15

## 2023-04-22 RX ADMIN — OXYCODONE HYDROCHLORIDE 5 MG: 5 TABLET ORAL at 20:45

## 2023-04-22 RX ADMIN — IPRATROPIUM BROMIDE 0.5 MG: 0.5 SOLUTION RESPIRATORY (INHALATION) at 13:38

## 2023-04-22 RX ADMIN — PANTOPRAZOLE SODIUM 40 MG: 40 TABLET, DELAYED RELEASE ORAL at 03:25

## 2023-04-22 RX ADMIN — OXYCODONE HYDROCHLORIDE 5 MG: 5 TABLET ORAL at 15:49

## 2023-04-22 RX ADMIN — IPRATROPIUM BROMIDE 0.5 MG: 0.5 SOLUTION RESPIRATORY (INHALATION) at 19:53

## 2023-04-22 RX ADMIN — SODIUM CHLORIDE SOLN NEBU 3% 4 ML: 3 NEBU SOLN at 07:51

## 2023-04-22 NOTE — PLAN OF CARE
Problem: MOBILITY - ADULT  Goal: Maintain or return to baseline ADL function  Description: INTERVENTIONS:  -  Assess patient's ability to carry out ADLs; assess patient's baseline for ADL function and identify physical deficits which impact ability to perform ADLs (bathing, care of mouth/teeth, toileting, grooming, dressing, etc )  - Assess/evaluate cause of self-care deficits   - Assess range of motion  - Assess patient's mobility; develop plan if impaired  - Assess patient's need for assistive devices and provide as appropriate  - Encourage maximum independence but intervene and supervise when necessary  - Involve family in performance of ADLs  - Assess for home care needs following discharge   - Consider OT consult to assist with ADL evaluation and planning for discharge  - Provide patient education as appropriate  Outcome: Progressing  Goal: Maintains/Returns to pre admission functional level  Description: INTERVENTIONS:  - Perform BMAT or MOVE assessment daily    - Set and communicate daily mobility goal to care team and patient/family/caregiver  - Collaborate with rehabilitation services on mobility goals if consulted  - Perform Range of Motion 2 times a day  - Reposition patient every 2 hours    - Dangle patient 2 times a day  - Stand patient 2 times a day  - Ambulate patient 3 times a day  - Out of bed to chair 3 times a day   - Out of bed for meals 3 times a day  - Out of bed for toileting  - Record patient progress and toleration of activity level   Outcome: Progressing     Problem: Prexisting or High Potential for Compromised Skin Integrity  Goal: Skin integrity is maintained or improved  Description: INTERVENTIONS:  - Identify patients at risk for skin breakdown  - Assess and monitor skin integrity  - Assess and monitor nutrition and hydration status  - Monitor labs   - Assess for incontinence   - Turn and reposition patient  - Assist with mobility/ambulation  - Relieve pressure over bony prominences  - Avoid friction and shearing  - Provide appropriate hygiene as needed including keeping skin clean and dry  - Evaluate need for skin moisturizer/barrier cream  - Collaborate with interdisciplinary team   - Patient/family teaching  - Consider wound care consult   Outcome: Progressing     Problem: Nutrition/Hydration-ADULT  Goal: Nutrient/Hydration intake appropriate for improving, restoring or maintaining nutritional needs  Description: Monitor and assess patient's nutrition/hydration status for malnutrition  Collaborate with interdisciplinary team and initiate plan and interventions as ordered  Monitor patient's weight and dietary intake as ordered or per policy  Utilize nutrition screening tool and intervene as necessary  Determine patient's food preferences and provide high-protein, high-caloric foods as appropriate       INTERVENTIONS:  - Monitor oral intake, urinary output, labs, and treatment plans  - Assess nutrition and hydration status and recommend course of action  - Evaluate amount of meals eaten  - Assist patient with eating if necessary   - Allow adequate time for meals  - Recommend/ encourage appropriate diets, oral nutritional supplements, and vitamin/mineral supplements  - Order, calculate, and assess calorie counts as needed  - Recommend, monitor, and adjust tube feedings and TPN/PPN based on assessed needs  - Assess need for intravenous fluids  - Provide specific nutrition/hydration education as appropriate  - Include patient/family/caregiver in decisions related to nutrition  Outcome: Progressing

## 2023-04-22 NOTE — ASSESSMENT & PLAN NOTE
· Completed prednisone therapy  · Continue PO Lasix to 40 mg BID   · Monitor respiratory status, currently on baseline 5L  · Respiratory protocol, Xopenex/Atrovent, Airway clearance protocol, IS  · Mucinex, Tessalon Coby   · Out of bed to chair, PT and OT

## 2023-04-22 NOTE — PROGRESS NOTES
[facility]  Progress Note  Name: Haritha Campuzano  MRN: [id number]  Unit/Bed#: S -01 I Date of Admission: 4/7/2023   Date of Service: 4/22/2023 I Hospital Day: 15    Assessment/Plan   * Acute on chronic respiratory failure ([facility] [address] )  Assessment & Plan  · Completed prednisone therapy  · Continue PO Lasix to 40 mg BID   · Monitor respiratory status, currently on baseline 5L  · Respiratory protocol, Xopenex/Atrovent, Airway clearance protocol, IS  · Mucinex, Tessalon Perles   · Out of bed to chair, PT and OT      History of pulmonary embolism  Assessment & Plan  · History of DVT, PE  · Home dose of coumadin alternating 9mg and 10 mg   · Continue Coumadin at a lower dose of 7 5 mg daily  · INR within limits    Chronic diastolic congestive heart failure (HCC)  Assessment & Plan  · No further shortness of breath  · Continue on p o  Lasix  · Daily weights monitor input and output    Morbid obesity (HCC)  Assessment & Plan  · Recently DC to [facility]   PT/OT: post acute rehab  · Pending placement  · BMI 60  · Nutrition consult for bariatric diet      EVERETT (obstructive sleep apnea)  Assessment & Plan  Hx of EVERETT, morbid obesity, obesity hypoventilation syndrome   Continue BiPAP HS     Primary hypertension  Assessment & Plan  BP stable  Home medication Lasix 40mg bid             VTE Pharmacologic Prophylaxis: VTE Score: 10 High Risk (Score >/= 5) - Pharmacological DVT Prophylaxis Ordered: warfarin (Coumadin)  Sequential Compression Devices Ordered  Patient Centered Rounds: I performed bedside rounds with nursing staff today  Discussions with Specialists or Other Care Team Provider:     Education and Discussions with Family / Patient: Patient declined call to        Total Time Spent on Date of Encounter in care of patient: 35 minutes This time was spent on one or more of the following: performing physical exam; counseling and coordination of care; obtaining or reviewing history; documenting in the medical record; reviewing/ordering tests, medications or procedures; communicating with other healthcare professionals and discussing with patient's family/caregivers  Current Length of Stay: 15 day(s)  Current Patient Status: Inpatient   Certification Statement: The patient will continue to require additional inpatient hospital stay due to Pending placement  Discharge Plan: To be determined, pending placement    Code Status: Level 1 - Full Code    Subjective:   Patient seen this morning in no acute distress, no new complaints  Objective:     Vitals:   Temp (24hrs), Av 9 °F (36 6 °C), Min:97 7 °F (36 5 °C), Max:98 1 °F (36 7 °C)    Temp:  [97 7 °F (36 5 °C)-98 1 °F (36 7 °C)] 98 1 °F (36 7 °C)  HR:  [] 97  Resp:  [18-20] 20  BP: ()/(53-74) 147/74  SpO2:  [90 %-99 %] 97 %  Body mass index is 61 82 kg/m²  Input and Output Summary (last 24 hours): Intake/Output Summary (Last 24 hours) at 2023 1423  Last data filed at 2023 1414  Gross per 24 hour   Intake 840 ml   Output 1750 ml   Net -910 ml       Physical Exam:   Physical Exam  Vitals reviewed  Constitutional:       General: He is not in acute distress  Appearance: He is obese  He is not toxic-appearing  HENT:      Head: Normocephalic and atraumatic  Mouth/Throat:      Pharynx: Oropharynx is clear  Eyes:      Extraocular Movements: Extraocular movements intact  Cardiovascular:      Rate and Rhythm: Normal rate and regular rhythm  Pulses: Normal pulses  Pulmonary:      Effort: No respiratory distress  Breath sounds: No wheezing, rhonchi or rales  Abdominal:      Palpations: Abdomen is soft  Tenderness: There is no abdominal tenderness  Musculoskeletal:         General: No tenderness  Right lower leg: Edema present  Left lower leg: Edema present  Comments:  At baseline   Neurological:      Mental Status: He is alert and oriented to person, place, and time    Psychiatric:         Mood and Affect: Mood normal          Behavior: Behavior normal          Thought Content:  Thought content normal          Judgment: Judgment normal           Additional Data:     Labs:      Results from last 7 days   Lab Units 04/21/23  0523   SODIUM mmol/L 137   POTASSIUM mmol/L 3 9   CHLORIDE mmol/L 94*   CO2 mmol/L 37*   BUN mg/dL 10   CREATININE mg/dL 0 44*   ANION GAP mmol/L 6   CALCIUM mg/dL 8 9   GLUCOSE RANDOM mg/dL 103     Results from last 7 days   Lab Units 04/21/23  0523   INR  2 50*                   Lines/Drains:  Invasive Devices     Peripheral Intravenous Line  Duration           Peripheral IV 04/19/23 Right;Ventral (anterior) Forearm 2 days                Recent Cultures (last 7 days):         Last 24 Hours Medication List:   Current Facility-Administered Medications   Medication Dose Route Frequency Provider Last Rate   • acetaminophen  975 mg Oral Q8H [facility] Sondra Romero MD     • albuterol  2 5 mg Nebulization Q4H PRN Inocencia Snyder MD     • benzonatate  200 mg Oral TID Sondra Romero MD     • bisacodyl  10 mg Rectal Daily PRN Jason Storm MD     • dextromethorphan-guaiFENesin  10 mL Oral Q4H PRN Colleen Mcqueen MD     • fluticasone  2 spray Each Nare Daily Jason Storm MD     • furosemide  40 mg Oral BID (diuretic) Jason Storm MD     • guaiFENesin  1,200 mg Oral Q12H [facility] Jason Storm MD     • hydrOXYzine HCL  50 mg Oral Q6H PRN Jason Storm MD     • ipratropium  0 5 mg Nebulization TID Kal Morocho MD     • levalbuterol  1 25 mg Nebulization TID Kal Morocho MD     • methocarbamol  750 mg Oral Q6H PRN Inocencia Snyder MD     • metoprolol succinate  25 mg Oral Daily Sang Selby MD     • oxyCODONE  5 mg Oral Q6H PRN Karin Ortiz MD     • pantoprazole  40 mg Oral Early Morning Sondra Romero MD     • polyethylene glycol  17 g Oral Daily Jason Storm MD     • potassium chloride  20 mEq Oral Daily Luz Maria Hager MD     • pregabalin  150 mg Oral TID Eulalia Stallings MD     • risperiDONE  1 mg Oral TID Eulalia Stallings MD     • senna-docusate sodium  1 tablet Oral BID Kiana Zamora MD     • simethicone  80 mg Oral Q6H PRN Jarett Brice MD     • warfarin  7 5 mg Oral Daily (warfarin) Jarett Brice MD          Today, Patient Was Seen By: Con Muniz MD    **Please Note: This note may have been constructed using a voice recognition system  **

## 2023-04-22 NOTE — DISCHARGE SUMMARY
Ordered        04/07/23 1130  INPATIENT ADMISSION  Once                      Discharge Date: 05/06/23    Consultations During Hospital Stay:  · Case management    Procedures Performed:   · None    Significant Findings / Test Results:   · BNP 35 on 4/7    Incidental Findings:   · None    Test Results Pending at Discharge (will require follow up): · None     Outpatient Tests Requested:  · None    Complications:  none    Reason for Admission: Shortness of breath  Hospital Course:   María Cardenas is a 69 y o  male patient with PMH of chronic respiratory failure on baseline 5L, morbid obesity, diastolic CHF, DVT/PE on Coumadin, obstructive sleep apnea on Bipap HS, and hypertension who originally presented to the hospital on 4/7/2023 due to progressively worsening shortness of breath  Of note, patient was in the hospital recently due to respiratory failure secondary to COVID infection  This admission, chest x-ray showed mild bibasilar atelectasis, without definite consolidation  Patient was admitted for acute on chronic respiratory failure, thought to be due to inflammatory changes post COVID  He received a course of steroids and was monitored off antibiotics  He has improved symptomatically and has maintained adequate oxygen saturation on his baseline 5 L of oxygen  He is stable for discharge to rehab facility  As patient was requiring Vickie Lyubov lift it took several days to find a place to accept him  After authorization completed he was excepted to [facility] in [place]  No PA facilities excepting him due to his weight  Started on Lexapro for adjustment disorder and potassium 20 daily  Lasix had been increased from 40 to twice daily and continued  Please see above list of diagnoses and related plan for additional information       Condition at Discharge: stable    Discharge Day Visit / Exam:   Subjective: None  Vitals: Blood Pressure: 104/65 (05/06/23 0727)  Pulse: 88 (05/06/23 5232)  Temperature: 98 1 °F (36 7 °C) (05/05/23 1605)  Temp Source: Oral (05/03/23 1449)  Respirations: 18 (05/05/23 1605)  Weight - Scale: (!) 222 kg (490 lb 4 9 oz) (05/06/23 0600)  SpO2: 95 % (05/06/23 0727)  Exam:   Physical Exam  Vitals and nursing note reviewed  Constitutional:       General: He is not in acute distress  Appearance: He is not ill-appearing, toxic-appearing or diaphoretic  Eyes:      Extraocular Movements: Extraocular movements intact  Conjunctiva/sclera: Conjunctivae normal    Cardiovascular:      Rate and Rhythm: Normal rate and regular rhythm  Pulmonary:      Effort: Pulmonary effort is normal    Neurological:      Mental Status: He is alert  Discussion with Family: Patient declined call to   Discharge instructions/Information to patient and family:   See after visit summary for information provided to patient and family  Provisions for Follow-Up Care:  See after visit summary for information related to follow-up care and any pertinent home health orders  Disposition:   Acute Rehab at [facility]  Planned Readmission: no     Discharge Statement:  I spent 60 minutes discharging the patient  This time was spent on the day of discharge  I had direct contact with the patient on the day of discharge  Greater than 50% of the total time  Also during peer to peer and discussing case management  Discharge Medications:  See after visit summary for reconciled discharge medications provided to patient and/or family        **Please Note: This note may have been constructed using a voice recognition system**

## 2023-04-23 RX ADMIN — ACETAMINOPHEN 975 MG: 325 TABLET, FILM COATED ORAL at 13:00

## 2023-04-23 RX ADMIN — IPRATROPIUM BROMIDE 0.5 MG: 0.5 SOLUTION RESPIRATORY (INHALATION) at 19:08

## 2023-04-23 RX ADMIN — ACETAMINOPHEN 975 MG: 325 TABLET, FILM COATED ORAL at 04:59

## 2023-04-23 RX ADMIN — LEVALBUTEROL HYDROCHLORIDE 1.25 MG: 1.25 SOLUTION RESPIRATORY (INHALATION) at 08:11

## 2023-04-23 RX ADMIN — FUROSEMIDE 40 MG: 40 TABLET ORAL at 09:17

## 2023-04-23 RX ADMIN — IPRATROPIUM BROMIDE 0.5 MG: 0.5 SOLUTION RESPIRATORY (INHALATION) at 08:10

## 2023-04-23 RX ADMIN — ACETAMINOPHEN 975 MG: 325 TABLET, FILM COATED ORAL at 21:12

## 2023-04-23 RX ADMIN — GUAIFENESIN 1200 MG: 600 TABLET ORAL at 09:13

## 2023-04-23 RX ADMIN — PREGABALIN 150 MG: 75 CAPSULE ORAL at 09:12

## 2023-04-23 RX ADMIN — OXYCODONE HYDROCHLORIDE 5 MG: 5 TABLET ORAL at 05:02

## 2023-04-23 RX ADMIN — FUROSEMIDE 40 MG: 40 TABLET ORAL at 16:39

## 2023-04-23 RX ADMIN — PREGABALIN 150 MG: 75 CAPSULE ORAL at 21:12

## 2023-04-23 RX ADMIN — WARFARIN SODIUM 7.5 MG: 7.5 TABLET ORAL at 17:39

## 2023-04-23 RX ADMIN — RISPERIDONE 1 MG: 1 TABLET ORAL at 09:12

## 2023-04-23 RX ADMIN — METHOCARBAMOL TABLETS 750 MG: 750 TABLET, COATED ORAL at 05:02

## 2023-04-23 RX ADMIN — RISPERIDONE 1 MG: 1 TABLET ORAL at 16:39

## 2023-04-23 RX ADMIN — GUAIFENESIN 1200 MG: 600 TABLET ORAL at 21:12

## 2023-04-23 RX ADMIN — SIMETHICONE 80 MG: 80 TABLET, CHEWABLE ORAL at 12:58

## 2023-04-23 RX ADMIN — OXYCODONE HYDROCHLORIDE 5 MG: 5 TABLET ORAL at 21:13

## 2023-04-23 RX ADMIN — METOPROLOL SUCCINATE 25 MG: 25 TABLET, EXTENDED RELEASE ORAL at 09:13

## 2023-04-23 RX ADMIN — PANTOPRAZOLE SODIUM 40 MG: 40 TABLET, DELAYED RELEASE ORAL at 04:59

## 2023-04-23 RX ADMIN — POTASSIUM CHLORIDE 20 MEQ: 1500 TABLET, EXTENDED RELEASE ORAL at 09:12

## 2023-04-23 RX ADMIN — METHOCARBAMOL TABLETS 750 MG: 750 TABLET, COATED ORAL at 21:12

## 2023-04-23 RX ADMIN — PREGABALIN 150 MG: 75 CAPSULE ORAL at 16:39

## 2023-04-23 RX ADMIN — OXYCODONE HYDROCHLORIDE 5 MG: 5 TABLET ORAL at 13:00

## 2023-04-23 RX ADMIN — BENZONATATE 200 MG: 100 CAPSULE ORAL at 21:12

## 2023-04-23 RX ADMIN — LEVALBUTEROL HYDROCHLORIDE 1.25 MG: 1.25 SOLUTION RESPIRATORY (INHALATION) at 19:07

## 2023-04-23 RX ADMIN — POLYETHYLENE GLYCOL 3350 17 G: 17 POWDER, FOR SOLUTION ORAL at 09:12

## 2023-04-23 RX ADMIN — RISPERIDONE 1 MG: 1 TABLET ORAL at 21:12

## 2023-04-23 NOTE — CASE MANAGEMENT
Case Management Progress Note    Patient name Chloe Tovar  Location S /S -28 MRN [id number]  : 1955 Date 2023       LOS (days): 16  Geometric Mean LOS (GMLOS) (days): 3 90  Days to GMLOS:-12 2        OBJECTIVE:        Current admission status: Inpatient  Preferred Pharmacy:   [address], [place] [address] [place] [address]  Phone: [phone] Fax: [phone]    Primary Care Provider: Kadi Hicks MD    Primary Insurance: [facility]  Secondary Insurance:     PROGRESS NOTE:  Followed-up with referrals in [address] [place] [address] and no facilities thus far have accepted pt either due to no bed availability, unable to accommodate needs, not in network with insurance  Multiple other referrals have no yet provided a determination  Message sent in [address] [place] [address] for update on status of determination  Will need to follow-up

## 2023-04-23 NOTE — PROGRESS NOTES
[facility]  Progress Note  Name: Georgi Luevano  MRN: [id number]  Unit/Bed#: S -01 I Date of Admission: 4/7/2023   Date of Service: 4/23/2023 I Hospital Day: 16    Assessment/Plan   * Acute on chronic respiratory failure ([address] )  Assessment & Plan  · Completed prednisone therapy  · Continue PO Lasix to 40 mg BID   · Monitor respiratory status, currently on baseline 4L  · Respiratory protocol, Xopenex/Atrovent, Airway clearance protocol, IS  · Mucinex, Tessalon Perles   · Out of bed to chair, PT and OT      Chronic diastolic congestive heart failure (HCC)  Assessment & Plan  · No further shortness of breath  · Continue on p o  Lasix    · Daily weights monitor input and output    EVERETT (obstructive sleep apnea)  Assessment & Plan  · Hx of EVERETT, morbid obesity, obesity hypoventilation syndrome   · Continue BiPAP HS     Primary hypertension  Assessment & Plan  · BP stable  · Continue metoprolol and Lasix 40mg bid      Morbid obesity (HCC)  Assessment & Plan  · Recently DC to [facility]   PT/OT: post acute rehab  · Pending placement  · BMI 60  · Nutrition consult for bariatric diet      History of pulmonary embolism  Assessment & Plan  · History of DVT, PE  · Home dose of coumadin alternating 9mg and 10 mg   Became supratherapeutic on this dose  · Currently on Coumadin at a lower dose of 7 5 mg daily  · Recheck INR tomorrow           VTE Pharmacologic Prophylaxis:   Pharmacologic: Warfarin (Coumadin)  Mechanical VTE Prophylaxis in Place: Yes    Education and Discussions with Family / Patient: Patient    Current Length of Stay: 16 day(s)    Current Patient Status: Inpatient   Certification Statement: The patient will continue to require additional inpatient hospital stay due to Pending placement    Discharge Plan: Medically stable for discharge and is pending placement    Code Status: Level 1 - Full Code      Subjective:   No complaints or acute overnight events    Objective:     Vitals: Temp (24hrs), Av 8 °F (36 6 °C), Min:97 5 °F (36 4 °C), Max:98 °F (36 7 °C)    Temp:  [97 5 °F (36 4 °C)-98 °F (36 7 °C)] 97 9 °F (36 6 °C)  HR:  [91-93] 91  Resp:  [16-20] 16  BP: (111-136)/() 111/66  SpO2:  [92 %-98 %] 98 %  Body mass index is 61 82 kg/m²  Input and Output Summary (last 24 hours): Intake/Output Summary (Last 24 hours) at 2023 1231  Last data filed at 2023 0930  Gross per 24 hour   Intake 840 ml   Output 1250 ml   Net -410 ml       Physical Exam:  General Appearance:    Alert, cooperative, no distress, appropriately responsive    Head:    Normocephalic, without obvious abnormality, atraumatic, mucous membranes moist    Eyes:    Conjunctiva/corneas clear   Neck:   Supple   Resp:    Nonlabored, decreased breath sounds bilaterally, otherwise clear    Heart:    Regular rate and rhythm, S1 and S2    Abdomen: Morbidly obese, soft, nontender   Extremities:  Stable, chronic bilateral lower extremity edema   Neurologic:  nonfocal         Additional Data:     Labs:        Results from last 7 days   Lab Units 23  0523   POTASSIUM mmol/L 3 9   CHLORIDE mmol/L 94*   CO2 mmol/L 37*   BUN mg/dL 10   CREATININE mg/dL 0 44*   CALCIUM mg/dL 8 9     Results from last 7 days   Lab Units 23  0523   INR  2 50*       * I Have Reviewed All Lab Data Listed Above  * Additional Pertinent Lab Tests Reviewed: [facility] Admission Reviewed    Cultures:   Blood Culture:   Lab Results   Component Value Date    BLOODCX No Growth After 5 Days  2023    BLOODCX No Growth After 5 Days  2023    BLOODCX No Growth After 5 Days  2023    BLOODCX No Growth After 5 Days   2023     Urine Culture: No results found for: URINECX  Sputum Culture: No components found for: SPUTUMCX  Wound Culture: No results found for: WOUNDCULT    Last 24 Hours Medication List:   Current Facility-Administered Medications   Medication Dose Route Frequency Provider Last Rate • acetaminophen  975 mg Oral Q8H [address] Hadassah Landau, MD     • albuterol  2 5 mg Nebulization Q4H PRN Priscila Saba MD     • benzonatate  200 mg Oral TID Hadassah Landau, MD     • bisacodyl  10 mg Rectal Daily PRN Tae Segura MD     • dextromethorphan-guaiFENesin  10 mL Oral Q4H PRN Philip Mayberry MD     • fluticasone  2 spray Each Nare Daily Tae Segura MD     • furosemide  40 mg Oral BID (diuretic) Tae Segura MD     • guaiFENesin  1,200 mg Oral Q12H [address] Tae Segura MD     • hydrOXYzine HCL  50 mg Oral Q6H PRN Tae Segura MD     • ipratropium  0 5 mg Nebulization TID Starlet Given, MD     • levalbuterol  1 25 mg Nebulization TID Starlet Given, MD     • methocarbamol  750 mg Oral Q6H PRN Priscila Saba MD     • metoprolol succinate  25 mg Oral Daily Cade Palacios MD     • oxyCODONE  5 mg Oral Q6H PRN Waynette Baumgarten, MD     • pantoprazole  40 mg Oral Early Morning Hadassah Landau, MD     • polyethylene glycol  17 g Oral Daily Tae Segura MD     • potassium chloride  20 mEq Oral Daily Cade Palacios MD     • pregabalin  150 mg Oral TID Priscila Saba MD     • risperiDONE  1 mg Oral TID Priscila Saba MD     • senna-docusate sodium  1 tablet Oral BID Kiana Schmidt MD     • simethicone  80 mg Oral Q6H PRN Tae Segura MD     • warfarin  7 5 mg Oral Daily (warfarin) Tae Segura MD          Today, Patient Was Seen By: Darrian Martinez MD    ** Please Note: Dragon 360 Dictation voice to text software may have been used in the creation of this document   **

## 2023-04-23 NOTE — ASSESSMENT & PLAN NOTE
· History of DVT, PE  · Home dose of coumadin alternating 9mg and 10 mg   Became supratherapeutic on this dose  · Currently on Coumadin at a lower dose of 7 5 mg daily  · Recheck INR tomorrow

## 2023-04-23 NOTE — PLAN OF CARE
Problem: MOBILITY - ADULT  Goal: Maintain or return to baseline ADL function  Description: INTERVENTIONS:  -  Assess patient's ability to carry out ADLs; assess patient's baseline for ADL function and identify physical deficits which impact ability to perform ADLs (bathing, care of mouth/teeth, toileting, grooming, dressing, etc )  - Assess/evaluate cause of self-care deficits   - Assess range of motion  - Assess patient's mobility; develop plan if impaired  - Assess patient's need for assistive devices and provide as appropriate  - Encourage maximum independence but intervene and supervise when necessary  - Involve family in performance of ADLs  - Assess for home care needs following discharge   - Consider OT consult to assist with ADL evaluation and planning for discharge  - Provide patient education as appropriate  Outcome: Progressing  Goal: Maintains/Returns to pre admission functional level  Description: INTERVENTIONS:  - Perform BMAT or MOVE assessment daily    - Set and communicate daily mobility goal to care team and patient/family/caregiver  - Collaborate with rehabilitation services on mobility goals if consulted  - Reposition patient every 2 hours    - Out of bed to chair 2 times a day   - Out of bed for meals 2 times a day  - Out of bed for toileting  - Record patient progress and toleration of activity level   Outcome: Progressing     Problem: Prexisting or High Potential for Compromised Skin Integrity  Goal: Skin integrity is maintained or improved  Description: INTERVENTIONS:  - Identify patients at risk for skin breakdown  - Assess and monitor skin integrity  - Assess and monitor nutrition and hydration status  - Monitor labs   - Assess for incontinence   - Turn and reposition patient  - Assist with mobility/ambulation  - Relieve pressure over bony prominences  - Avoid friction and shearing  - Provide appropriate hygiene as needed including keeping skin clean and dry  - Evaluate need for skin moisturizer/barrier cream  - Collaborate with interdisciplinary team   - Patient/family teaching  - Consider wound care consult   Outcome: Progressing     Problem: Nutrition/Hydration-ADULT  Goal: Nutrient/Hydration intake appropriate for improving, restoring or maintaining nutritional needs  Description: Monitor and assess patient's nutrition/hydration status for malnutrition  Collaborate with interdisciplinary team and initiate plan and interventions as ordered  Monitor patient's weight and dietary intake as ordered or per policy  Utilize nutrition screening tool and intervene as necessary  Determine patient's food preferences and provide high-protein, high-caloric foods as appropriate       INTERVENTIONS:  - Monitor oral intake, urinary output, labs, and treatment plans  - Assess nutrition and hydration status and recommend course of action  - Evaluate amount of meals eaten  - Assist patient with eating if necessary   - Allow adequate time for meals  - Recommend/ encourage appropriate diets, oral nutritional supplements, and vitamin/mineral supplements  - Order, calculate, and assess calorie counts as needed  - Recommend, monitor, and adjust tube feedings and TPN/PPN based on assessed needs  - Assess need for intravenous fluids  - Provide specific nutrition/hydration education as appropriate  - Include patient/family/caregiver in decisions related to nutrition  Outcome: Progressing

## 2023-04-23 NOTE — ASSESSMENT & PLAN NOTE
· Completed prednisone therapy  · Continue PO Lasix to 40 mg BID   · Monitor respiratory status, currently on baseline 4L  · Respiratory protocol, Xopenex/Atrovent, Airway clearance protocol, IS  · Mucinex, Tessalon Coby   · Out of bed to chair, PT and OT

## 2023-04-24 LAB
ANION GAP SERPL CALCULATED.3IONS-SCNC: 2 MMOL/L (ref 4–13)
BUN SERPL-MCNC: 9 MG/DL (ref 5–25)
CALCIUM SERPL-MCNC: 9 MG/DL (ref 8.4–10.2)
CHLORIDE SERPL-SCNC: 94 MMOL/L (ref 96–108)
CO2 SERPL-SCNC: 43 MMOL/L (ref 21–32)
CREAT SERPL-MCNC: 0.52 MG/DL (ref 0.6–1.3)
ERYTHROCYTE [DISTWIDTH] IN BLOOD BY AUTOMATED COUNT: 12.5 % (ref 11.6–15.1)
GFR SERPL CREATININE-BSD FRML MDRD: 108 ML/MIN/1.73SQ M
GLUCOSE SERPL-MCNC: 99 MG/DL (ref 65–140)
HCT VFR BLD AUTO: 38.8 % (ref 36.5–49.3)
HGB BLD-MCNC: 11.6 G/DL (ref 12–17)
INR PPP: 3.46 (ref 0.84–1.19)
MCH RBC QN AUTO: 32 PG (ref 26.8–34.3)
MCHC RBC AUTO-ENTMCNC: 29.9 G/DL (ref 31.4–37.4)
MCV RBC AUTO: 107 FL (ref 82–98)
PLATELET # BLD AUTO: 223 THOUSANDS/UL (ref 149–390)
PMV BLD AUTO: 8.9 FL (ref 8.9–12.7)
POTASSIUM SERPL-SCNC: 3.8 MMOL/L (ref 3.5–5.3)
PROTHROMBIN TIME: 35.1 SECONDS (ref 11.6–14.5)
RBC # BLD AUTO: 3.62 MILLION/UL (ref 3.88–5.62)
SODIUM SERPL-SCNC: 139 MMOL/L (ref 135–147)
WBC # BLD AUTO: 6.78 THOUSAND/UL (ref 4.31–10.16)

## 2023-04-24 RX ADMIN — METHOCARBAMOL TABLETS 750 MG: 750 TABLET, COATED ORAL at 11:22

## 2023-04-24 RX ADMIN — IPRATROPIUM BROMIDE 0.5 MG: 0.5 SOLUTION RESPIRATORY (INHALATION) at 14:38

## 2023-04-24 RX ADMIN — ACETAMINOPHEN 975 MG: 325 TABLET, FILM COATED ORAL at 14:36

## 2023-04-24 RX ADMIN — OXYCODONE HYDROCHLORIDE 5 MG: 5 TABLET ORAL at 23:44

## 2023-04-24 RX ADMIN — METHOCARBAMOL TABLETS 750 MG: 750 TABLET, COATED ORAL at 17:47

## 2023-04-24 RX ADMIN — METHOCARBAMOL TABLETS 750 MG: 750 TABLET, COATED ORAL at 23:44

## 2023-04-24 RX ADMIN — LEVALBUTEROL HYDROCHLORIDE 1.25 MG: 1.25 SOLUTION RESPIRATORY (INHALATION) at 07:49

## 2023-04-24 RX ADMIN — FUROSEMIDE 40 MG: 40 TABLET ORAL at 09:32

## 2023-04-24 RX ADMIN — PANTOPRAZOLE SODIUM 40 MG: 40 TABLET, DELAYED RELEASE ORAL at 05:33

## 2023-04-24 RX ADMIN — GUAIFENESIN 1200 MG: 600 TABLET ORAL at 21:02

## 2023-04-24 RX ADMIN — BENZONATATE 200 MG: 100 CAPSULE ORAL at 17:28

## 2023-04-24 RX ADMIN — FUROSEMIDE 40 MG: 40 TABLET ORAL at 17:27

## 2023-04-24 RX ADMIN — LEVALBUTEROL HYDROCHLORIDE 1.25 MG: 1.25 SOLUTION RESPIRATORY (INHALATION) at 14:38

## 2023-04-24 RX ADMIN — BENZONATATE 200 MG: 100 CAPSULE ORAL at 21:02

## 2023-04-24 RX ADMIN — ACETAMINOPHEN 975 MG: 325 TABLET, FILM COATED ORAL at 05:33

## 2023-04-24 RX ADMIN — PREGABALIN 150 MG: 75 CAPSULE ORAL at 09:33

## 2023-04-24 RX ADMIN — OXYCODONE HYDROCHLORIDE 5 MG: 5 TABLET ORAL at 17:47

## 2023-04-24 RX ADMIN — SENNOSIDES AND DOCUSATE SODIUM 1 TABLET: 8.6; 5 TABLET ORAL at 09:33

## 2023-04-24 RX ADMIN — IPRATROPIUM BROMIDE 0.5 MG: 0.5 SOLUTION RESPIRATORY (INHALATION) at 07:49

## 2023-04-24 RX ADMIN — OXYCODONE HYDROCHLORIDE 5 MG: 5 TABLET ORAL at 11:22

## 2023-04-24 RX ADMIN — SENNOSIDES AND DOCUSATE SODIUM 1 TABLET: 8.6; 5 TABLET ORAL at 17:28

## 2023-04-24 RX ADMIN — PREGABALIN 150 MG: 75 CAPSULE ORAL at 21:02

## 2023-04-24 RX ADMIN — METHOCARBAMOL TABLETS 750 MG: 750 TABLET, COATED ORAL at 04:57

## 2023-04-24 RX ADMIN — METOPROLOL SUCCINATE 25 MG: 25 TABLET, EXTENDED RELEASE ORAL at 09:32

## 2023-04-24 RX ADMIN — RISPERIDONE 1 MG: 1 TABLET ORAL at 21:02

## 2023-04-24 RX ADMIN — IPRATROPIUM BROMIDE 0.5 MG: 0.5 SOLUTION RESPIRATORY (INHALATION) at 19:56

## 2023-04-24 RX ADMIN — ACETAMINOPHEN 975 MG: 325 TABLET, FILM COATED ORAL at 21:02

## 2023-04-24 RX ADMIN — POTASSIUM CHLORIDE 20 MEQ: 1500 TABLET, EXTENDED RELEASE ORAL at 09:32

## 2023-04-24 RX ADMIN — BENZONATATE 200 MG: 100 CAPSULE ORAL at 09:33

## 2023-04-24 RX ADMIN — LEVALBUTEROL HYDROCHLORIDE 1.25 MG: 1.25 SOLUTION RESPIRATORY (INHALATION) at 19:56

## 2023-04-24 RX ADMIN — RISPERIDONE 1 MG: 1 TABLET ORAL at 17:28

## 2023-04-24 RX ADMIN — PREGABALIN 150 MG: 75 CAPSULE ORAL at 17:27

## 2023-04-24 RX ADMIN — GUAIFENESIN 1200 MG: 600 TABLET ORAL at 09:33

## 2023-04-24 RX ADMIN — RISPERIDONE 1 MG: 1 TABLET ORAL at 09:32

## 2023-04-24 RX ADMIN — OXYCODONE HYDROCHLORIDE 5 MG: 5 TABLET ORAL at 04:57

## 2023-04-24 NOTE — PLAN OF CARE
Problem: PHYSICAL THERAPY ADULT  Goal: Performs mobility at highest level of function for planned discharge setting  See evaluation for individualized goals  Description: Treatment/Interventions: Functional transfer training, LE strengthening/ROM, Therapeutic exercise, Endurance training, Cognitive reorientation, Patient/family training, Equipment eval/education, Gait training, Bed mobility          See flowsheet documentation for full assessment, interventions and recommendations  Outcome: Progressing  Note: Prognosis: Fair  Problem List: Decreased strength, Decreased range of motion, Decreased endurance, Impaired balance, Decreased mobility, Impaired hearing, Obesity, Decreased skin integrity, Pain, Decreased cognition (LE edema, fear and retreat?)  Assessment: Although Teodoro needs encouagement and reports feeling like he is not progressing, he IS making slow mobility progress  He is more consistent with needing mod A of 2 using belt and hammock technique during today's trials, and was able to perform longer WB Activity throughout trials  Additionally he was able to acheive 90% upright with arm extension for one of the 4 trials   Skilled PT recommended to progress pt toward treatment goals  Ideally would like to progress to bariatric walking sling once it becomes available  Barriers to Discharge: Decreased caregiver support, Inaccessible home environment     PT Discharge Recommendation: Post acute rehabilitation services    See flowsheet documentation for full assessment

## 2023-04-24 NOTE — PLAN OF CARE
Problem: OCCUPATIONAL THERAPY ADULT  Goal: Performs self-care activities at highest level of function for planned discharge setting  See evaluation for individualized goals  Description: Treatment Interventions: ADL retraining, Functional transfer training, UE strengthening/ROM, Endurance training, Patient/family training, Equipment evaluation/education, Compensatory technique education, Neuromuscular reeducation, Energy conservation          See flowsheet documentation for full assessment, interventions and recommendations  Outcome: Progressing  Note: Limitation: Decreased ADL status, Decreased Safe judgement during ADL, Decreased endurance, Decreased cognition, Decreased self-care trans, Decreased high-level ADLs  Prognosis: Fair  Assessment: Patient seen for OT treatment on 4/24/2023 s/p admission for Acute on chronic respiratory failure [facility]) Patient presents with active orders for OT eval and treat and Up with assistance   Upon arrival, pt found resting in recliner showing no signs of distress  Patient agreeable to OT session  Patient participated in functional transfer training with intervention focus on functional strength training, increasing activity tolerance, and working toward goal of transfer to/from W/C  Markus Barney is showing improvements in standing tolerance, transfers, but is continuing to perform below baseline due to the following deficits: endurance ,  decreased muscular strength , decreased balance , decreased standing tolerance for self care tasks , decreased trunk control  and (+) pain   From OT standpoint, patient would benefit from skilled intervention to maximize independence with ADLs and functional mobility  Goals remain appropriate, continue POC   At this time, recommending D/C to: post-acute rehabilitation     OT Discharge Recommendation: Post acute rehabilitation services     [facility]

## 2023-04-24 NOTE — OCCUPATIONAL THERAPY NOTE
Occupational Therapy Treatment Note  Patient Name: Tyesha Cedeño  [id number] Date: 4/24/2023  Problem List  Principal Problem:    Acute on chronic respiratory failure [facility])  Active Problems: Morbid obesity ([facility] [address] )    Primary hypertension    EVERETT (obstructive sleep apnea)    Chronic diastolic congestive heart failure ([facility] )    History of pulmonary embolism       04/24/23 1401   OT Last Visit   OT Visit Date 04/24/23   Note Type   Note Type Treatment   Pain Assessment   Pain Assessment Tool 0-10   Pain Score 9   Pain Location/Orientation Location: Back  (neck)   Pain Radiating Towards radiating to L shoulder   Pain Onset/Description Onset: Ongoing  (chronic in nature)   Effect of Pain on Daily Activities limited activity tolerance and functional mobility   Hospital Pain Intervention(s) Repositioned; Ambulation/increased activity  (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)   Restrictions/Precautions   Wells Duane Bearing Precautions Per Order No   Other Precautions Cognitive; Chair Alarm; Bed Alarm; Fall Risk;Multiple lines   Lifestyle   Autonomy Pt admitted from [facility], has hx of multiple recent hospitalizations  At baseline lives c his spouse   Reciprocal Relationships supportive spouse and son  Service to Others retired   Intrinsic Gratification (S)  Expressing that his goal is to attend his sons wedding in June  ADL   Where Assessed Chair   Functional Standing Tolerance   Time 30s x1 trial, 15 s x1 trial, 10s x2 trials   Activity functional trasnfer training   Comments mod a x2 with use of belt and hammock, weightbearing through BUEs onto table versus recliner  Flexed forward at hips 90 degrees  Transfers   Sit to Stand 3  Moderate assistance   Additional items Assist x 2; Increased time required;Verbal cues  (with belt and hammock technique, B/L pillow blocking at knees)   Stand to Sit 3  Moderate assistance   Additional items Assist x 2; Increased time required;Verbal cues   Additional Comments sit<>stand trials x4 completed from recliner  Primarily  mod A x2 B/L knee blocking c pillow in place  Belt and hammock technique used for all transfers  BUE in front of pt WBing on bedside table (pushed against wall)  Pt more successful with therapist placement of arms outside of pt arms, assisting more with hammock than belt  Achieves full knee extension and buttocks clearance on 4/4 trials , flexed forward 90 degrees at hips and WBing onto forearms  able to tolerate static standing 10-30s each transfer, limited d/t weakness and pain  Able to successfully transition to [facility] on hands on 1 trial    Subjective   Subjective Pt expressing frustration during todays session, intermittently irritated re: feeling a lack of progress  Time spend with pt for reflective thinking on progress and progression toward short term goal    Cognition   Overall Cognitive Status [facility]   Arousal/Participation Alert   Attention Attends with cues to redirect   Orientation Level Oriented X4   Memory Decreased recall of precautions   Following Commands Follows one step commands with increased time or repetition   Comments pt with intermittent confusion noted throughout session, however reporting difficulty concentrating 2/2 severity of pain  RN made aware   Additional Activities   Additional Activities Comments discussed pt's 1 week goal written during 4/21 session   Activity Tolerance   Activity Tolerance Patient limited by fatigue;Patient limited by pain   Medical Staff Made Aware PT Anthony Mendoza CM RN [facility]   Assessment   Assessment Patient seen for OT treatment on 4/24/2023 s/p admission for Acute on chronic respiratory failure [facility]) Patient presents with active orders for OT eval and treat and Up with assistance   Upon arrival, pt found resting in recliner showing no signs of distress  Patient agreeable to OT session   Patient participated in functional transfer training with intervention focus on functional strength training, increasing activity tolerance, and working toward goal of transfer to/from W/C  Loretta Solis is showing improvements in standing tolerance, transfers, but is continuing to perform below baseline due to the following deficits: endurance ,  decreased muscular strength , decreased balance , decreased standing tolerance for self care tasks , decreased trunk control  and (+) pain   From OT standpoint, patient would benefit from skilled intervention to maximize independence with ADLs and functional mobility  Goals remain appropriate, continue POC  At this time, recommending D/C to: post-acute rehabilitation   Plan   Treatment Interventions ADL retraining;Functional transfer training; Endurance training;Cognitive reorientation;Patient/family training;Equipment evaluation/education; Compensatory technique education; Neuromuscular reeducation; Energy conservation   Goal Expiration Date 05/01/23   OT Treatment Day 1   OT Frequency 3-5x/wk   Recommendation   OT Discharge Recommendation Post acute rehabilitation services   Additional Comments  Recommend chair position in bed vs OOB to recliner for pressure offloading throughout the day  Use of nanette lift for OOB trasnfers with nursing staff   Additional Comments 2 The patient's raw score on the AM-PAC Daily Activity Inpatient Short Form is 13  A raw score of less than 19 suggests the patient may benefit from discharge to post-acute rehabilitation services  Please refer to the recommendation of the Occupational Therapist for safe discharge planning     AM-PAC Daily Activity Inpatient   Lower Body Dressing 1   Bathing 1   Toileting 1   Upper Body Dressing 3   Grooming 3   Eating 4   Daily Activity Raw Score 13   Daily Activity Standardized Score (Calc for Raw Score >=11) 32 03   AM-[facility] Applied Cognition Inpatient   Following a Speech/Presentation 4   Understanding Ordinary Conversation 4   Taking Medications 3   Remembering Where Things Are Placed or Put Away 4   Remembering List of 4-5 Errands 3   Taking Care of Complicated Tasks 3 Applied Cognition Raw Score 21   Applied Cognition Standardized Score 44 3   End of Consult   Education Provided Yes;Family or social support of family present for education by provider  (spouse)   Patient Position at End of Consult Supine;Bed/Chair alarm activated; All needs within reach   Nurse Communication Nurse aware of consult   End of Consult Comments Pt benefited from co-session of skilled OT and PT therapists in order to most appropriately address functional deficits d/t extensive physical assistance required for safe mobility  and decreased activity tolerance  OT/PT objectives were addressed separately; please see PT note for specific goal areas targeted     Italo Evans

## 2023-04-24 NOTE — ASSESSMENT & PLAN NOTE
· History of DVT, PE  · Supratherapeutic, will likely have to decrease warfarin dose   · Recheck INR tomorrow

## 2023-04-24 NOTE — PROGRESS NOTES
[facility]  Progress Note  Name: Kilo Pike  MRN: [id number]  Unit/Bed#: S -01 I Date of Admission: 4/7/2023   Date of Service: 4/24/2023 I Hospital Day: 17    Assessment/Plan   * Acute on chronic respiratory failure ([facility] [address] )  Assessment & Plan  · Completed prednisone therapy  · Continue PO Lasix to 40 mg BID   · Monitor respiratory status, currently on baseline 4L  · Respiratory protocol, Xopenex/Atrovent, Airway clearance protocol, IS  · Mucinex, Tessalon Perles   · Out of bed to chair, PT and OT      History of pulmonary embolism  Assessment & Plan  · History of DVT, PE  · Supratherapeutic, will likely have to decrease warfarin dose   · Recheck INR tomorrow    Chronic diastolic congestive heart failure (HCC)  Assessment & Plan  · No further shortness of breath  · Continue on p o  Lasix  · Daily weights monitor input and output    EVERETT (obstructive sleep apnea)  Assessment & Plan  · Hx of EVERETT, morbid obesity, obesity hypoventilation syndrome   · Continue BiPAP HS     Morbid obesity (HCC)  Assessment & Plan  · Recently DC to [facility]   PT/OT: post acute rehab  · Pending placement  · BMI 60  · Nutrition consult for bariatric diet               VTE Pharmacologic Prophylaxis:   VTE Score: 10 Moderate Risk (Score 3-4) - Pharmacological DVT Prophylaxis Ordered: Warfarin (Coumadin)  - supra therapuetic INR, will hold warfarin today    Mechanical VTE Prophylaxis in Place: Yes    Patient Centered Rounds: I have performed bedside rounds with nursing staff today  Discussions with Specialists or Other Care Team Provider: CM    Education and Discussions with Family / Patient: will update patient's contact    Current Length of Stay: 17 day(s)    Current Patient Status: Inpatient     Discharge Plan / Estimated Discharge Date: PEnding placement    Code Status: Level 1 - Full Code      Subjective:   States he has congestion and uses Sudafed  States Claritin does not work    Complains of chronic back pain no other acute issues    Objective:     Vitals:   Temp (24hrs), Av 6 °F (36 4 °C), Min:97 5 °F (36 4 °C), Max:97 6 °F (36 4 °C)    Temp:  [97 5 °F (36 4 °C)-97 6 °F (36 4 °C)] 97 6 °F (36 4 °C)  HR:  [] 94  Resp:  [18] 18  BP: (107-153)/(62-81) 153/81  SpO2:  [91 %-98 %] 94 %  Body mass index is 61 99 kg/m²  Input and Output Summary (last 24 hours): Intake/Output Summary (Last 24 hours) at 2023 1544  Last data filed at 2023 1501  Gross per 24 hour   Intake 880 ml   Output 700 ml   Net 180 ml       Physical Exam:     Physical Exam  Vitals and nursing note reviewed  Constitutional:       General: He is not in acute distress  Appearance: He is well-developed  He is obese  HENT:      Head: Normocephalic and atraumatic  Mouth/Throat:      Mouth: Mucous membranes are moist       Pharynx: Oropharynx is clear  Eyes:      Conjunctiva/sclera: Conjunctivae normal    Cardiovascular:      Rate and Rhythm: Normal rate  Heart sounds: No murmur heard  Pulmonary:      Effort: Pulmonary effort is normal  No respiratory distress  Comments: On 4 L baseline oxygen  Abdominal:      General: Bowel sounds are normal       Palpations: Abdomen is soft  Tenderness: There is no abdominal tenderness  Musculoskeletal:         General: No swelling  Cervical back: Neck supple  Skin:     General: Skin is warm and dry  Capillary Refill: Capillary refill takes less than 2 seconds  Neurological:      Mental Status: He is alert and oriented to person, place, and time     Psychiatric:         Mood and Affect: Mood normal        Additional Data:     Labs:  Results from last 7 days   Lab Units 23  0531   WBC Thousand/uL 6 78   HEMOGLOBIN g/dL 11 6*   HEMATOCRIT % 38 8   PLATELETS Thousands/uL 223     Results from last 7 days   Lab Units 23  0531   SODIUM mmol/L 139   POTASSIUM mmol/L 3 8   CHLORIDE mmol/L 94*   CO2 mmol/L 43*   BUN mg/dL 9 CREATININE mg/dL 0 52*   ANION GAP mmol/L 2*   CALCIUM mg/dL 9 0   GLUCOSE RANDOM mg/dL 99     Results from last 7 days   Lab Units 04/24/23  0531   INR  3 46*                   Imaging: No pertinent imaging reviewed      Recent Cultures (last 7 days):           Lines/Drains:  Invasive Devices     Peripheral Intravenous Line  Duration           Peripheral IV 04/24/23 Right Antecubital <1 day                Telemetry:        Last 24 Hours Medication List:   Current Facility-Administered Medications   Medication Dose Route Frequency Provider Last Rate   • acetaminophen  975 mg Oral Q8H Todd Weathers MD     • albuterol  2 5 mg Nebulization Q4H PRN Clement Romano MD     • benzonatate  200 mg Oral TID Liseth Salmon MD     • bisacodyl  10 mg Rectal Daily PRN Liana Vasquez MD     • dextromethorphan-guaiFENesin  10 mL Oral Q4H PRN Jimmy Ramos MD     • fluticasone  2 spray Each Nare Daily Liana Vasquez MD     • furosemide  40 mg Oral BID (diuretic) Liana Vasquez MD     • guaiFENesin  1,200 mg Oral Q12H [address] Liana Vasquez MD     • hydrOXYzine HCL  50 mg Oral Q6H PRN Liana Vasquez MD     • ipratropium  0 5 mg Nebulization TID Narda Singleton MD     • levalbuterol  1 25 mg Nebulization TID Narda Singleton MD     • methocarbamol  750 mg Oral Q6H PRN Clement Romano MD     • metoprolol succinate  25 mg Oral Daily José Russ MD     • oxyCODONE  5 mg Oral Q6H PRN Rolly Paredes MD     • pantoprazole  40 mg Oral Early Morning Liseth Salmon MD     • polyethylene glycol  17 g Oral Daily Liana Vasquez MD     • potassium chloride  20 mEq Oral Daily José Russ MD     • pregabalin  150 mg Oral TID Clement Romano MD     • risperiDONE  1 mg Oral TID Clement Romano MD     • senna-docusate sodium  1 tablet Oral BID Kiana Starr MD     • simethicone  80 mg Oral Q6H PRN Liana Vasquez MD          Today, Patient Was Seen By: Madina Purdy, MD    ** Please Note: This note has been constructed using a voice recognition system   **

## 2023-04-24 NOTE — PHYSICAL THERAPY NOTE
PHYSICAL THERAPY TREATMENT NOTE  NAME:  Hu Trimble  DATE: 04/24/23    Length Of Stay: 17  Performed at least 2 patient identifiers during session: Name and Birthday    TREATMENT:    04/24/23 1427   PT Last Visit   PT Visit Date 04/24/23   Note Type   Note Type Treatment   Pain Assessment   Pain Assessment Tool 0-10   Pain Score 9   Pain Location/Orientation Location: Back   Pain Radiating Towards radiating to L shoulder   Pain Onset/Description Onset: Ongoing   Effect of Pain on Daily Activities limits functional mobility including standing tolerance, additional sit<>stand trials   Hospital Pain Intervention(s) Repositioned; Ambulation/increased activity  (Pt requesting pain meds via Andrea VALENZUELA)   Multiple Pain Sites Yes  (testicles per pt)   Pain Rating: FLACC (Rest) - Face 1   Pain Rating: FLACC (Rest) - Legs 0   Pain Rating: FLACC (Rest) - Activity 0   Pain Rating: FLACC (Rest) - Cry 1   Pain Rating: FLACC (Rest) - Consolability 0   Score: FLACC (Rest) 2   Restrictions/Precautions   Weight Bearing Precautions Per Order No   Other Precautions Cognitive; Chair Alarm; Bed Alarm; Fall Risk;Multiple lines;Pain; Impulsive   General   Chart Reviewed Yes   Response to Previous Treatment Patient reporting fatigue but able to participate  Family/Caregiver Present No   Cognition   Overall Cognitive Status WFL   Arousal/Participation Alert   Attention Attends with cues to redirect   Orientation Level Oriented X4   Memory Decreased recall of precautions   Following Commands Follows one step commands with increased time or repetition   Subjective   Subjective Pt with intermittent confusion noted throughout session, however reporting difficulty concentrating due to severity of pain  Needs encouragement to participate @ times and to progress with standing tolerance   Transfers   Sit to Stand 3  Moderate assistance   Additional items Assist x 2; Increased time required;Verbal cues  (using belt and hammock technique, emphasis on "HAMMOCK with perpendicular sheet under pt)   Stand to Sit 3  Moderate assistance   Additional items Assist x 2; Increased time required;Verbal cues;Armrests   Additional Comments Sit<>stand trials x4 completed from recliner using some form of belt and hammock technique, with hammock as a sheet PERPENDICULARLY under pt, B knee block with pillows @ knees to increase lever arm  Pt requires primarily mod A x2 for ALL trials this session  Initial sit<>stand trials as clearance and to get hamock tight under pt  BUE in front of pt WBing on RW vs bedside table (stabilized against wall), with focus of sit<>stand trials to get B knee ext and NOT complete trunk ext  Variations of \"proper\" belt and hammock primarily performed naa on Pt's L side with typical \"inside\" arm of therapy staff on OUTSIDE of pt to potentially allow better WB into forearm  Lynda Argue full knee extension and buttocks clearance on 4/4 trials , flexed forward 90 degrees at hips and WBing onto forearm on table or hands @ walker  able to tolerate static standing 10-30s each transfer, limited d/t weakness and pain  Erenkevinmagdaleno Suazoana was able to transition 1 time to 90% upright standing w/ arms extended   Ambulation/Elevation   Gait pattern Not appropriate   Ambulation/Elevation Additional Comments Brenda'sEgress test FAIL   Wheelchair Activities   Wheelchair Cushion Pressure relieving   Pressure Relief Type   (Pt able to posteriorly scoot back in chair with only min A)   Balance   Static Sitting Fair +   Static Standing Zero  (MAx of 30 seconds)   Endurance Deficit   Endurance Deficit Yes   Endurance Deficit Description needs therapeutic rests between mobility trials   Activity Tolerance   Activity Tolerance Patient limited by pain; Patient limited by fatigue   Nurse Made Aware care coordination w/ Padma De Oliveira RN before during and after session   Medical Staff Made Aware care coordination w/ Chrissy Penny from OT; additionally Betty WALTONT present to promote future continuum of " care   Exercises   Knee AROM Long Arc Quad Sitting;10 reps;Bilateral;AROM  (with slow deceleration into knee flexion; 2 sets)   Ankle Pumps Sitting;AROM; Bilateral;15 reps  (up and down on toes)   Neuro re-ed Bertha Marquez full knee extension and buttocks clearance on 4/4 trials , flexed forward 90 degrees at hips and WBing onto forearm on table or hands @ walker  able to tolerate static standing 10-30s each transfer, limited d/t weakness and pain  Brant Gay was able to transition 1 time to 90% upright standing w/ arms extended  Additional time with pt post mobility to review mobility progress toward his goal for Friday   Assessment   Prognosis Fair   Problem List Decreased strength;Decreased range of motion;Decreased endurance; Impaired balance;Decreased mobility; Impaired hearing;Obesity; Decreased skin integrity;Pain;Decreased cognition  (LE edema, fear and retreat?)   Assessment Although Teodoro needs encouagement and reports feeling like he is not progressing, he IS making slow mobility progress  He is more consistent with needing mod A of 2 using belt and hammock technique during today's trials, and was able to perform longer WB Activity throughout trials  Additionally he was able to acheive 90% upright with arm extension for one of the 4 trials   Skilled PT recommended to progress pt toward treatment goals  Ideally would like to progress to bariatric walking sling once it becomes available   Barriers to Discharge Decreased caregiver support; Inaccessible home environment   Goals   Patient Goals Pt's written goal for next Friday is to be able to perform full upright stand w/ A of 2 using walker and in sling for up to one min  (pt reports he will not have filaed if unable to stand for less than one min)   STG Expiration Date 05/01/23   PT Treatment Day 2   Plan   Treatment/Interventions Functional transfer training;LE strengthening/ROM; Therapeutic exercise; Endurance training;Cognitive reorientation;Patient/family training; Bed mobility; Equipment eval/education;Spoke to nursing;Continued evaluation;OT   Progress Slow progress, decreased activity tolerance   PT Frequency   (mon-fri)   Recommendation   PT Discharge Recommendation Post acute rehabilitation services   Equipment Recommended Walker; Wheelchair   AM-PAC Basic Mobility Inpatient   Turning in Flat Bed Without Bedrails 2   Lying on Back to Sitting on Edge of Flat Bed Without Bedrails 1   Moving Bed to Chair 1   Standing Up From Chair Using Arms 1   Walk in Room 1   Climb 3-5 Stairs With Railing 1   Basic Mobility Inpatient Raw Score 7   Turning Head Towards Sound 4   Follow Simple Instructions 3   Low Function Basic Mobility Raw Score  14   Low Function Basic Mobility Standardized Score  22 01   Highest Level Of Mobility   -HLM Goal 2: Bed activities/Dependent transfer   -HLM Achieved 4: Move to chair/commode   Education   Education Provided Mobility training;Home exercise program;Assistive device   Patient Reinforcement needed   End of Consult   Patient Position at End of Consult Seated edge of bed; All needs within reach   End of Consult Comments RN made aware of pt's request for pain meds and to return back to bed  Pt requires PT /OT co-treat due to required skilled interventions of at least 2 clinicians for care delivery, medical complexity, limited activity tolerance, and cognitive-behavioral impairments  PT and OT goals addressed separately  Nursing Recommendations:   • Mobility Plan as of 04/24/23: Pt is dependent Assist with  seated nanette lift sling  to/from recliner  Encourage OOB for all meals  The patient's AM-PAC Basic Mobility Inpatient Short Form Raw Score is 7  A Raw score of less than or equal to 16 suggests the patient may benefit from discharge to post-acute rehabilitation services, which DOES     coincide with CURRENT above PT recommendations       However please refer to therapist recommendation for discharge planning given other factors that may influence destination  Adapted from [facility] “6-Clicks” Basic Mobility and Daily Activity Scores With Discharge Destination  Physical Therapy, 2021;101:1-9   DOI: [email or web address]    Aj Dears, PT, DPT

## 2023-04-24 NOTE — UTILIZATION REVIEW
Continued Stay Review    Date: 4/23/23                          Current Patient Class: Inpatient  Current Level of Care: Med Surg    HPI:67 y o  male initially admitted on 4/7     Assessment/Plan: Continue po lasix 40 mg bid, currently on baseline O2 4L NC, continue Mucinex, Tessalon Perles, Xopenex/Atrovent, continue metoprolol and coumadin, recheck INR in am, pending placement to acute rehab       Vital Signs:     Date/Time Temp Pulse Resp BP MAP (mmHg) SpO2 Calculated FIO2 (%) - Nasal Cannula Nasal Cannula O2 Flow Rate (L/min) O2 Device   04/24/23 0749 -- -- -- -- -- 97 % 36 4 L/min Nasal cannula   04/24/23 07:16:50 97 6 °F (36 4 °C) 94 -- 153/81 105 96 % -- -- --   04/24/23 0327 -- -- -- -- -- 96 % -- -- --   04/23/23 2327 -- -- -- -- -- 96 % -- -- --   04/23/23 20:54:41 97 6 °F (36 4 °C) 101 18 107/62 77 91 % -- -- --   04/23/23 1908 -- -- -- -- -- 97 % 36 4 L/min Nasal cannula   04/23/23 16:38:54 97 5 °F (36 4 °C) 91 18 126/64 85 98 % -- -- --   04/23/23 0811 -- -- -- -- -- 98 % 36 4 L/min Nasal cannula   04/23/23 08:05:09 97 9 °F (36 6 °C) 91 16 111/66 81 97 % -- -- --   04/23/23 0746 -- -- -- -- -- -- -- -- Nasal cannula   04/23/23 0308 -- -- -- -- -- 97 % -- -- --   04/22/23 2305 -- -- -- -- -- 98 % -- -- --   04/1955 -- -- -- -- -- 92 % -- -- --   04/22/23 19:43:11 98 °F (36 7 °C) 93 20 136/104 Abnormal  115 93 % -- -- Nasal cannula   04/22/23 15:48:59 -- 91 19 115/64 81 97 % -- -- --   04/22/23 1500 97 5 °F (36 4 °C) -- -- -- -- -- -- -- --   04/22/23 0930 -- -- -- -- -- -- 36 4 L/min Nasal cannula   04/22/23 0752 -- -- -- -- -- 97 % 36 4 L/min Nasal cannula   04/22/23 07:07:40 -- 97 20 147/74 98 97 % -- -- --   04/22/23 0400 -- -- -- -- -- 91 % -- -- --       Pertinent Labs/Diagnostic Results:       Results from last 7 days   Lab Units 04/24/23  0531   WBC Thousand/uL 6 78   HEMOGLOBIN g/dL 11 6*   HEMATOCRIT % 38 8   PLATELETS Thousands/uL 223         Results from last 7 days   Lab Units 04/24/23  0531 04/21/23  0523 04/20/23  0512 04/19/23  0526 04/18/23  0508   SODIUM mmol/L 139 137 137 135 135   POTASSIUM mmol/L 3 8 3 9 3 6 3 6 4 0   CHLORIDE mmol/L 94* 94* 92* 91* 92*   CO2 mmol/L 43* 37* 39* 39* 38*   ANION GAP mmol/L 2* 6 6 5 5   BUN mg/dL 9 10 11 10 11   CREATININE mg/dL 0 52* 0 44* 0 36* 0 41* 0 56*   EGFR ml/min/1 73sq m 108 116 126 119 105   CALCIUM mg/dL 9 0 8 9 8 9 8 8 8 9   MAGNESIUM mg/dL  --  2 0 1 9  --   --              Results from last 7 days   Lab Units 04/24/23 0531 04/21/23  0523 04/20/23  0512 04/19/23  0526 04/18/23  0508   GLUCOSE RANDOM mg/dL 99 103 137 133 125           Results from last 7 days   Lab Units 04/24/23 0531 04/21/23  0523 04/20/23  0512   PROTIME seconds 35 1* 27 2* 27 2*   INR  3 46* 2 50* 2 50*           Medications:   Scheduled Medications:  acetaminophen, 975 mg, Oral, Q8H [facility]  benzonatate, 200 mg, Oral, TID  fluticasone, 2 spray, Each Nare, Daily  furosemide, 40 mg, Oral, BID (diuretic)  guaiFENesin, 1,200 mg, Oral, Q12H GRIFFIN  ipratropium, 0 5 mg, Nebulization, TID  levalbuterol, 1 25 mg, Nebulization, TID  metoprolol succinate, 25 mg, Oral, Daily  pantoprazole, 40 mg, Oral, Early Morning  polyethylene glycol, 17 g, Oral, Daily  potassium chloride, 20 mEq, Oral, Daily  pregabalin, 150 mg, Oral, TID  risperiDONE, 1 mg, Oral, TID  senna-docusate sodium, 1 tablet, Oral, BID      Continuous IV Infusions:     PRN Meds:  albuterol, 2 5 mg, Nebulization, Q4H PRN  bisacodyl, 10 mg, Rectal, Daily PRN  dextromethorphan-guaiFENesin, 10 mL, Oral, Q4H PRN  hydrOXYzine HCL, 50 mg, Oral, Q6H PRN  methocarbamol, 750 mg, Oral, Q6H PRN  oxyCODONE, 5 mg, Oral, Q6H PRN  simethicone, 80 mg, Oral, Q6H PRN        Discharge Plan: TBD    Network Utilization Review Department  ATTENTION: Please call with any questions or concerns to [phone] and carefully listen to the prompts so that you are directed to the right person   All voicemails are confidential   [facility] [place] all requests for admission clinical reviews, approved or denied determinations and any other requests to dedicated fax number below belonging to the campus where the patient is receiving treatment   List of dedicated fax numbers for the Facilities:  [address] [place] [address] DENIALS (Administrative/Medical Necessity) [phone]   [address] (Maternity/NICU/Pediatrics) [phone]   [address] [phone]   [facility] 77 [phone]   [address] [address] [phone]   [address] [facility] 134 815 [facility] [phone]

## 2023-04-24 NOTE — CASE MANAGEMENT
Case Management Progress Note    Patient name Claudia Briggs  Location S /S -15 MRN [id number]  : 1955 Date 2023       LOS (days): 17  Geometric Mean LOS (GMLOS) (days): 3 90  Days to GMLOS:-13 3        OBJECTIVE:        Current admission status: Inpatient  Preferred Pharmacy:   [address], [place] [facility] [address] [facility]  [address] [address]  Phone: [phone] Fax: [phone]    Primary Care Provider: Berta Franklin MD    Primary Insurance: [address] REP  Secondary Insurance:     PROGRESS NOTE:    Message received this morning from [facility] requesting updated clinicals  Progress notes, PT/OT notes and med list attached to referral  Ultimately responded that they have concerns about patient's ability to tolerate their rehab program      Message received from Ward Caceres at [facility] requesting we reach out to Porfirio Parker at their sister facility, [address] [place] [address] at: [phone]  Call made to number, but it went to a [facility], and person who answered did not know who Porfirio Parker was  Message received from [facility] relaying they are reviewing referral; message sent to follow-up, awaiting response  Contact number for facility: [phone]  Messages sent to all other facilities pending review/determination; awaiting responses

## 2023-04-25 PROBLEM — J96.20 ACUTE ON CHRONIC RESPIRATORY FAILURE (HCC): Status: RESOLVED | Noted: 2020-04-13 | Resolved: 2023-04-25

## 2023-04-25 LAB
ANION GAP SERPL CALCULATED.3IONS-SCNC: 4 MMOL/L (ref 4–13)
BUN SERPL-MCNC: 9 MG/DL (ref 5–25)
CALCIUM SERPL-MCNC: 9 MG/DL (ref 8.4–10.2)
CHLORIDE SERPL-SCNC: 94 MMOL/L (ref 96–108)
CO2 SERPL-SCNC: 39 MMOL/L (ref 21–32)
CREAT SERPL-MCNC: 0.5 MG/DL (ref 0.6–1.3)
ERYTHROCYTE [DISTWIDTH] IN BLOOD BY AUTOMATED COUNT: 12.5 % (ref 11.6–15.1)
GFR SERPL CREATININE-BSD FRML MDRD: 110 ML/MIN/1.73SQ M
GLUCOSE SERPL-MCNC: 99 MG/DL (ref 65–140)
HCT VFR BLD AUTO: 37.3 % (ref 36.5–49.3)
HGB BLD-MCNC: 11.4 G/DL (ref 12–17)
INR PPP: 3.12 (ref 0.84–1.19)
MCH RBC QN AUTO: 32.2 PG (ref 26.8–34.3)
MCHC RBC AUTO-ENTMCNC: 30.6 G/DL (ref 31.4–37.4)
MCV RBC AUTO: 105 FL (ref 82–98)
PLATELET # BLD AUTO: 231 THOUSANDS/UL (ref 149–390)
PMV BLD AUTO: 8.6 FL (ref 8.9–12.7)
POTASSIUM SERPL-SCNC: 3.9 MMOL/L (ref 3.5–5.3)
PROTHROMBIN TIME: 32.3 SECONDS (ref 11.6–14.5)
RBC # BLD AUTO: 3.54 MILLION/UL (ref 3.88–5.62)
SODIUM SERPL-SCNC: 137 MMOL/L (ref 135–147)
WBC # BLD AUTO: 6.11 THOUSAND/UL (ref 4.31–10.16)

## 2023-04-25 RX ORDER — PSEUDOEPHEDRINE HCL 120 MG/1
240 TABLET, FILM COATED, EXTENDED RELEASE ORAL ONCE
Status: COMPLETED | OUTPATIENT
Start: 2023-04-25 | End: 2023-04-25

## 2023-04-25 RX ORDER — LORATADINE 10 MG/1
10 TABLET ORAL ONCE
Status: COMPLETED | OUTPATIENT
Start: 2023-04-25 | End: 2023-04-25

## 2023-04-25 RX ORDER — WARFARIN SODIUM 5 MG/1
5 TABLET ORAL
Status: DISCONTINUED | OUTPATIENT
Start: 2023-04-25 | End: 2023-04-26

## 2023-04-25 RX ADMIN — METOPROLOL SUCCINATE 25 MG: 25 TABLET, EXTENDED RELEASE ORAL at 09:01

## 2023-04-25 RX ADMIN — SENNOSIDES AND DOCUSATE SODIUM 1 TABLET: 8.6; 5 TABLET ORAL at 09:02

## 2023-04-25 RX ADMIN — RISPERIDONE 1 MG: 1 TABLET ORAL at 17:30

## 2023-04-25 RX ADMIN — WARFARIN SODIUM 5 MG: 5 TABLET ORAL at 17:30

## 2023-04-25 RX ADMIN — PANTOPRAZOLE SODIUM 40 MG: 40 TABLET, DELAYED RELEASE ORAL at 05:34

## 2023-04-25 RX ADMIN — LEVALBUTEROL HYDROCHLORIDE 1.25 MG: 1.25 SOLUTION RESPIRATORY (INHALATION) at 20:55

## 2023-04-25 RX ADMIN — GUAIFENESIN 1200 MG: 600 TABLET ORAL at 09:02

## 2023-04-25 RX ADMIN — LORATADINE 10 MG: 10 TABLET ORAL at 09:02

## 2023-04-25 RX ADMIN — POLYETHYLENE GLYCOL 3350 17 G: 17 POWDER, FOR SOLUTION ORAL at 09:02

## 2023-04-25 RX ADMIN — GUAIFENESIN 1200 MG: 600 TABLET ORAL at 21:36

## 2023-04-25 RX ADMIN — ACETAMINOPHEN 975 MG: 325 TABLET, FILM COATED ORAL at 14:38

## 2023-04-25 RX ADMIN — PREGABALIN 150 MG: 75 CAPSULE ORAL at 09:02

## 2023-04-25 RX ADMIN — LEVALBUTEROL HYDROCHLORIDE 1.25 MG: 1.25 SOLUTION RESPIRATORY (INHALATION) at 07:28

## 2023-04-25 RX ADMIN — ACETAMINOPHEN 975 MG: 325 TABLET, FILM COATED ORAL at 05:34

## 2023-04-25 RX ADMIN — METHOCARBAMOL TABLETS 750 MG: 750 TABLET, COATED ORAL at 09:02

## 2023-04-25 RX ADMIN — IPRATROPIUM BROMIDE 0.5 MG: 0.5 SOLUTION RESPIRATORY (INHALATION) at 07:28

## 2023-04-25 RX ADMIN — PREGABALIN 150 MG: 75 CAPSULE ORAL at 17:30

## 2023-04-25 RX ADMIN — OXYCODONE HYDROCHLORIDE 5 MG: 5 TABLET ORAL at 21:36

## 2023-04-25 RX ADMIN — RISPERIDONE 1 MG: 1 TABLET ORAL at 21:36

## 2023-04-25 RX ADMIN — OXYCODONE HYDROCHLORIDE 5 MG: 5 TABLET ORAL at 09:02

## 2023-04-25 RX ADMIN — METHOCARBAMOL TABLETS 750 MG: 750 TABLET, COATED ORAL at 15:31

## 2023-04-25 RX ADMIN — IPRATROPIUM BROMIDE 0.5 MG: 0.5 SOLUTION RESPIRATORY (INHALATION) at 20:55

## 2023-04-25 RX ADMIN — PSEUDOEPHEDRINE HYDROCHLORIDE 240 MG: 120 TABLET, FILM COATED ORAL at 09:02

## 2023-04-25 RX ADMIN — POTASSIUM CHLORIDE 20 MEQ: 1500 TABLET, EXTENDED RELEASE ORAL at 09:01

## 2023-04-25 RX ADMIN — RISPERIDONE 1 MG: 1 TABLET ORAL at 09:02

## 2023-04-25 RX ADMIN — OXYCODONE HYDROCHLORIDE 5 MG: 5 TABLET ORAL at 15:31

## 2023-04-25 RX ADMIN — HYDROXYZINE HYDROCHLORIDE 50 MG: 50 TABLET, FILM COATED ORAL at 21:36

## 2023-04-25 RX ADMIN — FUROSEMIDE 40 MG: 40 TABLET ORAL at 17:30

## 2023-04-25 RX ADMIN — ACETAMINOPHEN 975 MG: 325 TABLET, FILM COATED ORAL at 21:36

## 2023-04-25 RX ADMIN — FUROSEMIDE 40 MG: 40 TABLET ORAL at 09:02

## 2023-04-25 RX ADMIN — PREGABALIN 150 MG: 75 CAPSULE ORAL at 21:37

## 2023-04-25 NOTE — PLAN OF CARE
Problem: OCCUPATIONAL THERAPY ADULT  Goal: Performs self-care activities at highest level of function for planned discharge setting  See evaluation for individualized goals  Description: Treatment Interventions: ADL retraining, Functional transfer training, UE strengthening/ROM, Endurance training, Patient/family training, Equipment evaluation/education, Compensatory technique education, Neuromuscular reeducation, Energy conservation          See flowsheet documentation for full assessment, interventions and recommendations  Outcome: Progressing  Note: Limitation: Decreased ADL status, Decreased Safe judgement during ADL, Decreased endurance, Decreased cognition, Decreased self-care trans, Decreased high-level ADLs  Prognosis: Fair  Assessment: Patient seen for OT treatment on 4/25/2023 s/p admission for Acute on chronic respiratory failure [facility]) Patient presents with active orders for OT eval and treat and Up with assistance   Upon arrival, pt found resting in recliner showing no signs of distress  Patient agreeable to OT session  Patient participated in functional transfer training with intervention focus on functional strength training, increasing activity tolerance, and working toward goal of transfer to/from W/C  Mimi Knutson is showing improvements in standing tolerance, transfers, generalized strength and trunk control but is continuing to perform below baseline due to the following deficits: endurance ,  decreased muscular strength , decreased balance , decreased standing tolerance for self care tasks ,  and (+) pain   From OT standpoint, patient would benefit from skilled intervention to maximize independence with ADLs and functional mobility  Goals remain appropriate, continue POC   At this time, recommending D/C to: post-acute rehabilitation     OT Discharge Recommendation: Post acute rehabilitation services        [facility]

## 2023-04-25 NOTE — OCCUPATIONAL THERAPY NOTE
Occupational Therapy Treatment Note     Patient Name: Zari Esparza  [id number]'[id number] Date: 4/25/2023  Problem List  Principal Problem:    Acute on chronic respiratory failure [facility])  Active Problems: Morbid obesity ([address] )    Primary hypertension    EVERETT (obstructive sleep apnea)    Chronic diastolic congestive heart failure (HCC)    History of pulmonary embolism       04/25/23 1411   OT Last Visit   OT Visit Date 04/25/23   Note Type   Note Type Treatment   Pain Assessment   Pain Assessment Tool 0-10   Pain Score 8   Pain Location/Orientation Location: Back   Pain Radiating Towards radiating to L shoulder   Pain Onset/Description Onset: Ongoing   Effect of Pain on Daily Activities limits activity tolerance   Hospital Pain Intervention(s) Medication (See MAR); Emotional support; Ambulation/increased activity;Repositioned   Multiple Pain Sites No   Restrictions/Precautions   Weight Bearing Precautions Per Order No   Other Precautions Chair Alarm; Bed Alarm; Fall Risk;Pain;Multiple lines;O2  (4LO2 via NC)   Lifestyle   Autonomy Pt admitted from [facility], has hx of multiple recent hospitalizations  At baseline lives c his spouse   Reciprocal Relationships supportive spouse and son  Service to Others retired   Intrinsic Gratification (S)  Expressing that his goal is to attend his sons wedding in June  Functional Standing Tolerance   Time 20s x 2 trials, 5-15s x3 trials   Activity functional transfer training   Comments Mod A x2 to maintain upright standing  weightbearing through B forearms vs hands  Flexed forward at hips, able to achieve 75% upright standing position x2 times   Bed Mobility   Additional Comments pt seated OOB in recliner  upom arrival /end of session   Transfers   Sit to Stand 3  Moderate assistance   Additional items Assist x 2; Increased time required;Verbal cues  (with belt and hammock technique, B/L pillow blocking at knees)   Stand to Sit 3  Moderate assistance   Additional items Assist x 2; Increased time required;Verbal cues   Stand pivot Unable to assess   Additional Comments sit<>stand trials x5 completed from recliner  Primarily  mod A x2 B/L knee blocking c pillow in place  Belt and hammock technique used  LUE in front of pt WBing on bedside table (pushed against wall), RUE pushing off armrest to initiate transfer  Pt continues to be more successful with therapist placement of arms outside of pt arms, assisting more with hammock than belt  Full knee extension and buttocks clearance on 5/5  trials, Pt able to successfully WB through hands during todays session 2x to achieve ~75% upright positioning  Standing tolerance limited d/t weakness and pain  Subjective   Subjective Pt continues to express frustration re: functional status during todays session  continued encouragement througout session and reminders of progress toward goals  Cognition   Overall Cognitive Status WFL   Arousal/Participation Alert   Attention Attends with cues to redirect   Orientation Level Oriented X4   Memory Decreased recall of precautions   Following Commands Follows one step commands without difficulty   Comments (S)  Pt agreeable to OT session  Continues to demonstrates s/s of depression and difficulty coping through hospitalization, would benefit from Psych/spiritual consult   Activity Tolerance   Activity Tolerance Patient limited by fatigue;Patient limited by pain   Medical Staff Made Aware PT Niki Gee, BIANCA Verdin   Assessment   Assessment Patient seen for OT treatment on 4/25/2023 s/p admission for Acute on chronic respiratory failure [facility]) Patient presents with active orders for OT eval and treat and Up with assistance   Upon arrival, pt found resting in recliner showing no signs of distress  Patient agreeable to OT session  Patient participated in functional transfer training with intervention focus on functional strength training, increasing activity tolerance, and working toward goal of transfer to/from W/C   Robert rebollar showing improvements in standing tolerance, transfers, generalized strength and trunk control but is continuing to perform below baseline due to the following deficits: endurance ,  decreased muscular strength , decreased balance , decreased standing tolerance for self care tasks ,  and (+) pain   From OT standpoint, patient would benefit from skilled intervention to maximize independence with ADLs and functional mobility  Goals remain appropriate, continue POC  At this time, recommending D/C to: post-acute rehabilitation   Plan   Treatment Interventions ADL retraining;Functional transfer training;UE strengthening/ROM; Endurance training;Cognitive reorientation;Patient/family training;Equipment evaluation/education   Goal Expiration Date 05/01/23   OT Treatment Day 7   OT Frequency 3-5x/wk   Recommendation   OT Discharge Recommendation Post acute rehabilitation services   Additional Comments  Recommend chair position in bed vs OOB to recliner for pressure offloading throughout the day  Use of nanette lift for OOB trasnfers with nursing staff   Additional Comments 2 The patient's raw score on the AM-PAC Daily Activity Inpatient Short Form is 13  A raw score of less than 19 suggests the patient may benefit from discharge to post-acute rehabilitation services  Please refer to the recommendation of the Occupational Therapist for safe discharge planning     AM-PAC Daily Activity Inpatient   Lower Body Dressing 1   Bathing 1   Toileting 1   Upper Body Dressing 3   Grooming 3   Eating 4   Daily Activity Raw Score 13   Daily Activity Standardized Score (Calc for Raw Score >=11) 32 03   AM-PAC Applied Cognition Inpatient   Following a Speech/Presentation 4   Understanding Ordinary Conversation 4   Taking Medications 3   Remembering Where Things Are Placed or Put Away 4   Remembering List of 4-5 Errands 3   Taking Care of Complicated Tasks 3   Applied Cognition Raw Score 21   Applied Cognition Standardized Score 44 3   End of Consult   Education Provided Yes   Patient Position at End of Consult Bedside chair;Bed/Chair alarm activated; All needs within reach   Nurse Communication Nurse aware of consult  Jez Kat)   End of Consult Comments Pt benefited from co-session of skilled OT and PT therapists in order to most appropriately address functional deficits d/t extensive physical assistance required for safe mobility  and decreased activity tolerance  OT/PT objectives were addressed separately; please see PT note for specific goal areas targeted     Margot Carrillo

## 2023-04-25 NOTE — CASE MANAGEMENT
Case Management Progress Note    Patient name María Cardenas  Location S /S -18 MRN [id number]  : 1955 Date 2023       LOS (days): 18  Geometric Mean LOS (GMLOS) (days): 3 90  Days to GMLOS:-14 2        OBJECTIVE:        Current admission status: Inpatient  Preferred Pharmacy:   [address], [place] [address] [address]  Phone: [phone] Fax: [phone]    Primary Care Provider: Aleyda Lima MD    Primary Insurance: [address] [facility] REP  Secondary Insurance:     PROGRESS NOTE:    No accepting facilities per Aidin referrals  CM s/w Fabiana from [facility] regarding patient  Ella Davis stating that she would be able to reconsider if patient is able to stand/pivot/transfer with assist x 2  Ella Davis stating that due to patient's size would currently need a nanette lift and assist x 3 at facility and facility does not have staffing to meet the needs of someone needing assist x 3 at this time  Ella Davis aware that patient is motivated to rehab and return home with wife and will continue to follow patient's progression

## 2023-04-25 NOTE — PROGRESS NOTES
[facility] Service Attending Physician Supervision Note - Progress Note    I have seen and examined Aneglia Lehman personally and have reviewed the medical record independently  I have reviewed all components of the note performed and documented by the resident physician  I personally performed all the required components for patient evaluation and examined the patient  I was the supervising / collaborating physician on 4/30/23   I agree with the resident's findings and plan of care with the following additions / exceptions:  Patient seen and examined  Complains of nausea but denies any vomiting  Still with some constipation  Denies any worsening shortness of breath  Somewhat lethargic today  Vitals:    04/30/23 0826   BP: 124/68   Pulse: 98   Resp:    Temp: 97 8 °F (36 6 °C)   SpO2: 94%   Abdomen soft nontender bowel sounds audible  Lungs clear to auscultation bilaterally with diminished breath sounds secondary to body habitus  Extremities negative for worsening edema  Continue with current plan of care  Continue with as needed antiemetics and bowel regimen  Continue with current dose of diuretics  Follow-up a m  labs  Education and Discussions with Family / Patient:Dw pt    Time Spent for Care: 30 minutes  More than 50% of total time spent on counseling and coordination of care as described above  I attest that this information is true, accurate, and complete to the best of my knowledge      Lexy Cannon MD

## 2023-04-25 NOTE — PHYSICAL THERAPY NOTE
"                                                             Physical Therapy Treatment Note    Patient's Name: Orestes Quan  : [birth year] 1342   PT Last Visit   PT Visit Date 23   Note Type   Note Type Treatment   Pain Assessment   Pain Assessment Tool 0-10   Pain Score 8   Pain Location/Orientation Location: Back;Orientation: Upper;Orientation: Left   Effect of Pain on Daily Activities limits mobility, activity tolerance   Pain 2   Pain Score 2 8   Pain Location/Orientation 2 Location: Neck   Restrictions/Precautions   Weight Bearing Precautions Per Order No   Other Precautions Cognitive; Chair Alarm; Bed Alarm; Fall Risk;Multiple lines;Pain;Fluid restriction; Impulsive   General   Chart Reviewed Yes   Response to Previous Treatment Patient reporting fatigue but able to participate  Family/Caregiver Present No   Cognition   Comments pt ID by wristband, name and    Subjective   Subjective pt supine in bed, with RN and PCA, preparing to be placed in over head lift to chair   Bed Mobility   Additional Comments pt dependent to bed side recliner with over head lift   Transfers   Sit to Stand 3  Moderate assistance   Additional items Assist x 2; Increased time required  (utilized belt and hammock technqiue, however required use of michelle pad initially due to need to readjust hammock)   Stand to Sit 3  Moderate assistance   Additional items Assist x 2; Increased time required;Verbal cues   Additional Comments pt performed a total of x5 STS transfers with use of night stand against wall for B UE weight bearing, knees blocked bilaterally, during reptitions 3 and 4 patient demonstrated ability to walk hands back and extend arms to about 75% standing position, was able to tolerate this position for no more than 20 seconds, verbal cues for increased neck extension and elbow extension, however pt unable 2/2 fatigue, continue to utilize belt and hammock technique with typical \"inside\" arm of therapy staff " on outside of pt, pt demonstrated ability to posteriorly shift weight to scoot back in chair independently   Ambulation/Elevation   Gait pattern Not appropriate   Balance   Static Sitting Fair +   Dynamic Sitting Fair -   Static Standing Zero  (assist x 2)   Endurance Deficit   Endurance Deficit Yes   Endurance Deficit Description requires rest between attempts, SOB from exertion   Activity Tolerance   Activity Tolerance Patient limited by fatigue;Patient limited by pain   Nurse Made Aware BIANCA Caldwell, pre/during/post   Medical Staff Made Aware care cooridnated with OT rocco   Assessment   Prognosis Fair   Problem List Decreased strength;Decreased endurance; Impaired balance;Decreased mobility; Impaired hearing;Obesity; Decreased skin integrity;Pain  (BL LE edema)   Assessment Pt continued to require assist x 2 in order to perform sit to stand transfers today  However pt did demonstrate ability to walk hands back and extend his elbows bilaterally he held a 75% standing position for 20 seconds twice  He also demonstrates ability to weight shift in chair without assitance  Pt would continue to benefit from skilled PT services to address functional deficits and improve current level of function  Barriers to Discharge Inaccessible home environment;Decreased caregiver support   Goals   Patient Goals Pt's written goal for next Friday is to be able to perform full upright stand w/ A of 2 using walker and in sling for up to one min  (pt reports he will not have filaed if unable to stand for less than one min)   STG Expiration Date 05/01/23   Short Term Goal #1 pt will:  Perform rolling and repositioning in bed w/no more than minx1 to left side using trapeze/rail to  decrease caregiver burden;  Perform supine <--> sitting edge of bed transition w/ modx1 to improve level of function and minimize need for sit->stand Trials from recliner as appropriate    Perform sit <---> stand transfers w/ max Ax1 to minimize burden of care, Perform 90% upright standing tolerance w/ UE support for 30 sec w/ A of 1 plus sling to promote longer standing tolerance and progress to pregait activities as appropriate  Assess gait and wide WC (as seated mobility target and potentially as mobility w/ Le's--even backward)  and set goals  PT Treatment Day 3   Plan   Treatment/Interventions LE strengthening/ROM; Functional transfer training; Therapeutic exercise; Endurance training;Cognitive reorientation;Patient/family training;Bed mobility; Equipment eval/education;Spoke to nursing;OT   Progress Slow progress, decreased activity tolerance   PT Frequency   (mon-fri)   Recommendation   PT Discharge Recommendation Post acute rehabilitation services   Equipment Recommended Walker; Wheelchair   AM-PAC Basic Mobility Inpatient   Turning in Flat Bed Without Bedrails 2   Lying on Back to Sitting on Edge of Flat Bed Without Bedrails 1   Moving Bed to Chair 1   Standing Up From Chair Using Arms 1   Walk in Room 1   Climb 3-5 Stairs With Railing 1   Basic Mobility Inpatient Raw Score 7   Turning Head Towards Sound 4   Follow Simple Instructions 3   Low Function Basic Mobility Raw Score  14   Low Function Basic Mobility Standardized Score  22 01   Highest Level Of Mobility   -HLM Goal 2: Bed activities/Dependent transfer   JH-HLM Achieved 4: Move to chair/commode   Education   Education Provided Mobility training;Home exercise program   Patient Reinforcement needed   End of Consult   Patient Position at End of Consult Bedside chair; All needs within reach     The patient's AM-PAC Basic Mobility Inpatient Short Form Raw Score is 7  A Raw score of less than or equal to 16 suggests the patient may benefit from discharge to post-acute rehabilitation services  Please also refer to the recommendation of the Physical Therapist for safe discharge planning              Silvia Taveras, PT, DPT

## 2023-04-25 NOTE — PROGRESS NOTES
[facility]  Progress Note  Name: Ashwini Carver  MRN: [id number]  Unit/Bed#: S -01 I Date of Admission: 4/7/2023   Date of Service: 4/25/2023 I Hospital Day: 25    Assessment/Plan   * Acute on chronic respiratory failure (HCC)-resolved as of 4/25/2023  Assessment & Plan  · Completed prednisone therapy  · Continue PO Lasix to 40 mg BID   · Monitor respiratory status, currently on baseline 4L  · Respiratory protocol, Xopenex/Atrovent, Airway clearance protocol, IS  · Mucinex, Tessalon Perles   · Out of bed to chair, PT and OT      History of pulmonary embolism  Assessment & Plan  · History of DVT, PE  · Supratherapeutic, will likely have to decrease warfarin dose   · INR AM    Chronic diastolic congestive heart failure (HCC)  Assessment & Plan  · No further shortness of breath  · Continue on p o  Lasix  · Daily weights monitor input and output    EVERETT (obstructive sleep apnea)  Assessment & Plan  · Hx of EVERETT, morbid obesity, obesity hypoventilation syndrome   · Continue BiPAP HS     Primary hypertension  Assessment & Plan  · BP stable  · Continue metoprolol and Lasix 40mg bid      Morbid obesity (HCC)  Assessment & Plan  · Recently DC to [facility]   PT/OT: post acute rehab  · Pending placement  · BMI 60             VTE Pharmacologic Prophylaxis:   VTE Score: 10 Moderate Risk (Score 3-4) - Pharmacological DVT Prophylaxis Ordered: Warfarin (Coumadin)  Mechanical VTE Prophylaxis in Place: Yes    Patient Centered Rounds: I have performed bedside rounds with nursing staff today  Discussions with Specialists or Other Care Team Provider: CM    Education and Discussions with Family / Patient: Patient declined call to   Current Length of Stay: 18 day(s)    Current Patient Status: Inpatient     Discharge Plan / Estimated Discharge Date: Pending placement    Code Status: Level 1 - Full Code      Subjective:   No acute overnight events, requests claritin D for congestion  Objective:     Vitals:   Temp (24hrs), Av 7 °F (36 5 °C), Min:97 2 °F (36 2 °C), Max:98 °F (36 7 °C)    Temp:  [97 2 °F (36 2 °C)-98 °F (36 7 °C)] 98 °F (36 7 °C)  HR:  [100-101] 100  Resp:  [18-20] 18  BP: (109-129)/(56-71) 113/56  SpO2:  [90 %-95 %] 92 %  Body mass index is 62 02 kg/m²  Input and Output Summary (last 24 hours): Intake/Output Summary (Last 24 hours) at 2023 1542  Last data filed at 2023 1227  Gross per 24 hour   Intake 1440 ml   Output 750 ml   Net 690 ml       Physical Exam:     Physical Exam  Vitals and nursing note reviewed  Constitutional:       General: He is not in acute distress  Appearance: He is well-developed  He is obese  HENT:      Head: Normocephalic and atraumatic  Mouth/Throat:      Mouth: Mucous membranes are moist       Pharynx: Oropharynx is clear  Eyes:      Conjunctiva/sclera: Conjunctivae normal    Cardiovascular:      Rate and Rhythm: Normal rate  Heart sounds: No murmur heard  Pulmonary:      Effort: Pulmonary effort is normal  No respiratory distress  Comments: On 4 L baseline oxygen  Abdominal:      General: Bowel sounds are normal       Palpations: Abdomen is soft  Tenderness: There is no abdominal tenderness  Musculoskeletal:         General: No swelling  Cervical back: Neck supple  Skin:     General: Skin is warm and dry  Capillary Refill: Capillary refill takes less than 2 seconds  Neurological:      Mental Status: He is alert and oriented to person, place, and time     Psychiatric:         Mood and Affect: Mood normal        Additional Data:     Labs:  Results from last 7 days   Lab Units 23  0540   WBC Thousand/uL 6 11   HEMOGLOBIN g/dL 11 4*   HEMATOCRIT % 37 3   PLATELETS Thousands/uL 231     Results from last 7 days   Lab Units 23  0540   SODIUM mmol/L 137   POTASSIUM mmol/L 3 9   CHLORIDE mmol/L 94*   CO2 mmol/L 39*   BUN mg/dL 9   CREATININE mg/dL 0 50*   ANION GAP mmol/L 4 CALCIUM mg/dL 9 0   GLUCOSE RANDOM mg/dL 99     Results from last 7 days   Lab Units 04/25/23  0540   INR  3 12*                   Imaging: No pertinent imaging reviewed      Recent Cultures (last 7 days):           Lines/Drains:  Invasive Devices     Peripheral Intravenous Line  Duration           Peripheral IV 04/24/23 Right Antecubital 1 day                Telemetry:        Last 24 Hours Medication List:   Current Facility-Administered Medications   Medication Dose Route Frequency Provider Last Rate   • acetaminophen  975 mg Oral Q8H [address] Sofía Roach MD     • albuterol  2 5 mg Nebulization Q4H PRN Susy Deal MD     • benzonatate  200 mg Oral TID Sofía Roach MD     • bisacodyl  10 mg Rectal Daily PRN Toño Gil MD     • dextromethorphan-guaiFENesin  10 mL Oral Q4H PRN Puneet Yoon MD     • fluticasone  2 spray Each Nare Daily Toño Gil MD     • furosemide  40 mg Oral BID (diuretic) Toño Gil MD     • guaiFENesin  1,200 mg Oral Q12H [address] Toño Gil MD     • hydrOXYzine HCL  50 mg Oral Q6H PRN Toño Gil MD     • ipratropium  0 5 mg Nebulization TID Courtney Huston MD     • levalbuterol  1 25 mg Nebulization TID Courtney Huston MD     • methocarbamol  750 mg Oral Q6H PRN Susy Deal MD     • metoprolol succinate  25 mg Oral Daily George Villegas MD     • oxyCODONE  5 mg Oral Q6H PRN Linda Alcantara MD     • pantoprazole  40 mg Oral Early Morning Sofía Roach MD     • polyethylene glycol  17 g Oral Daily Toño Gil MD     • potassium chloride  20 mEq Oral Daily George Villegas MD     • pregabalin  150 mg Oral TID Susy Deal MD     • risperiDONE  1 mg Oral TID Susy Deal MD     • senna-docusate sodium  1 tablet Oral BID Bolanle Sherryll Harada, MD     • simethicone  80 mg Oral Q6H PRN Toño Gil MD     • warfarin  5 mg Oral Daily (warfarin) Uche Cisneros MD          Today, Patient Was Seen By: Hina Garcia MD    ** Please Note: This note has been constructed using a voice recognition system   **

## 2023-04-25 NOTE — PLAN OF CARE
Problem: PHYSICAL THERAPY ADULT  Goal: Performs mobility at highest level of function for planned discharge setting  See evaluation for individualized goals  Description: Treatment/Interventions: Functional transfer training, LE strengthening/ROM, Therapeutic exercise, Endurance training, Cognitive reorientation, Patient/family training, Equipment eval/education, Gait training, Bed mobility          See flowsheet documentation for full assessment, interventions and recommendations  Outcome: Progressing  Note: Prognosis: Fair  Problem List: Decreased strength, Decreased endurance, Impaired balance, Decreased mobility, Impaired hearing, Obesity, Decreased skin integrity, Pain (BL LE edema)  Assessment: Pt continued to require assist x 2 in order to perform sit to stand transfers today  However pt did demonstrate ability to walk hands back and extend his elbows bilaterally he held a 75% standing position for 20 seconds twice  He also demonstrates ability to weight shift in chair without assitance  Pt would continue to benefit from skilled PT services to address functional deficits and improve current level of function  Barriers to Discharge: Inaccessible home environment, Decreased caregiver support     PT Discharge Recommendation: Post acute rehabilitation services    See flowsheet documentation for full assessment

## 2023-04-26 LAB
INR PPP: 2.73 (ref 0.84–1.19)
PROTHROMBIN TIME: 29.2 SECONDS (ref 11.6–14.5)

## 2023-04-26 RX ORDER — WARFARIN SODIUM 6 MG/1
6 TABLET ORAL
Status: DISCONTINUED | OUTPATIENT
Start: 2023-04-26 | End: 2023-04-28

## 2023-04-26 RX ADMIN — LEVALBUTEROL HYDROCHLORIDE 1.25 MG: 1.25 SOLUTION RESPIRATORY (INHALATION) at 14:06

## 2023-04-26 RX ADMIN — POLYETHYLENE GLYCOL 3350 17 G: 17 POWDER, FOR SOLUTION ORAL at 08:32

## 2023-04-26 RX ADMIN — LEVALBUTEROL HYDROCHLORIDE 1.25 MG: 1.25 SOLUTION RESPIRATORY (INHALATION) at 08:00

## 2023-04-26 RX ADMIN — RISPERIDONE 1 MG: 1 TABLET ORAL at 17:58

## 2023-04-26 RX ADMIN — ACETAMINOPHEN 975 MG: 325 TABLET, FILM COATED ORAL at 15:38

## 2023-04-26 RX ADMIN — PREGABALIN 150 MG: 75 CAPSULE ORAL at 08:32

## 2023-04-26 RX ADMIN — WARFARIN SODIUM 6 MG: 6 TABLET ORAL at 17:57

## 2023-04-26 RX ADMIN — OXYCODONE HYDROCHLORIDE 5 MG: 5 TABLET ORAL at 21:04

## 2023-04-26 RX ADMIN — GUAIFENESIN 1200 MG: 600 TABLET ORAL at 08:32

## 2023-04-26 RX ADMIN — GUAIFENESIN 1200 MG: 600 TABLET ORAL at 21:03

## 2023-04-26 RX ADMIN — OXYCODONE HYDROCHLORIDE 5 MG: 5 TABLET ORAL at 05:24

## 2023-04-26 RX ADMIN — METOPROLOL SUCCINATE 25 MG: 25 TABLET, EXTENDED RELEASE ORAL at 08:32

## 2023-04-26 RX ADMIN — PREGABALIN 150 MG: 75 CAPSULE ORAL at 22:25

## 2023-04-26 RX ADMIN — ACETAMINOPHEN 975 MG: 325 TABLET, FILM COATED ORAL at 21:03

## 2023-04-26 RX ADMIN — RISPERIDONE 1 MG: 1 TABLET ORAL at 08:31

## 2023-04-26 RX ADMIN — RISPERIDONE 1 MG: 1 TABLET ORAL at 22:25

## 2023-04-26 RX ADMIN — IPRATROPIUM BROMIDE 0.5 MG: 0.5 SOLUTION RESPIRATORY (INHALATION) at 14:06

## 2023-04-26 RX ADMIN — PANTOPRAZOLE SODIUM 40 MG: 40 TABLET, DELAYED RELEASE ORAL at 05:24

## 2023-04-26 RX ADMIN — METHOCARBAMOL TABLETS 750 MG: 750 TABLET, COATED ORAL at 12:12

## 2023-04-26 RX ADMIN — IPRATROPIUM BROMIDE 0.5 MG: 0.5 SOLUTION RESPIRATORY (INHALATION) at 08:01

## 2023-04-26 RX ADMIN — FUROSEMIDE 40 MG: 40 TABLET ORAL at 08:32

## 2023-04-26 RX ADMIN — POTASSIUM CHLORIDE 20 MEQ: 1500 TABLET, EXTENDED RELEASE ORAL at 08:32

## 2023-04-26 RX ADMIN — OXYCODONE HYDROCHLORIDE 5 MG: 5 TABLET ORAL at 12:12

## 2023-04-26 RX ADMIN — ACETAMINOPHEN 975 MG: 325 TABLET, FILM COATED ORAL at 05:24

## 2023-04-26 RX ADMIN — FUROSEMIDE 40 MG: 40 TABLET ORAL at 17:58

## 2023-04-26 RX ADMIN — LEVALBUTEROL HYDROCHLORIDE 1.25 MG: 1.25 SOLUTION RESPIRATORY (INHALATION) at 19:49

## 2023-04-26 RX ADMIN — PREGABALIN 150 MG: 75 CAPSULE ORAL at 17:58

## 2023-04-26 RX ADMIN — IPRATROPIUM BROMIDE 0.5 MG: 0.5 SOLUTION RESPIRATORY (INHALATION) at 19:49

## 2023-04-26 NOTE — OCCUPATIONAL THERAPY NOTE
Occupational Therapy Treatment Note     Patient Name: Remigio Reid  XKUNI'H Date: 4/26/2023  Problem List  Active Problems: Morbid obesity ([facility] [address] )    Primary hypertension    EVERETT (obstructive sleep apnea)    Chronic diastolic congestive heart failure ([facility] )    History of pulmonary embolism          04/26/23 1407   OT Last Visit   OT Visit Date 04/26/23   Note Type   Note Type Treatment   Pain Assessment   Pain Assessment Tool 0-10   Pain Score 8   Pain Location/Orientation Location: Back; Location: Neck   Pain Radiating Towards radiating to L shoulder   Pain Onset/Description Onset: Ongoing;Frequency: Intermittent  (chronic in nature, worsens with active ROM/ PTT)   Effect of Pain on Daily Activities limits activity tolerance   Patient's Stated Pain Goal No pain   Hospital Pain Intervention(s) Medication (See MAR); Repositioned; Ambulation/increased activity; Emotional support   Multiple Pain Sites No   Restrictions/Precautions   Weight Bearing Precautions Per Order No   Other Precautions Chair Alarm; Bed Alarm;Multiple lines;O2;Fall Risk;Pain  (ricky)   Lifestyle   Autonomy Pt admitted from [facility], has hx of multiple recent hospitalizations  At baseline lives c his spouse   Reciprocal Relationships supportive spouse and son  Service to Others retired   Intrinsic Gratification (S)  Expressing that his goal is to attend his sons wedding in June  Functional Standing Tolerance   Time variable 10-30s x 8 trials   Activity functional trasnfer training   Comments Mod A x2 to maintain upright standing with use of belt hammock technique, max A x2 without hammock technique  weightbearing through B forearms vs hands  Flexed forward at hips, able to achieve 100%upright standing position on hands x1 time  Transfers   Sit to Stand 2  Maximal assistance  (variable Max a x2 - mod A x2)   Additional items Assist x 2; Increased time required;Verbal cues  (with belt and hammock technique, B/L pillow blocking at knees))   Stand to Sit 3  Moderate assistance   Additional items Assist x 2; Increased time required;Verbal cues   Stand pivot Unable to assess   Additional Comments sit<>stand trials x8 completed from recliner  Variable Mod-Max A x2  B/L knee blocking c pillow in place    3 transfers requiring Max A x2 d/t difficulty with hammock technique (improper placement), therapists deferring to utilizing traditional transfer technique  Mod A x2 for trials with use of belt and hammock  LUE in front of pt WBing on bedside table (pushed against wall), RUE pushing off armrest to initiate transfer  Pt continues to be more successful with therapist placement of arms outside of pt arms, assisting more with hammock than belt  Full knee extension and buttocks clearance on 7/8  trials, Pt able to successfully WB through hands during todays session 1x to achieve 100% upright positioning  Longest standing tolerance demonstrated: 30s  Standing tolerance limited d/t weakness and pain  Subjective   Subjective Pt in brighter spirits on this date, increased engagement and less irritability noted  However pt continues to require encouragement for trialing of therapist suggestions at times  Pt expressing concerns re: achieving LTG, emotional support provided   Cognition   Overall Cognitive Status WFL   Arousal/Participation Alert   Attention Within functional limits   Orientation Level Oriented X4   Memory Decreased recall of precautions   Following Commands Follows one step commands without difficulty   Comments Pt agreeable to OT session   In brighter spirits today, however continues to demonstrates s/s of depression and difficulty coping through hospitalization, would benefit from Psych/spiritual consult   Activity Tolerance   Activity Tolerance Patient limited by fatigue;Patient limited by pain   Medical Staff Made Aware PT Jose Lua, RN Chris Leger, CM Sherry/Rolly   Assessment   Assessment Patient seen for OT treatment on 4/26/2023 s/p admission for Acute on chronic respiratory failure [facility]) Patient presents with active orders for OT eval and treat and Up with assistance   Upon arrival, pt found resting in recliner showing no signs of distress  Patient agreeable to OT session  Patient participated in functional transfer training with intervention focus on functional strength training, increasing activity tolerance, and working toward goal of transfer to/from W/C  María Cardenas is showing improvements in standing tolerance, transfers, generalized strength and trunk control but is continuing to perform below baseline due to the following deficits: endurance ,  decreased muscular strength , decreased balance , decreased standing tolerance for self care tasks ,  and (+) pain   From OT standpoint, patient would benefit from skilled intervention to maximize independence with ADLs and functional mobility  Goals remain appropriate, continue POC  At this time, recommending D/C to: post-acute rehabilitation  Next session, plan to trial functional mobility via W/C for self propelling in UE/LE to increase functional independence  Also plan to provide HEP for BUE strengthening  Plan   Treatment Interventions ADL retraining;Functional transfer training;UE strengthening/ROM; Endurance training;Patient/family training;Neuromuscular reeducation; Compensatory technique education; Energy conservation; Activityengagement   Goal Expiration Date 05/01/23   OT Treatment Day 8   OT Frequency 3-5x/wk   Recommendation   OT Discharge Recommendation Post acute rehabilitation services   Additional Comments  (S)  Recommend chair position in bed vs OOB to recliner for pressure offloading throughout the day  Use of nanette lift for OOB trasnfers with nursing staff   Additional Comments 2 The patient's raw score on the AM-PAC Daily Activity Inpatient Short Form is 13  A raw score of less than 19 suggests the patient may benefit from discharge to post-acute rehabilitation services   Please refer to the recommendation of the Occupational Therapist for safe discharge planning  AM-PAC Daily Activity Inpatient   Lower Body Dressing 1   Bathing 1   Toileting 1   Upper Body Dressing 3   Grooming 3   Eating 4   Daily Activity Raw Score 13   Daily Activity Standardized Score (Calc for Raw Score >=11) 32 03   AM-PAC Applied Cognition Inpatient   Following a Speech/Presentation 3   Understanding Ordinary Conversation 4   Taking Medications 3   Remembering Where Things Are Placed or Put Away 4   Remembering List of 4-5 Errands 3   Taking Care of Complicated Tasks 3   Applied Cognition Raw Score 20   Applied Cognition Standardized Score 41 76   End of Consult   Education Provided Yes;Family or social support of family present for education by provider   Patient Position at End of Consult Supine; Bedside chair; All needs within reach   Nurse Communication Nurse aware of consult  Dayna Jimenez)   End of Consult Comments Pt benefited from co-session of skilled OT and PT therapists in order to most appropriately address functional deficits d/t extensive physical assistance required for safe mobility  and decreased activity tolerance    OT/PT objectives were addressed separately; please see PT note for specific goal areas targeted   Tony Robertson

## 2023-04-26 NOTE — PLAN OF CARE
Problem: OCCUPATIONAL THERAPY ADULT  Goal: Performs self-care activities at highest level of function for planned discharge setting  See evaluation for individualized goals  Description: Treatment Interventions: ADL retraining, Functional transfer training, UE strengthening/ROM, Endurance training, Patient/family training, Equipment evaluation/education, Compensatory technique education, Neuromuscular reeducation, Energy conservation          See flowsheet documentation for full assessment, interventions and recommendations  Outcome: Progressing  Note: Limitation: Decreased ADL status, Decreased Safe judgement during ADL, Decreased endurance, Decreased cognition, Decreased self-care trans, Decreased high-level ADLs  Prognosis: Fair  Assessment: Patient seen for OT treatment on 4/26/2023 s/p admission for Acute on chronic respiratory failure [facility]) Patient presents with active orders for OT eval and treat and Up with assistance   Upon arrival, pt found resting in recliner showing no signs of distress  Patient agreeable to OT session  Patient participated in functional transfer training with intervention focus on functional strength training, increasing activity tolerance, and working toward goal of transfer to/from W/C  Aydenmansoor Quijano is showing improvements in standing tolerance, transfers, generalized strength and trunk control but is continuing to perform below baseline due to the following deficits: endurance ,  decreased muscular strength , decreased balance , decreased standing tolerance for self care tasks ,  and (+) pain   From OT standpoint, patient would benefit from skilled intervention to maximize independence with ADLs and functional mobility  Goals remain appropriate, continue POC  At this time, recommending D/C to: post-acute rehabilitation  Plan to trial functional mobility via W/C for self propelling in UE/LE to increase functional independence   Also plan to provide [facility] for [facility] strengthening       OT Discharge Recommendation: Post acute rehabilitation services   [facility]

## 2023-04-26 NOTE — PHYSICAL THERAPY NOTE
Physical Therapy Treatment Note    Patient's Name: Hu Trimble  : 23 1351   PT Last Visit   PT Visit Date 23   Note Type   Note Type Treatment   Pain Assessment   Pain Assessment Tool 0-10   Pain Score 8   Pain Location/Orientation Location: Back; Location: Neck  (radiates to left shoulder)   Effect of Pain on Daily Activities limits activity tolerance and transfers   Restrictions/Precautions   Weight Bearing Precautions Per Order No   Other Precautions Chair Alarm; Bed Alarm;O2;Fall Risk;Pain   General   Chart Reviewed Yes   Response to Previous Treatment Patient with no complaints from previous session  Family/Caregiver Present No   Cognition   Orientation Level Oriented X4   Following Commands Follows one step commands without difficulty   Subjective   Subjective pt up in chair, agreeable to PT treatment   Transfers   Sit to Stand 2  Maximal assistance   Additional items Assist x 2; Increased time required;Verbal cues   Stand to Sit 3  Moderate assistance   Additional items Assist x 2; Increased time required;Verbal cues   Additional Comments pt completes x8 STS trials today, levels of assist required varied from mod to max x 2 dependeing on positioning of hammock, with belt and hammock technique, inital hammock set up was too high on patient back, unable to assist patient as compared to previous treatment sessions, once new hammock was used pt required less assistance, pt did demonstrate improvements in activty tolerance and was able to fully stand 1x for 15-20 seconds  Pt continues to benefit from hand placement outside of patient body and set laterally  pt continues to require a bilateral knee block to assist with standing     Balance   Static Sitting Fair +   Dynamic Sitting Fair -   Static Standing Zero  (assist x 2)   Endurance Deficit   Endurance Deficit Yes   Endurance Deficit Description requires rest between attempts   Activity Tolerance   Activity Tolerance Patient limited by fatigue;Patient limited by pain   Nurse Made Aware BIANCA tracey pre/post   Medical Staff Made Aware care cooridnated with OT [facility]   Neuro re-ed demonstrated ability to propel in bed side chair with brakes unlocked without LOB, also demosntrated ability to perform chair pushup/tricep extension, educated that this can improve ability to stand   Assessment   Prognosis Fair   Problem List Decreased strength;Decreased endurance; Impaired balance;Decreased mobility; Decreased cognition; Impaired judgement;Decreased safety awareness; Obesity;Pain   Assessment Pt demosntrated ability to reach 100% standing position this session  Difficulty during first few attempts secondary to non optimal palcement of hammock sheet for belt and hammock technique  Pt does demonstrate ability to stand fully for 15-20 seconds, and was able to toelrate upright positioning for at least 30 seconds today  Educated patient on performance of tricep dip/chair push up to tolerance in order to improve UE strength to aid in performance of transfers  Pt stated understanding and agreed to this  Barriers to Discharge Inaccessible home environment   Goals   Patient Goals Pt's written goal for this Friday is to be able to perform full upright stand w/ A of 2 using walker and in sling for up to one min  (pt reports he will not have filaed if unable to stand for less than one min)   STG Expiration Date 05/01/23   Short Term Goal #1 pt will:  Perform rolling and repositioning in bed w/no more than minx1 to left side using trapeze/rail to  decrease caregiver burden;  Perform supine <--> sitting edge of bed transition w/ modx1 to improve level of function and minimize need for sit->stand Trials from recliner as appropriate    Perform sit <---> stand transfers w/ max Ax1 to minimize burden of care, Perform 90% upright standing tolerance w/ UE support for 30 sec w/ A of 1 plus sling to promote longer standing tolerance and progress to pregait activities as appropriate  Assess gait and wide WC (as seated mobility target and potentially as mobility w/ Le's--even backward)  and set goals  PT Treatment Day 4   Plan   Treatment/Interventions LE strengthening/ROM; Therapeutic exercise;Cognitive reorientation;Patient/family training;Equipment eval/education; Bed mobility;Gait training;Spoke to nursing;OT   Progress Slow progress, decreased activity tolerance   PT Frequency Other (Comment)  (mon-fri)   Recommendation   PT Discharge Recommendation Post acute rehabilitation services   Equipment Recommended Walker; Wheelchair   AM-PAC Basic Mobility Inpatient   Turning in Flat Bed Without Bedrails 2   Lying on Back to Sitting on Edge of Flat Bed Without Bedrails 1   Moving Bed to Chair 1   Standing Up From Chair Using Arms 1   Walk in Room 1   Climb 3-5 Stairs With Railing 1   Basic Mobility Inpatient Raw Score 7   Turning Head Towards Sound 4   Follow Simple Instructions 3   Low Function Basic Mobility Raw Score  14   Low Function Basic Mobility Standardized Score  22 01   Highest Level Of Mobility   JH-HLM Goal 2: Bed activities/Dependent transfer   JH-HLM Achieved 4: Move to chair/commode   Education   Education Provided Mobility training;Home exercise program   Patient Reinforcement needed   End of Consult   Patient Position at End of Consult Bedside chair; All needs within reach     The patient's AM-PAC Basic Mobility Inpatient Short Form Raw Score is 7  A Raw score of less than or equal to 16 suggests the patient may benefit from discharge to post-acute rehabilitation services  Please also refer to the recommendation of the Physical Therapist for safe discharge planning          Michaela Mclaughlin, PT, DPT, GCS

## 2023-04-26 NOTE — PLAN OF CARE
Problem: MOBILITY - ADULT  Goal: Maintain or return to baseline ADL function  Description: INTERVENTIONS:  -  Assess patient's ability to carry out ADLs; assess patient's baseline for ADL function and identify physical deficits which impact ability to perform ADLs (bathing, care of mouth/teeth, toileting, grooming, dressing, etc )  - Assess/evaluate cause of self-care deficits   - Assess range of motion  - Assess patient's mobility; develop plan if impaired  - Assess patient's need for assistive devices and provide as appropriate  - Encourage maximum independence but intervene and supervise when necessary  - Involve family in performance of ADLs  - Assess for home care needs following discharge   - Consider OT consult to assist with ADL evaluation and planning for discharge  - Provide patient education as appropriate  Outcome: Progressing  Goal: Maintains/Returns to pre admission functional level  Description: INTERVENTIONS:  - Perform BMAT or MOVE assessment daily    - Set and communicate daily mobility goal to care team and patient/family/caregiver     - Collaborate with rehabilitation services on mobility goals if consulted  - Perform Range of Motion  - Out of bed for toileting  - Record patient progress and toleration of activity level   Outcome: Progressing     Problem: Prexisting or High Potential for Compromised Skin Integrity  Goal: Skin integrity is maintained or improved  Description: INTERVENTIONS:  - Identify patients at risk for skin breakdown  - Assess and monitor skin integrity  - Assess and monitor nutrition and hydration status  - Monitor labs   - Assess for incontinence   - Turn and reposition patient  - Assist with mobility/ambulation  - Relieve pressure over bony prominences  - Avoid friction and shearing  - Provide appropriate hygiene as needed including keeping skin clean and dry  - Evaluate need for skin moisturizer/barrier cream  - Collaborate with interdisciplinary team   - Patient/family teaching  - Consider wound care consult   Outcome: Progressing     Problem: Nutrition/Hydration-ADULT  Goal: Nutrient/Hydration intake appropriate for improving, restoring or maintaining nutritional needs  Description: Monitor and assess patient's nutrition/hydration status for malnutrition  Collaborate with interdisciplinary team and initiate plan and interventions as ordered  Monitor patient's weight and dietary intake as ordered or per policy  Utilize nutrition screening tool and intervene as necessary  Determine patient's food preferences and provide high-protein, high-caloric foods as appropriate       INTERVENTIONS:  - Monitor oral intake, urinary output, labs, and treatment plans  - Assess nutrition and hydration status and recommend course of action  - Evaluate amount of meals eaten  - Assist patient with eating if necessary   - Allow adequate time for meals  - Recommend/ encourage appropriate diets, oral nutritional supplements, and vitamin/mineral supplements  - Order, calculate, and assess calorie counts as needed  - Recommend, monitor, and adjust tube feedings and TPN/PPN based on assessed needs  - Assess need for intravenous fluids  - Provide specific nutrition/hydration education as appropriate  - Include patient/family/caregiver in decisions related to nutrition  Outcome: Progressing

## 2023-04-26 NOTE — UTILIZATION REVIEW
Continued Stay Review    Date: 4/26/23                          Current Patient Class: IP  Current Level of Care: Med Surg    HPI:67 y o  male initially admitted on 4/7     Assessment/Plan: Continue po lasix  INR 2 7 today  We will continue with warfarin 6 mg tonight  Follow-up INR AM  Continue metoprolol  PT/OT recommending acute rehab post discharge  Pending placement       Vital Signs:     Date/Time Temp Pulse Resp BP MAP (mmHg) SpO2 Calculated FIO2 (%) - Nasal Cannula O2 Flow Rate (L/min) Nasal Cannula O2 Flow Rate (L/min) O2 Device   04/26/23 0801 -- -- -- -- -- 95 % 44 6 L/min 6 L/min Nasal cannula   04/26/23 0321 -- -- -- -- -- 96 % -- -- -- --   04/25/23 2251 -- -- -- -- -- 97 % -- -- -- --   04/25/23 2057 -- -- -- -- -- 98 % -- -- -- --   04/25/23 2053 97 8 °F (36 6 °C) 91 -- 122/65 84 95 % -- -- -- --   04/25/23 16:37:52 98 1 °F (36 7 °C) 91 -- 117/63 81 95 % -- -- -- --   04/25/23 0729 -- -- -- -- -- 92 % 36 -- 4 L/min Nasal cannula   04/25/23 07:10:17 98 °F (36 7 °C) 100 -- 113/56 75 92 % -- -- -- --   04/25/23 0009 -- -- -- -- -- 95 % -- -- -- --   04/24/23 20:43:48 97 2 °F (36 2 °C) Abnormal  100 18 129/68 88 94 % -- -- -- --   04/24/23 1957 -- -- -- -- -- 95 % 36 -- 4 L/min Nasal cannula   04/24/23 16:14:40 97 9 °F (36 6 °C) 101 20 109/71 84 90 % -- -- -- --   04/24/23 1439 -- -- -- -- -- 94 % 36 -- 4 L/min Nasal cannula   04/24/23 0900 -- -- -- -- -- 95 % 36 -- 4 L/min Nasal cannula   04/24/23 0749 -- -- -- -- -- 97 % 36 -- 4 L/min Nasal cannula   04/24/23 07:16:50 97 6 °F (36 4 °C) 94 -- 153/81 105 96 % -- -- -- --   04/24/23 0327 -- -- -- -- -- 96 % -- -- -- --       Pertinent Labs/Diagnostic Results:       Results from last 7 days   Lab Units 04/25/23  0540 04/24/23  0531   WBC Thousand/uL 6 11 6 78   HEMOGLOBIN g/dL 11 4* 11 6*   HEMATOCRIT % 37 3 38 8   PLATELETS Thousands/uL 231 223         Results from last 7 days   Lab Units 04/25/23  0540 04/24/23  0531 04/21/23  0523 04/20/23  4926 SODIUM mmol/L 137 139 137 137   POTASSIUM mmol/L 3 9 3 8 3 9 3 6   CHLORIDE mmol/L 94* 94* 94* 92*   CO2 mmol/L 39* 43* 37* 39*   ANION GAP mmol/L 4 2* 6 6   BUN mg/dL 9 9 10 11   CREATININE mg/dL 0 50* 0 52* 0 44* 0 36*   EGFR ml/min/1 73sq m 110 108 116 126   CALCIUM mg/dL 9 0 9 0 8 9 8 9   MAGNESIUM mg/dL  --   --  2 0 1 9             Results from last 7 days   Lab Units 04/25/23  0540 04/24/23  0531 04/21/23  0523 04/20/23  0512   GLUCOSE RANDOM mg/dL 99 99 103 137         Results from last 7 days   Lab Units 04/26/23  0504 04/25/23  0540 04/24/23  0531   PROTIME seconds 29 2* 32 3* 35 1*   INR  2 73* 3 12* 3 46*         Medications:   Scheduled Medications:  acetaminophen, 975 mg, Oral, Q8H GRIFFIN  benzonatate, 200 mg, Oral, TID  fluticasone, 2 spray, Each Nare, Daily  furosemide, 40 mg, Oral, BID (diuretic)  guaiFENesin, 1,200 mg, Oral, Q12H GRIFFIN  ipratropium, 0 5 mg, Nebulization, TID  levalbuterol, 1 25 mg, Nebulization, TID  metoprolol succinate, 25 mg, Oral, Daily  pantoprazole, 40 mg, Oral, Early Morning  polyethylene glycol, 17 g, Oral, Daily  potassium chloride, 20 mEq, Oral, Daily  pregabalin, 150 mg, Oral, TID  risperiDONE, 1 mg, Oral, TID  senna-docusate sodium, 1 tablet, Oral, BID  warfarin, 5 mg, Oral, Daily (warfarin)      Continuous IV Infusions:     PRN Meds:  albuterol, 2 5 mg, Nebulization, Q4H PRN  bisacodyl, 10 mg, Rectal, Daily PRN  dextromethorphan-guaiFENesin, 10 mL, Oral, Q4H PRN  hydrOXYzine HCL, 50 mg, Oral, Q6H PRN  methocarbamol, 750 mg, Oral, Q6H PRN  oxyCODONE, 5 mg, Oral, Q6H PRN  simethicone, 80 mg, Oral, Q6H PRN        Discharge Plan: TBD    Network Utilization Review Department  ATTENTION: Please call with any questions or concerns to [phone] and carefully listen to the prompts so that you are directed to the right person   All voicemails are confidential   Lamar Whitney all requests for admission clinical reviews, approved or denied determinations and any other requests to dedicated fax number below belonging to the campus where the patient is receiving treatment   List of dedicated fax numbers for the Facilities:  [address] [place] [address] DENIALS (Administrative/Medical Necessity) [phone]   [address] [place] [address] (Maternity/NICU/Pediatrics) [phone]   [address] [phone]   [address] [phone]   [address] [place] [address] [facility] [phone]   [address] [facility] 134 815 [facility] [phone]

## 2023-04-26 NOTE — CASE MANAGEMENT
Case Management Progress Note    Patient name Thais Bertrand  Location S /S -44 MRN [id number]  : 1955 Date 2023       LOS (days): 19  Geometric Mean LOS (GMLOS) (days): 3 90  Days to GMLOS:-15 3        OBJECTIVE:        Current admission status: Inpatient  Preferred Pharmacy:   [address], [place] - [address] [facility] [place] [address]  Phone: [phone] Fax: [phone]    Primary Care Provider: Negrita Ewing MD    Primary Insurance: [address] REP  Secondary Insurance:     PROGRESS NOTE:    No bed offers received at this time  Patient showing progress with PT/OT - able to stand/transfer with max assistx2 today  Updated PT/OT notes attached to referrals  TT sent to Saintclair Bolk at [facility] to request re-evaluation tomorrow,

## 2023-04-26 NOTE — PLAN OF CARE
Problem: PHYSICAL THERAPY ADULT  Goal: Performs mobility at highest level of function for planned discharge setting  See evaluation for individualized goals  Description: Treatment/Interventions: Functional transfer training, LE strengthening/ROM, Therapeutic exercise, Endurance training, Cognitive reorientation, Patient/family training, Equipment eval/education, Gait training, Bed mobility          See flowsheet documentation for full assessment, interventions and recommendations  Outcome: Progressing  Note: Prognosis: Fair  Problem List: Decreased strength, Decreased endurance, Impaired balance, Decreased mobility, Decreased cognition, Impaired judgement, Decreased safety awareness, Obesity, Pain  Assessment: Pt demosntrated ability to reach 100% standing position this session  Difficulty during first few attempts secondary to non optimal palcement of hammock sheet for belt and hammock technique  Pt does demonstrate ability to stand fully for 15-20 seconds, and was able to toelrate upright positioning for at least 30 seconds today  Educated patient on performance of tricep dip/chair push up to tolerance in order to improve UE strength to aid in performance of transfers  Pt stated understanding and agreed to this  Barriers to Discharge: Inaccessible home environment     PT Discharge Recommendation: Post acute rehabilitation services    See flowsheet documentation for full assessment

## 2023-04-26 NOTE — ASSESSMENT & PLAN NOTE
· History of DVT, PE  · INR 2 7 today  We will continue with warfarin 6 mg tonight    Follow-up INR AM

## 2023-04-27 LAB
INR PPP: 2.66 (ref 0.84–1.19)
PROTHROMBIN TIME: 28.6 SECONDS (ref 11.6–14.5)

## 2023-04-27 RX ORDER — ECHINACEA PURPUREA EXTRACT 125 MG
1 TABLET ORAL
Status: DISCONTINUED | OUTPATIENT
Start: 2023-04-27 | End: 2023-05-06 | Stop reason: HOSPADM

## 2023-04-27 RX ORDER — LORATADINE 10 MG/1
10 TABLET ORAL ONCE
Status: COMPLETED | OUTPATIENT
Start: 2023-04-27 | End: 2023-04-27

## 2023-04-27 RX ADMIN — LEVALBUTEROL HYDROCHLORIDE 1.25 MG: 1.25 SOLUTION RESPIRATORY (INHALATION) at 15:00

## 2023-04-27 RX ADMIN — IPRATROPIUM BROMIDE 0.5 MG: 0.5 SOLUTION RESPIRATORY (INHALATION) at 19:26

## 2023-04-27 RX ADMIN — BENZONATATE 200 MG: 100 CAPSULE ORAL at 09:52

## 2023-04-27 RX ADMIN — PREGABALIN 150 MG: 75 CAPSULE ORAL at 21:28

## 2023-04-27 RX ADMIN — METOPROLOL SUCCINATE 25 MG: 25 TABLET, EXTENDED RELEASE ORAL at 09:52

## 2023-04-27 RX ADMIN — IPRATROPIUM BROMIDE 0.5 MG: 0.5 SOLUTION RESPIRATORY (INHALATION) at 07:45

## 2023-04-27 RX ADMIN — OXYCODONE HYDROCHLORIDE 5 MG: 5 TABLET ORAL at 05:56

## 2023-04-27 RX ADMIN — POTASSIUM CHLORIDE 20 MEQ: 1500 TABLET, EXTENDED RELEASE ORAL at 09:51

## 2023-04-27 RX ADMIN — PANTOPRAZOLE SODIUM 40 MG: 40 TABLET, DELAYED RELEASE ORAL at 05:55

## 2023-04-27 RX ADMIN — OXYCODONE HYDROCHLORIDE 5 MG: 5 TABLET ORAL at 21:28

## 2023-04-27 RX ADMIN — ACETAMINOPHEN 975 MG: 325 TABLET, FILM COATED ORAL at 05:55

## 2023-04-27 RX ADMIN — PREGABALIN 150 MG: 75 CAPSULE ORAL at 16:08

## 2023-04-27 RX ADMIN — RISPERIDONE 1 MG: 1 TABLET ORAL at 21:28

## 2023-04-27 RX ADMIN — PREGABALIN 150 MG: 75 CAPSULE ORAL at 09:51

## 2023-04-27 RX ADMIN — FUROSEMIDE 40 MG: 40 TABLET ORAL at 09:51

## 2023-04-27 RX ADMIN — ACETAMINOPHEN 975 MG: 325 TABLET, FILM COATED ORAL at 21:28

## 2023-04-27 RX ADMIN — LEVALBUTEROL HYDROCHLORIDE 1.25 MG: 1.25 SOLUTION RESPIRATORY (INHALATION) at 07:45

## 2023-04-27 RX ADMIN — IPRATROPIUM BROMIDE 0.5 MG: 0.5 SOLUTION RESPIRATORY (INHALATION) at 15:00

## 2023-04-27 RX ADMIN — ACETAMINOPHEN 975 MG: 325 TABLET, FILM COATED ORAL at 16:08

## 2023-04-27 RX ADMIN — LEVALBUTEROL HYDROCHLORIDE 1.25 MG: 1.25 SOLUTION RESPIRATORY (INHALATION) at 19:26

## 2023-04-27 RX ADMIN — GUAIFENESIN 1200 MG: 600 TABLET ORAL at 21:28

## 2023-04-27 RX ADMIN — POLYETHYLENE GLYCOL 3350 17 G: 17 POWDER, FOR SOLUTION ORAL at 09:38

## 2023-04-27 RX ADMIN — RISPERIDONE 1 MG: 1 TABLET ORAL at 09:38

## 2023-04-27 RX ADMIN — GUAIFENESIN 1200 MG: 600 TABLET ORAL at 09:51

## 2023-04-27 RX ADMIN — OXYCODONE HYDROCHLORIDE 5 MG: 5 TABLET ORAL at 16:08

## 2023-04-27 RX ADMIN — WARFARIN SODIUM 6 MG: 6 TABLET ORAL at 17:43

## 2023-04-27 RX ADMIN — LORATADINE 10 MG: 10 TABLET ORAL at 09:51

## 2023-04-27 RX ADMIN — RISPERIDONE 1 MG: 1 TABLET ORAL at 16:08

## 2023-04-27 RX ADMIN — SENNOSIDES AND DOCUSATE SODIUM 1 TABLET: 8.6; 5 TABLET ORAL at 09:51

## 2023-04-27 NOTE — OCCUPATIONAL THERAPY NOTE
Occupational Therapy Treatment Note     Patient Name: Lulu Calzada  YVVUZ'A Date: 4/27/2023  Problem List  Active Problems: Morbid obesity ([facility] [address] )    Primary hypertension    EVERETT (obstructive sleep apnea)    Chronic diastolic [facility] ([facility] )    History of pulmonary embolism         04/27/23 1313   OT Last Visit   OT Visit Date 04/27/23   Note Type   Note Type Treatment   Pain Assessment   Pain Assessment Tool 0-10   Pain Score 8   Pain Location/Orientation Location: Back; Location: Neck   Pain Radiating Towards radiating to L shoulder   Pain Onset/Description Onset: Ongoing  (chronic in nature)   Effect of Pain on Daily Activities limits activity tolerance , functional use of LUE   Patient's Stated Pain Goal No pain   Multiple Pain Sites No   Restrictions/Precautions   Weight Bearing Precautions Per Order No   Other Precautions Chair Alarm; Bed Alarm;O2;Fall Risk;Pain  (ricky, 4L O2 via NC)   Lifestyle   Autonomy Pt admitted from [facility], has hx of multiple recent hospitalizations  At baseline lives c his spouse   Reciprocal Relationships supportive spouse and son  Service to Others retired   Intrinsic Gratification (S)  Expressing that his goal is to attend his sons wedding in June  Functional Standing Tolerance   Time variable 10-20s x10 trials   Activity functional trasnfer training both with and without walking sling   Comments Mod A x2 to maintain upright standing with use of belt hammock technique, max A x2 without hammock technique  weightbearing through B forearms   Bed Mobility   Rolling R 2  Maximal assistance   Additional items Assist x 1; Increased time required;Verbal cues;LE management  (completed in recliner)   Rolling L 2  Maximal assistance   Additional items Assist x 1; Increased time required;Verbal cues;LE management  (completed in recliner)   Transfers   Sit to Stand 3  Moderate assistance   Additional items Assist x 2; Increased time required;Verbal cues  (with belt and hammock technique, B/L pillow blocking at knees)   Stand to Sit 2  Maximal assistance   Additional items Assist x 2; Increased time required;Verbal cues   Stand pivot Unable to assess   Mechanical lift 1  Dependent   Additional items Assist x 2; Increased time required;Verbal cues  (overhead ceiling lift nanette for transfer recliner > bed at end of session )   Additional Comments sit<>stand trials x10 completed from recliner, mod A x2 B/L knee blocking c pillow in place with use of belt and hammock technique  transfers x 4 with use of beld and hammock support only for placement of walking sling  Transfers x 6 with use of walking sling for safety  Chair swap completed x2 to complete bed <> sky W/C transfer  Functional Mobility   Functional Mobility 3  Moderate assistance   Additional Comments functional mobility in W/C within hallway  self propelled household distance with combination of BLE, BUE use ; pt most successful from pulling self on railing in hallway with RUE  Reviewed brake management, pt able to manage R break, difficulty with L break d/t LUE pain  Pt primarily limited by decreased activity tolerance, generalized weakness  HR 110s, SpO2 94%  Additional items   (Manual bariatric W/C)   Therapeutic Excerise-Strength   UE Strength Yes   Right Upper Extremity- Strength   RUE Strength Comment functional strength training through BUE self propelling W/C for 15 min with functional rest breaks  Combination of LE and UE propelling d/t fatigue  Left Upper Extremity-Strength   LUE Strength Comment see above   Subjective   Subjective pt eager to get to W/C   Cognition   Overall Cognitive Status [facility]   Arousal/Participation Alert   Attention Within functional limits   Orientation Level Oriented X4   Memory Decreased recall of recent events   Following Commands Follows one step commands without difficulty   Comments Pt continues to demonstrates s/s of depression   Encouragement for active participation in self care tasks and motivation  Activity Tolerance   Activity Tolerance Patient limited by fatigue;Patient limited by pain   Medical Staff Made Aware PT andreea, RN Dixon Estrada, [facility] Maximino Turcios   Assessment   Assessment Patient seen for OT treatment on 4/27/2023 s/p admission for Acute on chronic respiratory failure [facility]) Patient presents with active orders for OT eval and treat and Up with assistance   Upon arrival, pt found resting in recliner showing no signs of distress  Patient agreeable to OT session  Patient participated in functional transfer training and functional mobility in W/C with intervention focus on functional strength training, increasing activity tolerance, and working toward goal of transfer to/from W/C  Jd Fontaine is showing improvements in standing tolerance, sitting balance and core strength, transfers, generalized strength and trunk control but is continuing to perform below baseline due to the following deficits: endurance ,  decreased muscular strength , decreased balance , decreased standing tolerance for self care tasks ,  and (+) pain   From OT standpoint, patient would benefit from skilled intervention to maximize independence with ADLs and functional mobility  Goals remain appropriate, continue POC  At this time, recommending D/C to: post-acute rehabilitation  Next session, plan to provide HEP for BUE strengthening   Plan   Treatment Interventions ADL retraining;Functional transfer training;UE strengthening/ROM; Endurance training;Cognitive reorientation;Patient/family training;Equipment evaluation/education; Neuromuscular reeducation; Compensatory technique education; Energy conservation   Goal Expiration Date 05/01/23   OT Treatment Day 9   OT Frequency 3-5x/wk   Recommendation   OT Discharge Recommendation Post acute rehabilitation services   Additional Comments  Recommend chair position in bed vs OOB to recliner for pressure offloading throughout the day   Use of nanette lift for OOB trasnfers with nursing staff AM-PAC Daily Activity Inpatient   Lower Body Dressing 1   Bathing 1   Toileting 1   Upper Body Dressing 3   Grooming 3   Eating 4   Daily Activity Raw Score 13   Daily Activity Standardized Score (Calc for Raw Score >=11) 32 03   AM-PAC Applied Cognition Inpatient   Following a Speech/Presentation 3   Understanding Ordinary Conversation 4   Taking Medications 3   Remembering Where Things Are Placed or Put Away 4   Remembering List of 4-5 Errands 3   Taking Care of Complicated Tasks 3   Applied Cognition Raw Score 20   Applied Cognition Standardized Score 41 76   End of Consult   Education Provided Yes;Family or social support of family present for education by provider   Patient Position at End of Consult Supine; Bedside chair;Bed/Chair alarm activated; All needs within reach   Nurse Communication Nurse aware of consult   End of Consult Comments Pt benefited from co-session of skilled OT and PT therapists in order to most appropriately address functional deficits d/t extensive physical assistance required for safe mobility  and decreased activity tolerance  OT/PT objectives were addressed separately; please see PT note for specific goal areas targeted     Elmer Monreal

## 2023-04-27 NOTE — RESPIRATORY THERAPY NOTE
04/26/23 2312   Respiratory Protocol   Protocol Initiated? Yes   Protocol Selection Respiratory; Airway Clearance   Language Barrier? No   Medical & Social History Reviewed? Yes   Diagnostic Studies Reviewed? Yes   Physical Assessment Performed? Yes   Home Devices/Therapy BiPAP/CPAP   Airway Clearance Plan Flutter   Respiratory Plan No distress/Pulmonary history; Discontinue Protocol   Additional Assessments   SpO2 95 %

## 2023-04-27 NOTE — PLAN OF CARE
Problem: PHYSICAL THERAPY ADULT  Goal: Performs mobility at highest level of function for planned discharge setting  See evaluation for individualized goals  Description: Treatment/Interventions: Functional transfer training, LE strengthening/ROM, Therapeutic exercise, Endurance training, Cognitive reorientation, Patient/family training, Equipment eval/education, Gait training, Bed mobility          See flowsheet documentation for full assessment, interventions and recommendations  Outcome: Progressing  Note: Prognosis: Fair  Problem List: Decreased strength, Decreased endurance, Impaired balance, Decreased mobility, Decreased coordination, Decreased cognition, Decreased safety awareness, Impaired judgement, Impaired tone, Decreased skin integrity  Assessment: Pt continues to make slow progress toward performing sit tos tand transfers, although continuing to require max assist x 2, pt input and effort in transfers is improving  He demosntrated ability to perform STS transfers today to a perpeinduclar position on nigh  order for hammock to be placed, as well as walking sling, and adjustmet of nanette pad x 3  Pt participated with propulsion in wheelchair with three different variations today  Use of R UE to pull himself down hallway, as well as pulling and pushing through B LE to propel himself, requiring mod assist to perform turns and negotiate obstacles  Pt was then able to perform multuiple sit to stand trasnfers to perependicular positioning tyo remove walking sling and adjust nanette lift sling  Pt did require continues assistance with bed mobility/rolling in bed side chair and once supine in bed for removal of nanette sling  Continue to reccomend post aute rehabilitation  Barriers to Discharge: Inaccessible home environment     PT Discharge Recommendation: Post acute rehabilitation services    See flowsheet documentation for full assessment

## 2023-04-27 NOTE — PHYSICAL THERAPY NOTE
Physical Therapy Treatment Note    Patient's Name: Yeison Lopez  : 23 1438   PT Last Visit   PT Visit Date 23   Note Type   Note Type Treatment   Pain Assessment   Pain Assessment Tool Barrientos-Baker FACES   Barrientos-Baker FACES Pain Rating 8   Pain Location/Orientation   (upper back, left shoulder blade)   Restrictions/Precautions   Weight Bearing Precautions Per Order No   Other Precautions Chair Alarm; Bed Alarm;O2;Multiple lines; Fall Risk;Pain  (ricky, 4 L O2 via NC)   General   Response to Previous Treatment Patient with no complaints from previous session  Family/Caregiver Present No   Cognition   Overall Cognitive Status WFL   Following Commands Follows one step commands without difficulty   Comments pt ID by wristband, name and    Subjective   Subjective pt up in bedside chair, agreeable to PT treatment   Bed Mobility   Rolling R 2  Maximal assistance   Additional items Assist x 1;HOB elevated; Bedrails;LE management   Rolling L 2  Maximal assistance   Additional items Assist x 1;HOB elevated; Bedrails;LE management   Supine to Sit Unable to assess   Sit to Supine Unable to assess   Additional Comments utilized over head mechnical lift to transfer   Transfers   Sit to Stand 2  Maximal assistance   Additional items Assist x 2; Increased time required;Verbal cues   Stand to Sit 3  Moderate assistance   Additional items Assist x 2; Increased time required;Verbal cues   Additional Comments continue to utlize belt and hammock technique for sit to stand transfers, this session belt and hammock technique was applied for STS transfer x2, prior to utilization of walking sling, post session patient was able to perform STS x3 in order to remove walking sling, and reposition nanette lift sling, pt does not achieve full standing position this session, however treatment goals today were directed at continued OOB mobility with wheelchair self propulsion, MARTINA ferrell assited in the use of walking sling and chair swap to safely transfer patient to bariatric wheelchair, BIANCA vazquez present to assit with rolling in bed side chair to properly position nanette sling in order to safely trasnfer pt back to bed per their request   Ambulation/Elevation   Gait pattern Not appropriate   Wheelchair Activities   Wheelchair Cushion Pressure relieving   Propulsion Yes   Propulsion Type 1 Manual   Level 1 Level tile   Method 1 Right lower extremity; Left lower extremity   Level of Assistance 1 Minimum assistance   Description/ Details 1 pt able to extend BL LE and pull himself forward utilizing hamstrings to flex and pull himself forward, able to perform about 25'   Propulsion 2   Propulsion Type 2 Manual   Level 2 Level tile   Method 2 Right upper extremity   Level of Assistance 2 Contact guard   Description/Details 2 pt utilizes hand rail in hallway to pull himself forward with R UE, does so for approximately 30'   Propulsion 3   Propulsion Type 3 Manual   Level 3 Level tile   Method 3 Right lower extremity; Left lower extremity   Level of Assistance 3 Moderate assistance   Description/ Details 3 pt pushes through B LE to propel himself backwards, requires mod assist in order to perform turns and to align himself properly   Balance   Static Sitting Fair +   Dynamic Sitting Fair -   Static Standing Zero  (Ax 2)   Endurance Deficit   Endurance Deficit Yes   Endurance Deficit Description frequent rest required throughout session   Activity Tolerance   Activity Tolerance Patient limited by fatigue;Patient limited by pain   Medical Staff Made Aware care coordinated with MARTINA Rodríguez RN Adria Mock, RN frank   Assessment   Prognosis Fair   Problem List Decreased strength;Decreased endurance; Impaired balance;Decreased mobility; Decreased coordination;Decreased cognition;Decreased safety awareness; Impaired judgement; Impaired tone;Decreased skin integrity   Assessment Pt continues to make slow progress toward performing sit tos tand transfers, although continuing to require max assist x 2, pt input and effort in transfers is improving  He demosntrated ability to perform STS transfers today to a perpeinduclar position on nigh  order for hammock to be placed, as well as walking sling, and adjustmet of nanette pad x 3  Pt participated with propulsion in wheelchair with three different variations today  Use of R UE to pull himself down hallway, as well as pulling and pushing through B LE to propel himself, requiring mod assist to perform turns and negotiate obstacles  Pt was then able to perform multuiple sit to stand trasnfers to perependicular positioning tyo remove walking sling and adjust nanette lift sling  Pt did require continues assistance with bed mobility/rolling in bed side chair and once supine in bed for removal of nanette sling  Continue to reccomend post aute rehabilitation  Barriers to Discharge Inaccessible home environment   Goals   Patient Goals Pt's written goal for this Friday is to be able to perform full upright stand w/ A of 2 using walker and in sling for up to one min  (pt reports he will not have filaed if unable to stand for less than one min)   STG Expiration Date 05/01/23   Short Term Goal #1 pt will:  Perform rolling and repositioning in bed w/no more than minx1 to left side using trapeze/rail to  decrease caregiver burden;  Perform supine <--> sitting edge of bed transition w/ modx1 to improve level of function and minimize need for sit->stand Trials from recliner as appropriate  Perform sit <---> stand transfers w/ max Ax1 to minimize burden of care, Perform 90% upright standing tolerance w/ UE support for 30 sec w/ A of 1 plus sling to promote longer standing tolerance and progress to pregait activities as appropriate  Assess gait and wide WC (as seated mobility target and potentially as mobility w/ Le's--even backward)  and set goals     PT Treatment Day 5   Plan   Treatment/Interventions Functional transfer training;LE strengthening/ROM; Therapeutic exercise;Cognitive reorientation;Patient/family training;Equipment eval/education; Bed mobility;Spoke to nursing;Spoke to case management;OT   Progress Slow progress, decreased activity tolerance   PT Frequency Other (Comment)  (mon-fri)   Recommendation   PT Discharge Recommendation Post acute rehabilitation services   Equipment Recommended Walker; Wheelchair   AM-PAC Basic Mobility Inpatient   Turning in Flat Bed Without Bedrails 2   Lying on Back to Sitting on Edge of Flat Bed Without Bedrails 1   Moving Bed to Chair 1   Standing Up From Chair Using Arms 1   Walk in Room 1   Climb 3-5 Stairs With Railing 1   Basic Mobility Inpatient Raw Score 7   Turning Head Towards Sound 4   Follow Simple Instructions 3   Low Function Basic Mobility Raw Score  14   Low Function Basic Mobility Standardized Score  22 01   Highest Level Of Mobility   JH-HLM Goal 2: Bed activities/Dependent transfer   JH-HLM Achieved 4: Move to chair/commode   Education   Education Provided Mobility training   Patient Reinforcement needed   End of Consult   Patient Position at End of Consult Supine;Bed/Chair alarm activated; All needs within reach   The patient's AM-PAC Basic Mobility Inpatient Short Form Raw Score is 7  A Raw score of less than or equal to 16 suggests the patient may benefit from discharge to post-acute rehabilitation services  Please also refer to the recommendation of the Physical Therapist for safe discharge planning            Catracho Dubon, PT

## 2023-04-27 NOTE — PLAN OF CARE
Problem: OCCUPATIONAL THERAPY ADULT  Goal: Performs self-care activities at highest level of function for planned discharge setting  See evaluation for individualized goals  Description: Treatment Interventions: ADL retraining, Functional transfer training, UE strengthening/ROM, Endurance training, Patient/family training, Equipment evaluation/education, Compensatory technique education, Neuromuscular reeducation, Energy conservation          See flowsheet documentation for full assessment, interventions and recommendations  Outcome: Progressing  Note: Limitation: Decreased ADL status, Decreased Safe judgement during ADL, Decreased endurance, Decreased cognition, Decreased self-care trans, Decreased high-level ADLs  Prognosis: Fair  Assessment: Patient seen for OT treatment on 4/27/2023 s/p admission for Acute on chronic respiratory failure [facility]) Patient presents with active orders for OT eval and treat and Up with assistance   Upon arrival, pt found resting in recliner showing no signs of distress  Patient agreeable to OT session  Patient participated in functional transfer training and functional mobility in W/C with intervention focus on functional strength training, increasing activity tolerance, and working toward goal of transfer to/from W/C  Nereyda Wheatley is showing improvements in standing tolerance, sitting balance and core strength, transfers, generalized strength and trunk control but is continuing to perform below baseline due to the following deficits: endurance ,  decreased muscular strength , decreased balance , decreased standing tolerance for self care tasks ,  and (+) pain   From OT standpoint, patient would benefit from skilled intervention to maximize independence with ADLs and functional mobility  Goals remain appropriate, continue POC  At this time, recommending D/C to: post-acute rehabilitation   Next session, plan to provide HEP for BUE strengthening     OT Discharge Recommendation: Post acute rehabilitation services     [facility]

## 2023-04-27 NOTE — ASSESSMENT & PLAN NOTE
POA  as evidenced by tachycardia 112, leukocytosis 12 3, tachypnea 24  Blood Pressure: 149/85  Pulse: 94  Respirations: 20    Sepsis Unlikely as no source present  Previous COVID infxn, likely continued inflammatory state         Plan-  Blood Cx-pending  Continue to monitor off antibiotics  Encourage p o  intake

## 2023-04-27 NOTE — ASSESSMENT & PLAN NOTE
· Recently DC to [facility]   PT/OT: post acute rehab  · Working PT as inpatient- improving mobility  · Pending placement  · BMI 60

## 2023-04-27 NOTE — PLAN OF CARE
Problem: MOBILITY - ADULT  Goal: Maintain or return to baseline ADL function  Description: INTERVENTIONS:  -  Assess patient's ability to carry out ADLs; assess patient's baseline for ADL function and identify physical deficits which impact ability to perform ADLs (bathing, care of mouth/teeth, toileting, grooming, dressing, etc )  - Assess/evaluate cause of self-care deficits   - Assess range of motion  - Assess patient's mobility; develop plan if impaired  - Assess patient's need for assistive devices and provide as appropriate  - Encourage maximum independence but intervene and supervise when necessary  - Involve family in performance of ADLs  - Assess for home care needs following discharge   - Consider OT consult to assist with ADL evaluation and planning for discharge  - Provide patient education as appropriate  Outcome: Progressing  Goal: Maintains/Returns to pre admission functional level  Description: INTERVENTIONS:  - Perform BMAT or MOVE assessment daily    - Set and communicate daily mobility goal to care team and patient/family/caregiver  - Collaborate with rehabilitation services on mobility goals if consulted  - Reposition patient every 2 hours    - Record patient progress and toleration of activity level   Outcome: Progressing     Problem: Prexisting or High Potential for Compromised Skin Integrity  Goal: Skin integrity is maintained or improved  Description: INTERVENTIONS:  - Identify patients at risk for skin breakdown  - Assess and monitor skin integrity  - Assess and monitor nutrition and hydration status  - Monitor labs   - Assess for incontinence   - Turn and reposition patient  - Assist with mobility/ambulation  - Relieve pressure over bony prominences  - Avoid friction and shearing  - Provide appropriate hygiene as needed including keeping skin clean and dry  - Evaluate need for skin moisturizer/barrier cream  - Collaborate with interdisciplinary team   - Patient/family teaching  - Consider wound care consult   Outcome: Progressing     Problem: Nutrition/Hydration-ADULT  Goal: Nutrient/Hydration intake appropriate for improving, restoring or maintaining nutritional needs  Description: Monitor and assess patient's nutrition/hydration status for malnutrition  Collaborate with interdisciplinary team and initiate plan and interventions as ordered  Monitor patient's weight and dietary intake as ordered or per policy  Utilize nutrition screening tool and intervene as necessary  Determine patient's food preferences and provide high-protein, high-caloric foods as appropriate       INTERVENTIONS:  - Monitor oral intake, urinary output, labs, and treatment plans  - Assess nutrition and hydration status and recommend course of action  - Evaluate amount of meals eaten  - Assist patient with eating if necessary   - Allow adequate time for meals  - Recommend/ encourage appropriate diets, oral nutritional supplements, and vitamin/mineral supplements  - Order, calculate, and assess calorie counts as needed  - Recommend, monitor, and adjust tube feedings and TPN/PPN based on assessed needs  - Assess need for intravenous fluids  - Provide specific nutrition/hydration education as appropriate  - Include patient/family/caregiver in decisions related to nutrition  Outcome: Progressing

## 2023-04-27 NOTE — PROGRESS NOTES
[facility]  Progress Note  Name: Valeri Wyatt  MRN: [id number]  Unit/Bed#: S -01 I Date of Admission: 4/7/2023   Date of Service: 4/27/2023 I Hospital Day: 20    Assessment/Plan   * Acute on chronic respiratory failure (HCC)-resolved as of 4/25/2023  Assessment & Plan  · Completed prednisone therapy  · Continue PO Lasix to 40 mg BID   · Monitor respiratory status, currently on baseline 4L  · Respiratory protocol, Xopenex/Atrovent, Airway clearance protocol, IS  · Mucinex, Tessalon Perles   · Out of bed to chair, PT and OT      History of pulmonary embolism  Assessment & Plan  · History of DVT, PE  · INR 2 66 today  We will continue with warfarin 6 mg tonight  Follow-up INR AM    Chronic diastolic congestive heart failure (HCC)  Assessment & Plan  · No further shortness of breath  · Continue on p o  Lasix  · Daily weights monitor input and output    EVERETT (obstructive sleep apnea)  Assessment & Plan  · Hx of EVERETT, morbid obesity, obesity hypoventilation syndrome   · Continue BiPAP HS     Primary hypertension  Assessment & Plan  · BP stable  · Continue metoprolol and Lasix 40mg bid      Morbid obesity (HCC)  Assessment & Plan  · Recently DC to [facility]   PT/OT: post acute rehab  · Working PT as inpatient- improving mobility  · Pending placement  · BMI 60        SIRS (systemic inflammatory response syndrome) (HCC)-resolved as of 4/13/2023  Assessment & Plan  POA  as evidenced by tachycardia 112, leukocytosis 12 3, tachypnea 24  Blood Pressure: 149/85  Pulse: 94  Respirations: 20    Sepsis Unlikely as no source present  Previous COVID infxn, likely continued inflammatory state         Plan-  Blood Cx-pending  Continue to monitor off antibiotics  Encourage p o  intake              VTE Pharmacologic Prophylaxis:   VTE Score: 10 Moderate Risk (Score 3-4) - Pharmacological DVT Prophylaxis Ordered: Rivaroxaban (Xarelto)      Mechanical VTE Prophylaxis in Place: No    Patient Centered Rounds: I have performed bedside rounds with nursing staff today  Discussions with Specialists or Other Care Team Provider: CM, nursing    Education and Discussions with Family / Patient: Patient declined call to   Current Length of Stay: 20 day(s)    Current Patient Status: Inpatient     Discharge Plan / Estimated Discharge Date: pending placement    Code Status: Level 1 - Full Code      Subjective:   Patient states he worked with PT yesterday, and did great , The goal is to work again today  In good spirits  Wants something for decongestion, feels congested in nose  Objective:     Vitals:   Temp (24hrs), Av 8 °F (36 6 °C), Min:97 7 °F (36 5 °C), Max:97 9 °F (36 6 °C)    Temp:  [97 7 °F (36 5 °C)-97 9 °F (36 6 °C)] 97 7 °F (36 5 °C)  HR:  [] 94  Resp:  [20] 20  BP: (111-149)/(53-85) 149/85  SpO2:  [93 %-99 %] 99 %  Body mass index is 62 24 kg/m²  Input and Output Summary (last 24 hours): Intake/Output Summary (Last 24 hours) at 2023 1331  Last data filed at 2023 1243  Gross per 24 hour   Intake 1080 ml   Output 1950 ml   Net -870 ml       Physical Exam:     Physical Exam  Vitals and nursing note reviewed  Constitutional:       General: He is not in acute distress  Appearance: He is well-developed  He is obese  HENT:      Head: Normocephalic and atraumatic  Mouth/Throat:      Mouth: Mucous membranes are moist       Pharynx: Oropharynx is clear  Eyes:      Conjunctiva/sclera: Conjunctivae normal    Cardiovascular:      Rate and Rhythm: Normal rate  Heart sounds: No murmur heard  Pulmonary:      Effort: Pulmonary effort is normal  No respiratory distress  Comments: On 4 L baseline oxygen  Abdominal:      General: Bowel sounds are normal       Palpations: Abdomen is soft  Tenderness: There is no abdominal tenderness  Musculoskeletal:         General: No swelling  Cervical back: Neck supple     Skin:     General: Skin is warm and dry       Capillary Refill: Capillary refill takes less than 2 seconds  Neurological:      Mental Status: He is alert and oriented to person, place, and time  Psychiatric:         Mood and Affect: Mood normal          Additional Data:     Labs:  Results from last 7 days   Lab Units 04/25/23  0540   WBC Thousand/uL 6 11   HEMOGLOBIN g/dL 11 4*   HEMATOCRIT % 37 3   PLATELETS Thousands/uL 231     Results from last 7 days   Lab Units 04/25/23  0540   SODIUM mmol/L 137   POTASSIUM mmol/L 3 9   CHLORIDE mmol/L 94*   CO2 mmol/L 39*   BUN mg/dL 9   CREATININE mg/dL 0 50*   ANION GAP mmol/L 4   CALCIUM mg/dL 9 0   GLUCOSE RANDOM mg/dL 99     Results from last 7 days   Lab Units 04/27/23  0546   INR  2 66*                   Imaging: No pertinent imaging reviewed      Recent Cultures (last 7 days):           Lines/Drains:  Invasive Devices     Peripheral Intravenous Line  Duration           Peripheral IV 04/24/23 Right Antecubital 3 days                Telemetry:        Last 24 Hours Medication List:   Current Facility-Administered Medications   Medication Dose Route Frequency Provider Last Rate   • acetaminophen  975 mg Oral Q8H [facility] Sondra Romero MD     • albuterol  2 5 mg Nebulization Q4H PRN Inocencia Snyder MD     • benzonatate  200 mg Oral TID Sondra Romero MD     • bisacodyl  10 mg Rectal Daily PRN Jason Storm MD     • dextromethorphan-guaiFENesin  10 mL Oral Q4H PRN Colleen Mcqueen MD     • fluticasone  2 spray Each Nare Daily Jason Storm MD     • furosemide  40 mg Oral BID (diuretic) Jason Storm MD     • guaiFENesin  1,200 mg Oral Q12H Ivan Tony MD     • hydrOXYzine HCL  50 mg Oral Q6H PRN Jason Storm MD     • ipratropium  0 5 mg Nebulization TID Kal Morocho MD     • levalbuterol  1 25 mg Nebulization TID Kal Morocho MD     • methocarbamol  750 mg Oral Q6H PRN Inocencia Snyder MD     • metoprolol succinate  25 mg Oral Daily Sang Selby MD • oxyCODONE  5 mg Oral Q6H PRN Evan Munguia MD     • pantoprazole  40 mg Oral Early Morning Brayden Crawford MD     • polyethylene glycol  17 g Oral Daily Froylan Devries MD     • potassium chloride  20 mEq Oral Daily Roosevelt Rebolledo MD     • pregabalin  150 mg Oral TID Vickie Álvarez MD     • risperiDONE  1 mg Oral TID Vickie Álvarez MD     • senna-docusate sodium  1 tablet Oral BID Kiana Lind MD     • simethicone  80 mg Oral Q6H PRN Froylan Devries MD     • sodium chloride  1 spray Each Nare Q1H PRN Betito Harvey MD     • warfarin  6 mg Oral Daily (warfarin) Piyush Celis MD          Today, Patient Was Seen By: Betito Harvey MD    ** Please Note: This note has been constructed using a voice recognition system   **

## 2023-04-27 NOTE — CASE MANAGEMENT
Case Management Progress Note    Patient name Yeison Lopez  Location S /S -23 MRN [id number]  : 1955 Date 2023       LOS (days): 20  Geometric Mean LOS (GMLOS) (days): 3 90  Days to GMLOS:-16 3        OBJECTIVE:        Current admission status: Inpatient  Preferred Pharmacy:   [address], [place] [address] [place] [address]  Phone: [phone] Fax: [phone]    Primary Care Provider: Adrienne Johnson MD    Primary Insurance: [facility] REP  Secondary Insurance:     PROGRESS NOTE:    Spoke with Pastora Bar at [facility], last night re: patient  Aware that patient has been able to stand/transfer with hamoock technique, max assist x2  Reports they will need patient to be able to transfer with rolling walker and max assist x2 persons in order to consider  Hammock technique, nanette lift, etc, would not qualify him for consideration  Will re-review case if patient able to demonstrate such progress  No facilities accepting at this time  Discussed patient's care plan with OT and RN and TT sent to MDs to follow-up on same  Therapy has been relaying that patient needs a lot of encouragement during therapy sessions, gets frustrated at times and makes self-deprecating remarks  Reports signs of depression and difficulty coping with current condition  Again inquired if psych consult would be warranted, and also discussed consulting nutrition and beginning a calorie-count to limit intake so that weight loss can be encouraged  Patient showing very slow progress with therapy, so hopefully if mental health can be addressed along with initiating dietary restrictions to encourage weight loss, mobility may improve and facility options may be found  Currently referrals have been sent to 500+ facilities, with no accepting beds at this time

## 2023-04-27 NOTE — ASSESSMENT & PLAN NOTE
· History of DVT, PE  · INR 2 66 today  We will continue with warfarin 6 mg tonight    Follow-up INR AM

## 2023-04-28 PROBLEM — F43.20 ADJUSTMENT DISORDER: Status: ACTIVE | Noted: 2023-04-28

## 2023-04-28 LAB
INR PPP: 1.85 (ref 0.84–1.19)
PROTHROMBIN TIME: 21.6 SECONDS (ref 11.6–14.5)

## 2023-04-28 RX ORDER — PSEUDOEPHEDRINE HCL 120 MG/1
240 TABLET, FILM COATED, EXTENDED RELEASE ORAL ONCE
Status: COMPLETED | OUTPATIENT
Start: 2023-04-28 | End: 2023-04-28

## 2023-04-28 RX ORDER — ESCITALOPRAM OXALATE 10 MG/1
5 TABLET ORAL DAILY
Status: DISCONTINUED | OUTPATIENT
Start: 2023-04-28 | End: 2023-05-01

## 2023-04-28 RX ORDER — LORATADINE 10 MG/1
10 TABLET ORAL ONCE
Status: COMPLETED | OUTPATIENT
Start: 2023-04-28 | End: 2023-04-28

## 2023-04-28 RX ORDER — WARFARIN SODIUM 7.5 MG/1
7.5 TABLET ORAL
Status: DISCONTINUED | OUTPATIENT
Start: 2023-04-28 | End: 2023-05-01

## 2023-04-28 RX ADMIN — LEVALBUTEROL HYDROCHLORIDE 1.25 MG: 1.25 SOLUTION RESPIRATORY (INHALATION) at 19:35

## 2023-04-28 RX ADMIN — WARFARIN SODIUM 7.5 MG: 7.5 TABLET ORAL at 17:49

## 2023-04-28 RX ADMIN — ESCITALOPRAM OXALATE 5 MG: 10 TABLET ORAL at 11:24

## 2023-04-28 RX ADMIN — ACETAMINOPHEN 975 MG: 325 TABLET, FILM COATED ORAL at 21:56

## 2023-04-28 RX ADMIN — PREGABALIN 150 MG: 75 CAPSULE ORAL at 21:56

## 2023-04-28 RX ADMIN — POTASSIUM CHLORIDE 20 MEQ: 1500 TABLET, EXTENDED RELEASE ORAL at 09:26

## 2023-04-28 RX ADMIN — OXYCODONE HYDROCHLORIDE 5 MG: 5 TABLET ORAL at 14:30

## 2023-04-28 RX ADMIN — ACETAMINOPHEN 975 MG: 325 TABLET, FILM COATED ORAL at 14:30

## 2023-04-28 RX ADMIN — HYDROXYZINE HYDROCHLORIDE 50 MG: 50 TABLET, FILM COATED ORAL at 09:35

## 2023-04-28 RX ADMIN — OXYCODONE HYDROCHLORIDE 5 MG: 5 TABLET ORAL at 20:24

## 2023-04-28 RX ADMIN — BENZONATATE 200 MG: 100 CAPSULE ORAL at 17:47

## 2023-04-28 RX ADMIN — RISPERIDONE 1 MG: 1 TABLET ORAL at 21:56

## 2023-04-28 RX ADMIN — PSEUDOEPHEDRINE HYDROCHLORIDE 240 MG: 120 TABLET, FILM COATED ORAL at 11:24

## 2023-04-28 RX ADMIN — PANTOPRAZOLE SODIUM 40 MG: 40 TABLET, DELAYED RELEASE ORAL at 05:13

## 2023-04-28 RX ADMIN — RISPERIDONE 1 MG: 1 TABLET ORAL at 17:47

## 2023-04-28 RX ADMIN — FUROSEMIDE 40 MG: 40 TABLET ORAL at 09:25

## 2023-04-28 RX ADMIN — METHOCARBAMOL TABLETS 750 MG: 750 TABLET, COATED ORAL at 20:24

## 2023-04-28 RX ADMIN — METHOCARBAMOL TABLETS 750 MG: 750 TABLET, COATED ORAL at 14:33

## 2023-04-28 RX ADMIN — METOPROLOL SUCCINATE 25 MG: 25 TABLET, EXTENDED RELEASE ORAL at 09:25

## 2023-04-28 RX ADMIN — PREGABALIN 150 MG: 75 CAPSULE ORAL at 09:25

## 2023-04-28 RX ADMIN — OXYCODONE HYDROCHLORIDE 5 MG: 5 TABLET ORAL at 05:14

## 2023-04-28 RX ADMIN — FUROSEMIDE 40 MG: 40 TABLET ORAL at 17:47

## 2023-04-28 RX ADMIN — GUAIFENESIN 1200 MG: 600 TABLET ORAL at 09:25

## 2023-04-28 RX ADMIN — GUAIFENESIN 1200 MG: 600 TABLET ORAL at 21:56

## 2023-04-28 RX ADMIN — IPRATROPIUM BROMIDE 0.5 MG: 0.5 SOLUTION RESPIRATORY (INHALATION) at 07:33

## 2023-04-28 RX ADMIN — BENZONATATE 200 MG: 100 CAPSULE ORAL at 09:26

## 2023-04-28 RX ADMIN — LEVALBUTEROL HYDROCHLORIDE 1.25 MG: 1.25 SOLUTION RESPIRATORY (INHALATION) at 07:33

## 2023-04-28 RX ADMIN — PREGABALIN 150 MG: 75 CAPSULE ORAL at 17:47

## 2023-04-28 RX ADMIN — LORATADINE 10 MG: 10 TABLET ORAL at 11:24

## 2023-04-28 RX ADMIN — SENNOSIDES AND DOCUSATE SODIUM 1 TABLET: 8.6; 5 TABLET ORAL at 09:25

## 2023-04-28 RX ADMIN — SENNOSIDES AND DOCUSATE SODIUM 1 TABLET: 8.6; 5 TABLET ORAL at 17:49

## 2023-04-28 RX ADMIN — ACETAMINOPHEN 975 MG: 325 TABLET, FILM COATED ORAL at 05:13

## 2023-04-28 RX ADMIN — IPRATROPIUM BROMIDE 0.5 MG: 0.5 SOLUTION RESPIRATORY (INHALATION) at 19:35

## 2023-04-28 RX ADMIN — POLYETHYLENE GLYCOL 3350 17 G: 17 POWDER, FOR SOLUTION ORAL at 09:26

## 2023-04-28 RX ADMIN — RISPERIDONE 1 MG: 1 TABLET ORAL at 09:25

## 2023-04-28 NOTE — CONSULTS
TeleConsultation - [address] 69 y o  male MRN: [id number]  Unit/Bed#: S -01 Encounter: [id number]        REQUIRED DOCUMENTATION:     1  This service was provided via Telemedicine  2  Provider located at [facility]   3  TeleMed provider: Maliha Mehta MD   4  Identify all parties in room with patient during tele consult: Patient   5  Patient was then informed that this was a Telemedicine visit and that the exam was being conducted confidentially over secure lines  My office door was closed  No one else was in the room  Patient acknowledged consent and understanding of privacy and security of the Telemedicine visit, and gave us permission to have the assistant stay in the room in order to assist with the history and to conduct the exam   I informed the patient that I have reviewed their record in Epic and presented the opportunity for them to ask any questions regarding the visit today  The patient agreed to participate  Discussed with Tori Briggs MD    Assessment/Plan     Present on Admission:  • EVERETT (obstructive sleep apnea)  • Primary hypertension  • History of pulmonary embolism  • Morbid obesity (HCC)    Assessment:    Adjustment Disorder with mixed emotions     Patient endorsing some depressive symptoms as well as anxiety symptoms as such recommend starting Escitalopram 5 mg qday  Patient without any indication for voluntary or involuntary IP psychiatric admission       Treatment Plan:    Planned Medication Changes:    -Per Above    Current Medications:     Current Facility-Administered Medications   Medication Dose Route Frequency Provider Last Rate   • acetaminophen  975 mg Oral Q8H Aston Easley MD     • albuterol  2 5 mg Nebulization Q4H PRN Lita Farrar MD     • benzonatate  200 mg Oral TID Enid Mosqueda MD     • bisacodyl  10 mg Rectal Daily PRN Ryan Chowdhury MD     • dextromethorphan-guaiFENesin  10 mL Oral Q4H PRN Mansoor Santamaria MD     • fluticasone  2 spray Each Nare Daily Brandin Dean MD     • furosemide  40 mg Oral BID (diuretic) Brandin Dean MD     • guaiFENesin  1,200 mg Oral Q12H [facility] Brandin Dean MD     • hydrOXYzine HCL  50 mg Oral Q6H PRN Brandin Dean MD     • ipratropium  0 5 mg Nebulization TID Tariq Simental MD     • levalbuterol  1 25 mg Nebulization TID Tariq Simental MD     • loratadine  10 mg Oral Once Leticia Lugo MD      And   • pseudoephedrine  240 mg Oral Once Leticia Lugo MD     • methocarbamol  750 mg Oral Q6H PRN Radha Lopez MD     • metoprolol succinate  25 mg Oral Daily Bayron Morales MD     • oxyCODONE  5 mg Oral Q6H PRN Lopez Toribio MD     • pantoprazole  40 mg Oral Early Morning Camryn Richardson MD     • polyethylene glycol  17 g Oral Daily Brandin Dean MD     • potassium chloride  20 mEq Oral Daily Bayron Morales MD     • pregabalin  150 mg Oral TID Radha Lopez MD     • risperiDONE  1 mg Oral TID Radha Lopez MD     • senna-docusate sodium  1 tablet Oral BID Kiana Romero MD     • simethicone  80 mg Oral Q6H PRN Brandin Dean MD     • sodium chloride  1 spray Each Nare Q1H PRN Leticia Lugo MD     • warfarin  7 5 mg Oral Daily (warfarin) Leticia Lugo MD         Risks / Benefits of Treatment:    Risks, benefits, and possible side effects of medications explained to patient and patient verbalizes understanding  Other treatment modalities recommended as indicated:    · psychotherapy  · outpatient referral      Inpatient consult to Psychiatry  Consult performed by: Frieda Hughes MD  Consult ordered by: Leticia Lugo MD        Physician Requesting Consult: Ara Navarro MD  Principal Problem:Acute on chronic respiratory failure [facility])    Reason for Consult: Psych Evaluation      History of Present Illness      Patient states that he has been having a more and more difficult time walking to the point his legs gave out  Patient states that he feels it was related to heart failure  Patient states that this is frustrating as it seems like one thing after another happens to the patient and wouldn't stop  Patient states that he has his todd which is helping him get through it  Patient states that over the last few days he feels that he has had enough and that he has been feeling somewhat depressed  Patient states that he was at a rehab facility recently and that they neglected him and he would soil himself in bed and did not progress  Patient states anxiety is his major concern  Patient denied any current SI/HI/AVH or other acute psychiatric complaints  Psychiatric Review Of Systems:    sleep: no  appetite changes: no  weight changes: no  energy/anergy: no  interest/pleasure/anhedonia: no  somatic symptoms: no  anxiety/panic: yes  reva: no  guilty/hopeless: no  self injurious behavior/risky behavior: no    Historical Information     Past Psychiatric History:     Psychiatric Hospitalizations:   • One past inpatient psychiatric admission  Outpatient Treatment History:   • Denied  Suicide Attempts:   • None  History of self-harm:   • None  Violence History:   • no  Past Psychiatric medication trials: Yes, unable to recall     Substance Abuse History: Denied        Family Psychiatric History: Denied         Social History:    Education: high school diploma/GED  Learning Disabilities: Denied   Marital history:   Children: Yes  Living arrangement, social support: The patient lives in home with wife  Occupational History: retired  Functioning Relationships: good support system    Other Pertinent History: None    Traumatic History:     Abuse: None  Other Traumatic Events: none    Past Medical History:   Diagnosis Date   • [facility]cong[facility] )    • DVT (deep venous thrombosis) ([facility] )    • HTN (hypertension)    • Morbid obesity (HCC)    • EVERETT (obstructive sleep apnea)    • Pulmonary embolism [facility]        Medical Review Of Systems:    Review of Systems    Meds/Allergies     all current active meds have been reviewed  Allergies   Allergen Reactions   • Baclofen Other (See Comments)     Nausea and vomiting   • Morphine Other (See Comments)       Objective     Vital signs in last 24 hours:  Temp:  [98 2 °F (36 8 °C)-98 6 °F (37 °C)] 98 6 °F (37 °C)  HR:  [] 94  Resp:  [20] 20  BP: (109-130)/(68-72) 130/72      Intake/Output Summary (Last 24 hours) at 4/28/2023 1056  Last data filed at 4/28/2023 0901  Gross per 24 hour   Intake 1000 ml   Output 1650 ml   Net -650 ml       Mental Status Evaluation:    Appearance:  age appropriate   Behavior:  normal   Speech:  normal pitch and normal volume   Mood:  normal   Affect:  normal   Language: naming objects   Thought Process:  normal   Associations intact associations   Thought Content:  normal   Perceptual Disturbances: None   Risk Potential: Suicidal Ideations none  Homicidal Ideations none  Potential for Aggression No   Sensorium:  person, place and time/date   Cognition:  recent and remote memory grossly intact   Consciousness:  alert    Attention: attention span and concentration were age appropriate   Intellect: within normal limits   Fund of Knowledge: awareness of current events: President   Insight:  age appropriate   Judgment: age appropriate   Muscle Strength:  Muscle Tone: normal NFT  normal   Gait/Station: normal gait/station   Motor Activity: no abnormal movements       Lab Results: I have personally reviewed all pertinent laboratory/tests results       Most Recent Labs:   Lab Results   Component Value Date    WBC 6 11 04/25/2023    RBC 3 54 (L) 04/25/2023    HGB 11 4 (L) 04/25/2023    HCT 37 3 04/25/2023     04/25/2023    RDW 12 5 04/25/2023    NEUTROABS 9 60 (H) 04/10/2023    SODIUM 137 04/25/2023    K 3 9 04/25/2023    CL 94 (L) 04/25/2023    CO2 39 (H) 04/25/2023    BUN 9 04/25/2023    CREATININE 0 50 (L) 04/25/2023    GLUC 99 04/25/2023    CALCIUM 9 0 04/25/2023    AST 14 04/07/2023    ALT 32 04/07/2023    ALKPHOS 65 04/07/2023    TP 5 8 (L) 04/07/2023    ALB 3 1 (L) 04/07/2023    TBILI 0 53 04/07/2023    AMMONIA 32 03/30/2023    HGBA1C 5 2 03/30/2023     03/30/2023       Imaging Studies: XR chest portable    Result Date: 4/7/2023  Narrative: CHEST INDICATION:   shortness of breath  Covid positive 3/29/2023  COMPARISON:  Chest CT 3/29/2023  EXAM PERFORMED/VIEWS:  XR CHEST PORTABLE  FINDINGS: Mild cardiomegaly  Mild bibasilar atelectasis with no definite acute disease  No effusion or pneumothorax  Upper abdomen normal  Bones normal for age  Impression: Mild bibasilar atelectasis  Pneumonia not excluded in the appropriate clinical setting  Workstation performed: [id number]     EKG/Pathology/Other Studies:   Lab Results   Component Value Date    VENTRATE 104 04/07/2023    ATRIALRATE 104 04/07/2023    PRINT 180 04/07/2023    QRSDINT 94 04/07/2023    QTINT 348 04/07/2023    QTCINT 457 04/07/2023    PAXIS 99 04/07/2023    QRSAXIS -47 04/07/2023    TWAVEAXIS 31 04/07/2023        Code Status: Level 1 - Full Code  Advance Directive and Living Will:      Power of :    POLST:      Screenings:    1  Nutrition Screening  Nutrition Assessment (completed by Staff): Nutrition  Feeding: Able to feed self  Diet Type: Cardiac, 2 gram sodium diet  Appetite: Good  Fluid Restrictions: 1800    2   Pain Screening  Pain Screening: Pain Assessment  Pain Assessment Tool: Barrientos-Baker FACES  Pain Score: 9  Barrientos-Baker FACES Pain Rating: Hurts whole lot  Pain Location/Orientation: Location: Generalized  Pain Radiating Towards: radiating to L shoulder  Pain Onset/Description: Onset: Ongoing (chronic in nature)  Effect of Pain on Daily Activities: limits activity tolerance , functional use of LUE  Patient's Stated Pain Goal: No pain  Hospital Pain Intervention(s): Medication (See MAR), Repositioned, Ambulation/increased activity, Emotional support  Multiple Pain Sites: No  Pain Rating: FLACC (Rest) - Face: Occasional grimace or frown, withdrawn, disinterested, appears sad or worried  Pain Rating: FLACC (Rest) - Legs: Normal position or relaxed  Pain Rating: FLACC (Rest) - Activity: Lying quietly, normal position, moves easily  Pain Rating: FLACC (Rest) - Cry: Moans or whimpers, occasional complaint, occasional verbal outburst or grunt  Pain Rating: FLACC (Rest) - Consolability: Content, relaxed  Score: FLACC (Rest): 2    3  Suicide Screening  ED Crisis Suicide Risk Assessment:        Counseling / Coordination of Care: Total floor / unit time spent today 30 minutes  Greater than 50% of total time was spent with the patient and / or family counseling and / or coordination of care  A description of the counseling / coordination of care: Direct Patient Care, Chart Review, and Documentation

## 2023-04-28 NOTE — PROGRESS NOTES
[facility]  Progress Note  Name: Fritz Jaquez  MRN: [id number]  Unit/Bed#: S -01 I Date of Admission: 4/7/2023   Date of Service: 4/28/2023 I Hospital Day: 21    Assessment/Plan   * Acute on chronic respiratory failure (HCC)-resolved as of 4/25/2023  Assessment & Plan  · Completed prednisone therapy  · Continue PO Lasix to 40 mg BID   · Monitor respiratory status, currently on baseline 4L  · Respiratory protocol, Xopenex/Atrovent, Airway clearance protocol, IS  · Mucinex, Tessalon Perles   · Out of bed to chair, PT and OT      Adjustment disorder  Assessment & Plan  Seen by psychiatric today  Started on Lexapro 5 mg daily    History of pulmonary embolism  Assessment & Plan  · History of DVT, PE  · INR lower than 2  · Will increase warfarin to 7 5 mg  · INR a m  Chronic diastolic congestive heart failure (HCC)  Assessment & Plan  · No further shortness of breath  · Continue on p o  Lasix  · Daily weights monitor input and output    EVERETT (obstructive sleep apnea)  Assessment & Plan  · Hx of EVERETT, morbid obesity, obesity hypoventilation syndrome   · Continue BiPAP HS     Primary hypertension  Assessment & Plan  · BP stable  · Continue metoprolol and Lasix 40mg bid      Morbid obesity ([address] )  Assessment & Plan  · Recently DC to [facility]   PT/OT: post acute rehab  · Working PT as inpatient- improving mobility  · Pending placement  · BMI 60          VTE Pharmacologic Prophylaxis:   VTE Score: 10 High Risk (Score >/= 5) - Pharmacological DVT Prophylaxis Ordered: Warfarin (Coumadin)  Sequential Compression Devices Ordered  Mechanical VTE Prophylaxis in Place: Yes    Patient Centered Rounds: I have performed bedside rounds with nursing staff today  Discussions with Specialists or Other Care Team Provider: Psych, CM, nursing    Education and Discussions with Family / Patient: Patient declined call to       Current Length of Stay: 21 day(s)    Current Patient Status: Inpatient     Discharge Plan / Estimated Discharge Date: Pending placement    Code Status: Level 1 - Full Code      Subjective:   States he has congestion, frustrated with calorie count  Wants to take the calorie count off  No other complaints  No acute overnight events    Objective:     Vitals:   Temp (24hrs), Av 1 °F (36 7 °C), Min:97 3 °F (36 3 °C), Max:98 6 °F (37 °C)    Temp:  [97 3 °F (36 3 °C)-98 6 °F (37 °C)] 97 3 °F (36 3 °C)  HR:  [92-98] 92  Resp:  [18-20] 18  BP: (114-130)/(67-72) 114/67  SpO2:  [92 %-97 %] 92 %  Body mass index is 62 39 kg/m²  Input and Output Summary (last 24 hours): Intake/Output Summary (Last 24 hours) at 2023 1648  Last data filed at 2023 1401  Gross per 24 hour   Intake 1200 ml   Output 1375 ml   Net -175 ml       Physical Exam:     Physical Exam  Vitals and nursing note reviewed  Constitutional:       General: He is not in acute distress  Appearance: He is well-developed  He is obese  HENT:      Head: Normocephalic and atraumatic  Mouth/Throat:      Mouth: Mucous membranes are moist       Pharynx: Oropharynx is clear  Eyes:      Conjunctiva/sclera: Conjunctivae normal    Cardiovascular:      Rate and Rhythm: Normal rate  Heart sounds: No murmur heard  Pulmonary:      Effort: Pulmonary effort is normal  No respiratory distress  Comments: On 4 L baseline oxygen  Abdominal:      General: Bowel sounds are normal       Palpations: Abdomen is soft  Tenderness: There is no abdominal tenderness  Musculoskeletal:         General: No swelling  Cervical back: Neck supple  Skin:     General: Skin is warm and dry  Capillary Refill: Capillary refill takes less than 2 seconds  Neurological:      Mental Status: He is alert and oriented to person, place, and time     Psychiatric:         Mood and Affect: Mood normal          Additional Data:     Labs:  Results from last 7 days   Lab Units 23  0540   WBC Thousand/uL 6 11 HEMOGLOBIN g/dL 11 4*   HEMATOCRIT % 37 3   PLATELETS Thousands/uL 231     Results from last 7 days   Lab Units 04/25/23  0540   SODIUM mmol/L 137   POTASSIUM mmol/L 3 9   CHLORIDE mmol/L 94*   CO2 mmol/L 39*   BUN mg/dL 9   CREATININE mg/dL 0 50*   ANION GAP mmol/L 4   CALCIUM mg/dL 9 0   GLUCOSE RANDOM mg/dL 99     Results from last 7 days   Lab Units 04/28/23  0529   INR  1 85*                   Imaging: No pertinent imaging reviewed      Recent Cultures (last 7 days):           Lines/Drains:  Invasive Devices     Peripheral Intravenous Line  Duration           Peripheral IV 04/24/23 Right Antecubital 4 days                Telemetry:        Last 24 Hours Medication List:   Current Facility-Administered Medications   Medication Dose Route Frequency Provider Last Rate   • acetaminophen  975 mg Oral Q8H [address] Yannick Gentile MD     • albuterol  2 5 mg Nebulization Q4H PRN Immanuel Post MD     • benzonatate  200 mg Oral TID Yannick Gentile MD     • bisacodyl  10 mg Rectal Daily PRN Collette Dupre, MD     • dextromethorphan-guaiFENesin  10 mL Oral Q4H PRN Ravi De Leon MD     • escitalopram  5 mg Oral Daily Simone Bustamante MD     • fluticasone  2 spray Each Nare Daily Collette Dupre, MD     • furosemide  40 mg Oral BID (diuretic) Collette Dupre, MD     • guaiFENesin  1,200 mg Oral Q12H [address] Collette Dupre, MD     • hydrOXYzine HCL  50 mg Oral Q6H PRN Collette Dupre, MD     • ipratropium  0 5 mg Nebulization TID Piyush Nath MD     • levalbuterol  1 25 mg Nebulization TID Piyush Nath MD     • methocarbamol  750 mg Oral Q6H PRN Immanuel Post MD     • metoprolol succinate  25 mg Oral Daily Dipak Hart MD     • oxyCODONE  5 mg Oral Q6H PRN Anup Wayne MD     • pantoprazole  40 mg Oral Early Morning Yannick Gentile MD     • polyethylene glycol  17 g Oral Daily Collette Dupre, MD     • potassium chloride  20 mEq Oral Daily Dipak Hart MD     • pregabalin  150 mg Oral TID Devin Pittman MD     • risperiDONE  1 mg Oral TID Devin Pittman MD     • senna-docusate sodium  1 tablet Oral BID Kiana Girard MD     • simethicone  80 mg Oral Q6H PRN Pastor Jayy MD     • sodium chloride  1 spray Each Nare Q1H PRN Jose Guadalupe Ferrara MD     • warfarin  7 5 mg Oral Daily (warfarin) Jose Guadalupe Ferrara MD          Today, Patient Was Seen By: Jose Guadalupe Ferrara MD    ** Please Note: This note has been constructed using a voice recognition system   **

## 2023-04-28 NOTE — PLAN OF CARE
Problem: PHYSICAL THERAPY ADULT  Goal: Performs mobility at highest level of function for planned discharge setting  See evaluation for individualized goals  Description: Treatment/Interventions: Functional transfer training, LE strengthening/ROM, Therapeutic exercise, Endurance training, Cognitive reorientation, Patient/family training, Equipment eval/education, Gait training, Bed mobility          See flowsheet documentation for full assessment, interventions and recommendations  Outcome: Progressing  Note: Prognosis: Fair  Problem List: Decreased strength, Decreased endurance, Impaired balance, Decreased mobility, Decreased coordination, Decreased cognition, Impaired judgement, Obesity, Pain  Assessment: This session was limited secondary to significant pain  Pain limited patinet's ability to perform functional transfers as well as had poor tolerance to seated position in bed side chair  Pt performed x5 STS transfers this session, reaching 75% standing x 2  Patient was educated to perform HEP over the weekend in preparation for subsequent treatment sessions  Continue to reccomend post acute rehabilitation  Barriers to Discharge: Inaccessible home environment     PT Discharge Recommendation: Post acute rehabilitation services    See flowsheet documentation for full assessment

## 2023-04-28 NOTE — ASSESSMENT & PLAN NOTE
· History of DVT, PE  · INR lower than 2  · Will increase warfarin to 7 5 mg  · INR a m  Detail Level: Zone

## 2023-04-28 NOTE — PHYSICAL THERAPY NOTE
Physical Therapy Treatment Note    Patient's Name: Mimi Knutson  : 23 1510   PT Last Visit   PT Visit Date 23   Note Type   Note Type Treatment   Pain Assessment   Pain Assessment Tool 0-10   Pain Score 9   Pain Location/Orientation Location: Generalized;Orientation: Left; Location: Back   Restrictions/Precautions   Weight Bearing Precautions Per Order No   Other Precautions Chair Alarm; Bed Alarm; Fall Risk;O2  (4 L O2)   General   Chart Reviewed Yes   Response to Previous Treatment Patient reporting fatigue but able to participate   (also reporting significant pain)   Cognition   Orientation Level Oriented X4   Following Commands Follows one step commands without difficulty   Subjective   Subjective pt supine in bed, agreeable to PT intervention   Bed Mobility   Rolling R 3  Moderate assistance   Additional items Assist x 1; Increased time required;LE management; Bedrails   Rolling L 3  Moderate assistance   Additional items Assist x 1; Increased time required; Bedrails  (pt utilized moemntum to initiate roll)   Supine to Sit Unable to assess   Sit to Supine Unable to assess   Additional Comments pt was not transferred to bed side chair prior to session due to bed side recliner break malfunction, PT replaced bed side chair, assisted by Jalil Nolan PTA with mobility to place nanette pad   Transfers   Sit to Stand 3  Moderate assistance   Additional items Assist x 2; Increased time required;Verbal cues   Stand to Sit 3  Moderate assistance   Additional items Assist x 2; Increased time required   Mechanical lift 1  Dependent   Additional items Assist x 2; Increased time required  (to and from bed)   Additional Comments performed x5 STS transfers this session with consistent moderate assist x 2, attempted this session to have patient place BL UE on night stand and attempting to exten his elbows with weight shift forward, pt met standing to about 75% x2   overall transfer training was limited by patient pain, pain medication was adminstered by BIANCA vazquez in middle of session, educated patient on coordination of pain medication in order to best utilize PT treatment sessions   Balance   Static Sitting Fair +   Dynamic Sitting Fair -   Static Standing Zero  (assist x 2)   Endurance Deficit   Endurance Deficit Yes   Endurance Deficit Description frequent rest required throughout session with cues for deep breathing and relaxation   Activity Tolerance   Activity Tolerance Patient limited by pain; Patient limited by fatigue   Nurse Made Aware BIANCA sherman pre/post, BIANCA vazquez   Medical Staff Made Aware care cooridnated with MARTINA mariaelena as Ax 2   Assessment   Prognosis Fair   Problem List Decreased strength;Decreased endurance; Impaired balance;Decreased mobility; Decreased coordination;Decreased cognition; Impaired judgement;Obesity;Pain   Assessment This session was limited secondary to significant pain  Pain limited patinet's ability to perform functional transfers as well as had poor tolerance to seated position in bed side chair  Pt performed x5 STS transfers this session, reaching 75% standing x 2  Patient was educated to perform HEP over the weekend in preparation for subsequent treatment sessions  Continue to reccomend post acute rehabilitation  Barriers to Discharge Inaccessible home environment   Goals   Patient Goals stand fully for 1 minutes   STG Expiration Date 05/01/23   Short Term Goal #1 pt will:  Perform rolling and repositioning in bed w/no more than minx1 to left side using trapeze/rail to  decrease caregiver burden;  Perform supine <--> sitting edge of bed transition w/ modx1 to improve level of function and minimize need for sit->stand Trials from recliner as appropriate    Perform sit <---> stand transfers w/ max Ax1 to minimize burden of care, Perform 90% upright standing tolerance w/ UE support for 30 sec w/ A of 1 plus sling to promote longer standing tolerance and progress to pregait activities as appropriate  Assess gait and wide WC (as seated mobility target and potentially as mobility w/ Le's--even backward)  and set goals  PT Treatment Day 6   Plan   Treatment/Interventions Functional transfer training;LE strengthening/ROM; Therapeutic exercise; Endurance training;Cognitive reorientation;Patient/family training;Equipment eval/education; Bed mobility;Gait training;Spoke to nursing   Progress Slow progress, decreased activity tolerance   PT Frequency Other (Comment)  (mon-fri)   Recommendation   PT Discharge Recommendation Post acute rehabilitation services   Equipment Recommended Wheelchair;Walker   AM-PAC Basic Mobility Inpatient   Turning in Flat Bed Without Bedrails 2   Lying on Back to Sitting on Edge of Flat Bed Without Bedrails 1   Moving Bed to Chair 1   Standing Up From Chair Using Arms 1   Walk in Room 1   Climb 3-5 Stairs With Railing 1   Basic Mobility Inpatient Raw Score 7   Turning Head Towards Sound 4   Follow Simple Instructions 3   Low Function Basic Mobility Raw Score  14   Low Function Basic Mobility Standardized Score  22 01   Highest Level Of Mobility   JH-HL Goal 2: Bed activities/Dependent transfer   -HL Achieved 4: Move to chair/commode   Education   Education Provided Mobility training;Home exercise program   Patient Reinforcement needed   End of Consult   Patient Position at End of Consult Supine;Bed/Chair alarm activated; All needs within reach   End of Consult Comments BIANCA shermna made aware pt supine Back in bed   The patient's AM-PAC Basic Mobility Inpatient Short Form Raw Score is 7  A Raw score of less than or equal to 16 suggests the patient may benefit from discharge to post-acute rehabilitation services  Please also refer to the recommendation of the Physical Therapist for safe discharge planning            Merly Massey, PT

## 2023-04-29 LAB
ANION GAP SERPL CALCULATED.3IONS-SCNC: 4 MMOL/L (ref 4–13)
BUN SERPL-MCNC: 9 MG/DL (ref 5–25)
CALCIUM SERPL-MCNC: 9.1 MG/DL (ref 8.4–10.2)
CHLORIDE SERPL-SCNC: 95 MMOL/L (ref 96–108)
CO2 SERPL-SCNC: 41 MMOL/L (ref 21–32)
CREAT SERPL-MCNC: 0.49 MG/DL (ref 0.6–1.3)
GFR SERPL CREATININE-BSD FRML MDRD: 111 ML/MIN/1.73SQ M
GLUCOSE SERPL-MCNC: 108 MG/DL (ref 65–140)
INR PPP: 2.83 (ref 0.84–1.19)
POTASSIUM SERPL-SCNC: 4.2 MMOL/L (ref 3.5–5.3)
PROTHROMBIN TIME: 30 SECONDS (ref 11.6–14.5)
SODIUM SERPL-SCNC: 140 MMOL/L (ref 135–147)

## 2023-04-29 RX ADMIN — OXYCODONE HYDROCHLORIDE 5 MG: 5 TABLET ORAL at 18:21

## 2023-04-29 RX ADMIN — METHOCARBAMOL TABLETS 750 MG: 750 TABLET, COATED ORAL at 05:46

## 2023-04-29 RX ADMIN — METHOCARBAMOL TABLETS 750 MG: 750 TABLET, COATED ORAL at 11:53

## 2023-04-29 RX ADMIN — RISPERIDONE 1 MG: 1 TABLET ORAL at 15:07

## 2023-04-29 RX ADMIN — PREGABALIN 150 MG: 75 CAPSULE ORAL at 08:41

## 2023-04-29 RX ADMIN — GUAIFENESIN 1200 MG: 600 TABLET ORAL at 08:40

## 2023-04-29 RX ADMIN — WARFARIN SODIUM 7.5 MG: 7.5 TABLET ORAL at 18:14

## 2023-04-29 RX ADMIN — IPRATROPIUM BROMIDE 0.5 MG: 0.5 SOLUTION RESPIRATORY (INHALATION) at 19:53

## 2023-04-29 RX ADMIN — POTASSIUM CHLORIDE 20 MEQ: 1500 TABLET, EXTENDED RELEASE ORAL at 08:41

## 2023-04-29 RX ADMIN — ESCITALOPRAM OXALATE 5 MG: 10 TABLET ORAL at 08:40

## 2023-04-29 RX ADMIN — LEVALBUTEROL HYDROCHLORIDE 1.25 MG: 1.25 SOLUTION RESPIRATORY (INHALATION) at 07:45

## 2023-04-29 RX ADMIN — FUROSEMIDE 40 MG: 40 TABLET ORAL at 15:08

## 2023-04-29 RX ADMIN — POLYETHYLENE GLYCOL 3350 17 G: 17 POWDER, FOR SOLUTION ORAL at 09:50

## 2023-04-29 RX ADMIN — METHOCARBAMOL TABLETS 750 MG: 750 TABLET, COATED ORAL at 18:21

## 2023-04-29 RX ADMIN — OXYCODONE HYDROCHLORIDE 5 MG: 5 TABLET ORAL at 11:51

## 2023-04-29 RX ADMIN — RISPERIDONE 1 MG: 1 TABLET ORAL at 20:38

## 2023-04-29 RX ADMIN — ACETAMINOPHEN 975 MG: 325 TABLET, FILM COATED ORAL at 15:05

## 2023-04-29 RX ADMIN — SENNOSIDES AND DOCUSATE SODIUM 1 TABLET: 8.6; 5 TABLET ORAL at 18:14

## 2023-04-29 RX ADMIN — LEVALBUTEROL HYDROCHLORIDE 1.25 MG: 1.25 SOLUTION RESPIRATORY (INHALATION) at 13:46

## 2023-04-29 RX ADMIN — SENNOSIDES AND DOCUSATE SODIUM 1 TABLET: 8.6; 5 TABLET ORAL at 08:41

## 2023-04-29 RX ADMIN — ACETAMINOPHEN 975 MG: 325 TABLET, FILM COATED ORAL at 20:38

## 2023-04-29 RX ADMIN — METOPROLOL SUCCINATE 25 MG: 25 TABLET, EXTENDED RELEASE ORAL at 08:41

## 2023-04-29 RX ADMIN — OXYCODONE HYDROCHLORIDE 5 MG: 5 TABLET ORAL at 05:46

## 2023-04-29 RX ADMIN — FUROSEMIDE 40 MG: 40 TABLET ORAL at 08:40

## 2023-04-29 RX ADMIN — PANTOPRAZOLE SODIUM 40 MG: 40 TABLET, DELAYED RELEASE ORAL at 05:46

## 2023-04-29 RX ADMIN — ACETAMINOPHEN 975 MG: 325 TABLET, FILM COATED ORAL at 05:46

## 2023-04-29 RX ADMIN — PREGABALIN 150 MG: 75 CAPSULE ORAL at 15:07

## 2023-04-29 RX ADMIN — IPRATROPIUM BROMIDE 0.5 MG: 0.5 SOLUTION RESPIRATORY (INHALATION) at 07:45

## 2023-04-29 RX ADMIN — PREGABALIN 150 MG: 75 CAPSULE ORAL at 20:38

## 2023-04-29 RX ADMIN — RISPERIDONE 1 MG: 1 TABLET ORAL at 08:40

## 2023-04-29 RX ADMIN — LEVALBUTEROL HYDROCHLORIDE 1.25 MG: 1.25 SOLUTION RESPIRATORY (INHALATION) at 19:53

## 2023-04-29 RX ADMIN — GUAIFENESIN 1200 MG: 600 TABLET ORAL at 20:37

## 2023-04-29 RX ADMIN — IPRATROPIUM BROMIDE 0.5 MG: 0.5 SOLUTION RESPIRATORY (INHALATION) at 13:46

## 2023-04-29 NOTE — PLAN OF CARE
Problem: MOBILITY - ADULT  Goal: Maintain or return to baseline ADL function  Description: INTERVENTIONS:  -  Assess patient's ability to carry out ADLs; assess patient's baseline for ADL function and identify physical deficits which impact ability to perform ADLs (bathing, care of mouth/teeth, toileting, grooming, dressing, etc )  - Assess/evaluate cause of self-care deficits   - Assess range of motion  - Assess patient's mobility; develop plan if impaired  - Assess patient's need for assistive devices and provide as appropriate  - Encourage maximum independence but intervene and supervise when necessary  - Involve family in performance of ADLs  - Assess for home care needs following discharge   - Consider OT consult to assist with ADL evaluation and planning for discharge  - Provide patient education as appropriate  Outcome: Progressing  Goal: Maintains/Returns to pre admission functional level  Description: INTERVENTIONS:  - Perform BMAT or MOVE assessment daily    - Set and communicate daily mobility goal to care team and patient/family/caregiver     - Collaborate with rehabilitation services on mobility goals if consulted  - Perform Range of Motion   - Reposition patient   - Dangle patient   - Stand patient   - Ambulate patient   - Out of bed to chair   - Out of bed for meals   - Out of bed for toileting  - Record patient progress and toleration of activity level   Outcome: Progressing     Problem: Prexisting or High Potential for Compromised Skin Integrity  Goal: Skin integrity is maintained or improved  Description: INTERVENTIONS:  - Identify patients at risk for skin breakdown  - Assess and monitor skin integrity  - Assess and monitor nutrition and hydration status  - Monitor labs   - Assess for incontinence   - Turn and reposition patient  - Assist with mobility/ambulation  - Relieve pressure over bony prominences  - Avoid friction and shearing  - Provide appropriate hygiene as needed including keeping skin clean and dry  - Evaluate need for skin moisturizer/barrier cream  - Collaborate with interdisciplinary team   - Patient/family teaching  - Consider wound care consult   Outcome: Progressing     Problem: Nutrition/Hydration-ADULT  Goal: Nutrient/Hydration intake appropriate for improving, restoring or maintaining nutritional needs  Description: Monitor and assess patient's nutrition/hydration status for malnutrition  Collaborate with interdisciplinary team and initiate plan and interventions as ordered  Monitor patient's weight and dietary intake as ordered or per policy  Utilize nutrition screening tool and intervene as necessary  Determine patient's food preferences and provide high-protein, high-caloric foods as appropriate       INTERVENTIONS:  - Monitor oral intake, urinary output, labs, and treatment plans  - Assess nutrition and hydration status and recommend course of action  - Evaluate amount of meals eaten  - Assist patient with eating if necessary   - Allow adequate time for meals  - Recommend/ encourage appropriate diets, oral nutritional supplements, and vitamin/mineral supplements  - Order, calculate, and assess calorie counts as needed  - Recommend, monitor, and adjust tube feedings and TPN/PPN based on assessed needs  - Assess need for intravenous fluids  - Provide specific nutrition/hydration education as appropriate  - Include patient/family/caregiver in decisions related to nutrition  Outcome: Progressing

## 2023-04-29 NOTE — ASSESSMENT & PLAN NOTE
· Recently DC to [facility]   PT/OT: post acute rehab  · Working PT as inpatient- improving mobility  · Pending placement  · BMI 60  · Nutrition consulted for calorie management

## 2023-04-29 NOTE — PLAN OF CARE
Problem: MOBILITY - ADULT  Goal: Maintain or return to baseline ADL function  Description: INTERVENTIONS:  -  Assess patient's ability to carry out ADLs; assess patient's baseline for ADL function and identify physical deficits which impact ability to perform ADLs (bathing, care of mouth/teeth, toileting, grooming, dressing, etc )  - Assess/evaluate cause of self-care deficits   - Assess range of motion  - Assess patient's mobility; develop plan if impaired  - Assess patient's need for assistive devices and provide as appropriate  - Encourage maximum independence but intervene and supervise when necessary  - Involve family in performance of ADLs  - Assess for home care needs following discharge   - Consider OT consult to assist with ADL evaluation and planning for discharge  - Provide patient education as appropriate  Outcome: Progressing  Goal: Maintains/Returns to pre admission functional level  Description: INTERVENTIONS:  - Perform BMAT or MOVE assessment daily    - Set and communicate daily mobility goal to care team and patient/family/caregiver  - Collaborate with rehabilitation services on mobility goals if consulted  - Perform Range of Motion 2 times a day  - Reposition patient every 2 hours    - Dangle patient 2 times a day  - Stand patient 3 times a day  - Ambulate patient 3 times a day  - Out of bed to chair 3 times a day   - Out of bed for meals 3 times a day  - Out of bed for toileting  - Record patient progress and toleration of activity level   Outcome: Progressing     Problem: Prexisting or High Potential for Compromised Skin Integrity  Goal: Skin integrity is maintained or improved  Description: INTERVENTIONS:  - Identify patients at risk for skin breakdown  - Assess and monitor skin integrity  - Assess and monitor nutrition and hydration status  - Monitor labs   - Assess for incontinence   - Turn and reposition patient  - Assist with mobility/ambulation  - Relieve pressure over bony prominences  - Avoid friction and shearing  - Provide appropriate hygiene as needed including keeping skin clean and dry  - Evaluate need for skin moisturizer/barrier cream  - Collaborate with interdisciplinary team   - Patient/family teaching  - Consider wound care consult   Outcome: Progressing     Problem: Nutrition/Hydration-ADULT  Goal: Nutrient/Hydration intake appropriate for improving, restoring or maintaining nutritional needs  Description: Monitor and assess patient's nutrition/hydration status for malnutrition  Collaborate with interdisciplinary team and initiate plan and interventions as ordered  Monitor patient's weight and dietary intake as ordered or per policy  Utilize nutrition screening tool and intervene as necessary  Determine patient's food preferences and provide high-protein, high-caloric foods as appropriate       INTERVENTIONS:  - Monitor oral intake, urinary output, labs, and treatment plans  - Assess nutrition and hydration status and recommend course of action  - Evaluate amount of meals eaten  - Assist patient with eating if necessary   - Allow adequate time for meals  - Recommend/ encourage appropriate diets, oral nutritional supplements, and vitamin/mineral supplements  - Order, calculate, and assess calorie counts as needed  - Recommend, monitor, and adjust tube feedings and TPN/PPN based on assessed needs  - Assess need for intravenous fluids  - Provide specific nutrition/hydration education as appropriate  - Include patient/family/caregiver in decisions related to nutrition  Outcome: Progressing

## 2023-04-29 NOTE — PROGRESS NOTES
[facility]  Progress Note  Name: Zac Parker  MRN: [id number]  Unit/Bed#: S -64 I Date of Admission: 4/7/2023   Date of Service: 4/29/2023 I Hospital Day: 22    Assessment/Plan   * Chronic diastolic congestive heart failure ([address] )  Assessment & Plan  · No further shortness of breath  · Continue on p o  Lasix  · Daily weights monitor input and output    Adjustment disorder  Assessment & Plan  Seen by psychiatric today  Started on Lexapro 5 mg daily    History of pulmonary embolism  Assessment & Plan  · History of DVT, PE  · INR therapeutic  · Continue with warfarin  7 5 mg  · INR a m     EVERETT (obstructive sleep apnea)  Assessment & Plan  · Hx of EVERETT, morbid obesity, obesity hypoventilation syndrome   · Continue BiPAP HS     Primary hypertension  Assessment & Plan  · BP stable  · Continue metoprolol and Lasix 40mg bid      Morbid obesity ([address] )  Assessment & Plan  · Recently DC to [facility]   PT/OT: post acute rehab  · Working PT as inpatient- improving mobility  · Pending placement  · BMI 60  · Nutrition consulted for calorie management  VTE Pharmacologic Prophylaxis: VTE Score: 10 High Risk (Score >/= 5) - Pharmacological DVT Prophylaxis Ordered: warfarin (Coumadin)  Sequential Compression Devices Ordered  Patient Centered Rounds: I performed bedside rounds with nursing staff today  Discussions with Specialists or Other Care Team Provider: Discussed with nursing    Education and Discussions with Family / Patient: Patient declined call to        Total Time Spent on Date of Encounter in care of patient: 45 minutes This time was spent on one or more of the following: performing physical exam; counseling and coordination of care; obtaining or reviewing history; documenting in the medical record; reviewing/ordering tests, medications or procedures; communicating with other healthcare professionals and discussing with patient's family/caregivers  Current Length of Stay: 22 day(s)  Current Patient Status: Inpatient   Certification Statement: The patient will continue to require additional inpatient hospital stay due to Ongoing management of symptoms and await placement  Discharge Plan: Anticipate discharge in 48-72 hrs to rehab facility  Code Status: Level 1 - Full Code    Subjective:   Seen and examined at bedside  Denies any shortness of breath  No acute events overnight  Objective:     Vitals:   Temp (24hrs), Av 6 °F (36 4 °C), Min:97 3 °F (36 3 °C), Max:97 8 °F (36 6 °C)    Temp:  [97 3 °F (36 3 °C)-97 8 °F (36 6 °C)] 97 6 °F (36 4 °C)  HR:  [] 94  Resp:  [18] 18  BP: (114-144)/(67-72) 144/72  SpO2:  [90 %-96 %] 94 %  Body mass index is 62 55 kg/m²  Input and Output Summary (last 24 hours): Intake/Output Summary (Last 24 hours) at 2023 1317  Last data filed at 2023 0547  Gross per 24 hour   Intake 840 ml   Output 1150 ml   Net -310 ml       Physical Exam:   Physical Exam  Constitutional:       General: He is not in acute distress  Appearance: He is obese  HENT:      Head: Normocephalic and atraumatic  Mouth/Throat:      Mouth: Mucous membranes are moist    Eyes:      Extraocular Movements: Extraocular movements intact  Pupils: Pupils are equal, round, and reactive to light  Cardiovascular:      Comments: Diminished heart sounds secondary to body habitus  Pulmonary:      Comments: Diminished breath sounds at bases bilaterally  Abdominal:      General: Bowel sounds are normal  There is distension  Palpations: Abdomen is soft  Musculoskeletal:      Cervical back: Neck supple  Right lower leg: Edema present  Left lower leg: Edema present  Skin:     General: Skin is warm  Neurological:      General: No focal deficit present  Mental Status: He is alert  Mental status is at baseline     Psychiatric:         Mood and Affect: Mood normal          Additional Data: Labs:  Results from last 7 days   Lab Units 04/25/23  0540   WBC Thousand/uL 6 11   HEMOGLOBIN g/dL 11 4*   HEMATOCRIT % 37 3   PLATELETS Thousands/uL 231     Results from last 7 days   Lab Units 04/29/23  0607   SODIUM mmol/L 140   POTASSIUM mmol/L 4 2   CHLORIDE mmol/L 95*   CO2 mmol/L 41*   BUN mg/dL 9   CREATININE mg/dL 0 49*   ANION GAP mmol/L 4   CALCIUM mg/dL 9 1   GLUCOSE RANDOM mg/dL 108     Results from last 7 days   Lab Units 04/29/23  0607   INR  2 83*                   Lines/Drains:  Invasive Devices     Peripheral Intravenous Line  Duration           Peripheral IV 04/24/23 Right Antecubital 5 days                      Imaging: No pertinent imaging reviewed      Recent Cultures (last 7 days):         Last 24 Hours Medication List:   Current Facility-Administered Medications   Medication Dose Route Frequency Provider Last Rate   • acetaminophen  975 mg Oral Q8H [address] Sergio Ledbetter MD     • albuterol  2 5 mg Nebulization Q4H PRN Thom Harrell MD     • benzonatate  200 mg Oral TID Sergio Ledbetter MD     • bisacodyl  10 mg Rectal Daily PRN Colletta Emerald, MD     • dextromethorphan-guaiFENesin  10 mL Oral Q4H PRN Beckie Briones MD     • escitalopram  5 mg Oral Daily Mesfin Melchor MD     • fluticasone  2 spray Each Nare Daily Colletta Emerald, MD     • furosemide  40 mg Oral BID (diuretic) Colletta Emerald, MD     • guaiFENesin  1,200 mg Oral Q12H [address] Colletta Emerald, MD     • hydrOXYzine HCL  50 mg Oral Q6H PRN Colletta Emerald, MD     • ipratropium  0 5 mg Nebulization TID Peggy Vance MD     • levalbuterol  1 25 mg Nebulization TID Peggy Vance MD     • methocarbamol  750 mg Oral Q6H PRN Thom Harrell MD     • metoprolol succinate  25 mg Oral Daily Marylin Soto MD     • oxyCODONE  5 mg Oral Q6H PRN Divina Magallon MD     • pantoprazole  40 mg Oral Early Morning Sergio Ledbetter MD     • polyethylene glycol  17 g Oral Daily Colletta Emerald, MD     • potassium chloride  20 mEq Oral Daily Joe Tracy MD     • pregabalin  150 mg Oral TID Issac Moscoso MD     • risperiDONE  1 mg Oral TID Issac Moscoso MD     • senna-docusate sodium  1 tablet Oral BID Kiana Leong MD     • simethicone  80 mg Oral Q6H PRN Freddie Townsend MD     • sodium chloride  1 spray Each Nare Q1H PRN Nichol Iyer MD     • warfarin  7 5 mg Oral Daily (warfarin) Nichol Iyer MD          Today, Patient Was Seen By: Keyonna Aparicio MD    **Please Note: This note may have been constructed using a voice recognition system  **

## 2023-04-30 LAB
ANION GAP SERPL CALCULATED.3IONS-SCNC: 1 MMOL/L (ref 4–13)
BASOPHILS # BLD AUTO: 0.03 THOUSANDS/ÂΜL (ref 0–0.1)
BASOPHILS NFR BLD AUTO: 0 % (ref 0–1)
BUN SERPL-MCNC: 8 MG/DL (ref 5–25)
CALCIUM SERPL-MCNC: 8.7 MG/DL (ref 8.4–10.2)
CHLORIDE SERPL-SCNC: 95 MMOL/L (ref 96–108)
CO2 SERPL-SCNC: 42 MMOL/L (ref 21–32)
CREAT SERPL-MCNC: 0.5 MG/DL (ref 0.6–1.3)
EOSINOPHIL # BLD AUTO: 0.17 THOUSAND/ÂΜL (ref 0–0.61)
EOSINOPHIL NFR BLD AUTO: 2 % (ref 0–6)
ERYTHROCYTE [DISTWIDTH] IN BLOOD BY AUTOMATED COUNT: 12.7 % (ref 11.6–15.1)
GFR SERPL CREATININE-BSD FRML MDRD: 110 ML/MIN/1.73SQ M
GLUCOSE SERPL-MCNC: 154 MG/DL (ref 65–140)
HCT VFR BLD AUTO: 38 % (ref 36.5–49.3)
HGB BLD-MCNC: 11.6 G/DL (ref 12–17)
IMM GRANULOCYTES # BLD AUTO: 0.05 THOUSAND/UL (ref 0–0.2)
IMM GRANULOCYTES NFR BLD AUTO: 1 % (ref 0–2)
INR PPP: 3.1 (ref 0.84–1.19)
LYMPHOCYTES # BLD AUTO: 1.02 THOUSANDS/ÂΜL (ref 0.6–4.47)
LYMPHOCYTES NFR BLD AUTO: 13 % (ref 14–44)
MCH RBC QN AUTO: 32.6 PG (ref 26.8–34.3)
MCHC RBC AUTO-ENTMCNC: 30.5 G/DL (ref 31.4–37.4)
MCV RBC AUTO: 107 FL (ref 82–98)
MONOCYTES # BLD AUTO: 0.54 THOUSAND/ÂΜL (ref 0.17–1.22)
MONOCYTES NFR BLD AUTO: 7 % (ref 4–12)
NEUTROPHILS # BLD AUTO: 5.9 THOUSANDS/ÂΜL (ref 1.85–7.62)
NEUTS SEG NFR BLD AUTO: 77 % (ref 43–75)
NRBC BLD AUTO-RTO: 0 /100 WBCS
PLATELET # BLD AUTO: 273 THOUSANDS/UL (ref 149–390)
PMV BLD AUTO: 8.5 FL (ref 8.9–12.7)
POTASSIUM SERPL-SCNC: 4.1 MMOL/L (ref 3.5–5.3)
PROTHROMBIN TIME: 32.2 SECONDS (ref 11.6–14.5)
RBC # BLD AUTO: 3.56 MILLION/UL (ref 3.88–5.62)
SODIUM SERPL-SCNC: 138 MMOL/L (ref 135–147)
WBC # BLD AUTO: 7.71 THOUSAND/UL (ref 4.31–10.16)

## 2023-04-30 RX ORDER — ONDANSETRON 4 MG/1
4 TABLET, ORALLY DISINTEGRATING ORAL EVERY 6 HOURS PRN
Status: DISCONTINUED | OUTPATIENT
Start: 2023-04-30 | End: 2023-05-06 | Stop reason: HOSPADM

## 2023-04-30 RX ORDER — ONDANSETRON 2 MG/ML
4 INJECTION INTRAMUSCULAR; INTRAVENOUS EVERY 4 HOURS PRN
Status: DISCONTINUED | OUTPATIENT
Start: 2023-04-30 | End: 2023-04-30

## 2023-04-30 RX ORDER — BISACODYL 10 MG
10 SUPPOSITORY, RECTAL RECTAL DAILY PRN
Status: DISCONTINUED | OUTPATIENT
Start: 2023-04-30 | End: 2023-05-06 | Stop reason: HOSPADM

## 2023-04-30 RX ADMIN — IPRATROPIUM BROMIDE 0.5 MG: 0.5 SOLUTION RESPIRATORY (INHALATION) at 07:32

## 2023-04-30 RX ADMIN — OXYCODONE HYDROCHLORIDE 5 MG: 5 TABLET ORAL at 08:54

## 2023-04-30 RX ADMIN — ONDANSETRON 4 MG: 4 TABLET, ORALLY DISINTEGRATING ORAL at 10:34

## 2023-04-30 RX ADMIN — OXYCODONE HYDROCHLORIDE 5 MG: 5 TABLET ORAL at 20:03

## 2023-04-30 RX ADMIN — IPRATROPIUM BROMIDE 0.5 MG: 0.5 SOLUTION RESPIRATORY (INHALATION) at 19:25

## 2023-04-30 RX ADMIN — POTASSIUM CHLORIDE 20 MEQ: 1500 TABLET, EXTENDED RELEASE ORAL at 09:00

## 2023-04-30 RX ADMIN — SENNOSIDES AND DOCUSATE SODIUM 1 TABLET: 8.6; 5 TABLET ORAL at 17:05

## 2023-04-30 RX ADMIN — PANTOPRAZOLE SODIUM 40 MG: 40 TABLET, DELAYED RELEASE ORAL at 04:26

## 2023-04-30 RX ADMIN — PREGABALIN 150 MG: 75 CAPSULE ORAL at 20:03

## 2023-04-30 RX ADMIN — FUROSEMIDE 40 MG: 40 TABLET ORAL at 17:05

## 2023-04-30 RX ADMIN — ACETAMINOPHEN 975 MG: 325 TABLET, FILM COATED ORAL at 14:05

## 2023-04-30 RX ADMIN — METOPROLOL SUCCINATE 25 MG: 25 TABLET, EXTENDED RELEASE ORAL at 08:54

## 2023-04-30 RX ADMIN — ESCITALOPRAM OXALATE 5 MG: 10 TABLET ORAL at 08:55

## 2023-04-30 RX ADMIN — IPRATROPIUM BROMIDE 0.5 MG: 0.5 SOLUTION RESPIRATORY (INHALATION) at 13:54

## 2023-04-30 RX ADMIN — METHOCARBAMOL TABLETS 750 MG: 750 TABLET, COATED ORAL at 20:03

## 2023-04-30 RX ADMIN — PREGABALIN 150 MG: 75 CAPSULE ORAL at 17:05

## 2023-04-30 RX ADMIN — METHOCARBAMOL TABLETS 750 MG: 750 TABLET, COATED ORAL at 02:43

## 2023-04-30 RX ADMIN — ACETAMINOPHEN 975 MG: 325 TABLET, FILM COATED ORAL at 20:02

## 2023-04-30 RX ADMIN — RISPERIDONE 1 MG: 1 TABLET ORAL at 08:54

## 2023-04-30 RX ADMIN — RISPERIDONE 1 MG: 1 TABLET ORAL at 20:03

## 2023-04-30 RX ADMIN — LEVALBUTEROL HYDROCHLORIDE 1.25 MG: 1.25 SOLUTION RESPIRATORY (INHALATION) at 19:25

## 2023-04-30 RX ADMIN — ACETAMINOPHEN 975 MG: 325 TABLET, FILM COATED ORAL at 04:26

## 2023-04-30 RX ADMIN — METHOCARBAMOL TABLETS 750 MG: 750 TABLET, COATED ORAL at 08:58

## 2023-04-30 RX ADMIN — PREGABALIN 150 MG: 75 CAPSULE ORAL at 08:54

## 2023-04-30 RX ADMIN — RISPERIDONE 1 MG: 1 TABLET ORAL at 17:05

## 2023-04-30 RX ADMIN — OXYCODONE HYDROCHLORIDE 5 MG: 5 TABLET ORAL at 02:43

## 2023-04-30 RX ADMIN — LEVALBUTEROL HYDROCHLORIDE 1.25 MG: 1.25 SOLUTION RESPIRATORY (INHALATION) at 13:54

## 2023-04-30 RX ADMIN — LEVALBUTEROL HYDROCHLORIDE 1.25 MG: 1.25 SOLUTION RESPIRATORY (INHALATION) at 07:32

## 2023-04-30 RX ADMIN — FUROSEMIDE 40 MG: 40 TABLET ORAL at 08:55

## 2023-04-30 RX ADMIN — WARFARIN SODIUM 7.5 MG: 7.5 TABLET ORAL at 17:05

## 2023-04-30 RX ADMIN — GUAIFENESIN 1200 MG: 600 TABLET ORAL at 20:03

## 2023-04-30 RX ADMIN — SENNOSIDES AND DOCUSATE SODIUM 1 TABLET: 8.6; 5 TABLET ORAL at 08:54

## 2023-04-30 RX ADMIN — POLYETHYLENE GLYCOL 3350 17 G: 17 POWDER, FOR SOLUTION ORAL at 08:54

## 2023-04-30 RX ADMIN — GUAIFENESIN 1200 MG: 600 TABLET ORAL at 08:54

## 2023-04-30 NOTE — PROGRESS NOTES
[facility]  Progress Note  Name: Efrem Calle  MRN: [id number]  Unit/Bed#: S -01 I Date of Admission: 4/7/2023   Date of Service: 4/30/2023 I Hospital Day: 23    Assessment/Plan   Acute on chronic respiratory failure (HCC)-resolved as of 4/25/2023  Assessment & Plan  · Completed prednisone therapy  · Continue PO Lasix to 40 mg BID   · Monitor respiratory status, currently on baseline 4L  · Respiratory protocol, Xopenex/Atrovent, Airway clearance protocol, IS  · Mucinex, Tessalon Perles   · Out of bed to chair, PT and OT  · CPAP at bedtime      Adjustment disorder  Assessment & Plan  Seen by psychiatry  Started on Lexapro 5 mg daily    History of pulmonary embolism  Assessment & Plan  · History of DVT, PE  · INR therapeutic  · Continue with warfarin  7 5 mg  · INR a m     EVERETT (obstructive sleep apnea)  Assessment & Plan  · Hx of EVERETT, morbid obesity, obesity hypoventilation syndrome   · Continue BiPAP HS     Primary hypertension  Assessment & Plan  · BP stable  · Continue metoprolol and Lasix 40mg bid      Morbid obesity ([address] )  Assessment & Plan  · Recently DC to [facility]   PT/OT: post acute rehab  · Working PT as inpatient- improving mobility  · Pending placement  · BMI 60  · Nutrition consulted for calorie management  * Chronic diastolic congestive heart failure (HCC)  Assessment & Plan  · No further shortness of breath  · Continue on p o  Lasix  · Daily weights monitor input and output         VTE Pharmacologic Prophylaxis:   VTE Score: 10 High Risk (Score >/= 5) - Pharmacological DVT Prophylaxis Ordered: Warfarin (Coumadin)  Sequential Compression Devices Ordered  Mechanical VTE Prophylaxis in Place: No    Patient Centered Rounds: I have performed bedside rounds with nursing staff today  Discussions with Specialists or Other Care Team Provider: Nursing    Education and Discussions with Family / Patient: Patient declined call to       Current Length of Stay: 23 day(s)    Current Patient Status: Inpatient     Discharge Plan / Estimated Discharge Date: pending placement    Code Status: Level 1 - Full Code      Subjective:   No acute overnight events  Patient complaining of nausea this morning  No other symptoms  No fever chills, had good p o  intake  Finished breakfast   Seemed more sleepy this morning  Able to wake up easily  Objective:     Vitals:   Temp (24hrs), Av 7 °F (36 5 °C), Min:97 7 °F (36 5 °C), Max:97 8 °F (36 6 °C)    Temp:  [97 7 °F (36 5 °C)-97 8 °F (36 6 °C)] 97 8 °F (36 6 °C)  HR:  [85-98] 98  BP: (124-132)/(66-68) 124/68  SpO2:  [94 %-97 %] 94 %  Body mass index is 62 84 kg/m²  Input and Output Summary (last 24 hours): Intake/Output Summary (Last 24 hours) at 2023 1521  Last data filed at 2023 1355  Gross per 24 hour   Intake 1465 ml   Output 2050 ml   Net -585 ml       Physical Exam:     Physical Exam  Vitals and nursing note reviewed  Constitutional:       General: He is not in acute distress  Appearance: He is well-developed  He is obese  Comments: Sleepy this morning   HENT:      Head: Normocephalic and atraumatic  Mouth/Throat:      Mouth: Mucous membranes are moist       Pharynx: Oropharynx is clear  Eyes:      Conjunctiva/sclera: Conjunctivae normal    Cardiovascular:      Rate and Rhythm: Normal rate  Heart sounds: No murmur heard  Pulmonary:      Effort: Pulmonary effort is normal  No respiratory distress  Comments: On 4 L baseline oxygen  Abdominal:      General: Bowel sounds are normal       Palpations: Abdomen is soft  Tenderness: There is no abdominal tenderness  Musculoskeletal:         General: No swelling  Cervical back: Neck supple  Skin:     General: Skin is warm and dry  Capillary Refill: Capillary refill takes less than 2 seconds  Neurological:      Mental Status: He is alert and oriented to person, place, and time     Psychiatric:         Mood and Affect: Mood normal           Additional Data:     Labs:  Results from last 7 days   Lab Units 04/30/23  1403   WBC Thousand/uL 7 71   HEMOGLOBIN g/dL 11 6*   HEMATOCRIT % 38 0   PLATELETS Thousands/uL 273   NEUTROS PCT % 77*   LYMPHS PCT % 13*   MONOS PCT % 7   EOS PCT % 2     Results from last 7 days   Lab Units 04/30/23  1403   SODIUM mmol/L 138   POTASSIUM mmol/L 4 1   CHLORIDE mmol/L 95*   CO2 mmol/L 42*   BUN mg/dL 8   CREATININE mg/dL 0 50*   ANION GAP mmol/L 1*   CALCIUM mg/dL 8 7   GLUCOSE RANDOM mg/dL 154*     Results from last 7 days   Lab Units 04/30/23  0437   INR  3 10*                   Imaging: No pertinent imaging reviewed      Recent Cultures (last 7 days):           Lines/Drains:  Invasive Devices     None                 Telemetry:        Last 24 Hours Medication List:   Current Facility-Administered Medications   Medication Dose Route Frequency Provider Last Rate   • acetaminophen  975 mg Oral Q8H Farhat Duran MD     • albuterol  2 5 mg Nebulization Q4H PRN Swapnil Hager MD     • benzonatate  200 mg Oral TID Manoj Meyers MD     • bisacodyl  10 mg Rectal Daily PRN Shahla Araujo MD     • dextromethorphan-guaiFENesin  10 mL Oral Q4H PRN Saravanan Hart MD     • escitalopram  5 mg Oral Daily Byron Devlin MD     • fluticasone  2 spray Each Nare Daily Romana Mullet, MD     • furosemide  40 mg Oral BID (diuretic) Romana Mullet, MD     • guaiFENesin  1,200 mg Oral Q12H [facility] Romana Mullet, MD     • hydrOXYzine HCL  50 mg Oral Q6H PRN Romana Mullet, MD     • ipratropium  0 5 mg Nebulization TID Mo Carias MD     • levalbuterol  1 25 mg Nebulization TID Mo Carias MD     • methocarbamol  750 mg Oral Q6H PRN Swapnil Hager MD     • metoprolol succinate  25 mg Oral Daily Sander Gerber MD     • ondansetron  4 mg Oral Q6H PRN Shahla Araujo MD     • oxyCODONE  5 mg Oral Q6H PRN Shaista Amador MD     • pantoprazole  40 mg Oral Early Morning Nancy Cruz MD     • polyethylene glycol  17 g Oral Daily Pastor Jayy MD     • potassium chloride  20 mEq Oral Daily Neal Heck MD     • pregabalin  150 mg Oral TID Devin Pittman MD     • risperiDONE  1 mg Oral TID Devin Pittman MD     • senna-docusate sodium  1 tablet Oral BID Kiana Girard MD     • simethicone  80 mg Oral Q6H PRN Pastor Jayy MD     • sodium chloride  1 spray Each Nare Q1H PRN Jose Guadalupe Ferrara MD     • warfarin  7 5 mg Oral Daily (warfarin) Jose Guadalupe Ferrara MD          Today, Patient Was Seen By: Jose Guadalupe Ferrara MD    ** Please Note: This note has been constructed using a voice recognition system   **

## 2023-04-30 NOTE — ASSESSMENT & PLAN NOTE
· Completed prednisone therapy  · Continue PO Lasix to 40 mg BID   · Monitor respiratory status, currently on baseline 4L  · Respiratory protocol, Xopenex/Atrovent, Airway clearance protocol, IS  · Mucinex, Tessalon Perles   · Out of bed to chair, PT and OT  · CPAP at bedtime

## 2023-05-01 LAB
ANION GAP SERPL CALCULATED.3IONS-SCNC: 4 MMOL/L (ref 4–13)
BUN SERPL-MCNC: 9 MG/DL (ref 5–25)
CALCIUM SERPL-MCNC: 8.8 MG/DL (ref 8.4–10.2)
CHLORIDE SERPL-SCNC: 94 MMOL/L (ref 96–108)
CO2 SERPL-SCNC: 40 MMOL/L (ref 21–32)
CREAT SERPL-MCNC: 0.46 MG/DL (ref 0.6–1.3)
ERYTHROCYTE [DISTWIDTH] IN BLOOD BY AUTOMATED COUNT: 12.7 % (ref 11.6–15.1)
GFR SERPL CREATININE-BSD FRML MDRD: 114 ML/MIN/1.73SQ M
GLUCOSE SERPL-MCNC: 114 MG/DL (ref 65–140)
HCT VFR BLD AUTO: 37 % (ref 36.5–49.3)
HGB BLD-MCNC: 11.1 G/DL (ref 12–17)
INR PPP: 3.36 (ref 0.84–1.19)
MCH RBC QN AUTO: 32.5 PG (ref 26.8–34.3)
MCHC RBC AUTO-ENTMCNC: 30 G/DL (ref 31.4–37.4)
MCV RBC AUTO: 108 FL (ref 82–98)
PLATELET # BLD AUTO: 305 THOUSANDS/UL (ref 149–390)
PMV BLD AUTO: 9.2 FL (ref 8.9–12.7)
POTASSIUM SERPL-SCNC: 4.2 MMOL/L (ref 3.5–5.3)
PROTHROMBIN TIME: 34.3 SECONDS (ref 11.6–14.5)
RBC # BLD AUTO: 3.42 MILLION/UL (ref 3.88–5.62)
SODIUM SERPL-SCNC: 138 MMOL/L (ref 135–147)
WBC # BLD AUTO: 7.89 THOUSAND/UL (ref 4.31–10.16)

## 2023-05-01 RX ORDER — PSEUDOEPHEDRINE HCL 120 MG/1
120 TABLET, FILM COATED, EXTENDED RELEASE ORAL 2 TIMES DAILY
Status: DISPENSED | OUTPATIENT
Start: 2023-05-01 | End: 2023-05-02

## 2023-05-01 RX ORDER — ESCITALOPRAM OXALATE 10 MG/1
5 TABLET ORAL
Status: DISCONTINUED | OUTPATIENT
Start: 2023-05-02 | End: 2023-05-06 | Stop reason: HOSPADM

## 2023-05-01 RX ORDER — AMOXICILLIN 250 MG
2 CAPSULE ORAL 2 TIMES DAILY
Status: DISCONTINUED | OUTPATIENT
Start: 2023-05-01 | End: 2023-05-06 | Stop reason: HOSPADM

## 2023-05-01 RX ORDER — FUROSEMIDE 10 MG/ML
40 INJECTION INTRAMUSCULAR; INTRAVENOUS ONCE
Status: DISCONTINUED | OUTPATIENT
Start: 2023-05-01 | End: 2023-05-01

## 2023-05-01 RX ADMIN — PSEUDOEPHEDRINE HYDROCHLORIDE 120 MG: 120 TABLET, FILM COATED ORAL at 11:39

## 2023-05-01 RX ADMIN — METOPROLOL SUCCINATE 25 MG: 25 TABLET, EXTENDED RELEASE ORAL at 09:26

## 2023-05-01 RX ADMIN — METHOCARBAMOL TABLETS 750 MG: 750 TABLET, COATED ORAL at 05:22

## 2023-05-01 RX ADMIN — OXYCODONE HYDROCHLORIDE 5 MG: 5 TABLET ORAL at 05:23

## 2023-05-01 RX ADMIN — PREGABALIN 150 MG: 75 CAPSULE ORAL at 17:27

## 2023-05-01 RX ADMIN — SENNOSIDES AND DOCUSATE SODIUM 1 TABLET: 8.6; 5 TABLET ORAL at 09:26

## 2023-05-01 RX ADMIN — LEVALBUTEROL HYDROCHLORIDE 1.25 MG: 1.25 SOLUTION RESPIRATORY (INHALATION) at 20:40

## 2023-05-01 RX ADMIN — LEVALBUTEROL HYDROCHLORIDE 1.25 MG: 1.25 SOLUTION RESPIRATORY (INHALATION) at 14:11

## 2023-05-01 RX ADMIN — METHOCARBAMOL TABLETS 750 MG: 750 TABLET, COATED ORAL at 17:51

## 2023-05-01 RX ADMIN — ACETAMINOPHEN 975 MG: 325 TABLET, FILM COATED ORAL at 05:22

## 2023-05-01 RX ADMIN — IPRATROPIUM BROMIDE 0.5 MG: 0.5 SOLUTION RESPIRATORY (INHALATION) at 14:11

## 2023-05-01 RX ADMIN — METHOCARBAMOL TABLETS 750 MG: 750 TABLET, COATED ORAL at 23:50

## 2023-05-01 RX ADMIN — FUROSEMIDE 60 MG: 40 TABLET ORAL at 18:50

## 2023-05-01 RX ADMIN — POLYETHYLENE GLYCOL 3350 17 G: 17 POWDER, FOR SOLUTION ORAL at 09:26

## 2023-05-01 RX ADMIN — FUROSEMIDE 40 MG: 40 TABLET ORAL at 09:26

## 2023-05-01 RX ADMIN — POTASSIUM CHLORIDE 20 MEQ: 1500 TABLET, EXTENDED RELEASE ORAL at 09:27

## 2023-05-01 RX ADMIN — ESCITALOPRAM OXALATE 5 MG: 10 TABLET ORAL at 09:26

## 2023-05-01 RX ADMIN — FUROSEMIDE 40 MG: 40 TABLET ORAL at 17:27

## 2023-05-01 RX ADMIN — PANTOPRAZOLE SODIUM 40 MG: 40 TABLET, DELAYED RELEASE ORAL at 05:23

## 2023-05-01 RX ADMIN — RISPERIDONE 1 MG: 1 TABLET ORAL at 23:50

## 2023-05-01 RX ADMIN — IPRATROPIUM BROMIDE 0.5 MG: 0.5 SOLUTION RESPIRATORY (INHALATION) at 20:40

## 2023-05-01 RX ADMIN — GUAIFENESIN 1200 MG: 600 TABLET ORAL at 09:26

## 2023-05-01 RX ADMIN — RISPERIDONE 1 MG: 1 TABLET ORAL at 17:27

## 2023-05-01 RX ADMIN — IPRATROPIUM BROMIDE 0.5 MG: 0.5 SOLUTION RESPIRATORY (INHALATION) at 07:46

## 2023-05-01 RX ADMIN — GUAIFENESIN 1200 MG: 600 TABLET ORAL at 23:50

## 2023-05-01 RX ADMIN — PREGABALIN 150 MG: 75 CAPSULE ORAL at 09:26

## 2023-05-01 RX ADMIN — OXYCODONE HYDROCHLORIDE 5 MG: 5 TABLET ORAL at 23:50

## 2023-05-01 RX ADMIN — PREGABALIN 150 MG: 75 CAPSULE ORAL at 23:50

## 2023-05-01 RX ADMIN — SENNOSIDES AND DOCUSATE SODIUM 2 TABLET: 8.6; 5 TABLET ORAL at 17:27

## 2023-05-01 RX ADMIN — OXYCODONE HYDROCHLORIDE 5 MG: 5 TABLET ORAL at 17:51

## 2023-05-01 RX ADMIN — OXYCODONE HYDROCHLORIDE 5 MG: 5 TABLET ORAL at 12:00

## 2023-05-01 RX ADMIN — METHOCARBAMOL TABLETS 750 MG: 750 TABLET, COATED ORAL at 12:00

## 2023-05-01 RX ADMIN — ACETAMINOPHEN 975 MG: 325 TABLET, FILM COATED ORAL at 23:50

## 2023-05-01 RX ADMIN — RISPERIDONE 1 MG: 1 TABLET ORAL at 09:26

## 2023-05-01 RX ADMIN — LEVALBUTEROL HYDROCHLORIDE 1.25 MG: 1.25 SOLUTION RESPIRATORY (INHALATION) at 07:46

## 2023-05-01 NOTE — PLAN OF CARE
Problem: OCCUPATIONAL THERAPY ADULT  Goal: Performs self-care activities at highest level of function for planned discharge setting  See evaluation for individualized goals  Description: Treatment Interventions: ADL retraining, Functional transfer training, UE strengthening/ROM, Endurance training, Patient/family training, Equipment evaluation/education, Compensatory technique education, Neuromuscular reeducation, Energy conservation          See flowsheet documentation for full assessment, interventions and recommendations  5/1/2023 1544 by Elio Sorensen OT  Outcome: Progressing  Note: Limitation: Decreased ADL status, Decreased Safe judgement during ADL, Decreased endurance, Decreased cognition, Decreased self-care trans, Decreased high-level ADLs  Prognosis: Fair  Assessment: Pt agreeable to OT treatment session, upon arrival patient was found seated OOB to Recliner  Patient participated in skilled OT interventions to address ADL tasks, functional transfers, and overall activity tolerance and participation  In comparison to previous session, patient with improvements in standing tolerance and ability to achieve upright standing, and generalized strength but continues to be limited by deconditioning, endurance and decreased balance  Patient to benefit from continued IPOT treatment to address deficits as defined above and maximize level of functional independence with ADLs and functional mobility  OT Discharge Recommendation: Post acute rehabilitation services       5/1/2023 1544 by Elio Sorensen OT  Note: Limitation: Decreased ADL status, Decreased Safe judgement during ADL, Decreased endurance, Decreased cognition, Decreased self-care trans, Decreased high-level ADLs  Prognosis: Fair  Assessment: Pt agreeable to OT treatment session, upon arrival patient was found seated OOB to Recliner   Patient participated in skilled OT interventions to address ADL tasks, functional transfers, and overall activity tolerance and participation  In comparison to previous session, patient with improvements in standing tolerance and ability to achieve upright standing, and generalized strength but continues to be limited by deconditioning, endurance and decreased balance  Patient to benefit from continued IPOT treatment to address deficits as defined above and maximize level of functional independence with ADLs and functional mobility       OT Discharge Recommendation: Post acute rehabilitation services     Mumtaz Kuhn Luis, OTR/L  [place] License [id number]  [address] [facility] [place] [phone]

## 2023-05-01 NOTE — PLAN OF CARE
Problem: PHYSICAL THERAPY ADULT  Goal: Performs mobility at highest level of function for planned discharge setting  See evaluation for individualized goals  Description: Treatment/Interventions: Functional transfer training, LE strengthening/ROM, Therapeutic exercise, Endurance training, Cognitive reorientation, Patient/family training, Equipment eval/education, Gait training, Bed mobility          See flowsheet documentation for full assessment, interventions and recommendations  5/1/2023 1545 by Tatyana Morales, PT  Outcome: Progressing  Note: Prognosis: Fair  Problem List: Decreased strength, Decreased endurance, Impaired balance, Decreased mobility, Decreased cognition, Impaired judgement, Decreased safety awareness, Obesity, Decreased skin integrity, Pain  Assessment: Pt goal date extended today due to continued progress  Patient continues to demonstrate small steady progress  This session patient performed sit to stand reaching 100% standing for 35 seconds  Patient requires cues for repositioning in chair, weight shifitng and use of BL UE support to boost himself back into bed  Patient continues to progress t  Barriers to Discharge: Inaccessible home environment     PT Discharge Recommendation: Post acute rehabilitation services    See flowsheet documentation for full assessment  5/1/2023 1545 by Tatyana Morales, PT  Outcome: Progressing  Note: Prognosis: Fair  Problem List: Decreased strength, Decreased endurance, Impaired balance, Decreased mobility, Decreased cognition, Impaired judgement, Decreased safety awareness, Obesity, Decreased skin integrity, Pain  Assessment: Pt goal date extended today due to continued progress  Patient continues to demonstrate small steady progress  This session patient performed sit to stand reaching 100% standing for 35 seconds  Patient requires cues for repositioning in chair, weight shifitng and use of BL UE support to boost himself back into bed   Patient continues to progress t  Barriers to Discharge: Inaccessible home environment     PT Discharge Recommendation: Post acute rehabilitation services    See flowsheet documentation for full assessment

## 2023-05-01 NOTE — CASE MANAGEMENT
Case Management Progress Note    Patient name Mimi Knutson  Location S /S -07 MRN [id number]  : 1955 Date 2023       LOS (days): 24  Geometric Mean LOS (GMLOS) (days): 3 90  Days to GMLOS:-20 1        OBJECTIVE:        Current admission status: Inpatient  Preferred Pharmacy:   [address], [place] [address]  [address] [address]  Phone: [phone] Fax: [phone]    Primary Care Provider: Iggy Lai MD    Primary Insurance: [facility] REP  Secondary Insurance:     PROGRESS NOTE:    Per nursing, patient's family has been bringing in outside food (chips, rice krispy treats, etc), so difficult to monitor restrictive diet  Patient's weight continues to increase (up to 490lbs per bed scale), so spoke with MD during rounds re: discussion with family re: diet restrictions and placing limits on food that is brought in from outside  Message received from [facility]) relaying that facility will need to be contacted directly to discuss possible admission  Call made to facility ([phone]) and spoke with Madelaine Mejia in admissions who reports she will review referral and be back in touch with determination  Call made to [facility] and [address] [place] [address] ([phone]) and spoke with Aruna in admissions; reports they have no male beds at this time and no anticipated movement this week  Response changed to decline in [address]  Call made to [facility] ([phone] ext [phone]) and VM left for Alanna at the admissions office requesting return call  Call made to [facility] ([phone]) and spoke with Dinorah Fulton who reports their equipment will be able to accommodate current weight; response changed to declined in [address]  Call made to the [facility] at [address] ([phone]) and VM left for Mont[place]errat requesting return call       Call made to [facility] ([phone]) [place] and VM left for admissions director, Adithya Lundberg, requesting return call  Call made to [facility] in [place], [place] ([phone]) and VM left for Cordelia requesting return call  Call made to [facility] ([phone]) and spoke with admissions who reports they currently have no beds available  Response changed to declined in [address]  Call made to [place] [address] [facility] ([phone]) and spoke with liaison  Reports they can accept bariatric patients, but program is very short-term, typically only 10-12 days, and they feel patient will need a longer program at this time  Response changed to declined in [address]  Call made to [facility] ([phone]) and was provided with contact number for Willis Mays in admissions - c: [phone]  Call made to her and VM left requesting return call  Call made to [facility] [place] [facility] ([phone]) but phone rings repeatedly with no answer, no ability to leave message  Call made to [facility] ([phone]) in [place], [place] and VM left for Brii Bowens in admissions requesting return call  Call made to [facility] in [place], [place] ([phone]) and spoke with Jason Saucedo in admissions; reports they are not contracted with patient's insurance - response changed to declined in [address]  Therapy in working with patient now - will follow-up with them/nursing to forward hip-to-hip measurement and w/c size to facilities

## 2023-05-01 NOTE — ASSESSMENT & PLAN NOTE
· No further shortness of breath  · Continue on p o  Lasix    · Daily weights monitor input and output  · Patient noncompliant with recommended diet causing increased weight-will discuss compliance with patient, the cause of his lower limb pain which has not improved with prn needed medication-add Lasix 60 x 1 now  · If improvement with weights and improvement with lower limb pain, plan to increase diuresis or change to torsemide  · pEnding placement to rehab

## 2023-05-01 NOTE — ASSESSMENT & PLAN NOTE
· History of DVT, PE  · INR therapeutic  · Continue with warfarin  7 5 mg INR permitting  · INR a m

## 2023-05-01 NOTE — PHYSICAL THERAPY NOTE
Physical Therapy Treatment Note    Patient's Name: Mimi Knutson  : 23 1403   PT Last Visit   PT Visit Date 23   Note Type   Note Type Treatment   Pain Assessment   Pain Assessment Tool 0-10   Pain Score 9   Pain Location/Orientation Location: Back;Orientation: Left  (groin)   Restrictions/Precautions   Weight Bearing Precautions Per Order No   Other Precautions Cognitive; Chair Alarm; Bed Alarm; Fall Risk;O2;Pain  (4 L o2 via NC)   General   Chart Reviewed Yes   Response to Previous Treatment Patient reporting fatigue but able to participate  Family/Caregiver Present No   Cognition   Following Commands Follows one step commands with increased time or repetition   Subjective   Subjective pt agreeable to PT intervention, states he did perform HEP over the weekend   Bed Mobility   Additional Comments pt up in recliner at start and end of treatment session   Transfers   Sit to Stand 2  Maximal assistance   Additional items Assist x 2; Increased time required;Verbal cues;Armrests   Stand to Sit 2  Maximal assistance   Additional items Assist x 2; Increased time required;Verbal cues;Armrests   Additional Comments x6 STS transfers performed this ession, trial of night stand as well as RW to encourage increased elbow extension to reach fulls tanding position, used bed sheet under patient for hammock technique for improved mecahnics and safety for therapy staff, pt did achieve one trial of 100% standing with head up for 35 seconds, other trials limited by LE strength/ocasional knee buckling, continued to encourage pt to utilize BL UE on night stand to achieve full standing position, however at times patient does push from recliner chair   Balance   Static Sitting Fair +   Dynamic Sitting Fair -   Static Standing Zero  (x assist)   Endurance Deficit   Endurance Deficit Yes   Endurance Deficit Description freqent rest required throughout session   Activity Tolerance   Activity Tolerance Patient limited by pain; Patient limited by fatigue   Nurse Made Aware BIANCA sherman pre/post   Medical Staff Made Aware care coordinated with OT lexee   Assessment   Prognosis Fair   Problem List Decreased strength;Decreased endurance; Impaired balance;Decreased mobility; Decreased cognition; Impaired judgement;Decreased safety awareness; Obesity; Decreased skin integrity;Pain   Assessment Pt goal date extended today due to continued progress  Patient continues to demonstrate small steady progress  This session patient performed sit to stand reaching 100% standing for 35 seconds  Patient requires cues for repositioning in chair, weight shifitng and use of BL UE support to boost himself back into bed  Patient continues to progress t   Barriers to Discharge Inaccessible home environment   Goals   Patient Goals stand for one minute   STG Expiration Date 05/11/23   Short Term Goal #1 pt will:  Perform rolling and repositioning in bed w/no more than minx1 to left side using trapeze/rail to  decrease caregiver burden;  Perform supine <--> sitting edge of bed transition w/ modx1 to improve level of function and minimize need for sit->stand Trials from recliner as appropriate  Perform sit <---> stand transfers w/ max Ax1 to minimize burden of care, Perform 90% upright standing tolerance w/ UE support for 30 sec w/ A of 1 plus sling to promote longer standing tolerance and progress to pregait activities as appropriate  Assess gait and wide WC (as seated mobility target and potentially as mobility w/ Le's--even backward)  and set goals  PT Treatment Day 7   Plan   Treatment/Interventions Functional transfer training;LE strengthening/ROM; Therapeutic exercise; Endurance training;Cognitive reorientation;Patient/family training;Equipment eval/education; Bed mobility;Gait training;Spoke to nursing;OT   Progress Slow progress, decreased activity tolerance   PT Frequency Other (Comment)  (mon-fri)   Recommendation   PT Discharge Recommendation Post acute rehabilitation services   Equipment Recommended Wheelchair;Walker   AM-PAC Basic Mobility Inpatient   Turning in Flat Bed Without Bedrails 2   Lying on Back to Sitting on Edge of Flat Bed Without Bedrails 1   Moving Bed to Chair 1   Standing Up From Chair Using Arms 1   Walk in Room 1   Climb 3-5 Stairs With Railing 1   Basic Mobility Inpatient Raw Score 7   Turning Head Towards Sound 4   Follow Simple Instructions 3   Low Function Basic Mobility Raw Score  14   Low Function Basic Mobility Standardized Score  22 01   Highest Level Of Mobility   JH-HLM Goal 2: Bed activities/Dependent transfer   JH-HLM Achieved 4: Move to chair/commode   Education   Education Provided Mobility training;Home exercise program   Patient Reinforcement needed   End of Consult   Patient Position at End of Consult Supine;Bed/Chair alarm activated   End of Consult Comments BIANCA sherman     The patient's AM-PAC Basic Mobility Inpatient Short Form Raw Score is 7  A Raw score of less than or equal to 16 suggests the patient may benefit from discharge to post-acute rehabilitation services  Please also refer to the recommendation of the Physical Therapist for safe discharge planning        July Ordaz, PT

## 2023-05-01 NOTE — UTILIZATION REVIEW
Continued Stay Review    Date: 4/29/23                          Current Patient Class: IP  Current Level of Care: Med Surg    HPI:67 y o  male initially admitted on 4/7     Assessment/Plan: Continue po lasix, Psych eval completed today-recommending Lexapro 5 mg daily, continue warfarin 7 5 mg, INR in am, continue metoprolol, working with PT as inpatient on improving mobility  Pt with BMI of 60  Pending placement  Patient showing very slow progress with therapy, so hopefully if mental health can be addressed along with initiating dietary restrictions to encourage weight loss, mobility may improve and facility options may be found  Currently referrals have been sent to 500+ facilities, with no accepting beds at this time       Vital Signs:     Date/Time Temp Pulse Resp BP MAP (mmHg) SpO2 Calculated FIO2 (%) - Nasal Cannula O2 Flow Rate (L/min) Nasal Cannula O2 Flow Rate (L/min) O2 Device   05/01/23 08:23:15 98 1 °F (36 7 °C) 92 20 115/65 82 93 % -- -- -- --   05/01/23 0746 -- -- -- -- -- -- 36 4 L/min 4 L/min Nasal cannula   05/01/23 0331 -- -- -- -- -- 93 % -- -- -- --   04/30/23 2259 -- -- -- -- -- 95 % -- -- -- --   04/30/23 21:25:48 98 8 °F (37 1 °C) 94 -- 154/78 103 95 % -- -- -- --   04/30/23 1925 -- -- -- -- -- 96 % 36 -- 4 L/min Nasal cannula   04/30/23 15:51:57 98 9 °F (37 2 °C) 89 -- 148/82 104 96 % -- -- -- --   04/30/23 1355 -- -- -- -- -- 94 % -- -- -- --   04/30/23 0826 97 8 °F (36 6 °C) 98 -- 124/68 87 94 % -- -- -- --   04/30/23 0732 -- -- -- -- -- 97 % -- -- -- --   04/30/23 0350 -- -- -- -- -- -- 36 -- 4 L/min Nasal cannula   04/29/23 2336 -- -- -- -- -- 96 % -- -- -- --   04/29/23 21:44:30 97 7 °F (36 5 °C) 92 -- 132/67 89 95 % -- -- -- --   04/1955 -- -- -- -- -- 97 % 36 -- 4 L/min Nasal cannula   04/29/23 16:36:44 97 7 °F (36 5 °C) 85 -- 129/66 87 96 % -- -- -- --   04/29/23 15:09:10 -- 91 -- 138/61 87 98 % -- -- -- --   04/29/23 1346 -- -- -- -- -- 98 % -- -- -- --   04/29/23 08:43:03 97 6 °F (36 4 °C) 94 -- 144/72 96 94 % -- -- -- --   04/29/23 0841 -- 95 -- 144/72 -- -- -- -- -- --   04/29/23 0745 -- -- -- -- -- 96 % -- -- -- --   04/29/23 0336 -- -- -- -- -- 93 % -- -- -- --       Pertinent Labs/Diagnostic Results:       Results from last 7 days   Lab Units 05/01/23  0526 04/30/23  1403 04/25/23  0540   WBC Thousand/uL 7 89 7 71 6 11   HEMOGLOBIN g/dL 11 1* 11 6* 11 4*   HEMATOCRIT % 37 0 38 0 37 3   PLATELETS Thousands/uL 305 273 231   NEUTROS ABS Thousands/µL  --  5 90  --          Results from last 7 days   Lab Units 05/01/23  0526 04/30/23  1403 04/29/23  0607 04/25/23  0540   SODIUM mmol/L 138 138 140 137   POTASSIUM mmol/L 4 2 4 1 4 2 3 9   CHLORIDE mmol/L 94* 95* 95* 94*   CO2 mmol/L 40* 42* 41* 39*   ANION GAP mmol/L 4 1* 4 4   BUN mg/dL 9 8 9 9   CREATININE mg/dL 0 46* 0 50* 0 49* 0 50*   EGFR ml/min/1 73sq m 114 110 111 110   CALCIUM mg/dL 8 8 8 7 9 1 9 0             Results from last 7 days   Lab Units 05/01/23  0526 04/30/23  1403 04/29/23  0607 04/25/23  0540   GLUCOSE RANDOM mg/dL 114 154* 108 99           Results from last 7 days   Lab Units 05/01/23  0526 04/30/23  0437 04/29/23  0607   PROTIME seconds 34 3* 32 2* 30 0*   INR  3 36* 3 10* 2 83*           Medications:   Scheduled Medications:  acetaminophen, 975 mg, Oral, Q8H [facility]  benzonatate, 200 mg, Oral, TID  escitalopram, 5 mg, Oral, Daily  fluticasone, 2 spray, Each Nare, Daily  furosemide, 40 mg, Oral, BID (diuretic)  guaiFENesin, 1,200 mg, Oral, Q12H GRIFFIN  ipratropium, 0 5 mg, Nebulization, TID  levalbuterol, 1 25 mg, Nebulization, TID  metoprolol succinate, 25 mg, Oral, Daily  pantoprazole, 40 mg, Oral, Early Morning  polyethylene glycol, 17 g, Oral, Daily  potassium chloride, 20 mEq, Oral, Daily  pregabalin, 150 mg, Oral, TID  risperiDONE, 1 mg, Oral, TID  senna-docusate sodium, 1 tablet, Oral, BID  warfarin, 7 5 mg, Oral, Daily (warfarin)      Continuous IV Infusions:     PRN Meds:  albuterol, 2 5 mg, Nebulization, Q4H PRN  bisacodyl, 10 mg, Rectal, Daily PRN  dextromethorphan-guaiFENesin, 10 mL, Oral, Q4H PRN  hydrOXYzine HCL, 50 mg, Oral, Q6H PRN  methocarbamol, 750 mg, Oral, Q6H PRN  ondansetron, 4 mg, Oral, Q6H PRN  oxyCODONE, 5 mg, Oral, Q6H PRN  simethicone, 80 mg, Oral, Q6H PRN  sodium chloride, 1 spray, Each Nare, Q1H PRN        Discharge Plan: [facility]    Network Utilization Review Department  ATTENTION: Please call with any questions or concerns to [phone] and carefully listen to the prompts so that you are directed to the right person  All voicemails are confidential   Chacho Arzate all requests for admission clinical reviews, approved or denied determinations and any other requests to dedicated fax number below belonging to the campus where the patient is receiving treatment   List of dedicated fax numbers for the Facilities:  [address] DENIALS (Administrative/Medical Necessity) [phone]   [address] (Maternity/NICU/Pediatrics) [phone]   [address] [phone]   [place] [address] [phone]   [address] [place] [address] [address] [phone]   [address] [place] [facility] [address] 815 [facility] [phone]

## 2023-05-01 NOTE — PROGRESS NOTES
[facility]  Progress Note  Name: Monisha Maynard  MRN: [id number]  Unit/Bed#: S -70 I Date of Admission: 4/7/2023   Date of Service: 5/1/2023 I Hospital Day: 24    Assessment/Plan   * Chronic diastolic congestive heart failure ([facility] [address] )  Assessment & Plan  · No further shortness of breath  · Continue on p o  Lasix  · Daily weights monitor input and output  · Patient noncompliant with recommended diet causing increased weight-will discuss compliance with patient, the cause of his lower limb pain which has not improved with prn needed medication-add Lasix 60 x 1 now  · If improvement with weights and improvement with lower limb pain, plan to increase diuresis or change to torsemide  · pEnding placement to rehab    Adjustment disorder  Assessment & Plan  Seen by psychiatry  Started on Lexapro 5 mg daily    History of pulmonary embolism  Assessment & Plan  · History of DVT, PE  · INR therapeutic  · Continue with warfarin  7 5 mg INR permitting  · INR a m     EVERETT (obstructive sleep apnea)  Assessment & Plan  · Hx of EVERETT, morbid obesity, obesity hypoventilation syndrome   · Continue BiPAP HS     Primary hypertension  Assessment & Plan  · BP stable  · Continue metoprolol and Lasix 40mg bid      Morbid obesity ([facility] [address] )  Assessment & Plan  · Recently DC to [facility]   PT/OT: post acute rehab  · Working PT as inpatient- improving mobility  · Pending placement  · BMI 60  · Nutrition consulted for calorie management  VTE Pharmacologic Prophylaxis: VTE Score: 10 High Risk (Score >/= 5) - Pharmacological DVT Prophylaxis Ordered: warfarin (Coumadin)  Sequential Compression Devices Ordered  Patient Centered Rounds: I performed bedside rounds with nursing staff today  Discussions with Specialists or Other Care Team Provider:     Education and Discussions with Family / Patient: Patient declined call to        Current Length of Stay: 24 day(s)  Current Patient Status: Inpatient   Discharge Plan: pending pending placement to rehab    Code Status: Level 1 - Full Code    Subjective:   Patient complains of lower limb pain not improved with as needed medications    Objective:     Vitals:   Temp (24hrs), Av 5 °F (36 9 °C), Min:98 1 °F (36 7 °C), Max:98 8 °F (37 1 °C)    Temp:  [98 1 °F (36 7 °C)-98 8 °F (37 1 °C)] 98 6 °F (37 °C)  HR:  [91-94] 91  Resp:  [20] 20  BP: (115-154)/(58-78) 120/58  SpO2:  [90 %-96 %] 90 %  Body mass index is 63 04 kg/m²  Input and Output Summary (last 24 hours): Intake/Output Summary (Last 24 hours) at 2023 1831  Last data filed at 2023 1805  Gross per 24 hour   Intake 1320 ml   Output 850 ml   Net 470 ml       Physical Exam:   Physical Exam  Vitals and nursing note reviewed  Constitutional:       General: He is not in acute distress  Appearance: He is well-developed  He is obese  He is not toxic-appearing or diaphoretic  HENT:      Head: Normocephalic and atraumatic  Eyes:      Conjunctiva/sclera: Conjunctivae normal    Cardiovascular:      Rate and Rhythm: Normal rate and regular rhythm  Pulmonary:      Effort: Pulmonary effort is normal    Musculoskeletal:      Cervical back: Neck supple  Neurological:      Mental Status: He is alert            Additional Data:     Labs:  Results from last 7 days   Lab Units 23  0526 23  1403   WBC Thousand/uL 7 89 7 71   HEMOGLOBIN g/dL 11 1* 11 6*   HEMATOCRIT % 37 0 38 0   PLATELETS Thousands/uL 305 273   NEUTROS PCT %  --  77*   LYMPHS PCT %  --  13*   MONOS PCT %  --  7   EOS PCT %  --  2     Results from last 7 days   Lab Units 23  0526   SODIUM mmol/L 138   POTASSIUM mmol/L 4 2   CHLORIDE mmol/L 94*   CO2 mmol/L 40*   BUN mg/dL 9   CREATININE mg/dL 0 46*   ANION GAP mmol/L 4   CALCIUM mg/dL 8 8   GLUCOSE RANDOM mg/dL 114     Results from last 7 days   Lab Units 23  0526   INR  3 36*                   Lines/Drains:  Invasive Devices     None Imaging: Reviewed radiology reports from this admission including:  All this admission    Recent Cultures (last 7 days):         Last 24 Hours Medication List:   Current Facility-Administered Medications   Medication Dose Route Frequency Provider Last Rate   • acetaminophen  975 mg Oral Q8H [address] Meenakshi Llamas MD     • albuterol  2 5 mg Nebulization Q4H PRN Nancy West MD     • benzonatate  200 mg Oral TID Meenakshi Llamas MD     • bisacodyl  10 mg Rectal Daily PRN Gisela Hurtado MD     • dextromethorphan-guaiFENesin  10 mL Oral Q4H PRN Melissa Mcqueen MD     • [START ON 5/2/2023] escitalopram  5 mg Oral HS Jacki Cruz MD     • fluticasone  2 spray Each Nare Daily Calvin Mathews MD     • furosemide  40 mg Oral BID (diuretic) Calvin Mathews MD     • furosemide  60 mg Oral Once Stephanie Busby MD     • guaiFENesin  1,200 mg Oral Q12H [address] Calvin Mathews MD     • hydrOXYzine HCL  50 mg Oral Q6H PRN Calvin Mathews MD     • ipratropium  0 5 mg Nebulization TID Frandy Wade MD     • levalbuterol  1 25 mg Nebulization TID Frandy Wade MD     • methocarbamol  750 mg Oral Q6H PRN Nancy West MD     • metoprolol succinate  25 mg Oral Daily Tonja Bhatt MD     • ondansetron  4 mg Oral Q6H PRN Gisela Hurtado MD     • oxyCODONE  5 mg Oral Q6H PRN Nesha Brasher MD     • pantoprazole  40 mg Oral Early Morning Meenakshi Llamas MD     • polyethylene glycol  17 g Oral Daily Calvin Mathews MD     • potassium chloride  20 mEq Oral Daily Tonja Bhatt MD     • pregabalin  150 mg Oral TID Nancy West MD     • pseudoephedrine  120 mg Oral BID Stephanie Busby MD     • risperiDONE  1 mg Oral TID Nancy West MD     • senna-docusate sodium  2 tablet Oral BID Stephanie Busby MD     • simethicone  80 mg Oral Q6H PRN Calvin Mathews MD     • sodium chloride  1 spray Each Nare Q1H PRN Gisela Hurtado MD          Today, Patient Was Seen By: Keo Hart MD    **Please Note: This note may have been constructed using a voice recognition system  **

## 2023-05-01 NOTE — OCCUPATIONAL THERAPY NOTE
Occupational Therapy Progress Note     Patient Name: Supriya Beard  TOXKK'[id number] Date: 5/1/2023  Problem List  Principal Problem:    Chronic diastolic congestive heart failure ([facility] [address] )  Active Problems: Morbid obesity ([facility] [address] )    Primary hypertension    EVERETT (obstructive sleep apnea)    History of pulmonary embolism    Adjustment disorder        05/01/23 1402   OT Last Visit   OT Visit Date 05/01/23   Note Type   Note Type Treatment   Pain Assessment   Pain Assessment Tool FLACC   Pain Location/Orientation Other (Comment)  (Groin/testacles)   Pain Onset/Description Descriptor: Discomfort   Effect of Pain on Daily Activities limits activity tolerance, distraction   Patient's Stated Pain Goal No pain   Hospital Pain Intervention(s) Repositioned; Ambulation/increased activity; Emotional support   Pain Rating: FLACC (Rest) - Face 1   Pain Rating: FLACC (Rest) - Legs 0   Pain Rating: FLACC (Rest) - Activity 0   Pain Rating: FLACC (Rest) - Cry 1   Pain Rating: FLACC (Rest) - Consolability 0   Score: FLACC (Rest) 2   Pain Rating: FLACC (Activity) - Face 1   Pain Rating: FLACC (Activity) - Legs 0   Pain Rating: FLACC (Activity) - Activity 1   Pain Rating: FLACC (Activity) - Cry 1   Pain Rating: FLACC (Activity) - Consolability 1   Score: FLACC (Activity) 4   Restrictions/Precautions   Weight Bearing Precautions Per Order No   Other Precautions Cognitive; Chair Alarm;Multiple lines;Pain; Fall Risk;O2  (4L O2 NC)   Lifestyle   Autonomy Pt admitted [facility], has hx of multiple recent hospitalizations  At baseline lives c his spouse   Reciprocal Relationships supportive spouse and son  Service to Others retired   Intrinsic Gratification Expressing that his goal is to attend his sons wedding in June     ADL   Where Assessed Chair   Functional Standing Tolerance   Time variable 10-20 seconds for most trials, able to tolerate 35 seconds last trial with 75-90% upright standing, fatigues easily   Activity functional transfer training "  Comments mod A x 2 to maintain upright standing with use of sheet under buttocks for belt hammock technique and BLE knee block to facilitate knee extension  Weight bearing through bilateral hands/forearms   Bed Mobility   Additional Comments Pt received in recliner upon arrival and returned at end of session   Transfers   Sit to Stand 2  Maximal assistance   Additional items Assist x 2; Increased time required;Verbal cues;Armrests   Stand to Sit 2  Maximal assistance   Additional items Assist x 2; Increased time required;Verbal cues;Armrests   Additional Comments x 6 STS from recliner with max A x 2 with variety of hand placement (bilateral forearms on nightstand, one hand recliner one hand nightstand, bilateral hands recliner) with BLE kneeblock and use of sheet under buttocks for hammock technique  Difficulty using triceps to push to upright stand  Use of walking sling only for safety  Pt frustrated at times however benefits from encouragement  Therapeutic Excerise-Strength   UE Strength Yes   Right Upper Extremity- Strength   RUE Strength Comment Functional strength training with use of BUEs on recliner armrests to assist with repositioning in recliner  Pt consistently stating\" Can you lift me back\", requires encouragement to apply effort  Left Upper Extremity-Strength   LUE Strength Comment see above   Subjective   Subjective \"Ok, let's go\"   Cognition   Overall Cognitive Status WFL  (questionable higher level cognition)   Arousal/Participation Alert; Cooperative   Attention Within functional limits   Orientation Level Oriented X4   Memory Decreased recall of recent events   Following Commands Follows one step commands without difficulty   Comments Pleasant, questionable motivation/learned dependence, questionable insight into deficits and problem solving  Self-limiting behaviors     Activity Tolerance   Activity Tolerance Patient limited by fatigue;Patient limited by pain   Medical Staff Made Aware Care " coordinated with PT Adams Kitchen RN [facility]   Assessment   Assessment Pt agreeable to OT treatment session, upon arrival patient was found seated OOB to Recliner  Patient participated in skilled OT interventions to address ADL tasks, functional transfers, and overall activity tolerance and participation  In comparison to previous session, patient with improvements in standing tolerance and ability to achieve upright standing, and generalized strength but continues to be limited by deconditioning, endurance and decreased balance  Patient to benefit from continued IPOT treatment to address deficits as defined above and maximize level of functional independence with ADLs and functional mobility  Plan   Treatment Interventions ADL retraining;Functional transfer training;UE strengthening/ROM; Endurance training;Patient/family training;Equipment evaluation/education; Compensatory technique education;Continued evaluation; Energy conservation; Activityengagement   Goal Expiration Date 05/11/23  (see updated goals below)   OT Treatment Day 10   OT Frequency 3-5x/wk   Recommendation   OT Discharge Recommendation Post acute rehabilitation services   Additional Comments  This session, pt required and most appropriately benefited from skilled OT/PT co-treat due to extensive physical assistance of SKILLED therapists, significant regression from functional baseline and decreased activity tolerance  OT and PT goals were addressed separately as seen in documentation  Additional Comments 2 The patient's raw score on the AM-PAC Daily Activity inpatient short form is 15, standardized score is 34 69, less than 39 4  From an OT standpoint, patients at this level may benefit from d/c to Post acute rehabilitation services     AM-[facility] Daily Activity Inpatient   Lower Body Dressing 1   Bathing 2   Toileting 1   Upper Body Dressing 3   Grooming 4   Eating 4   Daily Activity Raw Score 15   Daily Activity Standardized Score (Calc for Raw Score >=11) 34 69   AM-PAC Applied Cognition Inpatient   Following a Speech/Presentation 3   Understanding Ordinary Conversation 4   Taking Medications 3   Remembering Where Things Are Placed or Put Away 4   Remembering List of 4-5 Errands 3   Taking Care of Complicated Tasks 3   Applied Cognition Raw Score 20   Applied Cognition Standardized Score 41 76   End of Consult   Patient Position at End of Consult Bedside chair;Bed/Chair alarm activated; All needs within reach   Nurse Communication Nurse aware of consult     GOALS:    *Goals established to promote patient goal of to make it to my sons wedding in June:      *ADL transfers with Mod (A) x 2 for inc'd independence with ADLs/purposeful tasks     *UB ADL with (S) for inc'd independence with self care    *LB ADL (socks) with Min (A) using AE prn for inc'd independence with self care    *Increase stand tolerance x 2  m for inc'd tolerance with standing purposeful tasks    *Participate in 10m UE therex to increase overall stamina/activity tolerance for purposeful tasks    *Patient will verbalize 3 safety awareness/ principles to prevent falls in the home setting  *Pt will demonstrate use of long handled AE during 100% of tx sessions for increased ADL safety and independence following D/C     *Patient will improve functional activity tolerance to 30 minutes of sustained functional tasks to increase participation in basic self-care and decrease assistance level       LUKE Astudillo, OTR/L    [place] License [id number]  [address] [facility] [phone]

## 2023-05-01 NOTE — PROGRESS NOTES
"Progress Note - [address] 69 y o  male MRN: [id number]  Unit/Bed#: S -01 Encounter: [id number]    Assessment/Plan   Principal Problem:    Chronic diastolic congestive heart failure ([facility] )  Active Problems: Morbid obesity ([facility] )    Primary hypertension    EVERETT (obstructive sleep apnea)    History of pulmonary embolism    Adjustment disorder      Recommended Treatment:   Continue treatment per SLIM   Continue Escitalopram 5 mg QD, move dose to evening   Will cont to follow up PRN in the hopsital while awaiting placement  Cont Risperdal for his Tourette's at current effective dose    ------------------------------------------------------------    Subjective: Per nursing report, Liset Olmedo has been cooperative on the unit and compliant with scheduled medications  Today, Liset Olmedo was seen at bedside  He reports that his mood is \"mellow\"  He reports feeling more tired than he has previously and thinks that it may be due to the Escitalopram   He agreed to try moving this medication to the evening to see if it helps with the tiredness  He states that he usually gets about 4 hours of sleep during the night and takes a nap during the day but this is his baseline since he was a young adult  He reports no issues with his appetite  Other than the tiredness, he reports no other side effects  He reports that many years ago he was treated with phenelzine for a condition that he did not want to talk about  He reports that he is still having breakthrough pain from the postherpetic neuralgia despite PRN oxycodone and scheduled acetaminophen  He denies SI/HI/AVH at this time and does not think he needs anything standing long term for mood or anxiety (but also has had prior treatment in past with [facility] for \"things I would rather not talk about now\") but is open to changing dosing to evening to help with his feeling of daytime fatigue      Progress Toward Goals: slow improvement regarding " "mood    Psychiatric Review of Systems:  Behavior over the last 24 hours: improved  Sleep: normal and increased daytime tiredness  Appetite: adequate  Medication side effects: fatigue  ROS: Complete review of systems is negative except as noted above      Vital signs in last 24 hours:  Temp:  [98 1 °F (36 7 °C)-98 9 °F (37 2 °C)] 98 1 °F (36 7 °C)  HR:  [89-94] 92  Resp:  [20] 20  BP: (115-154)/(65-82) 115/65    Mental Status Exam:  Appearance:  alert, drowsy, good eye contact, appears stated age, appropriate grooming and hygiene and obese   Behavior:  calm and cooperative   Motor: Unable to assess   Speech:  spontaneous and coherent   Mood:  \"mellow\"   Affect:  constricted   Thought Process:  Organized, logical, goal-directed   Thought Content: no verbalized delusions or overt paranoia   Perceptual disturbances: no reported hallucinations and does not appear to be responding to internal stimuli at this time   Risk Potential: No active or passive suicidal or homicidal ideation was verbalized during interview   Cognition: oriented to self and situation, appears to be of average intelligence and cognition not formally tested   Insight:  Fair   Judgment: Fair     Current Medications:  Current Facility-Administered Medications   Medication Dose Route Frequency Provider Last Rate   • acetaminophen  975 mg Oral Q8H [address] Randy Arguelles MD     • albuterol  2 5 mg Nebulization Q4H PRN Kinjal Garcia MD     • benzonatate  200 mg Oral TID Randy Arguelles MD     • bisacodyl  10 mg Rectal Daily PRN Annemarie Littlejohn MD     • dextromethorphan-guaiFENesin  10 mL Oral Q4H PRN Oliverio Juarez MD     • escitalopram  5 mg Oral Daily Gail Zhu MD     • fluticasone  2 spray Each Nare Daily Chhaya Cohen MD     • furosemide  40 mg Oral BID (diuretic) Chhaya Cohen MD     • guaiFENesin  1,200 mg Oral Q12H Bennett Muniz MD     • hydrOXYzine HCL  50 mg Oral Q6H PRN Chhaya Cohen MD     • ipratropium  0 5 " mg Nebulization TID Brennan Kinney MD     • levalbuterol  1 25 mg Nebulization TID Brennna Kinney MD     • methocarbamol  750 mg Oral Q6H PRN Brayan Colindres MD     • metoprolol succinate  25 mg Oral Daily Olesay Saba MD     • ondansetron  4 mg Oral Q6H PRN Lucretia Abebe MD     • oxyCODONE  5 mg Oral Q6H PRN Dania Narayanan MD     • pantoprazole  40 mg Oral Early Morning Leona Boyer MD     • polyethylene glycol  17 g Oral Daily Deuce Pichardo MD     • potassium chloride  20 mEq Oral Daily Olesya Saba MD     • pregabalin  150 mg Oral TID Brayan Colindres MD     • risperiDONE  1 mg Oral TID Brayan Colindres MD     • senna-docusate sodium  1 tablet Oral BID Kiana Thurston MD     • simethicone  80 mg Oral Q6H PRN Deuce Pichardo MD     • sodium chloride  1 spray Each Nare Q1H PRN Lucretia Abebe MD         Behavioral Health Medications: all current active meds have been reviewed  Changes as in plan section above  Laboratory results:  I have personally reviewed all pertinent laboratory/tests results    Recent Results (from the past 48 hour(s))   Protime-INR    Collection Time: 04/30/23  4:37 AM   Result Value Ref Range    Protime 32 2 (H) 11 6 - 14 5 seconds    INR 3 10 (H) 0 84 - 1 19   CBC and differential    Collection Time: 04/30/23  2:03 PM   Result Value Ref Range    WBC 7 71 4 31 - 10 16 Thousand/uL    RBC 3 56 (L) 3 88 - 5 62 Million/uL    Hemoglobin 11 6 (L) 12 0 - 17 0 g/dL    Hematocrit 38 0 36 5 - 49 3 %     (H) 82 - 98 fL    MCH 32 6 26 8 - 34 3 pg    MCHC 30 5 (L) 31 4 - 37 4 g/dL    RDW 12 7 11 6 - 15 1 %    MPV 8 5 (L) 8 9 - 12 7 fL    Platelets 834 850 - 262 Thousands/uL    nRBC 0 /100 WBCs    Neutrophils Relative 77 (H) 43 - 75 %    Immat GRANS % 1 0 - 2 %    Lymphocytes Relative 13 (L) 14 - 44 %    Monocytes Relative 7 4 - 12 %    Eosinophils Relative 2 0 - 6 %    Basophils Relative 0 0 - 1 %    Neutrophils Absolute 5 90 1 85 - 7 62 Thousands/µL    Immature Grans Absolute 0 05 0 00 - 0 20 Thousand/uL    Lymphocytes Absolute 1 02 0 60 - 4 47 Thousands/µL    Monocytes Absolute 0 54 0 17 - 1 22 Thousand/µL    Eosinophils Absolute 0 17 0 00 - 0 61 Thousand/µL    Basophils Absolute 0 03 0 00 - 0 10 Thousands/µL   Basic metabolic panel    Collection Time: 04/30/23  2:03 PM   Result Value Ref Range    Sodium 138 135 - 147 mmol/L    Potassium 4 1 3 5 - 5 3 mmol/L    Chloride 95 (L) 96 - 108 mmol/L    CO2 42 (H) 21 - 32 mmol/L    ANION GAP 1 (L) 4 - 13 mmol/L    BUN 8 5 - 25 mg/dL    Creatinine 0 50 (L) 0 60 - 1 30 mg/dL    Glucose 154 (H) 65 - 140 mg/dL    Calcium 8 7 8 4 - 10 2 mg/dL    eGFR 110 ml/min/1 73sq m   CBC    Collection Time: 05/01/23  5:26 AM   Result Value Ref Range    WBC 7 89 4 31 - 10 16 Thousand/uL    RBC 3 42 (L) 3 88 - 5 62 Million/uL    Hemoglobin 11 1 (L) 12 0 - 17 0 g/dL    Hematocrit 37 0 36 5 - 49 3 %     (H) 82 - 98 fL    MCH 32 5 26 8 - 34 3 pg    MCHC 30 0 (L) 31 4 - 37 4 g/dL    RDW 12 7 11 6 - 15 1 %    Platelets 973 129 - 874 Thousands/uL    MPV 9 2 8 9 - 12 7 fL   Basic metabolic panel    Collection Time: 05/01/23  5:26 AM   Result Value Ref Range    Sodium 138 135 - 147 mmol/L    Potassium 4 2 3 5 - 5 3 mmol/L    Chloride 94 (L) 96 - 108 mmol/L    CO2 40 (H) 21 - 32 mmol/L    ANION GAP 4 4 - 13 mmol/L    BUN 9 5 - 25 mg/dL    Creatinine 0 46 (L) 0 60 - 1 30 mg/dL    Glucose 114 65 - 140 mg/dL    Calcium 8 8 8 4 - 10 2 mg/dL    eGFR 114 ml/min/1 73sq m   Protime-INR    Collection Time: 05/01/23  5:26 AM   Result Value Ref Range    Protime 34 3 (H) 11 6 - 14 5 seconds    INR 3 36 (H) 0 84 - 1 19        Ladan Deana MS3

## 2023-05-01 NOTE — CASE MANAGEMENT
Case Management Progress Note    Patient name Hu Trimble  Location S /S -43 MRN [id number]  : 1955 Date 2023       LOS (days): 24  Geometric Mean LOS (GMLOS) (days): 3 90  Days to GMLOS:-20 3        OBJECTIVE:        Current admission status: Inpatient  Preferred Pharmacy:   [address], [place] - [address]  [address]  [address] [address]  Phone: [phone] Fax: [phone]    Primary Care Provider: Vilma Saywer MD    Primary Insurance: [facility]  Secondary Insurance:     PROGRESS NOTE:    TT received from Cash Cantu at [address] relaying that per review with physician, they can continue to follow, but patient unable to tolerate 3 hours of therapy at this time, so unable to offer a bed  PT/OT notes attached in IRF referral for [facility] to review  Message received in [address] from [facility] relaying that they will review patient with administration in AM and follow-up with determination  Awaiting return call from [facility] re: whether or not they are able to accept  Referrals sent to additional facilities that advertise bariatric programs for consideration; awaiting responses

## 2023-05-01 NOTE — CASE MANAGEMENT
"   Case Management Progress Note    Patient name Claudia Briggs  Location S /S -76 MRN [id number]  : 1955 Date 2023       LOS (days): 24  Geometric Mean LOS (GMLOS) (days): 3 90  Days to GMLOS:-20 3        OBJECTIVE:        Current admission status: Inpatient  Preferred Pharmacy:   [facility] [place] - [address]  [address] [place] [address]  Phone: [phone] Fax: [phone]    Primary Care Provider: Berta Franklin MD    Primary Insurance: [address] [facility] [address] [facility] [address]  Secondary Insurance:     PROGRESS NOTE:    Per PT, patient has been able to get to a bariatric wheelchair - measurements are as follows: seat width - 28\", wheel to wheel - 36\" and depth - 19 5\"  Had not gotten hip-to-hip measurement for patient during session, but RN aware same needed and to obtain when transferring him back to bed  Wheelchair measurements forwarded to [facility] via [address] per their request  Reports they will follow-up with  and be in touch in AM with determination  Spoke with Tereza Hernandez at [facility]) who states that they may be able to accommodate patient for SNF admission  Requesting confirmation of current weight (490lbs) and states that she will call this writer back after she verifies their equipment can accommodate patient  Call received from Tabatha Powell at [facility]; reports they are unable to accommodate patient's over 300lbs  Message received from Christal Perez reports that they have concern about patient's ability to tolerate acute rehab, and that he will fall under a misc diagnosis for admission, which means patient will only have approximately a 10-day stay with need to transfer to home at d/c  Relayed that with goal to return home and limitation of only 10 days, he will most likely require SNF for admission   States that she will be " able to review again after PT/OT notes are in, as he was last seen on Friday, and PM&R consult may be helpful for continued consideration  TT sent to PT/OT and they report their notes will be in shortly for review  Met then with patient at bedside; he was on the phone with spouse, Vamsi Jose, when this writer entered the room  Spoke with both patient and spouse about possible option of [facility] and provided facility brochure from [address] for them to review  Both patient and spouse relayed concern about location (56 minutes from hospital) and patient's ability to have family visit secondary to distance  Patient/spouse stated that they spoke with liaison from [address] this morning, and would like to transfer to [facility] if that is an option  Reviewed that determination is still pending from acute rehab at this time, so [facility] is not a facility that can be considered at this time  Will follow-up with liaison/facility to see if they can offer a bed  Discussed that patient has been medically stable for discharge for at least a few weeks now, and still has a lot of progress to make in order to meet admission criteria for local SNF facilities - particularly that he is not requiring a nanette lift for transfers out of bed  Per conversation with PT/OT today, patient has been able to stand up from seated position in chair, but is still a dependent transfer out of bed using the nanette, and only making very slow progress with standing tolerance  Patient aware that if a nursing facility is found that states they can accommodate his needs (DME/weight, BiPap, o2, etc) than the hospital would not be able to continue to keep him hospitalized solely because he does not like the location of available facility  Relayed that referrals have been sent to an extended area, and will need him and spouse to consider any option available if they do not feel he can be transferred home        Patient and spouse relay wish to wait for [facility] decision prior to discussing alternative facilities  Aware that this writer will follow-up tomorrow to continue to discuss possible rehab options

## 2023-05-02 PROBLEM — K59.00 CONSTIPATION: Status: ACTIVE | Noted: 2023-05-02

## 2023-05-02 LAB
ANION GAP SERPL CALCULATED.3IONS-SCNC: 3 MMOL/L (ref 4–13)
BUN SERPL-MCNC: 9 MG/DL (ref 5–25)
CALCIUM SERPL-MCNC: 8.7 MG/DL (ref 8.4–10.2)
CHLORIDE SERPL-SCNC: 92 MMOL/L (ref 96–108)
CO2 SERPL-SCNC: 45 MMOL/L (ref 21–32)
CREAT SERPL-MCNC: 0.52 MG/DL (ref 0.6–1.3)
GFR SERPL CREATININE-BSD FRML MDRD: 108 ML/MIN/1.73SQ M
GLUCOSE SERPL-MCNC: 98 MG/DL (ref 65–140)
INR PPP: 3.09 (ref 0.84–1.19)
MAGNESIUM SERPL-MCNC: 1.9 MG/DL (ref 1.9–2.7)
POTASSIUM SERPL-SCNC: 3.6 MMOL/L (ref 3.5–5.3)
PROTHROMBIN TIME: 32.1 SECONDS (ref 11.6–14.5)
SODIUM SERPL-SCNC: 140 MMOL/L (ref 135–147)

## 2023-05-02 RX ORDER — BISACODYL 10 MG
10 SUPPOSITORY, RECTAL RECTAL DAILY
Status: DISCONTINUED | OUTPATIENT
Start: 2023-05-02 | End: 2023-05-03

## 2023-05-02 RX ADMIN — METHOCARBAMOL TABLETS 750 MG: 750 TABLET, COATED ORAL at 04:46

## 2023-05-02 RX ADMIN — OXYCODONE HYDROCHLORIDE 5 MG: 5 TABLET ORAL at 17:55

## 2023-05-02 RX ADMIN — PREGABALIN 150 MG: 75 CAPSULE ORAL at 17:55

## 2023-05-02 RX ADMIN — ACETAMINOPHEN 975 MG: 325 TABLET, FILM COATED ORAL at 22:05

## 2023-05-02 RX ADMIN — LEVALBUTEROL HYDROCHLORIDE 1.25 MG: 1.25 SOLUTION RESPIRATORY (INHALATION) at 21:26

## 2023-05-02 RX ADMIN — IPRATROPIUM BROMIDE 0.5 MG: 0.5 SOLUTION RESPIRATORY (INHALATION) at 14:39

## 2023-05-02 RX ADMIN — FUROSEMIDE 40 MG: 40 TABLET ORAL at 09:54

## 2023-05-02 RX ADMIN — LEVALBUTEROL HYDROCHLORIDE 1.25 MG: 1.25 SOLUTION RESPIRATORY (INHALATION) at 14:39

## 2023-05-02 RX ADMIN — GUAIFENESIN 1200 MG: 600 TABLET ORAL at 22:06

## 2023-05-02 RX ADMIN — FUROSEMIDE 40 MG: 40 TABLET ORAL at 17:56

## 2023-05-02 RX ADMIN — ACETAMINOPHEN 975 MG: 325 TABLET, FILM COATED ORAL at 04:46

## 2023-05-02 RX ADMIN — PANTOPRAZOLE SODIUM 40 MG: 40 TABLET, DELAYED RELEASE ORAL at 04:47

## 2023-05-02 RX ADMIN — POTASSIUM CHLORIDE 20 MEQ: 1500 TABLET, EXTENDED RELEASE ORAL at 09:54

## 2023-05-02 RX ADMIN — RISPERIDONE 1 MG: 1 TABLET ORAL at 17:56

## 2023-05-02 RX ADMIN — OXYCODONE HYDROCHLORIDE 5 MG: 5 TABLET ORAL at 12:03

## 2023-05-02 RX ADMIN — IPRATROPIUM BROMIDE 0.5 MG: 0.5 SOLUTION RESPIRATORY (INHALATION) at 07:52

## 2023-05-02 RX ADMIN — SENNOSIDES AND DOCUSATE SODIUM 2 TABLET: 8.6; 5 TABLET ORAL at 09:54

## 2023-05-02 RX ADMIN — SENNOSIDES AND DOCUSATE SODIUM 2 TABLET: 8.6; 5 TABLET ORAL at 17:57

## 2023-05-02 RX ADMIN — PREGABALIN 150 MG: 75 CAPSULE ORAL at 22:05

## 2023-05-02 RX ADMIN — GUAIFENESIN 1200 MG: 600 TABLET ORAL at 09:54

## 2023-05-02 RX ADMIN — LEVALBUTEROL HYDROCHLORIDE 1.25 MG: 1.25 SOLUTION RESPIRATORY (INHALATION) at 07:52

## 2023-05-02 RX ADMIN — PREGABALIN 150 MG: 75 CAPSULE ORAL at 09:54

## 2023-05-02 RX ADMIN — RISPERIDONE 1 MG: 1 TABLET ORAL at 09:54

## 2023-05-02 RX ADMIN — BISACODYL 10 MG: 10 SUPPOSITORY RECTAL at 12:04

## 2023-05-02 RX ADMIN — METHOCARBAMOL TABLETS 750 MG: 750 TABLET, COATED ORAL at 12:03

## 2023-05-02 RX ADMIN — BENZONATATE 200 MG: 100 CAPSULE ORAL at 17:56

## 2023-05-02 RX ADMIN — IPRATROPIUM BROMIDE 0.5 MG: 0.5 SOLUTION RESPIRATORY (INHALATION) at 21:26

## 2023-05-02 RX ADMIN — METHOCARBAMOL TABLETS 750 MG: 750 TABLET, COATED ORAL at 22:06

## 2023-05-02 RX ADMIN — POLYETHYLENE GLYCOL 3350 17 G: 17 POWDER, FOR SOLUTION ORAL at 09:55

## 2023-05-02 RX ADMIN — OXYCODONE HYDROCHLORIDE 5 MG: 5 TABLET ORAL at 04:46

## 2023-05-02 RX ADMIN — BENZONATATE 200 MG: 100 CAPSULE ORAL at 09:54

## 2023-05-02 RX ADMIN — ESCITALOPRAM OXALATE 5 MG: 10 TABLET ORAL at 22:06

## 2023-05-02 RX ADMIN — OXYCODONE HYDROCHLORIDE 5 MG: 5 TABLET ORAL at 22:05

## 2023-05-02 RX ADMIN — METHOCARBAMOL TABLETS 750 MG: 750 TABLET, COATED ORAL at 17:55

## 2023-05-02 RX ADMIN — RISPERIDONE 1 MG: 1 TABLET ORAL at 22:06

## 2023-05-02 RX ADMIN — METOPROLOL SUCCINATE 25 MG: 25 TABLET, EXTENDED RELEASE ORAL at 09:54

## 2023-05-02 NOTE — CASE MANAGEMENT
Case Management Discharge Planning Note    Patient name Remigio Goode S /S -16 MRN [id number]  : 1955 Date 2023       Current Admission Date: 2023  Current Admission Diagnosis:Chronic diastolic congestive heart failure [facility])   Patient Active Problem List    Diagnosis Date Noted   • Constipation 2023   • Adjustment disorder 2023   • Pulmonary embolism ([facility] [address] )    • COVID-19 2023   • Cerumen debris on tympanic membrane of both ears 2023   • Generalized weakness 2023   • Impaired functional mobility, balance, gait, and endurance 2023   • BPH (benign prostatic hyperplasia) 2023   • Chronic anticoagulation 2023   • Pressure injury, unstageable, with suspected deep tissue injury ([facility] [address] ) 2023   • Dyspnea on exertion 2023   • Morbid obesity ([facility] [address] ) 2023   • Primary hypertension 2023   • History of pulmonary embolism 2023   • CHF (congestive heart failure) ([facility] [address] ) 2023   • DVT (deep venous thrombosis) ([facility] [address] ) 2023   • Chronic diastolic congestive heart failure ([facility] [address] ) 2021   • DJD (degenerative joint disease), multiple sites 2021   • Ambulatory dysfunction 2021   • Chronic right-sided congestive heart failure ([facility] [address] ) 2021   • BMI 50 0-59 9, adult ([facility] [address] ) 2020   • Debility 2020   • Restrictive lung disease 2020   • Medical marijuana use 2018   • EVERETT (obstructive sleep apnea) 2018   • Obesity hypoventilation syndrome ([facility] [address] ) 2018   • Pulmonary hypertension ([facility] 01/15/2018   • Pain medication agreement signed 2017   • Neuralgia, post-herpetic 2017   • Sciatica 2012   • Fercho de la Tourette's syndrome 2012   • Chronic pain disorder 2012      LOS (days): 25  Geometric Mean LOS (GMLOS) (days): 3 90  Days to GMLOS:-21 4     OBJECTIVE:  Risk of Unplanned Readmission Score: 25 95         Current admission status: Inpatient "  Preferred Pharmacy:   [address] [place] - [address]  [address]  [address] [address]  Phone: [phone] Fax: [phone]    Primary Care Provider: Mamie Ramirez MD    Primary Insurance: [address] [facility]  Secondary Insurance:     DISCHARGE DETAILS:    Discharge planning discussed with[de-identified] patient at bedside; spouse, Killian Garvey on patient's phone (he was speaking with her when this writer entered the room)  Freedom of Choice: Yes (re: rehab)  Comments - Freedom of Choice: provided information on [facility] and [facility]; aware both are reviewing him  CM contacted family/caregiver?: Yes (spouse, Killian Garvey)  Were Treatment Team discharge recommendations reviewed with patient/caregiver?: Yes  Did patient/caregiver verbalize understanding of patient care needs?: N/A- going to facility  Were patient/caregiver advised of the risks associated with not following Treatment Team discharge recommendations?: Yes    Contacts  Patient Contacts: spouseKillian  Relationship to Patient[de-identified] Family  Contact Method: Phone  Reason/Outcome: Continuity of Care, Emergency Contact, Referral, Discharge Planning              Other Referral/Resources/Interventions Provided:  Interventions: SNF, Short Term Rehab  Referral Comments: Per RN, hip-to-hip measurement 37\" - same forwarded to following facilities  Updated weight obtained by nursing and patient now at 478lbs  Met with patient at bedside and he was on speaker phone with his spouse, Killian Garvey  Provided information on [facility] and [facility]  Both patient and spouse aware that neither [facility] nor [facility] are able to accept patient at this time  Both disappointed reporting hope for patient to stay within [address] [facility], as they feel this is the only place that has been making an effort to improve patient's ambulation   " Reviewed requirements of acute rehab- specifically ability to participate in 3 hours/day of therapy, limited timeframe of 10-14 days, and concern for a stable discharge plan (cited difficulty in finding a subacute facility to accept and inability for acute rehab facility to hold patient's indefinitely looking for alternative place if insurance is no longer covering rehab at their level of care)  Both patient and spouse aware that the hospital is working on finding a safe discharge destination, and if a bed offer is confirmed, they will need to consider it as his option, as the hospital will not continue to keep patient if alternative facilities can accommodate  Patient not happy about current facilities considering [facility] and [facility]) secondary to locations  Information on both at bedside and spouse aware  Patient reports that he feels he did really well today with therapy; aware that updated notes will be reviewed, but as long as he is still requiring nanette lift to get out of bed, local facilities will not be an option   [facility] is also reviewing referral, but they are located in [place] - forwarded them hip measurements per their request     Would you like to participate in our [address] service program?  : No - Declined    Treatment Team Recommendation: Short Term Rehab  Discharge Destination Plan[de-identified] Short Term Rehab  Transport at Discharge : [facility]

## 2023-05-02 NOTE — ASSESSMENT & PLAN NOTE
· No shortness of breath, continues on 4 L nasal cannula baseline oxygen  · Continue on p o  Lasix   40 BID  · Daily weights monitor input and output  · Pending placement to rehab, possible DC in 2-3 days

## 2023-05-02 NOTE — PHYSICAL THERAPY NOTE
Physical Therapy Treatment Note    Patient's Name: Duran Zhu  : 23 1344   PT Last Visit   PT Visit Date 23   Note Type   Note Type Treatment   Pain Assessment   Pain Assessment Tool 0-10   Pain Score 10 - Worst Possible Pain   Pain Location/Orientation Location: Back;Orientation: Bilateral   Effect of Pain on Daily Activities limits progression of transfer training   Restrictions/Precautions   Weight Bearing Precautions Per Order No   Other Precautions Cognitive; Chair Alarm; Bed Alarm; Fall Risk;O2;Pain  (4 L o2 via NC)   General   Response to Previous Treatment   (continued c/o pain)   Family/Caregiver Present No   Cognition   Overall Cognitive Status WFL   Orientation Level Oriented X4   Following Commands Follows one step commands with increased time or repetition   Subjective   Subjective pt up in recliner chair agreeable to PT intervention   Transfers   Sit to Stand 2  Maximal assistance   Additional items Assist x 2; Increased time required   Stand to Sit 2  Maximal assistance   Additional items Assist x 2; Increased time required   Additional Comments pt performed x4 STS this session, althought continuing to require max x 2 asssit pt demonstrated significant improvement in amount of assistance required, he was able to reach 100% standing with BL arms extended on night stand for 45 seconds, attempt x2 transfers to bariatric walker where patient was able to achieve about 60% standing position, however was severely limited by reported pain, pt was unable to tolerate further transfer training due to back spasms and pain, walking sling was utilized for pt and staff safety, however was not needed as assistance in order to perform transfer training   Balance   Static Sitting Fair +   Dynamic Sitting Fair   Static Standing Zero  (a x 2)   Activity Tolerance   Activity Tolerance Patient limited by pain   Nurse Made Aware RN whit pre/post made aware of pt reported pain   Medical Staff Made Aware care cooridinated with PTA mariaelena as A x 2   Assessment   Prognosis Fair   Problem List Decreased strength;Decreased endurance; Impaired balance;Decreased mobility; Decreased cognition; Impaired vision; Impaired judgement;Decreased safety awareness; Obesity; Decreased skin integrity;Pain   Assessment Pt demonstrated significant improvement in performance of transfers this session  He was able to reach a full standing position with max x 2 assist and held posture for 45 seconds  Attempted to transition to full standing with use of RW, x2 attempts, however pt became limited by significant back pain  RN was made aware  Pt requested session be held at this time due to his pain  Continue to reccomend post acute rehabilitation on discharge  Barriers to Discharge Inaccessible home environment   Goals   Patient Goals stand for one minute   STG Expiration Date 05/11/23   Short Term Goal #1 pt will:  Perform rolling and repositioning in bed w/no more than minx1 to left side using trapeze/rail to  decrease caregiver burden;  Perform supine <--> sitting edge of bed transition w/ modx1 to improve level of function and minimize need for sit->stand Trials from recliner as appropriate  Perform sit <---> stand transfers w/ max Ax1 to minimize burden of care, Perform 90% upright standing tolerance w/ UE support for 30 sec w/ A of 1 plus sling to promote longer standing tolerance and progress to pregait activities as appropriate  Assess gait and wide WC (as seated mobility target and potentially as mobility w/ Le's--even backward)  and set goals  PT Treatment Day 8   Plan   Treatment/Interventions Functional transfer training;LE strengthening/ROM; Endurance training; Therapeutic exercise;Cognitive reorientation;Patient/family training;Equipment eval/education; Bed mobility;Gait training;Spoke to nursing;Spoke to case management   Progress Progressing toward goals PT Frequency Other (Comment)  (mon-fri)   Recommendation   PT Discharge Recommendation Post acute rehabilitation services   Equipment Recommended Wheelchair;Walker   AM-PAC Basic Mobility Inpatient   Turning in Flat Bed Without Bedrails 2   Lying on Back to Sitting on Edge of Flat Bed Without Bedrails 1   Moving Bed to Chair 1   Standing Up From Chair Using Arms 1   Walk in Room 1   Climb 3-5 Stairs With Railing 1   Basic Mobility Inpatient Raw Score 7   Turning Head Towards Sound 4   Follow Simple Instructions 3   Low Function Basic Mobility Raw Score  14   Low Function Basic Mobility Standardized Score  22 01   Highest Level Of Mobility   JH-HLM Goal 2: Bed activities/Dependent transfer   JH-HLM Achieved 4: Move to chair/commode   Education   Education Provided Mobility training   Patient Reinforcement needed   End of Consult   Patient Position at End of Consult Bedside chair; All needs within reach     The patient's AM-PAC Basic Mobility Inpatient Short Form Raw Score is 7  A Raw score of less than or equal to 16 suggests the patient may benefit from discharge to post-acute rehabilitation services  Please also refer to the recommendation of the Physical Therapist for safe discharge planning            Fabiola Boyce, PT

## 2023-05-02 NOTE — ASSESSMENT & PLAN NOTE
· Recently DC to [facility]   PT/OT: post acute rehab  · Pending placement  · BMI 60  · Nutrition consulted for calorie management

## 2023-05-02 NOTE — PLAN OF CARE
Problem: PHYSICAL THERAPY ADULT  Goal: Performs mobility at highest level of function for planned discharge setting  See evaluation for individualized goals  Description: Treatment/Interventions: Functional transfer training, LE strengthening/ROM, Therapeutic exercise, Endurance training, Cognitive reorientation, Patient/family training, Equipment eval/education, Gait training, Bed mobility          See flowsheet documentation for full assessment, interventions and recommendations  Outcome: Progressing  Note: Prognosis: Fair  Problem List: Decreased strength, Decreased endurance, Impaired balance, Decreased mobility, Decreased cognition, Impaired vision, Impaired judgement, Decreased safety awareness, Obesity, Decreased skin integrity, Pain  Assessment: Pt demonstrated significant improvement in performance of transfers this session  He was able to reach a full standing position with max x 2 assist and held posture for 45 seconds  Attempted to transition to full standing with use of RW, x2 attempts, however pt became limited by significant back pain  RN was made aware  Pt requested session be held at this time due to his pain  Continue to reccomend post acute rehabilitation on discharge  Barriers to Discharge: Inaccessible home environment     PT Discharge Recommendation: Post acute rehabilitation services    See flowsheet documentation for full assessment

## 2023-05-02 NOTE — CASE MANAGEMENT
"   Case Management Progress Note    Patient name Remigio Reid  Location S /S -34 MRN [id number]  : 1955 Date 2023       LOS (days): 25  Geometric Mean LOS (GMLOS) (days): 3 90  Days to GMLOS:-21 2        OBJECTIVE:        Current admission status: Inpatient  Preferred Pharmacy:   [address], [place] - [address]  [address]  [address] [address]  Phone: [phone] Fax: [phone]    Primary Care Provider: David Rico MD    Primary Insurance: [facility]  Secondary Insurance:     PROGRESS NOTE:    Message received from Alen at [facility] and Rehab requesting call to discuss care  Call made to her ([phone]) to discuss patient's care needs  Per facility, the largest wheelchair they have is a 26\" wheelchair  Relayed that they have their own transport [facility], but it only can accommodate wheelchairs up to 26\" - will need to confirm size of wheelchair used here as two different measurements were given from PT (alternative was seat width of 26 5\", wheel to wheel 34 7/8\")  Aware that patient has also been requiring nanette lift out of bed; states that she believes their lift only goes up to 500lbs, but to confirm same  Reports hip-to-hip measurement would also be helpful; RN aware of same and to get today  Spoke with nodila and bariatric wheelchair measured again to confirm size with him present  Earnest Sawyer confirmed that wheelchair at hospital is a 26 \"     Call made to Edu ruiz at [facility] ([phone]) to follow-up on conversations from yesterday  Reports that she did confirm that facility is able to accommodate patient's weight and aware that he is using a nanette lift to get in/out of bed  Reports that she will verify bed availability and send information for insurance authorization (facility NPI and [place] information) once patient/family accepts bed offer   " PT/OT working with patient at this time  Will follow-up with patient after therapy is complete

## 2023-05-02 NOTE — PROGRESS NOTES
[facility]  Progress Note  Name: Sam Mayen  MRN: [id number]  Unit/Bed#: S -01 I Date of Admission: 4/7/2023   Date of Service: 5/2/2023 I Hospital Day: 25    Assessment/Plan   * Chronic diastolic congestive heart failure (HCC)  Assessment & Plan  · No shortness of breath, continues on 4 L nasal cannula baseline oxygen  · Continue on p o  Lasix  40 BID  · Daily weights monitor input and output  · Pending placement to rehab, possible DC in 2-3 days    Constipation  Assessment & Plan  Senokot/MiraLAX, added Dulcolax suppository scheduled today    Adjustment disorder  Assessment & Plan  Seen by psychiatry  Started on Lexapro 5 mg daily    History of pulmonary embolism  Assessment & Plan  · History of DVT, PE  · INR therapeutic  · Continue with warfarin  7 5 mg INR permitting  · INR a m     EVERETT (obstructive sleep apnea)  Assessment & Plan  · Hx of EVERETT, morbid obesity, obesity hypoventilation syndrome   · Continue BiPAP HS     Primary hypertension  Assessment & Plan  · BP stable  · Continue metoprolol and Lasix 40mg bid      Morbid obesity ([address] )  Assessment & Plan  · Recently DC to [facility]   PT/OT: post acute rehab  · Pending placement  · BMI 60  · Nutrition consulted for calorie management  VTE Pharmacologic Prophylaxis: VTE Score: 10 High Risk (Score >/= 5) - Pharmacological DVT Prophylaxis Ordered: warfarin (Coumadin)  Sequential Compression Devices Ordered  Patient Centered Rounds: I performed bedside rounds with nursing staff today  Discussions with Specialists or Other Care Team Provider:     Education and Discussions with Family / Patient: Patient declined call to   Current Length of Stay: 25 day(s)  Current Patient Status: Inpatient   Discharge Plan: Pending placement    Code Status: Level 1 - Full Code    Subjective:   Patient states that he does not indulge in salty foods or foods other than recommended    Not feel like he has gained any weight  Shortness of breath  Would like suppository today as he still has not had bowel movement  Objective:     Vitals:   Temp (24hrs), Av °F (36 7 °C), Min:97 8 °F (36 6 °C), Max:98 1 °F (36 7 °C)    Temp:  [97 8 °F (36 6 °C)-98 1 °F (36 7 °C)] 98 1 °F (36 7 °C)  HR:  [92-94] 94  Resp:  [17-20] 17  BP: ()/(65-74) 115/65  SpO2:  [92 %-95 %] 94 %  Body mass index is 61 42 kg/m²  Input and Output Summary (last 24 hours): Intake/Output Summary (Last 24 hours) at 2023 1643  Last data filed at 2023 1601  Gross per 24 hour   Intake 1800 ml   Output 1125 ml   Net 675 ml       Physical Exam:   Physical Exam  Vitals and nursing note reviewed  Constitutional:       General: He is not in acute distress  Appearance: He is well-developed  He is obese  He is not ill-appearing, toxic-appearing or diaphoretic  Comments: On 4 L, but no conversational dyspnea,  Lung sounds distant but sound clear  Wrinkled bilateral legs   HENT:      Head: Normocephalic and atraumatic  Eyes:      Conjunctiva/sclera: Conjunctivae normal    Cardiovascular:      Rate and Rhythm: Normal rate and regular rhythm  Heart sounds: No murmur heard  Pulmonary:      Effort: Pulmonary effort is normal  No respiratory distress  Breath sounds: Normal breath sounds  Abdominal:      Palpations: Abdomen is soft  Musculoskeletal:         General: No swelling  Cervical back: Neck supple  Skin:     General: Skin is warm and dry  Neurological:      Mental Status: He is alert            Additional Data:     Labs:  Results from last 7 days   Lab Units 23  0526 23  1403   WBC Thousand/uL 7 89 7 71   HEMOGLOBIN g/dL 11 1* 11 6*   HEMATOCRIT % 37 0 38 0   PLATELETS Thousands/uL 305 273   NEUTROS PCT %  --  77*   LYMPHS PCT %  --  13*   MONOS PCT %  --  7   EOS PCT %  --  2     Results from last 7 days   Lab Units 23  0435   SODIUM mmol/L 140   POTASSIUM mmol/L 3 6   CHLORIDE mmol/L 92* CO2 mmol/L 45*   BUN mg/dL 9   CREATININE mg/dL 0 52*   ANION GAP mmol/L 3*   CALCIUM mg/dL 8 7   GLUCOSE RANDOM mg/dL 98     Results from last 7 days   Lab Units 05/02/23  0435   INR  3 09*                   Lines/Drains:  Invasive Devices     None                       Imaging: No pertinent imaging reviewed      Recent Cultures (last 7 days):         Last 24 Hours Medication List:   Current Facility-Administered Medications   Medication Dose Route Frequency Provider Last Rate   • acetaminophen  975 mg Oral Q8H [address] Leona Boyer MD     • albuterol  2 5 mg Nebulization Q4H PRN Brayan Colindres MD     • benzonatate  200 mg Oral TID Leona Boyer MD     • bisacodyl  10 mg Rectal Daily PRN Lucretia Abebe MD     • bisacodyl  10 mg Rectal Daily Lesli Fong MD     • dextromethorphan-guaiFENesin  10 mL Oral Q4H PRN Keisha Adame MD     • escitalopram  5 mg Oral HS Gilda Hsu MD     • fluticasone  2 spray Each Nare Daily Deuce Pichardo MD     • furosemide  40 mg Oral BID (diuretic) Deuce Pichardo MD     • guaiFENesin  1,200 mg Oral Q12H [address] Deuce Pichardo MD     • hydrOXYzine HCL  50 mg Oral Q6H PRN Deuce Pichardo MD     • ipratropium  0 5 mg Nebulization TID Brennan Kinney MD     • levalbuterol  1 25 mg Nebulization TID Brennan Kinney MD     • methocarbamol  750 mg Oral Q6H PRN Brayan Colindres MD     • metoprolol succinate  25 mg Oral Daily Olesya Saba MD     • ondansetron  4 mg Oral Q6H PRN Lucretia Abebe MD     • oxyCODONE  5 mg Oral Q6H PRN Dania Narayanan MD     • pantoprazole  40 mg Oral Early Morning Leona Boyer MD     • polyethylene glycol  17 g Oral Daily Deuce Pichardo MD     • potassium chloride  20 mEq Oral Daily Olesya Saba MD     • pregabalin  150 mg Oral TID Brayan Colindres MD     • risperiDONE  1 mg Oral TID Brayan Colindres MD     • senna-docusate sodium  2 tablet Oral BID Lesli Fong MD     • simethicone  80 mg Oral Q6H PRN Manuela Pedro MD     • sodium chloride  1 spray Each Nare Q1H PRN Billy Dunn MD          Today, Patient Was Seen By: Jonathan Yusuf MD    **Please Note: This note may have been constructed using a voice recognition system  **

## 2023-05-03 RX ORDER — LORATADINE 10 MG/1
10 TABLET ORAL DAILY PRN
Status: DISCONTINUED | OUTPATIENT
Start: 2023-05-03 | End: 2023-05-06 | Stop reason: HOSPADM

## 2023-05-03 RX ORDER — KETOTIFEN FUMARATE 0.35 MG/ML
1 SOLUTION/ DROPS OPHTHALMIC 2 TIMES DAILY
Status: DISCONTINUED | OUTPATIENT
Start: 2023-05-03 | End: 2023-05-06 | Stop reason: HOSPADM

## 2023-05-03 RX ADMIN — METOPROLOL SUCCINATE 25 MG: 25 TABLET, EXTENDED RELEASE ORAL at 10:02

## 2023-05-03 RX ADMIN — ACETAMINOPHEN 975 MG: 325 TABLET, FILM COATED ORAL at 21:24

## 2023-05-03 RX ADMIN — FUROSEMIDE 40 MG: 40 TABLET ORAL at 10:03

## 2023-05-03 RX ADMIN — POTASSIUM CHLORIDE 20 MEQ: 1500 TABLET, EXTENDED RELEASE ORAL at 10:03

## 2023-05-03 RX ADMIN — METHOCARBAMOL TABLETS 750 MG: 750 TABLET, COATED ORAL at 13:12

## 2023-05-03 RX ADMIN — GUAIFENESIN 1200 MG: 600 TABLET ORAL at 21:25

## 2023-05-03 RX ADMIN — METHOCARBAMOL TABLETS 750 MG: 750 TABLET, COATED ORAL at 05:35

## 2023-05-03 RX ADMIN — OXYCODONE HYDROCHLORIDE 5 MG: 5 TABLET ORAL at 13:12

## 2023-05-03 RX ADMIN — PREGABALIN 150 MG: 75 CAPSULE ORAL at 21:24

## 2023-05-03 RX ADMIN — OXYCODONE HYDROCHLORIDE 5 MG: 5 TABLET ORAL at 05:35

## 2023-05-03 RX ADMIN — ESCITALOPRAM OXALATE 5 MG: 10 TABLET ORAL at 21:24

## 2023-05-03 RX ADMIN — LORATADINE 10 MG: 10 TABLET ORAL at 21:24

## 2023-05-03 RX ADMIN — KETOTIFEN FUMARATE 1 DROP: 0.25 SOLUTION/ DROPS OPHTHALMIC at 21:24

## 2023-05-03 RX ADMIN — PREGABALIN 150 MG: 75 CAPSULE ORAL at 17:46

## 2023-05-03 RX ADMIN — GUAIFENESIN 1200 MG: 600 TABLET ORAL at 10:02

## 2023-05-03 RX ADMIN — ACETAMINOPHEN 975 MG: 325 TABLET, FILM COATED ORAL at 05:35

## 2023-05-03 RX ADMIN — LEVALBUTEROL HYDROCHLORIDE 1.25 MG: 1.25 SOLUTION RESPIRATORY (INHALATION) at 19:32

## 2023-05-03 RX ADMIN — RISPERIDONE 1 MG: 1 TABLET ORAL at 10:03

## 2023-05-03 RX ADMIN — RISPERIDONE 1 MG: 1 TABLET ORAL at 17:46

## 2023-05-03 RX ADMIN — IPRATROPIUM BROMIDE 0.5 MG: 0.5 SOLUTION RESPIRATORY (INHALATION) at 19:32

## 2023-05-03 RX ADMIN — PANTOPRAZOLE SODIUM 40 MG: 40 TABLET, DELAYED RELEASE ORAL at 05:35

## 2023-05-03 RX ADMIN — IPRATROPIUM BROMIDE 0.5 MG: 0.5 SOLUTION RESPIRATORY (INHALATION) at 08:00

## 2023-05-03 RX ADMIN — POLYETHYLENE GLYCOL 3350 17 G: 17 POWDER, FOR SOLUTION ORAL at 10:02

## 2023-05-03 RX ADMIN — OXYCODONE HYDROCHLORIDE 5 MG: 5 TABLET ORAL at 20:02

## 2023-05-03 RX ADMIN — FUROSEMIDE 40 MG: 40 TABLET ORAL at 17:46

## 2023-05-03 RX ADMIN — ACETAMINOPHEN 975 MG: 325 TABLET, FILM COATED ORAL at 15:14

## 2023-05-03 RX ADMIN — LEVALBUTEROL HYDROCHLORIDE 1.25 MG: 1.25 SOLUTION RESPIRATORY (INHALATION) at 08:00

## 2023-05-03 RX ADMIN — PREGABALIN 150 MG: 75 CAPSULE ORAL at 10:02

## 2023-05-03 RX ADMIN — METHOCARBAMOL TABLETS 750 MG: 750 TABLET, COATED ORAL at 20:02

## 2023-05-03 RX ADMIN — SENNOSIDES AND DOCUSATE SODIUM 2 TABLET: 8.6; 5 TABLET ORAL at 10:02

## 2023-05-03 RX ADMIN — SIMETHICONE 80 MG: 80 TABLET, CHEWABLE ORAL at 15:14

## 2023-05-03 RX ADMIN — RISPERIDONE 1 MG: 1 TABLET ORAL at 21:24

## 2023-05-03 NOTE — OCCUPATIONAL THERAPY NOTE
"  Occupational Therapy Progress Note     Patient Name: Remigio Reid  [id number]'[id number] Date: 5/3/2023  Problem List  Principal Problem:    Chronic diastolic congestive heart failure ([facility] [address] )  Active Problems: Morbid [facility] )    Primary hypertension    EVERETT (obstructive sleep apnea)    History of pulmonary embolism    Adjustment disorder    Constipation        05/03/23 1418   OT Last Visit   OT Visit Date 05/03/23   Note Type   Note Type Treatment   Pain Assessment   Pain Assessment Tool 0-10   Pain Score 6   Pain Location/Orientation Location: Back   Pain Onset/Description Onset: Ongoing; Descriptor: Aching   Effect of Pain on Daily Activities limits activity tolerance and comfort   Patient's Stated Pain Goal No pain   Hospital Pain Intervention(s) Repositioned; Ambulation/increased activity; Emotional support   Restrictions/Precautions   Weight Bearing Precautions Per Order No   Other Precautions Cognitive; Bed Alarm; Chair Alarm;O2;Fall Risk;Pain  (4L O2 NC)   Lifestyle   Autonomy Pt admitted from [facility], has hx of multiple recent hospitalizations  At baseline lives c his spouse   Reciprocal Relationships supportive spouse and son  Service to Others retired   Intrinsic Gratification Expressing that his goal is to attend his sons wedding in June  ADL   LB Dressing Comments Attempted to engage pt in LB dressing with use of long handled AD however pt declined participation stating \"I dont think that's very important right now\"  Despite education on goals to improve patients independence with self care, pt continues to decline participation   Will cotninue to attempt to engage pt in ADL related goals   Functional Standing Tolerance   Time variable 10-20 seconds for 4/5 trials, on last trial pt able to tolerate 1 minute stand   Activity Functional standing tolerance   Comments Mod A x 2 to maintain standing with BLE knee block and BUE support on night stand   Bed Mobility   Additional Comments Pt received in recliner and " "returned at end of session   Transfers   Sit to Stand 2  Maximal assistance   Additional items Assist x 2; Increased time required;Verbal cues   Stand to Sit 2  Maximal assistance   Additional items Assist x 2; Increased time required;Verbal cues   Additional Comments x 5 attempts at STS from recliner with continued max A x 2 with BLE knee block and BUE support in nightstand placed against wall  Pt continues to be limited by back pain/spasms however with maximal encouragement/motivation pt is able to achieve 1 minute stand  Continued use fo walking sling for safety only, sling is not being used to assist with functional transfers  Right Upper Extremity- Strength   RUE Strength Comment Pt provided with theraband this session to increase participation/provide increased motivation to perform UE exercises  Pt provided with printed handout with pictures for numerous UE exerises, demonstrating ability to perform with doubled yellow theraband while sitting in recliner  Pt verbalized understanding to complete exercise packet 3x/day to improve overall strength and tolerance in UEs  Left Upper Extremity-Strength   LUE Strength Comment See above   Subjective   Subjective \"It's important to me for my wife to be able to visit me\"   Cognition   Overall Cognitive Status [facility]   Arousal/Participation Alert; Cooperative   Attention Within functional limits   Orientation Level Oriented X4   Memory Decreased recall of precautions   Following Commands Follows one step commands without difficulty   Comments Pt continues to be pleasant  Required motivation and continued support to provide maximal effort during session   Activity Tolerance   Activity Tolerance Patient limited by fatigue;Patient limited by pain   Medical Staff Rashi coordinated with PT Agata Granados, BIANCA Finn updated on outcomes   Assessment   Assessment Pt agreeable to OT treatment session, upon arrival patient was found seated OOB to Recliner   Patient participated in " skilled OT interventions to address ADL tasks, functional transfers, and overall activity tolerance and participation  In comparison to previous session, patient continues to require max encouragement to provide maximal effort during sessions and continues to require max A x 2 to perform STS from recliner  Pt continues to decline participation in ADLs stating he would rather focus on mobility before self-care despite various ways to engage pt  Patient to benefit from continued IPOT treatment to address deficits as defined above and maximize level of functional independence with ADLs and functional mobility  Plan   Treatment Interventions ADL retraining;Functional transfer training;UE strengthening/ROM; Endurance training;Patient/family training;Equipment evaluation/education; Compensatory technique education;Continued evaluation; Energy conservation; Activityengagement   Goal Expiration Date 05/11/23   OT Treatment Day 11   OT Frequency 3-5x/wk   Recommendation   OT Discharge Recommendation Post acute rehabilitation services   Additional Comments  This session, pt required and most appropriately benefited from skilled OT/PT co-treat due to extensive physical assistance of SKILLED therapists, significant regression from functional baseline and decreased activity tolerance  OT and PT goals were addressed separately as seen in documentation  Additional Comments 2 The patient's raw score on the AM-PAC Daily Activity inpatient short form is 15, standardized score is 34 69, less than 39 4  From an OT standpoint, patients at this level may benefit from d/c to Post acute rehabilitation services     AM-[facility] Daily Activity Inpatient   Lower Body Dressing 1   Bathing 2   Toileting 1   Upper Body Dressing 3   Grooming 4   Eating 4   Daily Activity Raw Score 15   Daily Activity Standardized Score (Calc for Raw Score >=11) 34 69   AM-PAC Applied Cognition Inpatient   Following a Speech/Presentation 3   Understanding Ordinary Conversation 4   Taking Medications 3   Remembering Where Things Are Placed or Put Away 4   Remembering List of 4-5 Errands 3   Taking Care of Complicated Tasks 3   Applied Cognition Raw Score 20   Applied Cognition Standardized Score 41 76   End of Consult   Patient Position at End of Consult Bedside chair; All needs within reach   Nurse Communication Nurse aware of consult     Torin Wu OTR/L  [place] License [id number]  [address] [facility] [id number]

## 2023-05-03 NOTE — PLAN OF CARE
Problem: PHYSICAL THERAPY ADULT  Goal: Performs mobility at highest level of function for planned discharge setting  See evaluation for individualized goals  Description: Treatment/Interventions: Functional transfer training, LE strengthening/ROM, Therapeutic exercise, Endurance training, Cognitive reorientation, Patient/family training, Equipment eval/education, Gait training, Bed mobility          See flowsheet documentation for full assessment, interventions and recommendations  Note: Prognosis: Fair  Problem List: Decreased strength, Decreased endurance, Impaired balance, Decreased mobility, Decreased coordination, Decreased cognition, Decreased safety awareness, Obesity, Pain  Assessment: PT met previously set goal of standing for one minute  Does continue to require assist x 2, however demonstrates adequate LE and trunk strength to clear hips from sitting surface and initiate sit to stand transfer  Pt does continue to be limited by his back pain and today's session was also limited by patient desire to perform BM  Patient encouraged throughout session, reviewed progress made this week alone in performance of transfers  Pt stated understanding  Continue to reccomend post acute rehabilitation  Barriers to Discharge: Inaccessible home environment     PT Discharge Recommendation: Post acute rehabilitation services    See flowsheet documentation for full assessment

## 2023-05-03 NOTE — ASSESSMENT & PLAN NOTE
· No shortness of breath, continues on 4 L nasal cannula baseline oxygen  · Continue on p o  Lasix   40 BID  · Daily weights monitor input and output  · Pending placement to rehab, patient has 2 facilities available, will discuss with family tomorrow, patient will need auth

## 2023-05-03 NOTE — ASSESSMENT & PLAN NOTE
Senokot/MiraLAX  Status post Dulcolax suppository yesterday with significant bowel movement leading to about 10 pound weight loss

## 2023-05-03 NOTE — CASE MANAGEMENT
Case Management Progress Note    Patient name Anup Garvey  Location S /S -50 MRN [id number]  : 1955 Date 5/3/2023       LOS (days): 26  Geometric Mean LOS (GMLOS) (days): 3 90  Days to GMLOS:-22 4        OBJECTIVE:        Current admission status: Inpatient  Preferred Pharmacy:   [address], [place] - [address] [facility]  [address]  [address] [address]  Phone: [phone] Fax: [phone]    Primary Care Provider: Rick Cuevas MD    Primary Insurance: [address] [place] [address]  Secondary Insurance:     PROGRESS NOTE:    [facility] Alen [facility] requesting coordination of video visit to confirm patient able to get in/out of wheelchair prior to confirming bed offer   Informed that same can probably be coordinated with therapy, but this writer will follow-up with her tomorrow after family meeting in AM

## 2023-05-03 NOTE — CASE MANAGEMENT
Case Management Progress Note    Patient name Aziza Valentin  Location S /S -94 MRN [id number]  : 1955 Date 5/3/2023       LOS (days): 26  Geometric Mean LOS (GMLOS) (days): 3 90  Days to GMLOS:-22 4        OBJECTIVE:        Current admission status: Inpatient  Preferred Pharmacy:   [address], [place] - [address]  [address]  [address] [address]  Phone: [phone] Fax: [phone]    Primary Care Provider: Rivas Ye MD    Primary Insurance: [facility]  Secondary Insurance:     PROGRESS NOTE:    At this time, bed offers have been received from [facility] in [place] and [facility] in [place], [place] pending insurance authorization  Additional facilities still reviewing and likely able to accept as well are Inocencia Hurd ([facility]), [facility] in [place], [place] and [facility] in [place], [place]  Met with patient at bedside and provided printed information on four of the five facilities above  Patient did not want to consider facility in [facility] secondary to distance, and particularly concerned about cost of ambulance transport as he states he gets billed per mile sent  Patient very discouraged that d/c is being pursued and unhappy about options currently available, as all are at least an hour from this area  Reviewed that given current needs (nanette lift for transfers out of bed), local facilities have not been willing to consider him for admission, and the hospital cannot continue to justify keeping him in the hospital for rehab while having multiple options to choose from  Reviewed process of obtaining auth for one of the facilities above and coordinating his discharge  Patient did not want to choose preference between facilities, so aware that spouse to be contacted   Patient states that he feels we are pushing him out of the hospital and it will be a detriment to his health - states that he plans to reach out to his  to see what can be done to keep him in the hospital  Copy of IMM provided to patient and appeal rights explained to him  Patient aware that while he can appeal his discharge - once insurance auth is obtained for a subacute facility, appeal will likely be in favor for hospital discharge as he also is in agreement that he no longer meets criteria to remain hospitalized  Call made to patient's spouse, Killian Favors, and reviewed everything discussed with patient  Spouse also relays dissatisfaction of having to make decision about discharge location and reports she's very unhappy that Rigo Rogers had denied him for [facility] as she feels he will be best suited remaining within our system, as he's been making progress with therapy while in the hospital  Reviewed ARC criteria and reasons for denial, but spouse still upset that only available options are outside of the area  Relays that patient's mental health will be affected if family is unable to visit, and states she doesn't have time to visit these facilities to make a decision  States that she's relying on admissions teams and this writer to say they can accommodate patient's size/needs, when past experience has shown otherwise  Spouse aware that information has been provided for these facilities since Monday, when potential bed offers have been coming in  Spouse encouraged to contact facilities to discuss her concerns and patient's care needs so that she is more comfortable making decision about which location they would want him to go  Spouse also states she does not want to consider [place] facility, and preference is to remain in [place]  Discussed arranging team meeting for tomorrow, 5/4 and she was in agreement with same  Reports she will be to hospital around 10:30am and will plan to meet with this writer, OJ GALEANA and MD to review need for d/c and rehab transfer   TT sent to MD to relay plan for meeting tomorrow AM  PT also notified of same

## 2023-05-03 NOTE — UTILIZATION REVIEW
Continued Stay Review    Date: 5/2/23                          Current Patient Class: IP  Current Level of Care: Med Surg    HPI:67 y o  male initially admitted on 4/7     Assessment/Plan: medically stable awaiting rehab placement  Placement barriers include morbid obesity  CM speaking with rehab concerning pt's wheelchair and nanette lift needs to see if he can be accommodated       Vital Signs:     Date/Time Temp Pulse Resp BP MAP (mmHg) SpO2 FiO2 (%) Calculated FIO2 (%) - Nasal Cannula O2 Flow Rate (L/min) Nasal Cannula O2 Flow Rate (L/min) O2 Device   05/02/23 2313 -- -- -- -- -- 93 % -- -- -- -- --   05/02/23 22:02:51 98 °F (36 7 °C) 85 22 127/63 84 95 % -- -- -- -- --   05/02/23 2130 -- -- -- -- -- -- 35 -- -- -- --   05/02/23 2126 -- -- -- -- -- 91 % -- 36 -- 4 L/min Nasal cannula   05/02/23 15:46:04 98 1 °F (36 7 °C) 94 17 115/65 82 94 % -- -- -- -- --   05/02/23 1439 -- -- -- -- -- -- -- 36 4 L/min 4 L/min Nasal cannula   05/02/23 08:37:25 97 8 °F (36 6 °C) 92 20 99/67 78 92 % -- -- -- -- --   05/02/23 0752 -- -- -- -- -- -- -- 36 4 L/min 4 L/min Nasal cannula   05/02/23 0313 -- -- -- -- -- 93 % -- -- -- -- --   05/01/23 2357 -- -- -- -- -- 92 % -- -- -- -- --   05/01/23 2200 -- -- -- -- -- -- 35 -- -- -- --   05/01/23 2100 98 °F (36 7 °C) 93 18 124/74 81 95 % -- 36 -- 4 L/min None (Room air)   05/01/23 2040 -- -- -- -- -- 95 % -- 36 -- 4 L/min Nasal cannula   05/01/23 15:38:50 98 6 °F (37 °C) 91 20 120/58 79 90 % -- -- -- -- --   05/01/23 1411 -- -- -- -- -- 93 % -- 36 4 L/min 4 L/min Nasal cannula   05/01/23 08:23:15 98 1 °F (36 7 °C) 92 20 115/65 82 93 % -- -- -- -- --   05/01/23 0746 -- -- -- -- -- -- -- 36 4 L/min 4 L/min Nasal cannula   05/01/23 0331 -- -- -- -- -- 93 % -- -- -- -- --       Pertinent Labs/Diagnostic Results:       Results from last 7 days   Lab Units 05/01/23  0526 04/30/23  1403   WBC Thousand/uL 7 89 7 71   HEMOGLOBIN g/dL 11 1* 11 6*   HEMATOCRIT % 37 0 38 0   PLATELETS Thousands/uL 305 273   NEUTROS ABS Thousands/µL  --  5 90         Results from last 7 days   Lab Units 05/02/23 0435 05/01/23  0526 04/30/23  1403 04/29/23  0607   SODIUM mmol/L 140 138 138 140   POTASSIUM mmol/L 3 6 4 2 4 1 4 2   CHLORIDE mmol/L 92* 94* 95* 95*   CO2 mmol/L 45* 40* 42* 41*   ANION GAP mmol/L 3* 4 1* 4   BUN mg/dL 9 9 8 9   CREATININE mg/dL 0 52* 0 46* 0 50* 0 49*   EGFR ml/min/1 73sq m 108 114 110 111   CALCIUM mg/dL 8 7 8 8 8 7 9 1   MAGNESIUM mg/dL 1 9  --   --   --              Results from last 7 days   Lab Units 05/02/23 0435 05/01/23  0526 04/30/23  1403 04/29/23  0607   GLUCOSE RANDOM mg/dL 98 114 154* 108         Results from last 7 days   Lab Units 05/02/23 0435 05/01/23  0526 04/30/23  0437   PROTIME seconds 32 1* 34 3* 32 2*   INR  3 09* 3 36* 3 10*           Medications:   Scheduled Medications:  acetaminophen, 975 mg, Oral, Q8H GRIFFIN  benzonatate, 200 mg, Oral, TID  bisacodyl, 10 mg, Rectal, Daily  escitalopram, 5 mg, Oral, HS  fluticasone, 2 spray, Each Nare, Daily  furosemide, 40 mg, Oral, BID (diuretic)  guaiFENesin, 1,200 mg, Oral, Q12H GIRFFIN  ipratropium, 0 5 mg, Nebulization, TID  levalbuterol, 1 25 mg, Nebulization, TID  metoprolol succinate, 25 mg, Oral, Daily  pantoprazole, 40 mg, Oral, Early Morning  polyethylene glycol, 17 g, Oral, Daily  potassium chloride, 20 mEq, Oral, Daily  pregabalin, 150 mg, Oral, TID  risperiDONE, 1 mg, Oral, TID  senna-docusate sodium, 2 tablet, Oral, BID      Continuous IV Infusions:     PRN Meds:  albuterol, 2 5 mg, Nebulization, Q4H PRN  bisacodyl, 10 mg, Rectal, Daily PRN  dextromethorphan-guaiFENesin, 10 mL, Oral, Q4H PRN  hydrOXYzine HCL, 50 mg, Oral, Q6H PRN  methocarbamol, 750 mg, Oral, Q6H PRN  ondansetron, 4 mg, Oral, Q6H PRN  oxyCODONE, 5 mg, Oral, Q6H PRN  simethicone, 80 mg, Oral, Q6H PRN  sodium chloride, 1 spray, Each Nare, Q1H PRN        Discharge Plan: TBD    Network Utilization Review Department  ATTENTION: Please call with any questions or concerns to [phone] and carefully listen to the prompts so that you are directed to the right person  All voicemails are confidential   Mendy Rashid all requests for admission clinical reviews, approved or denied determinations and any other requests to dedicated fax number below belonging to the campus where the patient is receiving treatment   List of dedicated fax numbers for the Facilities:  [address] DENIALS (Administrative/Medical Necessity) [phone]   [address] (Maternity/NICU/Pediatrics) [phone]   7 Aparna Gould [phone]   [facility] 77 [phone]   [address] [address] [facility] 28 [phone]   1558 [facility] Nieves Tee [facility] 134 815 [facility] [phone]

## 2023-05-03 NOTE — PHYSICAL THERAPY NOTE
Physical Therapy Treatment Note    Patient's Name: Aziza Valentin  : 23 1416   PT Last Visit   PT Visit Date 23   Note Type   Note Type Treatment   Pain Assessment   Pain Assessment Tool 0-10   Pain Score 6   Pain Location/Orientation Orientation: Upper; Location: Back;Orientation: Left  (shoulder blade)   Effect of Pain on Daily Activities limits activity tolerance   Restrictions/Precautions   Weight Bearing Precautions Per Order No   Other Precautions Cognitive; Chair Alarm; Bed Alarm;O2;Fall Risk;Pain  (4 L O2)   General   Chart Reviewed Yes   Family/Caregiver Present No   Cognition   Following Commands Follows one step commands without difficulty   Subjective   Subjective pt up in recliner, upset over news of facilities that are too far from home   Bed Mobility   Additional Comments pt up in recliner at start and end of session   Transfers   Sit to Stand 2  Maximal assistance   Additional items Assist x 2; Increased time required;Verbal cues   Stand to Sit 2  Maximal assistance   Additional items Assist x 2; Increased time required;Verbal cues   Additional Comments x5 STS transfer attempts, pt longest standing attempt of 60 seconds, requiring max encouragement to perform, patient demonstrates significant improvement in ability to clear hips from sitting surface and transition to standing position  continues to require facilitation of BL Knee block to promote knee extension and cues for improved hip extension and standing posture  contine to utilize walking sling for pt and therapist safety   Balance   Static Sitting Fair +   Dynamic Sitting Fair   Static Standing Zero   Activity Tolerance   Activity Tolerance Patient limited by pain; Other (Comment)  (desire to have BM)   Nurse Made Aware BIANCA Mccarty Staff Made Aware care coordinated with SAMANTHA Reese   Co-treatment with OT as patient's participation in therapy services was limited by decreased activity tolerance and current medical status  Co-treatment was performed in order to facilitate optimal performance in therapy services and maximize their rehabilitation during treatment time  PT and OT disciplines addressed separate goals of patient's individualized care plan     Exercises   Neuro re-ed demonstrates ability to reach outside seated YONATHAN, grab objects and return without issue, difficulty with propeling in bed side chair today   Assessment   Prognosis Fair   Problem List Decreased strength;Decreased endurance; Impaired balance;Decreased mobility; Decreased coordination;Decreased cognition;Decreased safety awareness; Obesity;Pain   Assessment PT met previously set goal of standing for one minute  Does continue to require assist x 2, however demonstrates adequate LE and trunk strength to clear hips from sitting surface and initiate sit to stand transfer  Pt does continue to be limited by his back pain and today's session was also limited by patient desire to perform BM  Patient encouraged throughout session, reviewed progress made this week alone in performance of transfers  Pt stated understanding  Continue to reccomend post acute rehabilitation  Barriers to Discharge Inaccessible home environment   Goals   Patient Goals stand for one minute   STG Expiration Date 05/11/23   Short Term Goal #1 pt will:  Perform rolling and repositioning in bed w/no more than minx1 to left side using trapeze/rail to  decrease caregiver burden;  Perform supine <--> sitting edge of bed transition w/ modx1 to improve level of function and minimize need for sit->stand Trials from recliner as appropriate  Perform sit <---> stand transfers w/ max Ax1 to minimize burden of care, Perform 90% upright standing tolerance w/ UE support for 30 sec w/ A of 1 plus sling to promote longer standing tolerance and progress to pregait activities as appropriate    Assess gait and wide WC (as seated mobility target and potentially as mobility w/ Le's--even backward)  and set goals  PT Treatment Day 9   Plan   Treatment/Interventions Functional transfer training;LE strengthening/ROM; Endurance training; Therapeutic exercise;Cognitive reorientation;Patient/family training;Equipment eval/education; Bed mobility;Spoke to nursing;Spoke to case management;OT   Progress Progressing toward goals   PT Frequency Other (Comment)  (mon-fri)   Recommendation   PT Discharge Recommendation Post acute rehabilitation services   Equipment Recommended Wheelchair;Walker   AM-PAC Basic Mobility Inpatient   Turning in Flat Bed Without Bedrails 2   Lying on Back to Sitting on Edge of Flat Bed Without Bedrails 1   Moving Bed to Chair 1   Standing Up From Chair Using Arms 1   Walk in Room 1   Climb 3-5 Stairs With Railing 1   Basic Mobility Inpatient Raw Score 7   Turning Head Towards Sound 4   Follow Simple Instructions 3   Low Function Basic Mobility Raw Score  14   Low Function Basic Mobility Standardized Score  22 01   Highest Level Of Mobility   JH-HLM Goal 2: Bed activities/Dependent transfer   JH-HLM Achieved 5: Stand (1 or more minutes)   Education   Education Provided Mobility training   Patient Reinforcement needed   End of Consult   Patient Position at End of Consult Bedside chair;Bed/Chair alarm activated; All needs within reach     The patient's AM-PAC Basic Mobility Inpatient Short Form Raw Score is 7  A Raw score of less than or equal to 16 suggests the patient may benefit from discharge to post-acute rehabilitation services  Please also refer to the recommendation of the Physical Therapist for safe discharge planning      Fabiola Boyce, PT

## 2023-05-03 NOTE — PLAN OF CARE
Problem: OCCUPATIONAL THERAPY ADULT  Goal: Performs self-care activities at highest level of function for planned discharge setting  See evaluation for individualized goals  Description: Treatment Interventions: ADL retraining, Functional transfer training, UE strengthening/ROM, Endurance training, Patient/family training, Equipment evaluation/education, Compensatory technique education, Neuromuscular reeducation, Energy conservation          See flowsheet documentation for full assessment, interventions and recommendations  Outcome: Progressing  Note: Limitation: Decreased ADL status, Decreased Safe judgement during ADL, Decreased endurance, Decreased cognition, Decreased self-care trans, Decreased high-level ADLs  Prognosis: Fair  Assessment: Pt agreeable to OT treatment session, upon arrival patient was found seated OOB to Recliner  Patient participated in skilled OT interventions to address ADL tasks, functional transfers, and overall activity tolerance and participation  In comparison to previous session, patient continues to require max encouragement to provide maximal effort during sessions and continues to require max A x 2 to perform STS from recliner  Pt continues to decline participation in ADLs stating he would rather focus on mobility before self-care despite various ways to engage pt  Patient to benefit from continued IPOT treatment to address deficits as defined above and maximize level of functional independence with ADLs and functional mobility       OT Discharge Recommendation: Post acute rehabilitation services     Mumtaz Kuhn, OTR/L  [place] License [id number]  [facility] [id number]

## 2023-05-04 RX ORDER — PSEUDOEPHEDRINE HCL 30 MG
30 TABLET ORAL ONCE
Status: COMPLETED | OUTPATIENT
Start: 2023-05-04 | End: 2023-05-04

## 2023-05-04 RX ADMIN — IPRATROPIUM BROMIDE 0.5 MG: 0.5 SOLUTION RESPIRATORY (INHALATION) at 07:38

## 2023-05-04 RX ADMIN — POLYETHYLENE GLYCOL 3350 17 G: 17 POWDER, FOR SOLUTION ORAL at 08:31

## 2023-05-04 RX ADMIN — LEVALBUTEROL HYDROCHLORIDE 1.25 MG: 1.25 SOLUTION RESPIRATORY (INHALATION) at 14:52

## 2023-05-04 RX ADMIN — ACETAMINOPHEN 975 MG: 325 TABLET, FILM COATED ORAL at 13:48

## 2023-05-04 RX ADMIN — KETOTIFEN FUMARATE 1 DROP: 0.25 SOLUTION/ DROPS OPHTHALMIC at 16:52

## 2023-05-04 RX ADMIN — RISPERIDONE 1 MG: 1 TABLET ORAL at 21:51

## 2023-05-04 RX ADMIN — ESCITALOPRAM OXALATE 5 MG: 10 TABLET ORAL at 21:51

## 2023-05-04 RX ADMIN — ACETAMINOPHEN 975 MG: 325 TABLET, FILM COATED ORAL at 21:51

## 2023-05-04 RX ADMIN — ACETAMINOPHEN 975 MG: 325 TABLET, FILM COATED ORAL at 06:25

## 2023-05-04 RX ADMIN — BENZONATATE 200 MG: 100 CAPSULE ORAL at 08:31

## 2023-05-04 RX ADMIN — PREGABALIN 150 MG: 75 CAPSULE ORAL at 21:51

## 2023-05-04 RX ADMIN — METOPROLOL SUCCINATE 25 MG: 25 TABLET, EXTENDED RELEASE ORAL at 08:31

## 2023-05-04 RX ADMIN — PSEUDOEPHEDRINE HCL 30 MG: 30 TABLET, FILM COATED ORAL at 16:51

## 2023-05-04 RX ADMIN — RISPERIDONE 1 MG: 1 TABLET ORAL at 08:31

## 2023-05-04 RX ADMIN — LEVALBUTEROL HYDROCHLORIDE 1.25 MG: 1.25 SOLUTION RESPIRATORY (INHALATION) at 07:38

## 2023-05-04 RX ADMIN — PREGABALIN 150 MG: 75 CAPSULE ORAL at 16:51

## 2023-05-04 RX ADMIN — RISPERIDONE 1 MG: 1 TABLET ORAL at 16:51

## 2023-05-04 RX ADMIN — KETOTIFEN FUMARATE 1 DROP: 0.25 SOLUTION/ DROPS OPHTHALMIC at 08:33

## 2023-05-04 RX ADMIN — METHOCARBAMOL TABLETS 750 MG: 750 TABLET, COATED ORAL at 16:51

## 2023-05-04 RX ADMIN — OXYCODONE HYDROCHLORIDE 5 MG: 5 TABLET ORAL at 09:13

## 2023-05-04 RX ADMIN — GUAIFENESIN 1200 MG: 600 TABLET ORAL at 08:31

## 2023-05-04 RX ADMIN — PANTOPRAZOLE SODIUM 40 MG: 40 TABLET, DELAYED RELEASE ORAL at 06:25

## 2023-05-04 RX ADMIN — SALINE NASAL SPRAY 1 SPRAY: 1.5 SOLUTION NASAL at 16:51

## 2023-05-04 RX ADMIN — OXYCODONE HYDROCHLORIDE 5 MG: 5 TABLET ORAL at 02:33

## 2023-05-04 RX ADMIN — PREGABALIN 150 MG: 75 CAPSULE ORAL at 08:31

## 2023-05-04 RX ADMIN — FUROSEMIDE 40 MG: 40 TABLET ORAL at 16:51

## 2023-05-04 RX ADMIN — LEVALBUTEROL HYDROCHLORIDE 1.25 MG: 1.25 SOLUTION RESPIRATORY (INHALATION) at 20:08

## 2023-05-04 RX ADMIN — METHOCARBAMOL TABLETS 750 MG: 750 TABLET, COATED ORAL at 09:13

## 2023-05-04 RX ADMIN — OXYCODONE HYDROCHLORIDE 5 MG: 5 TABLET ORAL at 16:51

## 2023-05-04 RX ADMIN — METHOCARBAMOL TABLETS 750 MG: 750 TABLET, COATED ORAL at 02:33

## 2023-05-04 RX ADMIN — IPRATROPIUM BROMIDE 0.5 MG: 0.5 SOLUTION RESPIRATORY (INHALATION) at 20:08

## 2023-05-04 RX ADMIN — POTASSIUM CHLORIDE 20 MEQ: 1500 TABLET, EXTENDED RELEASE ORAL at 08:31

## 2023-05-04 RX ADMIN — IPRATROPIUM BROMIDE 0.5 MG: 0.5 SOLUTION RESPIRATORY (INHALATION) at 14:52

## 2023-05-04 RX ADMIN — SENNOSIDES AND DOCUSATE SODIUM 2 TABLET: 8.6; 5 TABLET ORAL at 08:31

## 2023-05-04 RX ADMIN — FUROSEMIDE 40 MG: 40 TABLET ORAL at 08:31

## 2023-05-04 RX ADMIN — GUAIFENESIN 1200 MG: 600 TABLET ORAL at 21:51

## 2023-05-04 NOTE — CASE MANAGEMENT
Awilda Pickering 48 received request for authorization from Care Manager  Authorization request for: SNF  Accepting Facility: [address] NPI: [id number]  Accepting MD: Dr Hemanth Garcia NPI: [id number]  Authorization initiated by contacting insurance: Hel[employer]io Ray Via: Phone   [phone]  Pending Reference #: [id number]  Clinicals submitted via: Fax  [phone]

## 2023-05-04 NOTE — CASE MANAGEMENT
Case Management Progress Note    Patient name Loretta MONROY /S -31 MRN [id number]  : 1955 Date 2023       LOS (days): 27  Geometric Mean LOS (GMLOS) (days): 3 90  Days to GMLOS:-23        OBJECTIVE:        Current admission status: Inpatient  Preferred Pharmacy:   [address], [place] - [address]  [address] [facility] [address]  Phone: [phone] Fax: [phone]    Primary Care Provider: Hemanth Cole MD    Primary Insurance: [address] [facility] REP  Secondary Insurance:     PROGRESS NOTE:    Spoke with spouse on the phone to review DCP  Had a long conversation about the limited choices for rehab facilities  Spouse expressed her fears and concerns about being far away as she's had negative experiences in other healthcare settings where patient's care needs and goals were not being met so she is understandably worried  We discussed ways to mitigate concerns such as facetiming, phone calls with Teodoro/facility  Spouse can visit 1x/week  We discussed insurance limitations in level of rehab care  Spouse understands we need to submit for insurance auth once a facility is confirmed  Spouse understands patient is medically stable for discharge  Spouse is aware [facility] and [facility] are the only two accepting facilities as of this morning  Spouse is aware of medicare ratings for both facilities  Spouse inquired about a [place]-based facility which she believes was [facility] as they had a [employer] rating, but patient would not qualify for [facility] if he exhausts insurance benefits and she understands that no [place] facility has offered a bed currently  Offered to provide transport to the rehab of choice and also to pay for transport from the rehab to home when patient is ready to discharge   Spouse was pleased with the way patient was being cared for during this hospital stay and was appreciative the care team has taken time to help patient toward his mobility goals  Spouse plans on visiting at 21 [phone] and will have a care team meeting with CM and CM Director to discuss final decision

## 2023-05-04 NOTE — PHYSICAL THERAPY NOTE
Physical Therapy Treatment Note    Patient's Name: Tyesha Cedeño  : 23 1441   PT Last Visit   PT Visit Date 23   Note Type   Note Type Treatment   Pain Assessment   Pain Assessment Tool 0-10   Pain Score 8   Pain Location/Orientation Orientation: Bilateral;Location: Back  (between shoulder blades)   Restrictions/Precautions   Weight Bearing Precautions Per Order No   Other Precautions Cognitive; Chair Alarm; Bed Alarm; Fall Risk;Pain;O2  (4 L O2)   General   Chart Reviewed Yes   Family/Caregiver Present No   Cognition   Following Commands Follows one step commands without difficulty   Subjective   Subjective pt agreeable to PT intervention, states he believes he has found a facility to take him   Transfers   Sit to Stand 2  Maximal assistance   Additional items Assist x 2; Increased time required;Verbal cues   Stand to Sit 2  Maximal assistance   Additional items Assist x 2; Increased time required;Verbal cues   Additional Comments x5 STS attempts this session, patient has become very consistent in performance of STS and with cues is able to reach full 100% standing position, standing tolerance continues to fluctuate between 20-50 seconds this session, overall session was limited due to patient desire to use bed pan for BM   Balance   Static Sitting Fair +   Dynamic Sitting Fair   Endurance Deficit   Endurance Deficit Yes   Activity Tolerance   Activity Tolerance Patient limited by pain; Other (Comment)  (desire for BM)   Nurse Made Aware BIANCA vazquez pre/post   Medical Staff Made Aware care coordinated with MARTINA Baldwin 2, OJ peña   Exercises   Neuro re-ed continue to instruct patient to utilize trapeze bar to perform isometric holds as well as tricep/chair push ups for improved overall strength and address mobility deficits   Assessment   Prognosis Fair   Problem List Decreased strength;Decreased endurance; Impaired balance;Decreased mobility; Decreased cognition; Impaired judgement;Decreased safety awareness; Obesity; Decreased skin integrity;Pain   Assessment Pt now demonstrating more consistency with STS transfers  His standing tolerance varies, but overall patient is able to reach a standing position, requiring cues for BL UE to be extended as well as picking his head up  At times inconsistent carryover with his cues  Continue to emphasize with patient to perform previously educated exercise program when not participating with therapy services to promote continued strength, endurance and overall activity tolerance  Continue to recommend post acute rehabilitation  Barriers to Discharge Inaccessible home environment   Goals   Patient Goals to get better   STG Expiration Date 05/11/23   Short Term Goal #1 pt will:  Perform rolling and repositioning in bed w/no more than minx1 to left side using trapeze/rail to  decrease caregiver burden;  Perform supine <--> sitting edge of bed transition w/ modx1 to improve level of function and minimize need for sit->stand Trials from recliner as appropriate  Perform sit <---> stand transfers w/ max Ax1 to minimize burden of care, Perform 90% upright standing tolerance w/ UE support for 30 sec w/ A of 1 plus sling to promote longer standing tolerance and progress to pregait activities as appropriate  Assess gait and wide WC (as seated mobility target and potentially as mobility w/ Le's--even backward)  and set goals  PT Treatment Day 10   Plan   Treatment/Interventions Functional transfer training;LE strengthening/ROM; Therapeutic exercise;Cognitive reorientation;Equipment eval/education;Patient/family training;Bed mobility;Gait training;Spoke to nursing;Spoke to case management   Progress Progressing toward goals   PT Frequency Other (Comment)  (mon-fri)   Recommendation   PT Discharge Recommendation Post acute rehabilitation services   Equipment Recommended Wheelchair;Walker   AM-PAC Basic Mobility Inpatient   Turning in Flat Bed Without Bedrails 2   Lying on Back to Sitting on Edge of Flat Bed Without Bedrails 1   Moving Bed to Chair 1   Standing Up From Chair Using Arms 1   Walk in Room 1   Climb 3-5 Stairs With Railing 1   Basic Mobility Inpatient Raw Score 7   Turning Head Towards Sound 4   Follow Simple Instructions 3   Low Function Basic Mobility Raw Score  14   Low Function Basic Mobility Standardized Score  22 01   Highest Level Of Mobility   -HLM Goal 2: Bed activities/Dependent transfer   -HL Achieved 4: Move to chair/commode   Education   Education Provided Mobility training   Patient Reinforcement needed   End of Consult   Patient Position at End of Consult Bed/Chair alarm activated; Bedside chair; All needs within reach   The patient's AM-PAC Basic Mobility Inpatient Short Form Raw Score is 7  A Raw score of less than or equal to 16 suggests the patient may benefit from discharge to post-acute rehabilitation services  Please also refer to the recommendation of the Physical Therapist for safe discharge planning      Tyrone Rodriguez PT

## 2023-05-04 NOTE — PROGRESS NOTES
Progress Note - [address] 69 y o  male MRN: [id number]  Unit/Bed#: S -01 Encounter: [id number]    Assessment/Plan   Principal Problem:    Chronic diastolic congestive heart failure ([facility] )  Active Problems: Morbid obesity ([facility] )    Primary hypertension    EVERETT (obstructive sleep apnea)    History of pulmonary embolism    Adjustment disorder    Constipation      Recommended Treatment:   Continue medical treatment per SLIM  Continue Lexapro 5 mg daily in evening  Continue Risperal for Tourette's at current effective dose    ------------------------------------------------------------    Subjective: Per nursing report, Marjorie Castillo has been cooperative on the unit and compliant with scheduled medications  He reports that he is feeling much better and has not had any anxiety symptoms over the past few days  He is not aware that he has been taking Lexapro daily  He is educated about an SSRI as a daily medication to control baseline anxiety/depression  He states he would prefer to take medication only when he has episodes of anxiety rather than daily medication  He does not report any medication side effects  No SI/HI or AVH  Good appetite and denies any issues with sleep  He has meeting today with CM to discuss discharge plan  He is worried about going to a rehab facility where he may not receive quality care  Progress Toward Goals: slow improvement    Psychiatric Review of Systems:  Behavior over the last 24 hours: improved  Sleep: normal  Appetite: adequate  Medication side effects: none verbalized  ROS: Complete review of systems is negative except as noted above      Vital signs in last 24 hours:  Temp:  [97 6 °F (36 4 °C)-98 1 °F (36 7 °C)] 98 1 °F (36 7 °C)  HR:  [81-91] 81  Resp:  [16-20] 20  BP: (114-163)/(66-84) 163/84    Mental Status Exam:  Appearance:  alert, good eye contact, appears stated age, casually dressed, appropriate grooming and hygiene and obese   Behavior:  calm, cooperative and sitting comfortably   Motor: no abnormal movements   Speech:  spontaneous, clear and coherent   Mood:  euthymic   Affect:  mood-congruent and appropriate range   Thought Process:  Organized, logical, goal-directed   Thought Content: no verbalized delusions or overt paranoia   Perceptual disturbances: no reported hallucinations and does not appear to be responding to internal stimuli at this time   Risk Potential: No active or passive suicidal or homicidal ideation was verbalized during interview   Cognition: oriented to self and situation, appears to be of average intelligence and cognition not formally tested   Insight:  Fair   Judgment: Fair     Current Medications:  Current Facility-Administered Medications   Medication Dose Route Frequency Provider Last Rate   • acetaminophen  975 mg Oral Q8H [facility] Cheikh Banda MD     • albuterol  2 5 mg Nebulization Q4H PRN Eusebia Floyd MD     • benzonatate  200 mg Oral TID Cheikh Banda MD     • bisacodyl  10 mg Rectal Daily PRN Gema Nassar MD     • dextromethorphan-guaiFENesin  10 mL Oral Q4H PRN Leo Ramos MD     • escitalopram  5 mg Oral HS Tatyana Wen MD     • fluticasone  2 spray Each Nare Daily Pramod Julio MD     • furosemide  40 mg Oral BID (diuretic) Pramod Julio MD     • guaiFENesin  1,200 mg Oral Q12H Inna Jason MD     • hydrOXYzine HCL  50 mg Oral Q6H PRN Pramod Julio MD     • ipratropium  0 5 mg Nebulization TID Yang Mancini MD     • ketotifen  1 drop Both Eyes BID Chacho Mills MD     • levalbuterol  1 25 mg Nebulization TID Yang Mancini MD     • loratadine  10 mg Oral Daily PRN Chacho Mills MD     • methocarbamol  750 mg Oral Q6H PRN Eusebia Floyd MD     • metoprolol succinate  25 mg Oral Daily Tahmina Tariq MD     • ondansetron  4 mg Oral Q6H PRN Gema Nassar MD     • oxyCODONE  5 mg Oral Q6H PRN Praveen Benton MD     • pantoprazole  40 mg Oral Early Morning Renuka Rod MD     • polyethylene glycol  17 g Oral Daily Ryann Vilchis MD     • potassium chloride  20 mEq Oral Daily Kalin Dixon MD     • pregabalin  150 mg Oral TID Newton Pryor MD     • risperiDONE  1 mg Oral TID Newton Pryor MD     • senna-docusate sodium  2 tablet Oral BID Zeinab Yost MD     • simethicone  80 mg Oral Q6H PRN Ryann Vilchis MD     • sodium chloride  1 spray Each Nare Q1H PRN Esperanza Pruett MD         Behavioral Health Medications: all current active meds have been reviewed  Changes as in plan section above  Laboratory results:  I have personally reviewed all pertinent laboratory/tests results  No results found for this or any previous visit (from the past 48 hour(s))       Ladan Leblanc

## 2023-05-04 NOTE — ASSESSMENT & PLAN NOTE
· No shortness of breath, continues on 4 L nasal cannula baseline oxygen  · Continue on p o  Lasix  40 BID  · Daily weights monitor input and output  · Pending placement to rehab, prefers [facility] which is in [place], CM will place for authorization, DC in 24-48 hours pending auth  Medically cleared for DC

## 2023-05-04 NOTE — PROGRESS NOTES
[facility]  Progress Note  Name: Cecilia Whittaker  MRN: [id number]  Unit/Bed#: S -01 I Date of Admission: 4/7/2023   Date of Service: 5/4/2023 I Hospital Day: 32    Assessment/Plan   * Chronic diastolic congestive heart failure (HCC)  Assessment & Plan  · No shortness of breath, continues on 4 L nasal cannula baseline oxygen  · Continue on p o  Lasix  40 BID  · Daily weights monitor input and output  · Pending placement to rehab, prefers [facility] which is in [place], CM will place for authorization, DC in 24-48 hours pending auth  Medically cleared for DC  Constipation  Assessment & Plan  Senokot/MiraLAX  duColax as needed    Adjustment disorder  Assessment & Plan  Seen by psychiatry  Started on Lexapro 5 mg daily    History of pulmonary embolism  Assessment & Plan  · History of DVT, PE  · INR therapeutic  · Continue with warfarin  7 5 mg INR permitting  · INR a m     EVERETT (obstructive sleep apnea)  Assessment & Plan  · Hx of EVERETT, morbid obesity, obesity hypoventilation syndrome   · Continue BiPAP HS     Primary hypertension  Assessment & Plan  · BP stable  · Continue metoprolol and Lasix 40mg bid      Morbid obesity ([address] )  Assessment & Plan  · Recently DC to [facility]   PT/OT: post acute rehab  · Pending placement  · BMI 60  · Nutrition consulted for calorie management  VTE Pharmacologic Prophylaxis: VTE Score: 10 High Risk (Score >/= 5) - Pharmacological DVT Prophylaxis Ordered: warfarin (Coumadin)  Sequential Compression Devices Ordered  Patient Centered Rounds: I performed bedside rounds with nursing staff today  Discussions with Specialists or Other Care Team Provider: CM    Education and Discussions with Family / Patient: Patient declined call to   Current Length of Stay: 27 day(s)  Current Patient Status: Inpatient   Discharge Plan: Anticipate discharge in 24-48 hrs to rehab facility      Code Status: Level 1 - Full Code    Subjective: Patient urgently needed to go to the bathroom  No other complaints provided  Objective:     Vitals:   Temp (24hrs), Av 9 °F (36 6 °C), Min:97 6 °F (36 4 °C), Max:98 1 °F (36 7 °C)    Temp:  [97 6 °F (36 4 °C)-98 1 °F (36 7 °C)] 98 1 °F (36 7 °C)  HR:  [81-85] 81  Resp:  [18-20] 20  BP: (117-163)/(67-84) 163/84  SpO2:  [93 %-97 %] 94 %  Body mass index is 62 5 kg/m²  Input and Output Summary (last 24 hours): Intake/Output Summary (Last 24 hours) at 2023 1529  Last data filed at 2023 1500  Gross per 24 hour   Intake 980 ml   Output 550 ml   Net 430 ml       Physical Exam:   Physical Exam  Vitals and nursing note reviewed  Constitutional:       General: He is not in acute distress  Appearance: He is obese  He is not diaphoretic  Eyes:      Extraocular Movements: Extraocular movements intact  Cardiovascular:      Rate and Rhythm: Normal rate  Pulmonary:      Effort: Pulmonary effort is normal    Skin:     General: Skin is warm  Neurological:      Mental Status: He is alert  Additional Data:     Labs:  Results from last 7 days   Lab Units 23  0526 23  1403   WBC Thousand/uL 7 89 7 71   HEMOGLOBIN g/dL 11 1* 11 6*   HEMATOCRIT % 37 0 38 0   PLATELETS Thousands/uL 305 273   NEUTROS PCT %  --  77*   LYMPHS PCT %  --  13*   MONOS PCT %  --  7   EOS PCT %  --  2     Results from last 7 days   Lab Units 23  0435   SODIUM mmol/L 140   POTASSIUM mmol/L 3 6   CHLORIDE mmol/L 92*   CO2 mmol/L 45*   BUN mg/dL 9   CREATININE mg/dL 0 52*   ANION GAP mmol/L 3*   CALCIUM mg/dL 8 7   GLUCOSE RANDOM mg/dL 98     Results from last 7 days   Lab Units 23  0435   INR  3 09*                   Lines/Drains:  Invasive Devices     None                       Imaging: No pertinent imaging reviewed      Recent Cultures (last 7 days):         Last 24 Hours Medication List:   Current Facility-Administered Medications   Medication Dose Route Frequency Provider Last Rate   • acetaminophen  975 mg Oral Q8H [facility] Yannick Gentile MD     • albuterol  2 5 mg Nebulization Q4H PRN Immanuel Post MD     • benzonatate  200 mg Oral TID Yannick Gentile MD     • bisacodyl  10 mg Rectal Daily PRN Isabella Dunbar MD     • dextromethorphan-guaiFENesin  10 mL Oral Q4H PRN Ravi De Leon MD     • escitalopram  5 mg Oral HS Angel Mayer MD     • fluticasone  2 spray Each Nare Daily Collette Dupre, MD     • furosemide  40 mg Oral BID (diuretic) Collette Dupre, MD     • guaiFENesin  1,200 mg Oral Q12H [facility] Collette Dupre, MD     • hydrOXYzine HCL  50 mg Oral Q6H PRN Collette Dupre, MD     • ipratropium  0 5 mg Nebulization TID Piyush Nath MD     • ketotifen  1 drop Both Eyes BID Peyton Hardwick MD     • levalbuterol  1 25 mg Nebulization TID Piyush Nath MD     • loratadine  10 mg Oral Daily PRN Peyton Hardwick MD     • methocarbamol  750 mg Oral Q6H PRN Immanuel Post MD     • metoprolol succinate  25 mg Oral Daily Dipak Hart MD     • ondansetron  4 mg Oral Q6H PRN Isabella Dunbar MD     • oxyCODONE  5 mg Oral Q6H PRN Anup Wayne MD     • pantoprazole  40 mg Oral Early Morning Yannick Gentile MD     • polyethylene glycol  17 g Oral Daily Collette Dupre, MD     • potassium chloride  20 mEq Oral Daily Dipak Hart MD     • pregabalin  150 mg Oral TID Immanuel Post MD     • risperiDONE  1 mg Oral TID Immanuel Post MD     • senna-docusate sodium  2 tablet Oral BID Tala Lemus MD     • simethicone  80 mg Oral Q6H PRN Collette Dupre, MD     • sodium chloride  1 spray Each Nare Q1H PRN Isabella Dunbar MD          Today, Patient Was Seen By: Tala Lemus MD    **Please Note: This note may have been constructed using a voice recognition system  **

## 2023-05-04 NOTE — ARC ADMISSION
Received referral on patient for possible ARC placement  Upon review of patient’s case with [facility] physician, patient deemed not ARC appropriate at this time  Lower level of care/therapies recommended  CM made aware    Thank you,   [address] Admissions

## 2023-05-04 NOTE — PLAN OF CARE
Problem: PHYSICAL THERAPY ADULT  Goal: Performs mobility at highest level of function for planned discharge setting  See evaluation for individualized goals  Description: Treatment/Interventions: Functional transfer training, LE strengthening/ROM, Therapeutic exercise, Endurance training, Cognitive reorientation, Patient/family training, Equipment eval/education, Gait training, Bed mobility          See flowsheet documentation for full assessment, interventions and recommendations  Outcome: Progressing  Note: Prognosis: Fair  Problem List: Decreased strength, Decreased endurance, Impaired balance, Decreased mobility, Decreased cognition, Impaired judgement, Decreased safety awareness, Obesity, Decreased skin integrity, Pain  Assessment: Pt now demonstrating more consistency with STS transfers  His standing tolerance varies, but overall patient is able to reach a standing position, requiring cues for BL UE to be extended as well as picking his head up  At times inconsistent carryover with his cues  Continue to emphasize with patient to perform previously educated exercise program when not participating with therapy services to promote continued strength, endurance and overall activity tolerance  Continue to recommend post acute rehabilitation  Barriers to Discharge: Inaccessible home environment     PT Discharge Recommendation: Post acute rehabilitation services    See flowsheet documentation for full assessment

## 2023-05-04 NOTE — ESCALATED TEAM TX
Team Meeting Note    Patient name Hu Trimble  Location S /S [address] [id number] MRN [id number]  : 1955 Date 2023       Team Meeting  Meeting Type: Tx Team Meeting    Team Members Present  Team Members Present: , Nurse, Other (Discipline and Name)  Nursing Team Member: Kaia Bassett RN  Care Management Team Member: HUMPHREY So  Other (Discipline and Name): HUMPHREY Titus CM Director    Patient/Family Present  Patient Present: Yes  Patient's Family Present: Yes  Family Relationship: Spouse  Spouse: [address] [facility],8W    Team Meeting - Additional Comments: Met with patient, spouse, Kaia Bassett RN along with Winnie Cruz CM Director to discuss discharge plan and anticipated rehab transfer  Patient and spouse provided with information on two considering facilities: [facility] and [facility]; aware that confirmed bed [facility] have not been received [facility]  Questions answered by CM director about d/c process and facility options  Calls facilitated with admissions directors for both facilities, and patient and spouse ultimately elected to initiate insurance authorization for [employer] [facility] reserved in [address] and aware that insurance auth to be started today for anticipated transfer tomorrow  CM discharge support contacted to initiate authorization  Transportation requested in [place] for 2:00pm  in anticipation of discharge  IMM had been provided to patient and spouse yesterday, and appeal rights reviewed again  At this time, patient/spouse declining wish to appeal discharge and aware that authorization to be started  Orders for BiPap and Hospital Bed attached to referral in [address] for facility to order  CM will continue to follow to assist with coordinating rehab transfer  MD notified via TT of above plan

## 2023-05-04 NOTE — UTILIZATION REVIEW
Continued Stay Review    Date: 05/04/2023                          Current Patient Class: Inpatient  Current Level of Care: Med/Surg    HPI:67 y o  male initially admitted on 04/07/2023     Assessment/Plan: Chronic Diastolic CHF  Continue O2 @ 4L via NC  Continue Nebulizers Atrovent / Kenyon Farrar  Continue Lexapro daily in the evening and Risperal for tourette's  Daily weight / I&O        Vital Signs: /84   Pulse 81   Temp 98 1 °F (36 7 °C)   Resp 20   Wt (!) 221 kg (486 lb 12 4 oz)   SpO2 97%   BMI 62 50 kg/m²     Pertinent Labs/Diagnostic Results:     Results from last 7 days   Lab Units 05/01/23  0526 04/30/23  1403   WBC Thousand/uL 7 89 7 71   HEMOGLOBIN g/dL 11 1* 11 6*   HEMATOCRIT % 37 0 38 0   PLATELETS Thousands/uL 305 273   NEUTROS ABS Thousands/µL  --  5 90     Results from last 7 days   Lab Units 05/02/23  0435 05/01/23  0526 04/30/23  1403 04/29/23  0607   SODIUM mmol/L 140 138 138 140   POTASSIUM mmol/L 3 6 4 2 4 1 4 2   CHLORIDE mmol/L 92* 94* 95* 95*   CO2 mmol/L 45* 40* 42* 41*   ANION GAP mmol/L 3* 4 1* 4   BUN mg/dL 9 9 8 9   CREATININE mg/dL 0 52* 0 46* 0 50* 0 49*   EGFR ml/min/1 73sq m 108 114 110 111   CALCIUM mg/dL 8 7 8 8 8 7 9 1   MAGNESIUM mg/dL 1 9  --   --   --      Results from last 7 days   Lab Units 05/02/23  0435 05/01/23  0526 04/30/23  1403 04/29/23  0607   GLUCOSE RANDOM mg/dL 98 114 154* 108       Results from last 7 days   Lab Units 05/02/23  0435 05/01/23  0526 04/30/23  0437   PROTIME seconds 32 1* 34 3* 32 2*   INR  3 09* 3 36* 3 10*     Medications:   Scheduled Medications:  acetaminophen, 975 mg, Oral, Q8H GRIFFIN  benzonatate, 200 mg, Oral, TID  escitalopram, 5 mg, Oral, HS  fluticasone, 2 spray, Each Nare, Daily  furosemide, 40 mg, Oral, BID (diuretic)  guaiFENesin, 1,200 mg, Oral, Q12H GRIFFIN  ipratropium, 0 5 mg, Nebulization, TID  ketotifen, 1 drop, Both Eyes, BID  levalbuterol, 1 25 mg, Nebulization, TID  metoprolol succinate, 25 mg, Oral, Daily  pantoprazole, 40 mg, Oral, Early Morning  polyethylene glycol, 17 g, Oral, Daily  potassium chloride, 20 mEq, Oral, Daily  pregabalin, 150 mg, Oral, TID  risperiDONE, 1 mg, Oral, TID  senna-docusate sodium, 2 tablet, Oral, BID      Continuous IV Infusions:     PRN Meds:  albuterol, 2 5 mg, Nebulization, Q4H PRN  bisacodyl, 10 mg, Rectal, Daily PRN  dextromethorphan-guaiFENesin, 10 mL, Oral, Q4H PRN  hydrOXYzine HCL, 50 mg, Oral, Q6H PRN  loratadine, 10 mg, Oral, Daily PRN  methocarbamol, 750 mg, Oral, Q6H PRN  ondansetron, 4 mg, Oral, Q6H PRN  oxyCODONE, 5 mg, Oral, Q6H PRN  simethicone, 80 mg, Oral, Q6H PRN  sodium chloride, 1 spray, Each Nare, Q1H PRN        Discharge Plan: D    Network Utilization Review Department  ATTENTION: Please call with any questions or concerns to [phone] and carefully listen to the prompts so that you are directed to the right person  All voicemails are confidential   Vandana Ibarra all requests for admission clinical reviews, approved or denied determinations and any other requests to dedicated fax number below belonging to the campus where the patient is receiving treatment   List of dedicated fax numbers for the Facilities:  [address] DENIALS (Administrative/Medical Necessity) [phone]   [address] (Maternity/NICU/Pediatrics) [phone]   [address] [phone]   [facility] [phone]   [address] [id number] [place] [address] [facility] [phone]   [address] [facility] 134 815 [facility] [phone]

## 2023-05-05 LAB
INR PPP: 1.36 (ref 0.84–1.19)
PROTHROMBIN TIME: 17 SECONDS (ref 11.6–14.5)

## 2023-05-05 RX ORDER — PSEUDOEPHEDRINE HCL 30 MG
30 TABLET ORAL ONCE
Status: COMPLETED | OUTPATIENT
Start: 2023-05-05 | End: 2023-05-05

## 2023-05-05 RX ORDER — PSEUDOEPHEDRINE HCL 120 MG/1
120 TABLET, FILM COATED, EXTENDED RELEASE ORAL 2 TIMES DAILY
Status: DISCONTINUED | OUTPATIENT
Start: 2023-05-05 | End: 2023-05-06 | Stop reason: HOSPADM

## 2023-05-05 RX ORDER — WARFARIN SODIUM 7.5 MG/1
7.5 TABLET ORAL
Status: COMPLETED | OUTPATIENT
Start: 2023-05-05 | End: 2023-05-05

## 2023-05-05 RX ORDER — WARFARIN SODIUM 5 MG/1
5 TABLET ORAL
Status: DISCONTINUED | OUTPATIENT
Start: 2023-05-05 | End: 2023-05-05

## 2023-05-05 RX ADMIN — ACETAMINOPHEN 975 MG: 325 TABLET, FILM COATED ORAL at 13:13

## 2023-05-05 RX ADMIN — PREGABALIN 150 MG: 75 CAPSULE ORAL at 18:25

## 2023-05-05 RX ADMIN — POTASSIUM CHLORIDE 20 MEQ: 1500 TABLET, EXTENDED RELEASE ORAL at 09:55

## 2023-05-05 RX ADMIN — SENNOSIDES AND DOCUSATE SODIUM 2 TABLET: 8.6; 5 TABLET ORAL at 18:26

## 2023-05-05 RX ADMIN — POLYETHYLENE GLYCOL 3350 17 G: 17 POWDER, FOR SOLUTION ORAL at 09:56

## 2023-05-05 RX ADMIN — KETOTIFEN FUMARATE 1 DROP: 0.25 SOLUTION/ DROPS OPHTHALMIC at 18:32

## 2023-05-05 RX ADMIN — LEVALBUTEROL HYDROCHLORIDE 1.25 MG: 1.25 SOLUTION RESPIRATORY (INHALATION) at 07:18

## 2023-05-05 RX ADMIN — GUAIFENESIN AND DEXTROMETHORPHAN 10 ML: 100; 10 SYRUP ORAL at 05:31

## 2023-05-05 RX ADMIN — IPRATROPIUM BROMIDE 0.5 MG: 0.5 SOLUTION RESPIRATORY (INHALATION) at 13:34

## 2023-05-05 RX ADMIN — PREGABALIN 150 MG: 75 CAPSULE ORAL at 09:55

## 2023-05-05 RX ADMIN — IPRATROPIUM BROMIDE 0.5 MG: 0.5 SOLUTION RESPIRATORY (INHALATION) at 07:18

## 2023-05-05 RX ADMIN — ESCITALOPRAM OXALATE 5 MG: 10 TABLET ORAL at 21:25

## 2023-05-05 RX ADMIN — WARFARIN SODIUM 7.5 MG: 7.5 TABLET ORAL at 18:25

## 2023-05-05 RX ADMIN — OXYCODONE HYDROCHLORIDE 5 MG: 5 TABLET ORAL at 12:16

## 2023-05-05 RX ADMIN — METOPROLOL SUCCINATE 25 MG: 25 TABLET, EXTENDED RELEASE ORAL at 09:54

## 2023-05-05 RX ADMIN — METHOCARBAMOL TABLETS 750 MG: 750 TABLET, COATED ORAL at 18:26

## 2023-05-05 RX ADMIN — FUROSEMIDE 40 MG: 40 TABLET ORAL at 09:54

## 2023-05-05 RX ADMIN — GUAIFENESIN 1200 MG: 600 TABLET ORAL at 21:25

## 2023-05-05 RX ADMIN — GUAIFENESIN 1200 MG: 600 TABLET ORAL at 09:54

## 2023-05-05 RX ADMIN — PSEUDOEPHEDRINE HCL 30 MG: 30 TABLET, FILM COATED ORAL at 02:34

## 2023-05-05 RX ADMIN — METHOCARBAMOL TABLETS 750 MG: 750 TABLET, COATED ORAL at 12:16

## 2023-05-05 RX ADMIN — ACETAMINOPHEN 975 MG: 325 TABLET, FILM COATED ORAL at 05:31

## 2023-05-05 RX ADMIN — LEVALBUTEROL HYDROCHLORIDE 1.25 MG: 1.25 SOLUTION RESPIRATORY (INHALATION) at 13:34

## 2023-05-05 RX ADMIN — FUROSEMIDE 40 MG: 40 TABLET ORAL at 18:34

## 2023-05-05 RX ADMIN — RISPERIDONE 1 MG: 1 TABLET ORAL at 18:25

## 2023-05-05 RX ADMIN — PANTOPRAZOLE SODIUM 40 MG: 40 TABLET, DELAYED RELEASE ORAL at 05:31

## 2023-05-05 RX ADMIN — ACETAMINOPHEN 975 MG: 325 TABLET, FILM COATED ORAL at 21:25

## 2023-05-05 RX ADMIN — OXYCODONE HYDROCHLORIDE 5 MG: 5 TABLET ORAL at 05:31

## 2023-05-05 RX ADMIN — IPRATROPIUM BROMIDE 0.5 MG: 0.5 SOLUTION RESPIRATORY (INHALATION) at 19:38

## 2023-05-05 RX ADMIN — SENNOSIDES AND DOCUSATE SODIUM 2 TABLET: 8.6; 5 TABLET ORAL at 09:54

## 2023-05-05 RX ADMIN — PSEUDOEPHEDRINE HYDROCHLORIDE 120 MG: 120 TABLET, FILM COATED ORAL at 18:26

## 2023-05-05 RX ADMIN — SIMETHICONE 80 MG: 80 TABLET, CHEWABLE ORAL at 13:44

## 2023-05-05 RX ADMIN — LEVALBUTEROL HYDROCHLORIDE 1.25 MG: 1.25 SOLUTION RESPIRATORY (INHALATION) at 19:37

## 2023-05-05 RX ADMIN — OXYCODONE HYDROCHLORIDE 5 MG: 5 TABLET ORAL at 18:26

## 2023-05-05 RX ADMIN — KETOTIFEN FUMARATE 1 DROP: 0.25 SOLUTION/ DROPS OPHTHALMIC at 09:57

## 2023-05-05 RX ADMIN — METHOCARBAMOL TABLETS 750 MG: 750 TABLET, COATED ORAL at 05:31

## 2023-05-05 RX ADMIN — RISPERIDONE 1 MG: 1 TABLET ORAL at 09:55

## 2023-05-05 NOTE — CASE MANAGEMENT
Received called from Claude Edu ([phone]) from SNF facility  Looking to confirm some information about authorizations and bed  CM notified

## 2023-05-05 NOTE — CASE MANAGEMENT
Per CM's request called insurance ([phone]) to check the status of submitted authorization  Spoke with rep who stated case is currently with a medical director for review  Will call with update once decision is made  CM notified

## 2023-05-05 NOTE — CASE MANAGEMENT
Case Management Discharge Planning Note    Patient name Taylor Ode  Location S /S -10 MRN [id number]  : 1955 Date 2023       Current Admission Date: 2023  Current Admission Diagnosis:Chronic diastolic congestive heart failure [facility])   Patient Active Problem List    Diagnosis Date Noted   • Constipation 2023   • Adjustment disorder 2023   • Pulmonary embolism ([facility] [facility] )    • COVID-19 2023   • Cerumen debris on tympanic membrane of both ears 2023   • Generalized weakness 2023   • Impaired functional mobility, balance, gait, and endurance 2023   • BPH (benign prostatic hyperplasia) 2023   • Chronic anticoagulation 2023   • Pressure injury, unstageable, with suspected deep tissue injury ([facility] ) 2023   • Dyspnea on exertion 2023   • Morbid obesity ([facility] ) 2023   • Primary hypertension 2023   • History of pulmonary embolism 2023   • CHF (congestive heart failure) ([facility] ) 2023   • DVT (deep venous thrombosis) ([facility] ) 2023   • Chronic diastolic congestive heart failure ([facility] ) 2021   • DJD (degenerative joint disease), multiple sites 2021   • Ambulatory dysfunction 2021   • Chronic right-sided congestive heart failure ([facility] ) 2021   • BMI 50 0-59 9, adult ([facility] [facility] ) 2020   • Debility 2020   • Restrictive lung disease 2020   • Medical marijuana use 2018   • EVERETT (obstructive sleep apnea) 2018   • Obesity hypoventilation syndrome ([facility] ) 2018   • Pulmonary hypertension ([facility] ) 01/15/2018   • Pain medication agreement signed 2017   • Neuralgia, post-herpetic 2017   • Sciatica 2012   • Fercho de la Tourette's syndrome 2012   • Chronic pain disorder 2012      LOS (days): 28  Geometric Mean LOS (GMLOS) (days): 3 90  Days to GMLOS:-24 1     OBJECTIVE:  Risk of Unplanned Readmission Score: 25 06         Current admission status: Inpatient Preferred Pharmacy:   [address] - [address]  [address] [facility] [address]  Phone: [phone] Fax: [phone]    Primary Care Provider: Adelaide Bamberger, MD    Primary Insurance: 254 [facility] REP  Secondary Insurance:     DISCHARGE DETAILS:  CM reached out to [facility] discharge support  Patient's insurance authorization pending, under medical review  CM and CM director at bedside with patient  Patient updated insurance authorization pending  Plan of care for discharge tomorrow to inpatient rehab facility at 1000  Patient aware that CM waiting for insurance determination and will update once received  Patient updating his spouse with plan of care  Facility has all DME needed at discharge  CM contacted [facility]  Spoke with Randee  Patient's transport canceled for today at 1400  CM sent referral via [facility] for 1000 tomorrow 5/6  Waiting for confirmation on time  CM spoke with Segundo from Carl at 35 20 43  CM introduced self and role  Admission liaison updated insurance authorization is pending  Facility confirmed able to accept once authorization obtained  Facility has bed and CPAP for patient  No Covid swab needed  CM will f/u with admission liaison once authorization has been determined  Liaison aware CM working on transport for tomorrow 5/6 at 1000

## 2023-05-05 NOTE — ASSESSMENT & PLAN NOTE
· History of DVT, PE  · INR therapeutic  · Continue with warfarin  7 5 mg INR permitting, today <2 so restarted

## 2023-05-05 NOTE — ASSESSMENT & PLAN NOTE
· No shortness of breath, continues on 4 L nasal cannula baseline oxygen  · Continue on p o  Lasix  40 BID  · Daily weights monitor input and output  · Pending placement to rehab, prefers [facility] which is in [place], [place] in 24-48 hours pending auth  Medically cleared for DC

## 2023-05-05 NOTE — CASE MANAGEMENT
Per CM's request called  insurance ([phone]) to check the status of submitted authorization  Spoke with Anabella who stated case is currently with a medical director for review  Will call with update once decision is made   notified

## 2023-05-05 NOTE — CASE MANAGEMENT
Called insurance ([phone]) to check the status of submitted authorization  Spoke with Boone Paula who stated that authorization is still pending and is in the hands of the clinician  OJ notified

## 2023-05-05 NOTE — PROGRESS NOTES
[facility]  Progress Note  Name: Bowen Barker  MRN: [id number]  Unit/Bed#: S -01 I Date of Admission: 4/7/2023   Date of Service: 5/5/2023 I Hospital Day: 28    Assessment/Plan   * Chronic diastolic congestive heart failure (HCC)  Assessment & Plan  · No shortness of breath, continues on 4 L nasal cannula baseline oxygen  · Continue on p o  Lasix  40 BID  · Daily weights monitor input and output  · Pending placement to rehab, prefers [facility] which is in [place], [place] in 24-48 hours pending auth  Medically cleared for DC  Constipation  Assessment & Plan  Senokot/MiraLAX  duColax as needed    Adjustment disorder  Assessment & Plan  Seen by psychiatry  Started on Lexapro 5 mg daily    History of pulmonary embolism  Assessment & Plan  · History of DVT, PE  · INR therapeutic  · Continue with warfarin  7 5 mg INR permitting, today <2 so restarted    EVERETT (obstructive sleep apnea)  Assessment & Plan  · Hx of EVERETT, morbid obesity, obesity hypoventilation syndrome   · Continue BiPAP HS     Primary hypertension  Assessment & Plan  · BP stable  · Continue metoprolol and Lasix 40mg bid      Morbid obesity ([address] )  Assessment & Plan  · Recently DC to [facility]   PT/OT: post acute rehab  · Pending placement  · BMI 60  · Nutrition consulted for calorie management  PSEUDOFED every 3 days only  VTE Pharmacologic Prophylaxis: VTE Score: 10 High Risk (Score >/= 5) - Pharmacological DVT Prophylaxis Ordered: warfarin (Coumadin)  Sequential Compression Devices Ordered  Patient Centered Rounds: I performed bedside rounds with nursing staff today  Discussions with Specialists or Other Care Team Provider: Cm    Education and Discussions with Family / Patient: Patient declined call to   Current Length of Stay: 28 day(s)  Current Patient Status: Inpatient   Discharge Plan: Anticipate discharge in 24-48 hrs to rehab facility      Code Status: Level 1 - Full Code    Subjective:   Would like next dose of psuedophed  No other complaints  Objective:     Vitals:   Temp (24hrs), Av 3 °F (36 8 °C), Min:98 1 °F (36 7 °C), Max:98 5 °F (36 9 °C)    Temp:  [98 1 °F (36 7 °C)-98 5 °F (36 9 °C)] 98 1 °F (36 7 °C)  HR:  [] 87  Resp:  [18] 18  BP: (103-135)/(61-80) 103/61  SpO2:  [92 %-95 %] 92 %  Body mass index is 62 98 kg/m²  Input and Output Summary (last 24 hours): Intake/Output Summary (Last 24 hours) at 2023 1621  Last data filed at 2023 1604  Gross per 24 hour   Intake 1380 ml   Output 1575 ml   Net -195 ml       Physical Exam:   Physical Exam  Vitals and nursing note reviewed  Constitutional:       General: He is not in acute distress  Appearance: He is obese  He is not ill-appearing, toxic-appearing or diaphoretic  Eyes:      Extraocular Movements: Extraocular movements intact  Cardiovascular:      Rate and Rhythm: Normal rate  Pulmonary:      Effort: Pulmonary effort is normal    Abdominal:      General: There is no distension  Tenderness: There is no abdominal tenderness  There is no guarding  Skin:     General: Skin is warm  Neurological:      Mental Status: He is alert     Psychiatric:         Mood and Affect: Mood normal          Behavior: Behavior normal           Additional Data:     Labs:  Results from last 7 days   Lab Units 23  0526 23  1403   WBC Thousand/uL 7 89 7 71   HEMOGLOBIN g/dL 11 1* 11 6*   HEMATOCRIT % 37 0 38 0   PLATELETS Thousands/uL 305 273   NEUTROS PCT %  --  77*   LYMPHS PCT %  --  13*   MONOS PCT %  --  7   EOS PCT %  --  2     Results from last 7 days   Lab Units 23  0435   SODIUM mmol/L 140   POTASSIUM mmol/L 3 6   CHLORIDE mmol/L 92*   CO2 mmol/L 45*   BUN mg/dL 9   CREATININE mg/dL 0 52*   ANION GAP mmol/L 3*   CALCIUM mg/dL 8 7   GLUCOSE RANDOM mg/dL 98     Results from last 7 days   Lab Units 23  0524   INR  1 36*                   Lines/Drains:  Invasive Devices None                       Imaging: No pertinent imaging reviewed      Recent Cultures (last 7 days):         Last 24 Hours Medication List:   Current Facility-Administered Medications   Medication Dose Route Frequency Provider Last Rate   • acetaminophen  975 mg Oral Q8H Fatimah Edgar MD     • albuterol  2 5 mg Nebulization Q4H PRN Immanuel Post MD     • benzonatate  200 mg Oral TID Yannick Gentile MD     • bisacodyl  10 mg Rectal Daily PRN Isabella Dunbar MD     • dextromethorphan-guaiFENesin  10 mL Oral Q4H PRN Ravi De Leon MD     • escitalopram  5 mg Oral HS Angel Mayer MD     • fluticasone  2 spray Each Nare Daily Collette Dupre, MD     • furosemide  40 mg Oral BID (diuretic) Collette Dupre, MD     • guaiFENesin  1,200 mg Oral Q12H [facility] Collette Dupre, MD     • hydrOXYzine HCL  50 mg Oral Q6H PRN Collette Dupre, MD     • ipratropium  0 5 mg Nebulization TID Piyush Nath MD     • ketotifen  1 drop Both Eyes BID Peyton Hardwick MD     • levalbuterol  1 25 mg Nebulization TID Piyush Nath MD     • loratadine  10 mg Oral Daily PRN Peyton Hardwick MD     • methocarbamol  750 mg Oral Q6H PRN Immanuel Post MD     • metoprolol succinate  25 mg Oral Daily Dipak Hart MD     • ondansetron  4 mg Oral Q6H PRN Isabella Dunbar MD     • oxyCODONE  5 mg Oral Q6H PRN Anup Wayne MD     • pantoprazole  40 mg Oral Early Morning Yannick Gentile MD     • polyethylene glycol  17 g Oral Daily Collette Dupre, MD     • potassium chloride  20 mEq Oral Daily Dipak Hart MD     • pregabalin  150 mg Oral TID Immanuel Post MD     • pseudoephedrine  120 mg Oral BID Shayna Castrejon MD     • risperiDONE  1 mg Oral TID Immanuel Post MD     • senna-docusate sodium  2 tablet Oral BID Tala Lemus MD     • simethicone  80 mg Oral Q6H PRN Collette Dupre, MD     • sodium chloride  1 spray Each Nare Q1H PRN Isabella Dunbar MD     • warfarin  7 5 mg Oral Once (warfarin) Masoud Ramos MD          Today, Patient Was Seen By: Masoud Ramos MD    **Please Note: This note may have been constructed using a voice recognition system  **

## 2023-05-06 ENCOUNTER — TELEPHONE (OUTPATIENT)
Dept: CASE MANAGEMENT | Facility: HOSPITAL | Age: 68
End: 2023-05-06

## 2023-05-06 VITALS
OXYGEN SATURATION: 95 % | DIASTOLIC BLOOD PRESSURE: 65 MMHG | BODY MASS INDEX: 62.95 KG/M2 | RESPIRATION RATE: 18 BRPM | SYSTOLIC BLOOD PRESSURE: 104 MMHG | HEART RATE: 88 BPM | WEIGHT: 315 LBS | TEMPERATURE: 98.1 F

## 2023-05-06 RX ORDER — METOPROLOL SUCCINATE 25 MG/1
25 TABLET, EXTENDED RELEASE ORAL DAILY
Qty: 30 TABLET | Refills: 0
Start: 2023-05-07 | End: 2023-06-06

## 2023-05-06 RX ORDER — KETOTIFEN FUMARATE 0.35 MG/ML
1 SOLUTION/ DROPS OPHTHALMIC 2 TIMES DAILY
Qty: 5 ML | Refills: 0
Start: 2023-05-06

## 2023-05-06 RX ORDER — ESCITALOPRAM OXALATE 5 MG/1
5 TABLET ORAL
Refills: 0
Start: 2023-05-06 | End: 2023-06-05

## 2023-05-06 RX ORDER — GUAIFENESIN 1200 MG/1
1200 TABLET, EXTENDED RELEASE ORAL EVERY 12 HOURS SCHEDULED
Qty: 28 TABLET | Refills: 0
Start: 2023-05-06 | End: 2023-05-20

## 2023-05-06 RX ORDER — FUROSEMIDE 40 MG/1
40 TABLET ORAL 2 TIMES DAILY
Qty: 60 TABLET | Refills: 0
Start: 2023-05-06 | End: 2023-06-05

## 2023-05-06 RX ORDER — POTASSIUM CHLORIDE 20 MEQ/1
20 TABLET, EXTENDED RELEASE ORAL DAILY
Qty: 30 TABLET | Refills: 0
Start: 2023-05-07 | End: 2023-06-06

## 2023-05-06 RX ORDER — OXYCODONE HYDROCHLORIDE 5 MG/1
5 TABLET ORAL EVERY 6 HOURS PRN
Qty: 20 TABLET | Refills: 0 | Status: SHIPPED | OUTPATIENT
Start: 2023-05-06 | End: 2023-05-11

## 2023-05-06 RX ORDER — LORATADINE 10 MG/1
10 TABLET ORAL DAILY PRN
Qty: 30 TABLET | Refills: 0
Start: 2023-05-06 | End: 2023-06-05

## 2023-05-06 RX ORDER — PANTOPRAZOLE SODIUM 40 MG/1
40 TABLET, DELAYED RELEASE ORAL
Refills: 0
Start: 2023-05-07

## 2023-05-06 RX ORDER — FLUTICASONE PROPIONATE 50 MCG
2 SPRAY, SUSPENSION (ML) NASAL DAILY
Qty: 9.9 ML | Refills: 0
Start: 2023-05-07

## 2023-05-06 RX ADMIN — IPRATROPIUM BROMIDE 0.5 MG: 0.5 SOLUTION RESPIRATORY (INHALATION) at 07:25

## 2023-05-06 RX ADMIN — PSEUDOEPHEDRINE HYDROCHLORIDE 120 MG: 120 TABLET, FILM COATED ORAL at 08:17

## 2023-05-06 RX ADMIN — HYDROXYZINE HYDROCHLORIDE 50 MG: 50 TABLET, FILM COATED ORAL at 10:42

## 2023-05-06 RX ADMIN — PREGABALIN 150 MG: 75 CAPSULE ORAL at 08:18

## 2023-05-06 RX ADMIN — METHOCARBAMOL TABLETS 750 MG: 750 TABLET, COATED ORAL at 06:18

## 2023-05-06 RX ADMIN — PREGABALIN 150 MG: 75 CAPSULE ORAL at 00:27

## 2023-05-06 RX ADMIN — OXYCODONE HYDROCHLORIDE 5 MG: 5 TABLET ORAL at 00:27

## 2023-05-06 RX ADMIN — ACETAMINOPHEN 975 MG: 325 TABLET, FILM COATED ORAL at 06:18

## 2023-05-06 RX ADMIN — PANTOPRAZOLE SODIUM 40 MG: 40 TABLET, DELAYED RELEASE ORAL at 06:18

## 2023-05-06 RX ADMIN — OXYCODONE HYDROCHLORIDE 5 MG: 5 TABLET ORAL at 07:09

## 2023-05-06 RX ADMIN — METHOCARBAMOL TABLETS 750 MG: 750 TABLET, COATED ORAL at 00:27

## 2023-05-06 RX ADMIN — POTASSIUM CHLORIDE 20 MEQ: 1500 TABLET, EXTENDED RELEASE ORAL at 08:17

## 2023-05-06 RX ADMIN — POLYETHYLENE GLYCOL 3350 17 G: 17 POWDER, FOR SOLUTION ORAL at 08:18

## 2023-05-06 RX ADMIN — GUAIFENESIN 1200 MG: 600 TABLET ORAL at 08:18

## 2023-05-06 RX ADMIN — KETOTIFEN FUMARATE 1 DROP: 0.25 SOLUTION/ DROPS OPHTHALMIC at 08:19

## 2023-05-06 RX ADMIN — RISPERIDONE 1 MG: 1 TABLET ORAL at 00:27

## 2023-05-06 RX ADMIN — SENNOSIDES AND DOCUSATE SODIUM 2 TABLET: 8.6; 5 TABLET ORAL at 08:18

## 2023-05-06 RX ADMIN — RISPERIDONE 1 MG: 1 TABLET ORAL at 08:19

## 2023-05-06 RX ADMIN — LEVALBUTEROL HYDROCHLORIDE 1.25 MG: 1.25 SOLUTION RESPIRATORY (INHALATION) at 07:25

## 2023-05-06 NOTE — CASE MANAGEMENT
Support Center has received Intent to Deny  Denial received for: SNF  Facility: [facility] [address]  Denial #:[id number]  Denial Reason: Requested Peer to Peer  Peer to Peer phone#:[phone] Option 5    Deadline: 5/8/23 @ 12pm  Care Manager notified:  Benjamin Echols

## 2023-05-06 NOTE — TELEPHONE ENCOUNTER
TT received from hospital operators regarding patient  Per TT, patient's spouse Robin Fox) requesting phone call  Call made to [facility] @ [phone]  [facility] stating that she followed EMS when patient was discharged  [facility] stating she is not happy with the facility and yelled at this CM that CM is useless and good for nothing  [facility] stating that CM must have patient removed from the facility  CM reviewed that CM is unable to remove patient from the facility  CM reviewed that all clinicals were provided to [facility] facility who confirmed able to accomodate patient's needs  [facility] stating she is unable to accommodate patient at home and that patient returning home would not be safe at this time due to the level of care/assistance that patient requires  CM encouraged [facility] to work with [facility] and  to facilitate a safe discharge home if that is Shilpa's and patient's preference  CM provided CM's contact phone number for follow-up questions

## 2023-05-06 NOTE — CASE MANAGEMENT
Case Management Progress Note    Patient name Chloe Tovar  Location S /S -20 MRN [id number]  : 1955 Date 2023       LOS (days): 29  Geometric Mean LOS (GMLOS) (days): 3 90  Days to GMLOS:-25 1        OBJECTIVE:        Current admission status: Inpatient  Preferred Pharmacy:   [address] [place] - [address] [place] [address]  Phone: [phone] Fax: [phone]    Primary Care Provider: Kadi Hicks MD    Primary Insurance: 254 [facility] [address] [facility] REP  Secondary Insurance:     PROGRESS NOTE:    Iam Tavares 5  Phone call placed to [facility]  Phone number: [phone]  Spoke to [facility]  Juan Interiano #: [id number]  SOC: 2023  APPROVED 4 DAYS  NRD: 2023  CARE COORDINATOR: Evan Tracy  PHONE: [phone]  FAX: [phone]    AUTH INFORMATION FORWARDED TO  Bibi Arellano

## 2023-05-06 NOTE — CASE MANAGEMENT
Case Management Discharge Planning Note    Patient name Hanane Drilling  Location S /S -10 MRN [id number]  : 1955 Date 2023       Current Admission Date: 2023  Current Admission Diagnosis:Chronic diastolic congestive heart failure [facility])   Patient Active Problem List    Diagnosis Date Noted   • Constipation 2023   • Adjustment disorder 2023   • Pulmonary embolism ([facility] )    • COVID-19 2023   • Cerumen debris on tympanic membrane of both ears 2023   • Generalized weakness 2023   • Impaired functional mobility, balance, gait, and endurance 2023   • BPH (benign prostatic hyperplasia) 2023   • Chronic anticoagulation 2023   • Pressure injury, unstageable, with suspected deep tissue injury ([facility] ) 2023   • Dyspnea on exertion 2023   • Morbid obesity ([facility] ) 2023   • Primary hypertension 2023   • History of pulmonary embolism 2023   • CHF (congestive heart failure) ([address] ) 2023   • DVT (deep venous thrombosis) ([facility] ) 2023   • Chronic diastolic congestive heart failure ([facility] ) 2021   • DJD (degenerative joint disease), multiple sites 2021   • Ambulatory dysfunction 2021   • Chronic right-sided congestive heart failure ([facility] ) 2021   • BMI 50 0-59 9, adult ([facility] ) 2020   • Debility 2020   • Restrictive lung disease 2020   • Medical marijuana use 2018   • EVERETT (obstructive sleep apnea) 2018   • Obesity hypoventilation syndrome ([address] ) 2018   • Pulmonary hypertension ([facility] [facility] ) 01/15/2018   • Pain medication agreement signed 2017   • Neuralgia, post-herpetic 2017   • Sciatica 2012   • Fercho de la Tourette's syndrome 2012   • Chronic pain disorder 2012      LOS (days): 29  Geometric Mean LOS (GMLOS) (days): 3 90  Days to GMLOS:-25 1     OBJECTIVE:  Risk of Unplanned Readmission Score: 25 57         Current admission status: Inpatient Preferred Pharmacy:   [address], [place] - [address]  [address] [place] [address]  Phone: [phone] Fax: [phone]    Primary Care Provider: Sampson Wick MD    Primary Insurance: 254 [facility] REP  Secondary Insurance:     DISCHARGE DETAILS:    Discharge planning discussed with[de-identified] Patient and spouse  Freedom of Choice: Yes     CM contacted family/caregiver?: Yes  Were Treatment Team discharge recommendations reviewed with patient/caregiver?: Yes  Did patient/caregiver verbalize understanding of patient care needs?: N/A- going to facility  Were patient/caregiver advised of the risks associated with not following Treatment Team discharge recommendations?: Yes    Requested [address]         Is the patient interested in [address] at discharge?: No    DME Referral Provided  Referral made for DME?: No    Other Referral/Resources/Interventions Provided:  Interventions: Short Term Rehab    Would you like to participate in our [address] service program?  : No - Declined    Treatment Team Recommendation: Short Term Rehab  Discharge Destination Plan[de-identified] Short Term Rehab (Dory Sizer)  Transport at Discharge : [facility]  Dispatcher Contacted: Yes  Number/Name of Dispatcher: ROUNDTRIP  Transported by [employer] and Unit #): SLETS  ETA of Transport (Date): 05/06/23  ETA of Transport (Time): 1000     Additional Comments: AUTHORIZATION OBTAINED  PLEASE SEE PRIOR CM NOTE  ALL PARTIES MADE AWARE OF TRANSPORT ARRANGEMENTS      Accepting Facility Name, [address] : [address] [place], [place]  Receiving Facility/Agency Phone Number: [phone]  Facility/Agency Fax Number: [address] Number: [phone]    HUMPHREY Kennedy, PATOW, ACRAGHU, [facility]  05/06/23 11:33 AM

## 2023-05-06 NOTE — NURSING NOTE
When I was about to administer midnight dose of Lyrica (150mg) I found another dose (2x75 mg capsules) under patients computer  After consultation with charge RN I returned Lyrica that I just pulled out from [facility] with Jenny Cartagena, charge RN and administered the dose that was already pulled out previously

## 2023-05-06 NOTE — ASSESSMENT & PLAN NOTE
· History of DVT, PE  · INR therapeutic  · Continue with warfarin  7 5 mg INR permitting, restarted and continued on discharge

## 2023-05-06 NOTE — ASSESSMENT & PLAN NOTE
· No shortness of breath, continues on 4 L nasal cannula baseline oxygen  · Continue on p o  Lasix  40 BID  · Daily weights monitor input and output  · Pending placement to rehab, prefers [facility] which is in [place], peer to peer completed  · Patient excepted to [facility] for rehab and discharged

## 2023-05-06 NOTE — DISCHARGE INSTR - AVS FIRST PAGE
Dear Frida Barrera,     It was our pleasure to care for you here at [facility]  It is our hope that we were always able to exceed the expected standards for your care during your stay  You were hospitalized due to ***  You were cared for on the *** floor by Dana Monzon MD under the service of Michelle Downs MD with the [facility] who covers for your primary care physician (PCP), Mariah Soto MD, while you were hospitalized  If you have any questions or concerns related to this hospitalization, you may contact us at [phone]  For follow up as well as any medication refills, we recommend that you follow up with your primary care physician  A registered nurse will reach out to you by phone within a few days after your discharge to answer any additional questions that you may have after going home  However, at this time we provide for you here, the most important instructions / recommendations at discharge:     Notable Medication Adjustments -   Increase lasix 40 once daily to twice daily   Take Lexapro 5 mg daily  Take pantoprazole 40 mg daily  Take daily potassium 20 mg daily  Ophthalmic drops provided  Flonase as needed  Avoid Sudafed daily  Testing Required after Discharge -   None  Important follow up information -   Follow up with PCP   Other Instructions -   If increased volume notivce or increase 0xygen requirements call PCP/Cardiology  Please review this entire after visit summary as additional general instructions including medication list, appointments, activity, diet, any pertinent wound care, and other additional recommendations from your care team that may be provided for you        Sincerely,     Dana Monzon MD

## 2023-05-06 NOTE — CASE MANAGEMENT
Awilda Pickering 50 has received approved authorization from insurance [address] [place] Peer to Peer Review   Called in by Rep: Timothy Blankenship P# [phone]  Authorization received for: SNF  Facility: [facility] #: [id number] [facility] MAP#[id number])  Start of Care:5/5/23  Next Review Date:5/9/23  Care Coordinator: Delano Fothergill  T#:[phone]  Submit next review via [address] notified:  Anne Richard

## 2023-05-06 NOTE — CASE MANAGEMENT
Case Management Progress Note    Patient name Marcello Mahmood  Location S /S -19 MRN [id number]  : 1955 Date 2023       LOS (days): 29  Geometric Mean LOS (GMLOS) (days): 3 90  Days to GMLOS:-25        OBJECTIVE:        Current admission status: Inpatient  Preferred Pharmacy:   [address] [address]  Phone: [phone] Fax: [phone]    Primary Care Provider: Junior Barrera MD    Primary Insurance: 254 [facility] [address] [facility] REP  Secondary Insurance:     PROGRESS NOTE:    CM present for P2P with [employer], patient's authorization approved  Harrison Graham 99 aware that information will be forwarded to them and will need to be forwarded to this CM to provide to accepting facility       HUMPHREY Gilliland, PATOW, ACM, [facility]  23 9:52 AM

## 2023-05-07 NOTE — ED NOTES
RN calls from [facility] for information on pt  Pt consents to share information and consent form faxed to this facility   Care specifics return faxed to [address] [place] [address]  05/07/23 5481

## 2023-05-08 ENCOUNTER — TELEPHONE (OUTPATIENT)
Dept: CASE MANAGEMENT | Facility: HOSPITAL | Age: 68
End: 2023-05-08

## 2023-05-08 PROBLEM — H61.23 CERUMEN DEBRIS ON TYMPANIC MEMBRANE OF BOTH EARS: Status: RESOLVED | Noted: 2023-03-09 | Resolved: 2023-05-08

## 2023-05-08 NOTE — UTILIZATION REVIEW
NOTIFICATION OF ADMISSION DISCHARGE   This is a Notification of Discharge from [facility]  Please be advised that this patient has been discharge from our facility  Below you will find the admission and discharge date and time including the patient’s disposition  UTILIZATION REVIEW CONTACT:  Cherri Croft MA  Utilization   Network Utilization Review Department  Phone: [phone] x carefully listen to the prompts  All voicemails are confidential   Email: [email or web address]     ADMISSION INFORMATION  PRESENTATION DATE: 4/7/2023  9:02 AM  OBERVATION ADMISSION DATE:   INPATIENT ADMISSION DATE: 4/7/23 11:30 AM   DISCHARGE DATE: 5/6/2023  1:03 PM   DISPOSITION:Non [facility] SNF/TCU/SNU    IMPORTANT INFORMATION:  Send all requests for admission clinical reviews, approved or denied determinations and any other requests to dedicated fax number below belonging to the campus where the patient is receiving treatment   List of dedicated fax numbers:  [address] DENIALS (Administrative/Medical Necessity) [phone]   [address] (Maternity/NICU/Pediatrics) [phone]   [facility] [phone]   [place] [address] [phone]   [address] [phone]   [facility] [phone]   [facility] [phone]   [address] [place] [address] 119 [phone]   [facility]  [phone]   [facility] [phone]   [facility] [address] [facility] [phone]

## 2023-05-08 NOTE — TELEPHONE ENCOUNTER
Call received from patient's spouse relaying dissatisfaction re: rehab transfer  Reports that patient was transferred to [facility] on Saturday and had been sent to the [facility] on Sunday  Reports facility did not have the appropriate equipment for patient (bariatric bed or BiPAP) and that staff had not been properly able to care for patient  Reports that she does not want patient returning to that facility, but aware that this writer is unable to assist with discharging planning from current hospital  Supportive listening provided to spouse as she spoke about difficult transitions patient has experienced over the last few months   Spouse reports that she has a meeting with case management at [address] [place] [address] this morning, and aware she can work with them for d/c planning from that hospital     HUMPHREY Crum  10:38 AM

## 2023-08-23 NOTE — PROGRESS NOTES
Anesthesia Post-op Note    Patient: Yenny Estrada  Procedure(s) Performed:  SECTION  Anesthesia type: Spinal    Vitals Value Taken Time   Temp 36.4 °C (97.5 °F) 23 180   Pulse 85 23 1809   Resp 16 23 180   SpO2 96 % 23 180   /75 23 180         Patient Location: Labor and Delivery  Post-op Vital Signs:stable  Level of Consciousness: participates in exam, awake, alert and oriented  Respiratory Status: spontaneous ventilation, unassisted and room air  Cardiovascular blood pressure returned to baseline  Hydration: euvolemic  Pain Management: well controlled  Handoff: Handoff to receiving clinician was performed and questions were answered  Vomiting: none  Nausea: None  Airway Patency:patent  Post-op Assessment: awake, alert, appropriately conversant, or baseline, no complications, patient tolerated procedure well, dentition within defined limits and No Corneal Abrasion  Comments: VSS, comfortable, neuraxial block present, satisfied with care, rapport to RN.  Pt reevaluated @ 8:05 PM: comfortable, VSS, neuraxial block resolved, neurologically @ baseline, denies headache, nausea.      No notable events documented.   [facility]  Progress Note  Name: Maximino Grier  MRN: [id number]  Unit/Bed#: S -75 I Date of Admission: 3/28/2023   Date of Service: 2023 I Hospital Day: 4    Assessment/Plan   * Acute on chronic respiratory failure with hypoxia and hypercapnia (HCC)  Assessment & Plan  Pt presented to ED with C/o productive cough x 1 week, shortness of breath, no improvement with nebs  He also noted that his SpO2 was low in the 80's at rehab  Pt wears CPAP qhS doesn't know settings  Per medical record he is non-compliant with CPA as outpatient  Initial /102/64/50  Pt placed on BiPAP in ED 15 inspiratory / 5 expiratory pressure   Respiratory virus panel COVID + 3/29/2023  Legionella and Strep urine Ag negative   CT chest - mild dependent atelectasis     Acute on chronic respiratory failure likely 2/2 COVID with OHS and CPAP noncompliance contributing to chronic respiratory failure with hypoxia and hypercapnia  Patient was initially admitted to critical care, then transferred to [facility] level of care after he was no longer requiring BiPAP continuously  Patient intermittently requiring BiPAP qHS and 6L mid-flow NC while awake     Patient uses 4L at baseline, however noted at home 4L wasn't enough     Plan  • Continue BiPAP qHS, titrate FiO2 to maintain SpO2 > 88%  • Continue COVID pathway, see plan below  • Respiratory protocol  • Incentive spirometry   • Hold oxy and robaxin for any concerns of AMS    SIRS (systemic inflammatory response syndrome) ([facility])  Assessment & Plan  On admission, patient had tachycardia and tachypnea = SIRS +   CT chest showed mild bilateral dependent atelectasis   S/p Cefepime and Vancomycin in ED   UA negative, Procal negative, Lactic negative    Likely 2/2 COVID with no other known potential source of infection     Blood cultures no growth at 48 hours  Sputum cultures + mixed respiratory tana     Plan:   - Repeat CBC to follow WBC   - Monitor for other potential sources of infection, especially skin   - Continue COVID treatment with remdesivir, decadron     COVID-19  Assessment & Plan  Patient presented from SNF with cough and SOB x 1 week   COVID + on viral panel in ED   Procal negative, S/p 2 days of Rocephin and Doxycycline   BNP negative   CRP and D-Dimer elevated   Recent Labs     03/30/23  1803 04/01/23  0434   PROCALCITONI 0 12 0 09     CRP: 9 5>6    Plan:   - Continue COVID pathway:   ? Decadron 0 1mg/kg BID, Day 5/10   ? Remdesivir Day 5/5  - Mucinex 1200mg BID  -Tessalon Perles 200mg TID prn  -Continue respiratory support with BiPAP  - Recheck Procal and trend CRP  - Consider CT PE scan if concerns for PE     Pulmonary hypertension ([facility] [address] )  Assessment & Plan  Patient with history of pulmonary hypertension  On most recent echo, PAP unable to be calculated   PTA: lasix 40mg daily     Patient appears euvolemic on exam without any peripheral edema  No large pleural effusions seen on imaging  No JVD  Plan:   - Continue PTA lasix 40mg daily  - Daily weights   - Is/Os     Chronic pain disorder  Assessment & Plan  Patient with chronic pain from post herpetic neuralgia and chronic back pain  PTA: Tramadol, Robaxin, Lyrica     Plan:  - Oxy and robaxin ordered for pain with improved level of alertness   - Continue PTA lyrica   - Tylenol 975mg q8h PRN   - Lidocaine patch vs voltaren gel for back pain exacerbations     CHF (congestive heart failure) (HCC)  Assessment & Plan  Wt Readings from Last 3 Encounters:   04/01/23 (!) 186 kg (410 lb)   03/28/23 (!) 197 kg (434 lb 6 4 oz)   03/24/23 (!) 198 kg (435 lb 12 8 oz)     Hx diastolic HF  Most recent echo (2/5/23) - LVEF 60%, indeterminate diastolic function  RV mildly dilated    PTA Lasix 40mg  No evidence of acute exacerbation     Plan:   - Continue home lasix  - Monitor Is/Os   - Daily weights    History of pulmonary embolism  Assessment & Plan  Hx PE noted on CT PE study 1/12/2018 - Pulmonary embolism is demonstrated as discussed above involving segmental and subsegmental branches of the left and right main pulmonaries  PTA: Warfarin 9mg daily (mon-Fri) and 10mg (Sat-Sun)    Recent Labs     03/30/23  1803 04/01/23  0434 04/02/23  0423   INR 3 87* 3 91* 3 30*         Plan:   - Holding home warfarin due to supratherapeutic   - Monitor respiratory status     Severe obstructive sleep apnea  Assessment & Plan  Patient has history of EVERETT and is prescribed CPAP for home use  Patient endorses compliance but medical record notes hx of non-compliance  EVERETT likely 2/2 BMI 55 and OHS    Plan:   - BiPAP for increased respiratory support until stable   - CPAP qHS once improved     Primary hypertension  Assessment & Plan  Blood Pressure: 142/74    Blood pressures have been elevated since arrival    Patient on lasix 40mg daily PTA  Plan:   - Consider amlodipine 5mg daily for blood pressure control if patient remains hypertensive   - Resume home medication regimen     Morbid obesity ([address] )  Assessment & Plan    Lab Results   Component Value Date    HGBA1C 5 2 03/30/2023     BMI 55 72    Plan:   - Hgb A1c to assess for DM                VTE Pharmacologic Prophylaxis: VTE Score: 6 High Risk (Score >/= 5) - Pharmacological DVT Prophylaxis Ordered: warfarin (Coumadin)  Sequential Compression Devices Ordered  Patient Centered Rounds: I performed bedside rounds with nursing staff today  Discussions with Specialists or Other Care Team Provider: Attending Physician, Case Management, PT/OT, RT, Critical Care     Education and Discussions with Family / Patient: Will call and update family later today  Current Length of Stay: 4 day(s)  Current Patient Status: Inpatient   Discharge Plan: Anticipate discharge in 24-48 hrs to rehab facility  Code Status: Level 1 - Full Code    Subjective:   Patient seen and examined at bedside this morning  Patient reports feeling very short of breath today, worst than prior   He denies any pain, chest pain, abdominal pain, nausea, vomiting, diarrhea, or constipation  Overnight, no acute events reported by night team       Nursing reports no concerns  Objective:     Vitals:   Temp (24hrs), Av 7 °F (37 1 °C), Min:98 1 °F (36 7 °C), Max:99 1 °F (37 3 °C)    Temp:  [98 1 °F (36 7 °C)-99 1 °F (37 3 °C)] 98 7 °F (37 1 °C)  HR:  [70-95] 70  Resp:  [17-18] 17  BP: (135-142)/(62-82) 139/82  SpO2:  [94 %-96 %] 96 %  Body mass index is 52 68 kg/m²  Input and Output Summary (last 24 hours): Intake/Output Summary (Last 24 hours) at 2023 1443  Last data filed at 2023 1019  Gross per 24 hour   Intake 300 ml   Output 450 ml   Net -150 ml       Physical Exam:   Physical Exam  Vitals and nursing note reviewed  Constitutional:       General: He is not in acute distress  Appearance: He is obese  He is ill-appearing  He is not toxic-appearing or diaphoretic  Interventions: Nasal cannula in place  HENT:      Head: Normocephalic and atraumatic  Nose: Nose normal  No congestion or rhinorrhea  Mouth/Throat:      Mouth: Mucous membranes are moist    Eyes:      General: No scleral icterus  Right eye: No discharge  Left eye: No discharge  Conjunctiva/sclera: Conjunctivae normal    Cardiovascular:      Rate and Rhythm: Normal rate and regular rhythm  Pulses: Normal pulses  Heart sounds: Normal heart sounds  No murmur heard  No friction rub  No gallop  Pulmonary:      Effort: Pulmonary effort is normal  No respiratory distress  Breath sounds: Decreased air movement present  No stridor  Decreased breath sounds present  No wheezing, rhonchi or rales  Chest:      Chest wall: No tenderness  Abdominal:      General: Abdomen is protuberant  Bowel sounds are normal  There is no distension  Palpations: Abdomen is soft  Tenderness: There is no abdominal tenderness  There is no guarding  Hernia: A hernia is present   Hernia is present in the umbilical area  Musculoskeletal:         General: No swelling, tenderness, deformity or signs of injury  Right lower leg: No edema  Left lower leg: No edema  Skin:     General: Skin is warm and dry  Capillary Refill: Capillary refill takes less than 2 seconds  Coloration: Skin is not jaundiced or pale  Findings: No bruising, erythema, lesion or rash  Neurological:      Mental Status: He is alert and oriented to person, place, and time  Psychiatric:         Behavior: Behavior normal  Behavior is cooperative  Additional Data:     Labs:  Results from last 7 days   Lab Units 04/02/23  0423 03/30/23  1803 03/29/23  0451   WBC Thousand/uL 8 46   < > 6 51   HEMOGLOBIN g/dL 13 3   < > 12 6   HEMATOCRIT % 43 6   < > 42 0   PLATELETS Thousands/uL 240   < > 194   BANDS PCT %  --   --  4   NEUTROS PCT % 64   < >  --    LYMPHS PCT % 27   < >  --    LYMPHO PCT %  --   --  2*   MONOS PCT % 8   < >  --    MONO PCT %  --   --  3*   EOS PCT % 0   < > 0    < > = values in this interval not displayed       Results from last 7 days   Lab Units 04/02/23  0423   SODIUM mmol/L 140   POTASSIUM mmol/L 3 6   CHLORIDE mmol/L 92*   CO2 mmol/L 45*   BUN mg/dL 15   CREATININE mg/dL 0 48*   ANION GAP mmol/L 3*   CALCIUM mg/dL 8 7   ALBUMIN g/dL 2 9*   TOTAL BILIRUBIN mg/dL 0 34   ALK PHOS U/L 67   ALT U/L 59*   AST U/L 25   GLUCOSE RANDOM mg/dL 131     Results from last 7 days   Lab Units 04/02/23  0423   INR  3 30*     Results from last 7 days   Lab Units 03/30/23  1008   POC GLUCOSE mg/dl 138     Results from last 7 days   Lab Units 03/30/23  1859   HEMOGLOBIN A1C % 5 2     Results from last 7 days   Lab Units 04/01/23  0434 03/30/23  1803 03/29/23  0451 03/29/23  0000 03/28/23  2251   LACTIC ACID mmol/L  --   --   --   --  0 7   PROCALCITONIN ng/ml 0 09 0 12 0 12 0 13  --        Lines/Drains:  Invasive Devices     Peripheral Intravenous Line  Duration           Long-Dwell Peripheral IV (Midline) 21/99/05 Right Basilic 4 days                      Imaging: Reviewed radiology reports from this admission including: chest CT scan and abdominal/pelvic CT   CT chest wo contrast   ED Interpretation by Aleyda Valiente PA-C (03/29 0153)   Teodora Felton MD  [phone] 3/29/2023     Narrative & Impression  CT CHEST WITHOUT IV CONTRAST     INDICATION:   hypercapnia; acute on chronic respiratory failure  Patient has confirmed COVID-19      COMPARISON:  CT of abdomen pelvis on March 28, 2023      TECHNIQUE: CT examination of the chest was performed without intravenous contrast  Multiplanar 2D reformatted images were created from the source data      Radiation dose length product (DLP) for this visit:  6235 mGy-cm   This examination, like all CT scans performed in the [facility], was performed utilizing techniques to minimize radiation dose exposure, including the use of iterative   reconstruction and automated exposure control       FINDINGS:     LUNGS:  Mild bilateral dependent atelectasis  Otherwise no focal consolidation  The central airways are patent      PLEURA:  Unremarkable      HEART/GREAT VESSELS: Heart is unremarkable for patient's age  No thoracic aortic aneurysm      MEDIASTINUM AND EMILEE:     Unremarkable      CHEST WALL AND LOWER NECK:  Unremarkable      VISUALIZED STRUCTURES IN THE UPPER ABDOMEN:  Unremarkable      OSSEOUS STRUCTURES:  Spinal degenerative changes are noted  No acute fracture or destructive osseous lesion      IMPRESSION:     Mild bilateral dependent atelectasis      Workstation performed: [id number]           Final Result by Teodora Felton MD (03/29 0143)      Mild bilateral dependent atelectasis  Workstation performed: [id number]         CT abdomen pelvis wo contrast   Final Result by Teodora Felton MD (03/29 0045)      Sludge and/or stones within the gallbladder  No pericholecystic inflammatory change              Workstation performed: [id number]             Recent Cultures (last 7 days):   Results from last 7 days   Lab Units 03/29/23  1515 03/29/23  0859 03/29/23  0000 03/28/23  2254   BLOOD CULTURE   --   --  No Growth After 4 Days  No Growth After 4 Days  SPUTUM CULTURE  3+ Growth of  --   --   --    GRAM STAIN RESULT  1+ Epithelial cells per low power field*  1+ Polys*  3+ Gram positive cocci in clusters*  2+ Gram positive rods*  1+ Gram negative rods*  --   --   --    LEGIONELLA URINARY ANTIGEN   --  Negative  --   --        Last 24 Hours Medication List:   Current Facility-Administered Medications   Medication Dose Route Frequency Provider Last Rate   • acetaminophen  975 mg Oral Q8H Campbell Ureña MD     • benzonatate  200 mg Oral TID PRN Hardik Dire, DO     • chlorhexidine  15 mL Mouth/Throat Q12H [address] [place]     • dexamethasone  0 1 mg/kg Intravenous Q12H Omer G Ignacio 19 7 mg (04/02/23 0410)   • furosemide  40 mg Oral Daily O[address] SOFIE Pierre     • guaiFENesin  1,200 mg Oral Q12H [address] Campbell Ureña MD     • ipratropium-albuterol  3 mL Nebulization 4x Daily PRN Campbell Ureña MD     • labetalol  10 mg Intravenous Q4H PRN Campbell Ureña MD     • methocarbamol  750 mg Oral Q6H PRN Campbell Ureña MD     • oxyCODONE  10 mg Oral Q4H PRN Campbell Ureña MD     • oxyCODONE  5 mg Oral Q4H PRN Campbell Ureña MD     • pregabalin  150 mg Oral TID Rella Peer          Today, Patient Was Seen By: Campbell Ureña MD    **Please Note: This note may have been constructed using a voice recognition system  **

## 2024-11-04 NOTE — ASSESSMENT & PLAN NOTE
· Hx DVT  · Home regimen: Warfarin daily  · Hold Warfarin for now  · PT/INR - 26 4/2 40  · Start Heparin gtt for DVT/PE Show Topical Anesthesia Variable?: Yes

## 2025-06-18 NOTE — PHYSICAL THERAPY NOTE
PHYSICAL THERAPY TREATMENT NOTE  NAME:  Frida Barrera  DATE: 04/13/23    Length Of Stay: 6  Performed at least 2 patient identifiers during session: Name and Birthday    TREATMENT:    04/13/23 1118   PT Last Visit   PT Visit Date (S)  04/13/23  (split session)   Note Type   Note Type BID visit/treatment  (as split session)   Pain Assessment   Pain Assessment Tool 0-10   Pain Score   (initially 8, up to 9 >30 min prior to start of second PM session)   Pain Location/Orientation Location: Neck   Effect of Pain on Daily Activities limits upright unsupported sitting, limits standing tolerance   Patient's Stated Pain Goal No pain   Hospital Pain Intervention(s) Repositioned; Ambulation/increased activity; Medication (See MAR); Emotional support  (medicated prior to both sessions; declined ice)   Multiple Pain Sites Yes  (chronic L upper back)   Restrictions/Precautions   Weight Bearing Precautions Per Order No   Other Precautions Bed Alarm; Chair Alarm; Fall Risk;Fluid restriction;Pain;Telemetry  (condom cath)   General   Chart Reviewed Yes   Response to Previous Treatment Patient with no complaints from previous session  Family/Caregiver Present No   Cognition   Overall Cognitive Status WFL   Arousal/Participation Alert   Attention Attends with cues to redirect   Following Commands Follows one step commands with increased time or repetition   Subjective   Subjective Pt pleasant and cooperative, agreeable to participate and wanting to get OOB in chair prior to AM session  Reports disliking the hospital bed and awaiting new bed  Bed Mobility   Rolling R 2  Maximal assistance   Additional items Assist x 1;Bedrails;HOB elevated; Increased time required;Verbal cues   Rolling L 1  Dependent  (limited due to narrow bed)   Additional items Assist x 1;Bedrails; Increased time required;Verbal cues   Supine to Sit Unable to assess  (as focus on AM session on care coordination w/ nursing staff for OOB mobility transfers specific to this pt )   Additional Comments Pt able to lift trunk from elevated HOB for 5 seconds w/ A of 1   Transfers   Mechanical lift 1  Dependent  (taking more than reasonable time to confirm pt w/ limited fear/retreat to perform trasnfers and to provide education to both pt and staff re: precautions with transfer)   Additional items Assist x 3; Increased time required;Verbal cues;Armrests  (using Guldmann seated sling)   Additional Comments majority of time for care coordination w/ nursing staff for education in mobilizing pt OOB using guldmann sling to recliner, and promoting 90-90 positioning as able to Le's supported   Ambulation/Elevation   Gait pattern Not appropriate   Ambulation/Elevation Additional Comments Brenda's Egress test FAIL; Brenda's Bariatric body type APPLE ASCITES   Wheelchair Activities   Wheelchair Cushion   (bariatric cushion in recliner)   Pressure Relief Type Lateral lean  (in recliner)   Balance   Static Sitting Fair +   Dynamic Sitting Fair -   Endurance Deficit   Endurance Deficit Yes   Endurance Deficit Description needs therapeutic rests between mobility trials including rolling  Activity Tolerance   Activity Tolerance Patient limited by fatigue;Patient limited by pain   Nurse Made Aware care coordination w/ Damien Hernandez RN and PCA  Spoke to [employer] who reported that other nursing staff on currently is aware of how to use ceiling nanette lift for OOB transfers or can reach out to other staff that does know/is comfortable   Medical Staff Made Aware [facility]  Reached out pedro Naranjo from nutrition re: Pt's current food program   Assessment   Prognosis Guarded   Problem List Decreased strength;Decreased range of motion;Decreased endurance; Impaired balance;Decreased mobility;Obesity; Decreased skin integrity;Pain   Assessment Nursing reported good understanding of patient's specific mobility recommendations for out of bed transfers to a target surface using a seated Guldmann Sling  Patient did not complain of any lower extremity pain with seated swelling straps at thighs  During session today, Mere Bai required intermittently more physical assistance for performing rolling but was limited due to witdh of bed  He was able to participate in other mobility progressions including lifting trunk from elevated head of bed and initiating straight leg raises  Based on patient's body habitus of Apple ascites, he will likely have an easier time performing sit to stand transfers from the recliner chair as opposed to the total care bariatric bed, especially with his need to widen his stance to perform sit to stand transfers  Recommend trials this afternoon to perform sit to stand from recliner using belt and hammock technique  Additionally recommend patient be out of bed to the recliner at least 2 hours at a time for upright positioning, and potentially longer if he can reposition self better in recliner chair  Barriers to Discharge Decreased caregiver support; Inaccessible home environment   Goals   Patient Goals to get out of this bed   STG Expiration Date 04/21/23   PT Treatment Day 3   Plan   Treatment/Interventions Functional transfer training;LE strengthening/ROM; Therapeutic exercise; Endurance training;Patient/family training;Bed mobility;Gait training;Equipment eval/education;Cognitive reorientation;Spoke to nursing;Spoke to case management;OT   Progress Slow progress, decreased activity tolerance   PT Frequency   (5x/wk Mon-fri)   Recommendation   PT Discharge Recommendation Post acute rehabilitation services   Equipment Recommended   (nanette lift by nursing for OOB transfers; potential walking sling and wide rolling walker in future with mobility progression from recliner chair)   AM-PAC Basic Mobility Inpatient   Turning in Flat Bed Without Bedrails 2   Lying on Back to Sitting on Edge of Flat Bed Without Bedrails 1   Moving Bed to Chair 1   Standing Up From Chair Using Arms 1   Walk in Room 1   Climb 3-5 Stairs With Railing 1   Basic Mobility Inpatient Raw Score 7   Turning Head Towards Sound 4   Follow Simple Instructions 3   Low Function Basic Mobility Raw Score  14   Low Function Basic Mobility Standardized Score  22 01   Highest Level Of Mobility   JH-HLM Goal 2: Bed activities/Dependent transfer   JH-HLM Achieved 4: Move to chair/commode   Education   Education Provided Mobility training   Patient Reinforcement needed; Explanation/teachback used   End of Consult   Patient Position at End of Consult Bedside chair; All needs within reach;Bed/Chair alarm activated   Nursing Recommendations:   Mobility Plan as of 04/13/23: Pt is dependent Assist x2 with  guldmann bariatric seated sling  to/from recliner  Encourage OOB for all meals  The patient's AM-PAC Basic Mobility Inpatient Short Form Raw Score is 7  A Raw score of less than or equal to 16 suggests the patient may benefit from discharge to post-acute rehabilitation services, which DOES coincide with CURRENT above PT recommendations  However please refer to therapist recommendation for discharge planning given other factors that may influence destination  Adapted from [employer] of AM-PAC “6-Clicks” Basic Mobility and Daily Activity Scores With Discharge Destination  Physical Therapy, 2021;101:1-9   DOI: [email or web address]    Dominick Diaz PT, DPT additional location...